# Patient Record
Sex: FEMALE | Race: WHITE | NOT HISPANIC OR LATINO | Employment: FULL TIME | URBAN - METROPOLITAN AREA
[De-identification: names, ages, dates, MRNs, and addresses within clinical notes are randomized per-mention and may not be internally consistent; named-entity substitution may affect disease eponyms.]

---

## 2017-01-03 PROBLEM — M79.601 ARM PAIN, RIGHT: Status: ACTIVE | Noted: 2017-01-03

## 2018-04-28 ENCOUNTER — HOSPITAL ENCOUNTER (EMERGENCY)
Facility: HOSPITAL | Age: 63
Discharge: HOME | End: 2018-04-28
Attending: EMERGENCY MEDICINE | Admitting: EMERGENCY MEDICINE

## 2018-04-28 ENCOUNTER — APPOINTMENT (OUTPATIENT)
Dept: CT IMAGING | Facility: HOSPITAL | Age: 63
End: 2018-04-28

## 2018-04-28 VITALS
RESPIRATION RATE: 16 BRPM | TEMPERATURE: 98.5 F | SYSTOLIC BLOOD PRESSURE: 146 MMHG | OXYGEN SATURATION: 94 % | HEART RATE: 97 BPM | HEIGHT: 72 IN | BODY MASS INDEX: 27.77 KG/M2 | DIASTOLIC BLOOD PRESSURE: 93 MMHG | WEIGHT: 205 LBS

## 2018-04-28 DIAGNOSIS — S00.03XA HEMATOMA OF SCALP, INITIAL ENCOUNTER: Primary | ICD-10-CM

## 2018-04-28 DIAGNOSIS — S09.90XA MINOR CLOSED HEAD INJURY: ICD-10-CM

## 2018-04-28 PROCEDURE — 99283 EMERGENCY DEPT VISIT LOW MDM: CPT

## 2018-04-28 PROCEDURE — 70450 CT HEAD/BRAIN W/O DYE: CPT

## 2018-05-08 ENCOUNTER — TRANSCRIBE ORDERS (OUTPATIENT)
Dept: OCCUPATIONAL THERAPY | Facility: CLINIC | Age: 63
End: 2018-05-08

## 2018-05-08 DIAGNOSIS — S76.211A GROIN STRAIN, RIGHT, INITIAL ENCOUNTER: Primary | ICD-10-CM

## 2018-05-09 ENCOUNTER — TREATMENT (OUTPATIENT)
Dept: PHYSICAL THERAPY | Facility: CLINIC | Age: 63
End: 2018-05-09

## 2018-05-09 DIAGNOSIS — S76.211D GROIN STRAIN, RIGHT, SUBSEQUENT ENCOUNTER: Primary | ICD-10-CM

## 2018-05-09 PROCEDURE — 97110 THERAPEUTIC EXERCISES: CPT | Performed by: PHYSICAL THERAPIST

## 2018-05-09 PROCEDURE — 97140 MANUAL THERAPY 1/> REGIONS: CPT | Performed by: PHYSICAL THERAPIST

## 2018-05-09 PROCEDURE — 97001 PR PHYS THERAPY EVALUATION: CPT | Performed by: PHYSICAL THERAPIST

## 2018-05-09 NOTE — PROGRESS NOTES
Physical Therapy Initial Evaluation and Plan of Care    Patient: Penelope Onofre   : 1955  Diagnosis/ICD-10 Code:  Groin strain, right, subsequent encounter [S76.211D]  Referring practitioner: CARYN Wiggins    Subjective Evaluation    History of Present Illness  Date of onset: 2018  Mechanism of injury: Walking down hallway and slipped falling on R hip/head.  Didn't notice hip initially due to head pain. Pt denied dizziness before or after her fall    PMH of sciatica which wasn't symptomatic before her fall.  Pt also has a history of anxiety.        Patient Occupation: [employer] for 27 years   Precautions and Work Restrictions: no pushing/pullling >20#, sit down prn, no crawling/kneeling/squatting/standing/walkingPain  Current pain ratin  At worst pain ratin  Location: R inguinal region  Quality: pulling  Relieving factors: heat  Aggravating factors: lifting (sit to stand)    Diagnostic Tests  CT scan: normal    Treatments  Previous treatment: medication (dose pack)  Patient Goals  Patient goals for therapy: decreased pain             Objective       Palpation   Left   No palpable tenderness to the adductor brevis, adductor longus and iliopsoas.     Tenderness     Lumbar Spine  No tenderness in the spinous process.     Right Hip   Tenderness in the inguinal ligament. No tenderness in the ASIS and greater trochanter.     Active Range of Motion   Left Hip   External rotation (90/90): 30 degrees   Internal rotation (90/90): 30 degrees     Right Hip   Flexion: 95 degrees with pain  External rotation (90/90): 30 degrees   Internal rotation (90/90): 32 degrees     Passive Range of Motion     Right Hip   Normal passive range of motion    Strength/Myotome Testing     Left Hip   Planes of Motion   Flexion: 5  Extension: 2+    Right Hip   Planes of Motion   Flexion: 5  Extension: 2+  Abduction: 2+  Adduction: 5  External rotation: 5  Internal rotation: 5    Additional Strength  Details  Pt noted R groin pain with L hip flexion    Tests     Lumbar     Right   Negative passive SLR.     Left Hip   SLR: Negative.     Right Hip   Positive SERGIO.   Negative FADIR.     Additional Tests Details  Pt unable to perform R ASLR due to groin pain    Level ASIS    Slow, guarded transitional movements    Ambulation     Observational Gait   Gait: antalgic   Decreased right stance time.          Assessment & Plan     Assessment  Impairments: abnormal or restricted ROM, activity intolerance, impaired physical strength and pain with function  Assessment details:  Penelope Onofre is a pleasant 63 y.o. female that presents with signs and symptoms consistent with the above diagnosis. Pt would benefit from skilled PT services in order to address listed impairments and increase tolerance to normal daily activities including ADLs, work and recreational activities.       Prognosis: good  Functional Limitations: lifting, sleeping, walking, uncomfortable because of pain, sitting and standing  Goals  Plan Goals: STG In 2 weeks  1. Pt to be independent with HEP  2. Pt to ambulate in a good reciprocal gait pattern   3. Pt to report decreased pain with transitional movements such as sit to stand.   4. Pt to exhibit R hip overall strength to >/= 3+/5 with minimal pain to allow for prolonged ADL's and walking    LTG In 4 weeks  1. Pt to exhibit at least 100 degrees of R hip flex AROM to allow for traversing objects and transitional movements as necessary for ADL's  2. Pt to report min to no pain with ADLs/work  3. Pt to return to work with min/no restrictions  4. Pt to exhibit 5/5 strength throughout hip and knee musculature to allow for normalized gait and prolonged standing.  5. - SERGIO testing to allow for pivoting movements during ADLs with minimal discomfort.      Plan  Therapy options: will be seen for skilled physical therapy services  Planned modality interventions: ultrasound, electrical stimulation/[place]izen [place]  stimulation, cryotherapy and thermotherapy (hydrocollator packs)  Planned therapy interventions: abdominal trunk stabilization, flexibility, strengthening, stretching, soft tissue mobilization, manual therapy, joint mobilization, home exercise program and gait training  Frequency: 3x week  Duration in weeks: 4  Treatment plan discussed with: patient        Manual Therapy:    10     mins  72280;  Therapeutic Exercise:    10     mins  10766;     Neuromuscular Rogelio:    -    mins  86385;    Therapeutic Activity:     -     mins  36269;     Gait Training:      -     mins  90145;     Ultrasound:     -     mins  18347;    Electrical Stimulation:    -     mins  13641 ( );  Iontophoresis                 -     mins 41440      Timed Treatment:   20   mins   Total Treatment:     60   mins    PT SIGNATURE: FARZANEH Gonzalez License # [id number]  DATE TREATMENT INITIATED: 5/9/2018    Initial Certification  Certification Period: 8/7/2018  I certify that the therapy services are furnished while this patient is under my care.  The services outlined above are required by this patient, and will be reviewed every 90 days.     PHYSICIAN: Kelly Saravia, CARYN      DATE:     Please sign and return via fax to [phone].. Thank you, [facility].

## 2018-05-09 NOTE — PATIENT INSTRUCTIONS
Access Code: [email or web address]   URL: [email or web address]   Date: 05/09/2018   Prepared by: Angie Hinojosa     Exercises   Supine Hip Adduction Isometric with Ball - 10 reps - 1 sets - 10 hold - 1x daily   Hooklying Isometric Hip Abduction with Belt - 10 reps - 1 sets - 10 hold - 1x daily   Hooklying Isometric Hip Flexion - 10 reps - 1 sets - 5 hold - 1x daily   Supine Gluteal Sets - 10 reps - 1 sets - 5 hold - 1x daily   Beginner Bridge - 10 reps - 1 sets - 5 hold - 1x daily   Seated Hip Adduction Isometrics with Ball - 10 reps - 1 sets - 10 hold - 1x daily   Seated Isometric Hip Abduction with Belt - 10 reps - 1 sets - 10 hold - 2x daily

## 2018-05-11 ENCOUNTER — TREATMENT (OUTPATIENT)
Dept: PHYSICAL THERAPY | Facility: CLINIC | Age: 63
End: 2018-05-11

## 2018-05-11 DIAGNOSIS — S76.211D GROIN STRAIN, RIGHT, SUBSEQUENT ENCOUNTER: Primary | ICD-10-CM

## 2018-05-11 PROCEDURE — 97110 THERAPEUTIC EXERCISES: CPT | Performed by: PHYSICAL THERAPIST

## 2018-05-11 PROCEDURE — 97140 MANUAL THERAPY 1/> REGIONS: CPT | Performed by: PHYSICAL THERAPIST

## 2018-05-11 PROCEDURE — 97035 APP MDLTY 1+ULTRASOUND EA 15: CPT | Performed by: PHYSICAL THERAPIST

## 2018-05-11 NOTE — PROGRESS NOTES
Physical Therapy Daily Progress Note    Visit # : 2  Penelope Onofre reports: feeling about the same; is unsure about exercise technique and would like to have videos of exercises.      Subjective     Objective   See Exercise, Manual, and Modality Logs for complete treatment.       Assessment/Plan  Pt was sent a link for HEP videos and demonstrated good technique and understanding in clinic.  Good tolerance to Sci-Fit exercise progression.  Progress strengthening /stabilization /functional activity           Manual Therapy:    10     mins  78460;  Therapeutic Exercise:    15     mins  89124;     Neuromuscular Rogelio:    -    mins  87898;    Therapeutic Activity:     -     mins  96666;     Gait Training:      -     mins  18163;     Ultrasound:     8     mins  38654;    Electrical Stimulation:    -     mins  98007 ( );  Iontophoresis                 -     mins 20786      Timed Treatment:   33   mins   Total Treatment:     45   mins        Donna Hinojosa PT  Physical Therapist  [place] License # [id number]

## 2018-05-14 ENCOUNTER — TREATMENT (OUTPATIENT)
Dept: PHYSICAL THERAPY | Facility: CLINIC | Age: 63
End: 2018-05-14

## 2018-05-14 DIAGNOSIS — S76.211D GROIN STRAIN, RIGHT, SUBSEQUENT ENCOUNTER: Primary | ICD-10-CM

## 2018-05-14 PROCEDURE — 97140 MANUAL THERAPY 1/> REGIONS: CPT | Performed by: PHYSICAL THERAPIST

## 2018-05-14 PROCEDURE — 97110 THERAPEUTIC EXERCISES: CPT | Performed by: PHYSICAL THERAPIST

## 2018-05-14 PROCEDURE — 97035 APP MDLTY 1+ULTRASOUND EA 15: CPT | Performed by: PHYSICAL THERAPIST

## 2018-05-14 NOTE — PATIENT INSTRUCTIONS
Access Code: [email or web address]   URL: [email or web address]   Date: 05/14/2018   Prepared by: Angie Hinojosa     Exercises   Supine March - 10 reps - 1 sets - 5 hold - 1x daily   Hooklying Clamshell with Resistance - 10 reps - 2 sets - 3 hold - 1x daily   Seated Hip Abduction with Resistance - 10 reps - 2 sets - 3 hold - 1x daily

## 2018-05-14 NOTE — PROGRESS NOTES
Physical Therapy Daily Progress Note    Visit # : 3  Penelope Onofre reports: felt a little better over the weekend but groin is sore today after returning to work.    Subjective     Objective   See Exercise, Manual, and Modality Logs for complete treatment.       Assessment/Plan  Pt had difficulty performing sidestepping in good form on Treadmill and required SBA for safety.  Excellent tolerance to exercise progression without exacerbation of symptoms  Progress per Plan of Care           Manual Therapy:    10     mins  57337;  Therapeutic Exercise:    25     mins  97038;     Neuromuscular Rogelio:    -    mins  88600;    Therapeutic Activity:     -     mins  33285;     Gait Training:      -     mins  04665;     Ultrasound:     8     mins  62394;    Electrical Stimulation:    -     mins  34152 ( );  Iontophoresis                 -     mins 72212      Timed Treatment:   43   mins   Total Treatment:     55   mins        Donna Hinojosa PT  Physical Therapist  [place] License # [id number]

## 2018-05-16 ENCOUNTER — TREATMENT (OUTPATIENT)
Dept: PHYSICAL THERAPY | Facility: CLINIC | Age: 63
End: 2018-05-16

## 2018-05-16 DIAGNOSIS — S76.211D GROIN STRAIN, RIGHT, SUBSEQUENT ENCOUNTER: Primary | ICD-10-CM

## 2018-05-16 PROCEDURE — 97035 APP MDLTY 1+ULTRASOUND EA 15: CPT | Performed by: PHYSICAL THERAPIST

## 2018-05-16 PROCEDURE — 97140 MANUAL THERAPY 1/> REGIONS: CPT | Performed by: PHYSICAL THERAPIST

## 2018-05-16 PROCEDURE — 97110 THERAPEUTIC EXERCISES: CPT | Performed by: PHYSICAL THERAPIST

## 2018-05-16 NOTE — PATIENT INSTRUCTIONS
Access Code: [email or web address]   URL: [email or web address]   Date: 05/16/2018   Prepared by: Angie Hinojosa     Exercises   Hooklying Single Knee to Chest - 2 reps - 1 sets - 20 hold - 1x daily

## 2018-05-16 NOTE — PROGRESS NOTES
Physical Therapy Daily Progress Note    Visit # : 4  Penelope Onofre reports: feeling 85% better    Subjective     Objective   See Exercise, Manual, and Modality Logs for complete treatment.       Assessment/Plan  Pt is responding favorably to treatment and has been consistent with HEP.  Add push/pull sled next visit if tolerated.    Progress per Plan of Care           Manual Therapy:    10     mins  87721;  Therapeutic Exercise:    25     mins  47607;     Neuromuscular Rogelio:    -    mins  76747;    Therapeutic Activity:     -     mins  44875;     Gait Training:      -     mins  01528;     Ultrasound:     8     mins  21005;    Electrical Stimulation:    -     mins  64354 ( );  Iontophoresis                 -     mins 94173      Timed Treatment:   43   mins   Total Treatment:     55   mins        Donna Hinojosa, PT  Physical Therapist  [place] License # [id number]

## 2018-05-18 ENCOUNTER — TREATMENT (OUTPATIENT)
Dept: PHYSICAL THERAPY | Facility: CLINIC | Age: 63
End: 2018-05-18

## 2018-05-18 DIAGNOSIS — S76.211D GROIN STRAIN, RIGHT, SUBSEQUENT ENCOUNTER: Primary | ICD-10-CM

## 2018-05-18 PROCEDURE — 97110 THERAPEUTIC EXERCISES: CPT | Performed by: PHYSICAL THERAPIST

## 2018-05-18 PROCEDURE — 97140 MANUAL THERAPY 1/> REGIONS: CPT | Performed by: PHYSICAL THERAPIST

## 2018-05-18 NOTE — PROGRESS NOTES
Physical Therapy Daily Progress Note    Visit # : 5  Penelope Onofre reports: pt reports that she is feeling much better; no pain at work today.  Has a thermastat issue at home and requests to abbreviate treatment today and will perform HEP/MH when she gets home.      Subjective     Objective   See Exercise, Manual, and Modality Logs for complete treatment.       Assessment/Plan  Held on ultrasound today as well as symptoms have significant decreased.  Reassess for MD next visit.    Progress per Plan of Care           Manual Therapy:    10     mins  55379;  Therapeutic Exercise:    15     mins  71225;     Neuromuscular Rogelio:    -    mins  25603;    Therapeutic Activity:     -     mins  74028;     Gait Training:      -     mins  06333;     Ultrasound:     -     mins  65723;    Electrical Stimulation:    -     mins  29610 ( );  Iontophoresis                 -     mins 95228      Timed Treatment:  25    mins   Total Treatment:     25   mins        Donna Hinojosa, FARZANEH  Physical Therapist  [place] License # [id number]

## 2018-05-21 ENCOUNTER — TREATMENT (OUTPATIENT)
Dept: PHYSICAL THERAPY | Facility: CLINIC | Age: 63
End: 2018-05-21

## 2018-05-21 DIAGNOSIS — S76.211D GROIN STRAIN, RIGHT, SUBSEQUENT ENCOUNTER: Primary | ICD-10-CM

## 2018-05-21 PROCEDURE — 97110 THERAPEUTIC EXERCISES: CPT | Performed by: PHYSICAL THERAPIST

## 2018-05-21 NOTE — PROGRESS NOTES
"MD note    Patient: Penelope Onofre   : 1955  Diagnosis/ICD-10 Code:  Groin strain, right, subsequent encounter [S76.211D]  Referring practitioner: CARYN Wiggins  Date of Initial Visit: Type: THERAPY  Noted: 2018  Today's Date: 2018  Patient seen for 6 sessions      Subjective:   Clinical Progress: improved  Home Program Compliance: Yes  Treatment has included: therapeutic exercise, manual therapy, ultrasound and moist heat    Subjective Evaluation    History of Present Illness  Mechanism of injury: \"I'm feeling 150% better\"    Pain  Current pain ratin         Objective   MMT: R hip flex/Add/IR/ER  and non painful      Gait: pt ambulates in a good reciprocal gait pattern  AROM: R hip flex 120 degrees  Special testing: - SERGIO  Function: pt was able to push a 50# sled with pivot turn 2x25' without symptoms    Assessment & Plan     Assessment  Assessment details: Pt has made excellent progress in physical therapy and has met all STG/LTG and demonstrates good understanding of HEP.  Pt demonstrates ability to return to full duty work.  Recommend discharge from formal PT.           Recommendations: Discharge    PT Signature: Donna Hinojosa, PT  [place] License # [id number]        Manual Therapy:    -     mins  75537;  Therapeutic Exercise:    30     mins  86473;     Neuromuscular Rogelio:   -     mins  84729;    Therapeutic Activity:     5     mins  58146;     Gait Training:      -     mins  95188;     Ultrasound:     -     mins  24454;    Electrical Stimulation:    -     mins  85313 ( );  Iontophoresis                 -     mins 52882    Timed Treatment:   35   mins   Total Treatment:     35   mins                      "

## 2018-06-27 ENCOUNTER — DOCUMENTATION (OUTPATIENT)
Dept: PHYSICAL THERAPY | Facility: CLINIC | Age: 63
End: 2018-06-27

## 2018-06-27 NOTE — PROGRESS NOTES
Discharge Summary  Discharge Summary from Physical Therapy Report      Dates  PT visit: 5/9-5/21/18  Number of Visits: 6     Discharge Status of Patient: See MD Note dated 5/21/18    Goals: All Met    Discharge Plan: Continue with current home exercise program as instructed    Date of Discharge 6/27/18        Donna Hinojosa, PT  Physical Therapist

## 2018-11-17 ENCOUNTER — APPOINTMENT (OUTPATIENT)
Dept: GENERAL RADIOLOGY | Facility: HOSPITAL | Age: 63
End: 2018-11-17

## 2018-11-17 ENCOUNTER — HOSPITAL ENCOUNTER (EMERGENCY)
Facility: HOSPITAL | Age: 63
Discharge: HOME | End: 2018-11-17
Attending: FAMILY MEDICINE | Admitting: FAMILY MEDICINE

## 2018-11-17 VITALS
DIASTOLIC BLOOD PRESSURE: 97 MMHG | SYSTOLIC BLOOD PRESSURE: 150 MMHG | WEIGHT: 182 LBS | HEART RATE: 6 BPM | RESPIRATION RATE: 16 BRPM | BODY MASS INDEX: 24.65 KG/M2 | OXYGEN SATURATION: 99 % | HEIGHT: 72 IN | TEMPERATURE: 98.3 F

## 2018-11-17 DIAGNOSIS — S59.901A ELBOW INJURY, RIGHT, INITIAL ENCOUNTER: Primary | ICD-10-CM
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
## 2018-11-17 PROCEDURE — 99283 EMERGENCY DEPT VISIT LOW MDM: CPT

## 2018-11-17 PROCEDURE — 73070 X-RAY EXAM OF ELBOW: CPT

## 2018-11-17 RX ORDER — HYDROCODONE BITARTRATE AND ACETAMINOPHEN 5; 325 MG/1; MG/1
1 TABLET ORAL EVERY 4 HOURS PRN
Qty: 8 TABLET | Refills: 0 | Status: SHIPPED | OUTPATIENT
Start: 2018-11-17

## 2018-11-17 NOTE — DISCHARGE INSTRUCTIONS
If unable to work Monday due to pain, call Dr Armenta for a recheck in the office Monday or Tuesday.  Return if any problems

## 2018-11-17 NOTE — ED PROVIDER NOTES
" EMERGENCY DEPARTMENT ENCOUNTER    CHIEF COMPLAINT  Chief Complaint: Elbow Pain  History given by: Patient  History limited by:   Room Number: 27/27  PMD: Provider, No Known      HPI:  Pt is a 63 y.o. female who presents complaining of R elbow pain that occurred today PTA while at work. Pt states that she was carrying bags of garbage when she slipped on eggs which caused her to fall. Pt did not strike her head or have LOC. She has been able to move elbow with pain. Pt has also been able to ambulate.    Duration: PTA  Onset: sudden  Timing: constant  Location: R elbow  Radiation: none  Quality: \"pain\"  Intensity/Severity: moderate  Progression: unchanged  Associated Symptoms: none  Aggravating Factors: movement  Alleviating Factors: none  Previous Episodes: none  Treatment before arrival: none    PAST MEDICAL HISTORY  Active Ambulatory Problems     Diagnosis Date Noted   • Arm pain, left 01/03/2017     Resolved Ambulatory Problems     Diagnosis Date Noted   • No Resolved Ambulatory Problems     Past Medical History:   Diagnosis Date   • Claudication (CMS/HCC)    • GERD (gastroesophageal reflux disease)        PAST SURGICAL HISTORY  Past Surgical History:   Procedure Laterality Date   • BREAST CYST EXCISION Right    • TONSILLECTOMY         FAMILY HISTORY  Family History   Problem Relation Age of Onset   • Alzheimer's disease Mother    • Cancer Father        SOCIAL HISTORY  Social History     Socioeconomic History   • Marital status:      Spouse name: Not on file   • Number of children: Not on file   • Years of education: Not on file   • Highest education level: Not on file   Social Needs   • Financial resource strain: Not on file   • Food insecurity - worry: Not on file   • Food insecurity - inability: Not on file   • Transportation needs - medical: Not on file   • Transportation needs - non-medical: Not on file   Occupational History   • Not on file   Tobacco Use   • Smoking status: Former Smoker     Years: " 40.00   • Smokeless tobacco: Never Used   Substance and Sexual Activity   • Alcohol use: No   • Drug use: Defer   • Sexual activity: Defer   Other Topics Concern   • Not on file   Social History Narrative   • Not on file       ALLERGIES  Codeine and Sulfa antibiotics    REVIEW OF SYSTEMS  Review of Systems   Constitutional: Negative for fever.   Respiratory: Negative for shortness of breath.    Cardiovascular: Negative for chest pain.   Musculoskeletal: Positive for arthralgias (R elbow).       PHYSICAL EXAM  ED Triage Vitals [11/17/18 0848]   Temp Heart Rate Resp BP SpO2   -- 86 16 150/97 --      Temp src Heart Rate Source Patient Position BP Location FiO2 (%)   -- -- -- -- --       Physical Exam   Constitutional: She is oriented to person, place, and time. No distress.   Eyes: EOM are normal.   Neck: Normal range of motion.   Cardiovascular: Normal rate and regular rhythm.   Pulmonary/Chest: Effort normal and breath sounds normal. No respiratory distress.   Musculoskeletal:        Right elbow: Tenderness found.   Neurological: She is alert and oriented to person, place, and time. She has normal sensation and normal strength.   Skin: Skin is warm and dry.   Psychiatric: Affect normal.   Nursing note and vitals reviewed.    RADIOLOGY  XR Elbow 2 View Right - negative fx        I ordered the above noted radiological studies. Interpreted by radiologist.  Reviewed by me in PACS.       PROCEDURES  Procedures      PROGRESS AND CONSULTS     9:00 AM  Ordered XR R elbow.  9:27 AM  Rechecked with pt. Informed of her imaging showing no obvious fx, but possibility of radial head fx. Rechecked her ROM and it is actually almost painless.  Suspect this is a contusion and less likelyu an occult radial head fracture. Will place in sling and if sxs persist will have follow-up with orthopedist.       MEDICAL DECISION MAKING  Results were reviewed/discussed with the patient and they were also made aware of online access. Pt also made  aware that some labs, such as cultures, will not be resulted during ER visit and follow up with PMD is necessary.     MDM  Number of Diagnoses or Management Options  Elbow injury, right, initial encounter:   Fall, initial encounter:      Amount and/or Complexity of Data Reviewed  Tests in the radiology section of CPT®: ordered and reviewed (negative)           DIAGNOSIS  Final diagnoses:   Elbow injury, right, initial encounter   Fall, initial encounter       DISPOSITION  DISCHARGE    Patient discharged in stable condition.    Reviewed implications of results, diagnosis, meds, responsibility to follow up, warning signs and symptoms of possible worsening, potential complications and reasons to return to ER.    Patient/Family voiced understanding of above instructions.    Discussed plan for discharge, as there is no emergent indication for admission. Patient referred to primary care provider for BP management due to today's BP. Pt/family is agreeable and understands need for follow up and repeat testing.  Pt is aware that discharge does not mean that nothing is wrong but it indicates no emergency is present that requires admission and they must continue care with follow-up as given below or physician of their choice.     FOLLOW-UP  Carlos Armenta Jr., MD  [address] [address]  [phone]    Call in 2 days  For Orthopedist follow-up, If symptoms worsen         Medication List      New Prescriptions    HYDROcodone-acetaminophen 5-325 MG per tablet  Commonly known as:  NORCO  Take 1 tablet by mouth Every 4 (Four) Hours As Needed (pain).              Latest Documented Vital Signs:  As of 9:33 AM  BP- 150/97 HR- 86 Temp-   O2 sat- 99%    --  Documentation assistance provided by lori Gan for Dr. Lawson.  Information recorded by the scribe was done at my direction and has been verified and validated by me.     Wu Gan  11/17/18 1836       Augustus Lawson MD  11/17/18 4315

## 2019-01-25 ENCOUNTER — APPOINTMENT (OUTPATIENT)
Dept: GENERAL RADIOLOGY | Facility: HOSPITAL | Age: 64
End: 2019-01-25

## 2019-01-25 ENCOUNTER — HOSPITAL ENCOUNTER (EMERGENCY)
Facility: HOSPITAL | Age: 64
Discharge: HOME | End: 2019-01-25
Attending: EMERGENCY MEDICINE | Admitting: EMERGENCY MEDICINE

## 2019-01-25 ENCOUNTER — APPOINTMENT (OUTPATIENT)
Dept: CT IMAGING | Facility: HOSPITAL | Age: 64
End: 2019-01-25

## 2019-01-25 VITALS
TEMPERATURE: 97.6 F | WEIGHT: 168.1 LBS | OXYGEN SATURATION: 95 % | DIASTOLIC BLOOD PRESSURE: 100 MMHG | BODY MASS INDEX: 22.77 KG/M2 | HEIGHT: 72 IN | SYSTOLIC BLOOD PRESSURE: 182 MMHG | HEART RATE: 77 BPM | RESPIRATION RATE: 16 BRPM

## 2019-01-25 DIAGNOSIS — R51.9 NONINTRACTABLE EPISODIC HEADACHE, UNSPECIFIED HEADACHE TYPE: ICD-10-CM

## 2019-01-25 DIAGNOSIS — R79.89 ELEVATED SERUM CREATININE: ICD-10-CM

## 2019-01-25 DIAGNOSIS — I10 HYPERTENSION, UNSPECIFIED TYPE: Primary | ICD-10-CM

## 2019-01-25 LAB
ALBUMIN SERPL-MCNC: 4.2 G/DL (ref 3.5–5.2)
ALBUMIN/GLOB SERPL: 1.3 G/DL
ALP SERPL-CCNC: 79 U/L (ref 39–117)
ALT SERPL W P-5'-P-CCNC: 9 U/L (ref 1–33)
ANION GAP SERPL CALCULATED.3IONS-SCNC: 12.2 MMOL/L
AST SERPL-CCNC: 13 U/L (ref 1–32)
BASOPHILS # BLD AUTO: 0.02 10*3/MM3 (ref 0–0.2)
BASOPHILS NFR BLD AUTO: 0.3 % (ref 0–1.5)
BILIRUB SERPL-MCNC: 0.4 MG/DL (ref 0.1–1.2)
BUN BLD-MCNC: 22 MG/DL (ref 8–23)
BUN/CREAT SERPL: 15.9 (ref 7–25)
CALCIUM SPEC-SCNC: 9.3 MG/DL (ref 8.6–10.5)
CHLORIDE SERPL-SCNC: 102 MMOL/L (ref 98–107)
CO2 SERPL-SCNC: 21.8 MMOL/L (ref 22–29)
CREAT BLD-MCNC: 1.38 MG/DL (ref 0.57–1)
DEPRECATED RDW RBC AUTO: 49.2 FL (ref 37–54)
EOSINOPHIL # BLD AUTO: 0.06 10*3/MM3 (ref 0–0.7)
EOSINOPHIL NFR BLD AUTO: 0.9 % (ref 0.3–6.2)
ERYTHROCYTE [DISTWIDTH] IN BLOOD BY AUTOMATED COUNT: 13.1 % (ref 11.7–13)
GFR SERPL CREATININE-BSD FRML MDRD: 38 ML/MIN/1.73
GLOBULIN UR ELPH-MCNC: 3.2 GM/DL
GLUCOSE BLD-MCNC: 92 MG/DL (ref 65–99)
HCT VFR BLD AUTO: 42.8 % (ref 35.6–45.5)
HGB BLD-MCNC: 14.2 G/DL (ref 11.9–15.5)
HOLD SPECIMEN: NORMAL
HOLD SPECIMEN: NORMAL
IMM GRANULOCYTES # BLD AUTO: 0 10*3/MM3 (ref 0–0.03)
IMM GRANULOCYTES NFR BLD AUTO: 0 % (ref 0–0.5)
LYMPHOCYTES # BLD AUTO: 1.39 10*3/MM3 (ref 0.9–4.8)
LYMPHOCYTES NFR BLD AUTO: 21.8 % (ref 19.6–45.3)
MCH RBC QN AUTO: 34 PG (ref 26.9–32)
MCHC RBC AUTO-ENTMCNC: 33.2 G/DL (ref 32.4–36.3)
MCV RBC AUTO: 102.4 FL (ref 80.5–98.2)
MONOCYTES # BLD AUTO: 0.61 10*3/MM3 (ref 0.2–1.2)
MONOCYTES NFR BLD AUTO: 9.6 % (ref 5–12)
NEUTROPHILS # BLD AUTO: 4.3 10*3/MM3 (ref 1.9–8.1)
NEUTROPHILS NFR BLD AUTO: 67.4 % (ref 42.7–76)
PLATELET # BLD AUTO: 129 10*3/MM3 (ref 140–500)
PMV BLD AUTO: 10.3 FL (ref 6–12)
POTASSIUM BLD-SCNC: 4.1 MMOL/L (ref 3.5–5.2)
PROT SERPL-MCNC: 7.4 G/DL (ref 6–8.5)
RBC # BLD AUTO: 4.18 10*6/MM3 (ref 3.9–5.2)
SODIUM BLD-SCNC: 136 MMOL/L (ref 136–145)
TROPONIN T SERPL-MCNC: <0.01 NG/ML (ref 0–0.03)
WBC NRBC COR # BLD: 6.38 10*3/MM3 (ref 4.5–10.7)
WHOLE BLOOD HOLD SPECIMEN: NORMAL
WHOLE BLOOD HOLD SPECIMEN: NORMAL

## 2019-01-25 PROCEDURE — 93005 ELECTROCARDIOGRAM TRACING: CPT | Performed by: PHYSICIAN ASSISTANT

## 2019-01-25 PROCEDURE — 93010 ELECTROCARDIOGRAM REPORT: CPT | Performed by: INTERNAL MEDICINE

## 2019-01-25 PROCEDURE — 85025 COMPLETE CBC W/AUTO DIFF WBC: CPT | Performed by: PHYSICIAN ASSISTANT

## 2019-01-25 PROCEDURE — 71046 X-RAY EXAM CHEST 2 VIEWS: CPT

## 2019-01-25 PROCEDURE — 70450 CT HEAD/BRAIN W/O DYE: CPT

## 2019-01-25 PROCEDURE — 80053 COMPREHEN METABOLIC PANEL: CPT | Performed by: PHYSICIAN ASSISTANT

## 2019-01-25 PROCEDURE — 99284 EMERGENCY DEPT VISIT MOD MDM: CPT

## 2019-01-25 PROCEDURE — 84484 ASSAY OF TROPONIN QUANT: CPT | Performed by: PHYSICIAN ASSISTANT

## 2019-01-25 RX ORDER — AMLODIPINE BESYLATE 10 MG/1
10 TABLET ORAL DAILY
Qty: 30 TABLET | Refills: 0 | Status: SHIPPED | OUTPATIENT
Start: 2019-01-25

## 2019-01-25 RX ORDER — CLONIDINE HYDROCHLORIDE 0.1 MG/1
0.1 TABLET ORAL ONCE
Status: COMPLETED | OUTPATIENT
Start: 2019-01-25 | End: 2019-01-25

## 2019-01-25 RX ADMIN — CLONIDINE HYDROCHLORIDE 0.1 MG: 0.1 TABLET ORAL at 08:18

## 2019-01-25 NOTE — ED PROVIDER NOTES
"EMERGENCY DEPARTMENT ENCOUNTER    Room Number:  30/30  Date seen:  1/25/2019  Time seen: 7:49 AM  PCP: Provider, No Known   History provided by- patient    HPI:  Chief complaint: elevated blood pressure  Context:Penelope Onofre is a 63 y.o. female who presents to the ED with c/o elevated BP. She reports that about 4 days ago, she slipped on ice and fell. She notes that the following day, when she went to [facility] to be evaluated for the fall, she had elevated BP of 202/104 and was instructed to follow up with a PMD for it. She states that today, while she was at work, she checked her BP and it was elevated (SBP of 199) and so she came to ED for further evaluation. She reports that she has had intermittent headaches (gradual onset, moderate in severity) \"for a while\" and intermittent dizziness for the past week but currently has neither of these sx. She denies chest pain, dyspnea, vision changes, focal weakness, numbness, syncope, abd pain, N/V/D, acute change in chronic bilateral hand swelling, BLE swelling, fever, chills, and past hx of HTN. She notes that she is currently not established with a PMD and has not undergone blood work in several years. She reports that she has hx of Raynaud's disease. Pt has no other complaints at this time.     Onset: gradual  Location: generalized  Radiation: N/A  Duration: first noticed about 3 days ago  Timing: intermittent  Character: reported elevated BP of 202/104 about 3 days ago, reported elevated SBP of 199 today  Aggravating Factors: none  Alleviating Factors: none  Severity: moderate    MEDICAL RECORD REVIEW    No pertinent old labs in EPIC      ALLERGIES  Codeine and Sulfa antibiotics    PAST MEDICAL HISTORY  Active Ambulatory Problems     Diagnosis Date Noted   • Arm pain, left 01/03/2017     Resolved Ambulatory Problems     Diagnosis Date Noted   • No Resolved Ambulatory Problems     Past Medical History:   Diagnosis Date   • Chronic back pain    • Claudication (CMS/HCC)    • " GERD (gastroesophageal reflux disease)        PAST SURGICAL HISTORY  Past Surgical History:   Procedure Laterality Date   • BREAST CYST EXCISION Right    • TONSILLECTOMY         FAMILY HISTORY  Family History   Problem Relation Age of Onset   • Alzheimer's disease Mother    • Cancer Father        SOCIAL HISTORY  Social History     Socioeconomic History   • Marital status:      Spouse name: Not on file   • Number of children: Not on file   • Years of education: Not on file   • Highest education level: Not on file   Social Needs   • Financial resource strain: Not on file   • Food insecurity - worry: Not on file   • Food insecurity - inability: Not on file   • Transportation needs - medical: Not on file   • Transportation needs - non-medical: Not on file   Occupational History   • Not on file   Tobacco Use   • Smoking status: Former Smoker     Years: 40.00   • Smokeless tobacco: Never Used   • Tobacco comment: quit 2 weeks ago   Substance and Sexual Activity   • Alcohol use: No     Frequency: Never   • Drug use: No   • Sexual activity: Defer   Other Topics Concern   • Not on file   Social History Narrative   • Not on file       REVIEW OF SYSTEMS  Review of Systems   Constitutional: Negative for chills and fever.        Reported elevated BP of 202/104 about 3 days ago, reported elevated SBP of 199 today   Eyes: Negative.  Negative for visual disturbance.   Respiratory: Negative for shortness of breath.    Cardiovascular: Negative for chest pain.   Gastrointestinal: Negative for abdominal pain, diarrhea, nausea and vomiting.   Genitourinary: Negative.  Negative for difficulty urinating and dysuria.   Musculoskeletal:        Chronic bilateral hand swelling (no acute change)   Skin: Negative.    Neurological: Positive for dizziness (intermittent) and headaches (intermittent). Negative for syncope, weakness and numbness.   Psychiatric/Behavioral: Negative.        PHYSICAL EXAM  ED Triage Vitals   Temp Heart Rate  Resp BP SpO2   01/25/19 0729 01/25/19 0748 01/25/19 0729 01/25/19 0734 01/25/19 0804   98.2 °F (36.8 °C) 101 18 (!) 208/126 96 % WNL      Temp src Heart Rate Source Patient Position BP Location FiO2 (%)   01/25/19 0729 01/25/19 0858 01/25/19 0858 01/25/19 0858 --   Tympanic Monitor Lying Left arm      Physical Exam   Constitutional: She is oriented to person, place, and time and well-developed, well-nourished, and in no distress.   HENT:   Head: Normocephalic and atraumatic.   Right Ear: External ear normal.   Left Ear: External ear normal.   Nose: Nose normal.   Eyes: Conjunctivae and EOM are normal.   Neck: Normal range of motion.   Cardiovascular: Normal rate, regular rhythm and intact distal pulses.   Pulmonary/Chest: Effort normal and breath sounds normal. No respiratory distress. She has no wheezes. She has no rhonchi. She has no rales.   Abdominal: Soft. She exhibits no distension. There is no tenderness.   Musculoskeletal: Normal range of motion. She exhibits edema (trace edema to bilateral hands, trace BLE edema).   Neurological: She is alert and oriented to person, place, and time. She has normal motor skills and normal sensation. GCS score is 15.   No focal neuro deficits   Skin: Skin is warm and dry.   Psychiatric: Affect normal.   Nursing note and vitals reviewed.      LAB RESULTS  Recent Results (from the past 24 hour(s))   Comprehensive Metabolic Panel    Collection Time: 01/25/19  8:12 AM   Result Value Ref Range    Glucose 92 65 - 99 mg/dL    BUN 22 8 - 23 mg/dL    Creatinine 1.38 (H) 0.57 - 1.00 mg/dL    Sodium 136 136 - 145 mmol/L    Potassium 4.1 3.5 - 5.2 mmol/L    Chloride 102 98 - 107 mmol/L    CO2 21.8 (L) 22.0 - 29.0 mmol/L    Calcium 9.3 8.6 - 10.5 mg/dL    Total Protein 7.4 6.0 - 8.5 g/dL    Albumin 4.20 3.50 - 5.20 g/dL    ALT (SGPT) 9 1 - 33 U/L    AST (SGOT) 13 1 - 32 U/L    Alkaline Phosphatase 79 39 - 117 U/L    Total Bilirubin 0.4 0.1 - 1.2 mg/dL    eGFR Non  Amer 38 (L) >60  mL/min/1.73    Globulin 3.2 gm/dL    A/G Ratio 1.3 g/dL    BUN/Creatinine Ratio 15.9 7.0 - 25.0    Anion Gap 12.2 mmol/L   Troponin    Collection Time: 01/25/19  8:12 AM   Result Value Ref Range    Troponin T <0.010 0.000 - 0.030 ng/mL   CBC Auto Differential    Collection Time: 01/25/19  8:12 AM   Result Value Ref Range    WBC 6.38 4.50 - 10.70 10*3/mm3    RBC 4.18 3.90 - 5.20 10*6/mm3    Hemoglobin 14.2 11.9 - 15.5 g/dL    Hematocrit 42.8 35.6 - 45.5 %    .4 (H) 80.5 - 98.2 fL    MCH 34.0 (H) 26.9 - 32.0 pg    MCHC 33.2 32.4 - 36.3 g/dL    RDW 13.1 (H) 11.7 - 13.0 %    RDW-SD 49.2 37.0 - 54.0 fl    MPV 10.3 6.0 - 12.0 fL    Platelets 129 (L) 140 - 500 10*3/mm3    Neutrophil % 67.4 42.7 - 76.0 %    Lymphocyte % 21.8 19.6 - 45.3 %    Monocyte % 9.6 5.0 - 12.0 %    Eosinophil % 0.9 0.3 - 6.2 %    Basophil % 0.3 0.0 - 1.5 %    Immature Grans % 0.0 0.0 - 0.5 %    Neutrophils, Absolute 4.30 1.90 - 8.10 10*3/mm3    Lymphocytes, Absolute 1.39 0.90 - 4.80 10*3/mm3    Monocytes, Absolute 0.61 0.20 - 1.20 10*3/mm3    Eosinophils, Absolute 0.06 0.00 - 0.70 10*3/mm3    Basophils, Absolute 0.02 0.00 - 0.20 10*3/mm3    Immature Grans, Absolute 0.00 0.00 - 0.03 10*3/mm3   Light Blue Top    Collection Time: 01/25/19  8:12 AM   Result Value Ref Range    Extra Tube hold for add-on    Green Top (Gel)    Collection Time: 01/25/19  8:12 AM   Result Value Ref Range    Extra Tube Hold for add-ons.    Lavender Top    Collection Time: 01/25/19  8:12 AM   Result Value Ref Range    Extra Tube hold for add-on    Gold Top - SST    Collection Time: 01/25/19  8:12 AM   Result Value Ref Range    Extra Tube Hold for add-ons.        I ordered the above labs and reviewed the results    RADIOLOGY  CT Head Without Contrast (Preliminary result)   Result time 01/25/19 10:16:43   Preliminary result by Carlos James MD (01/25/19 10:16:43)                Impression:    Negative.     Radiation dose reduction techniques were utilized, including  automated  exposure control and exposure modulation based on body size.                  Narrative:    HEAD CT WITHOUT CONTRAST 01/25/2019     HISTORY: Headache. Hypertension.     TECHNIQUE: Noncontrast head CT is provided and is correlated with  previous head CT 04/28/2018 and 04/17/2013.     FINDINGS: The ventricles are normal in caliber and unchanged in  comparison to the April 2018 CT. The brain parenchyma, extra-axial  spaces, and the bones of the skull appear normal. Paranasal sinuses,  mastoid air cells, and middle ears are clear.                       XR Chest 2 View (Preliminary result)   Result time 01/25/19 09:20:34   Preliminary result by Twan Candelaria MD (01/25/19 09:20:34)                Impression:    Borderline cardiomegaly.               Narrative:    PA AND LATERAL CHEST 01/25/2019      HISTORY: Hypertension.     FINDINGS: Heart size is at the upper limits of normal. Lungs appear free  of acute infiltrates. Bony structures appear unremarkable.                       I ordered the above noted radiological studies and reviewed the images on the PACS system.        MEDICATIONS GIVEN IN ER  Medications   CloNIDine (CATAPRES) tablet 0.1 mg (0.1 mg Oral Given 1/25/19 0818)       EKG  Interpreted by ED Physician (Dr Hall; see Dr Hall's note for EKG interpretation)      PROCEDURES  Procedures      PROGRESS AND CONSULTS    Progress Notes:       0753- BP is 189/130.     0800- Ordered clonidine for elevated BP. Ordered CT head, CXR, troponin, blood work, and EKG for further evaluation.     0827- Pt reports having anxiety with CTs. Offered medication for anxiety but pt declines, stating that she is willing to undergo CT head without it.      1020- Obtained CT head results-> no acute process.     1026- Rechecked pt. She is resting comfortably and is in no acute distress. BP is 180/114. Discussed with pt about all pertinent results including CT head findings (no acute process), CXR findings (no acute  "process), elevated creatinine of 1.38, negative troponin, otherwise stable labs, and elevated BP in ED that has somewhat improved after administration of clonidine. Informed pt of plan to prescribe 10mg norvasc QD for HTN. Counseled pt on the importance of following up closely with referred [facility] physician for recheck of condition/creatinine/BP and for further BP management and to call the office TODAY to schedule close follow up appointment. Provided discharge instructions for HTN and serum creatinine test. RTER warnings given. Pt understands and agrees with plan. Addressed all questions.    1115- Reviewed pt's history and workup with Dr. Hall.  After a bedside evaluation; Dr Hall agrees with the plan of care.       Disposition vitals:  BP (!) 180/104 (BP Location: Left arm, Patient Position: Lying)   Pulse 91   Temp 98.2 °F (36.8 °C) (Tympanic)   Resp 16   Ht 182.9 cm (72\")   Wt 76.2 kg (168 lb 1.6 oz)   SpO2 95%   BMI 22.80 kg/m²       DIAGNOSIS  Final diagnoses:   Hypertension, unspecified type   Nonintractable episodic headache, unspecified headache type   Elevated serum creatinine       DISCHARGE    Patient discharged in stable condition.    Reviewed implications of results, diagnosis, meds, responsibility to follow up, warning signs and symptoms of possible worsening, potential complications and reasons to return to ER.    Patient/Family voiced understanding of above instructions.    Discussed plan for discharge, as there is no emergent indication for admission. Patient referred to primary care provider for BP management due to today's BP. Pt/family is agreeable and understands need for follow up and repeat testing.  Pt/family is aware that discharge does not mean that nothing is wrong but it indicates no emergency is present that requires admission and that pt must continue care with follow-up as given below or physician of their choice.     FOLLOW-UP  PATIENT LIAISON [facility] " [id number]  [phone]  Schedule an appointment as soon as possible for a visit in 2 days        DISCHARGE MEDICATIONS     Medication List      New Prescriptions    amLODIPine 10 MG tablet  Commonly known as:  NORVASC  Take 1 tablet by mouth Daily.            Documentation assistance provided by lori Workman for Yohana Perea PA-C.  Information recorded by the scribe was done at my direction and has been verified and validated by me.       Angelica Workman  01/25/19 1141       Yohana Perea PA  01/26/19 9116

## 2019-01-25 NOTE — ED PROVIDER NOTES
MD ATTESTATION NOTE    The SHANICE and I have discussed this patient's history, physical exam, and treatment plan.  I have reviewed the documentation and personally had a face to face interaction with the patient. I affirm the documentation and agree with the treatment and plan.  The attached note describes my personal findings.    Pt presents with elevated BP that she first noticed about 4 days ago after she visited urgent care s/p mechanical fall. Pt checked her BP again today and her systolic was 199 so she came to the ER for further evaluation. Pt also complains of intermittent dizziness and headaches for the last several months. Pt has no other complaints at this time.     GENERAL: not distressed  HENT: nares patent  EYES: no scleral icterus  CV: regular rhythm, regular rate  RESPIRATORY: normal effort  MUSCULOSKELETAL: no deformity  NEURO: alert, GALDAMEZ, FC  SKIN: warm, dry    Vital signs and nursing notes reviewed.    Informed Pt that we are going to treat Pt with BP medicines. Emphasized the need for her to follow up with her PCP regarding long term BP management. Pt understands and agrees to all plans. All questions answered.          Esperanza Rodriguez  01/25/19 1121       Ty Hall II, MD  01/25/19 2747

## 2019-01-25 NOTE — DISCHARGE INSTRUCTIONS
Follow up with a primary care doctor of your choice or call the number above for recommendations of clinics that are accepting new patients.  Call TODAY to schedule this appointment.  Take the norvasc as prescribed.  Return to the ER for chest pain, shortness of breath, stroke symptoms, severe headache, as needed.

## 2019-01-25 NOTE — ED NOTES
Pt c/o high blood pressure today. 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 It was also high a few days ago at an [facility] when Pt was there due to a fall and back pain. Pt denies headache or any other symptoms     Gris Sow RN  01/25/19 0872

## 2025-01-01 ENCOUNTER — APPOINTMENT (OUTPATIENT)
Dept: GENERAL RADIOLOGY | Facility: HOSPITAL | Age: 70
End: 2025-01-01
Payer: MEDICARE

## 2025-01-01 ENCOUNTER — HOSPITAL ENCOUNTER (INPATIENT)
Facility: HOSPITAL | Age: 70
LOS: 1 days | End: 2025-06-04
Attending: EMERGENCY MEDICINE | Admitting: INTERNAL MEDICINE
Payer: MEDICARE

## 2025-01-01 VITALS
OXYGEN SATURATION: 88 % | SYSTOLIC BLOOD PRESSURE: 80 MMHG | BODY MASS INDEX: 17.69 KG/M2 | WEIGHT: 130.4 LBS | DIASTOLIC BLOOD PRESSURE: 43 MMHG | TEMPERATURE: 97.4 F

## 2025-01-01 DIAGNOSIS — I95.9 HYPOTENSION, UNSPECIFIED HYPOTENSION TYPE: ICD-10-CM

## 2025-01-01 DIAGNOSIS — E87.20 LACTIC ACIDOSIS: ICD-10-CM

## 2025-01-01 DIAGNOSIS — I48.91 ATRIAL FIBRILLATION WITH RVR: ICD-10-CM

## 2025-01-01 DIAGNOSIS — D69.6 THROMBOCYTOPENIA: ICD-10-CM

## 2025-01-01 DIAGNOSIS — N17.9 AKI (ACUTE KIDNEY INJURY): ICD-10-CM

## 2025-01-01 DIAGNOSIS — J81.0 ACUTE PULMONARY EDEMA: ICD-10-CM

## 2025-01-01 DIAGNOSIS — A41.9 SEPTIC SHOCK: Primary | ICD-10-CM

## 2025-01-01 DIAGNOSIS — R65.21 SEPTIC SHOCK: Primary | ICD-10-CM

## 2025-01-01 DIAGNOSIS — N39.0 ACUTE UTI: ICD-10-CM

## 2025-01-01 DIAGNOSIS — D64.9 ANEMIA, UNSPECIFIED TYPE: ICD-10-CM

## 2025-01-01 DIAGNOSIS — D72.819 LEUKOPENIA, UNSPECIFIED TYPE: ICD-10-CM

## 2025-01-01 DIAGNOSIS — E83.42 HYPOMAGNESEMIA: ICD-10-CM

## 2025-01-01 LAB
ABO GROUP BLD: NORMAL
ALBUMIN SERPL-MCNC: 2.5 G/DL (ref 3.5–5.2)
ALBUMIN SERPL-MCNC: NORMAL G/DL
ALBUMIN/GLOB SERPL: 1 G/DL
ALBUMIN/GLOB SERPL: NORMAL {RATIO}
ALP SERPL-CCNC: 34 U/L (ref 39–117)
ALP SERPL-CCNC: NORMAL U/L
ALT SERPL W P-5'-P-CCNC: 13 U/L (ref 1–33)
ALT SERPL W P-5'-P-CCNC: NORMAL U/L
ANION GAP SERPL CALCULATED.3IONS-SCNC: 20.3 MMOL/L (ref 5–15)
ANION GAP SERPL CALCULATED.3IONS-SCNC: NORMAL MMOL/L
APTT PPP: 38 SECONDS (ref 22.7–35.4)
APTT PPP: NORMAL S
ARTERIAL PATENCY WRIST A: ABNORMAL
AST SERPL-CCNC: 20 U/L (ref 1–32)
AST SERPL-CCNC: NORMAL U/L
ATMOSPHERIC PRESS: 753 MMHG
B PARAPERT DNA SPEC QL NAA+PROBE: NOT DETECTED
B PERT DNA SPEC QL NAA+PROBE: NOT DETECTED
BACTERIA UR QL AUTO: ABNORMAL /HPF
BASE EXCESS BLDA CALC-SCNC: -8.1 MMOL/L (ref 0–2)
BDY SITE: ABNORMAL
BILIRUB SERPL-MCNC: 0.5 MG/DL (ref 0–1.2)
BILIRUB SERPL-MCNC: NORMAL MG/DL
BILIRUB UR QL STRIP: NEGATIVE
BLD GP AB SCN SERPL QL: NEGATIVE
BUN SERPL-MCNC: 32 MG/DL (ref 8–23)
BUN SERPL-MCNC: NORMAL MG/DL
BUN/CREAT SERPL: 14.1 (ref 7–25)
BUN/CREAT SERPL: NORMAL
C PNEUM DNA NPH QL NAA+NON-PROBE: NOT DETECTED
CALCIUM SPEC-SCNC: 8 MG/DL (ref 8.6–10.5)
CALCIUM SPEC-SCNC: NORMAL MMOL/L
CHLORIDE SERPL-SCNC: 94 MMOL/L (ref 98–107)
CHLORIDE SERPL-SCNC: NORMAL MMOL/L
CLARITY UR: ABNORMAL
CO2 SERPL-SCNC: 12.7 MMOL/L (ref 22–29)
CO2 SERPL-SCNC: NORMAL MMOL/L
COLOR UR: ABNORMAL
CREAT SERPL-MCNC: 2.27 MG/DL (ref 0.57–1)
CREAT SERPL-MCNC: NORMAL MG/DL
D-LACTATE SERPL-SCNC: 3.1 MMOL/L (ref 0.5–2)
D-LACTATE SERPL-SCNC: 4.8 MMOL/L (ref 0.5–2)
D-LACTATE SERPL-SCNC: NORMAL MMOL/L
DEPRECATED RDW RBC AUTO: 46.7 FL (ref 37–54)
DEPRECATED RDW RBC AUTO: NORMAL FL
DEVICE COMMENT: ABNORMAL
EGFRCR SERPLBLD CKD-EPI 2021: 22.6 ML/MIN/1.73
ERYTHROCYTE [DISTWIDTH] IN BLOOD BY AUTOMATED COUNT: 13.1 % (ref 12.3–15.4)
ERYTHROCYTE [DISTWIDTH] IN BLOOD BY AUTOMATED COUNT: NORMAL %
FLUAV SUBTYP SPEC NAA+PROBE: NOT DETECTED
FLUBV RNA ISLT QL NAA+PROBE: NOT DETECTED
GAS FLOW AIRWAY: 15 LPM
GEN 5 1HR TROPONIN T REFLEX: 65 NG/L
GLOBULIN UR ELPH-MCNC: 2.6 GM/DL
GLOBULIN UR ELPH-MCNC: NORMAL MG/DL
GLUCOSE SERPL-MCNC: 123 MG/DL (ref 65–99)
GLUCOSE SERPL-MCNC: NORMAL MG/DL
GLUCOSE UR STRIP-MCNC: NEGATIVE MG/DL
HADV DNA SPEC NAA+PROBE: NOT DETECTED
HCO3 BLDA-SCNC: 17.8 MMOL/L (ref 22–28)
HCOV 229E RNA SPEC QL NAA+PROBE: NOT DETECTED
HCOV HKU1 RNA SPEC QL NAA+PROBE: NOT DETECTED
HCOV NL63 RNA SPEC QL NAA+PROBE: NOT DETECTED
HCOV OC43 RNA SPEC QL NAA+PROBE: NOT DETECTED
HCT VFR BLD AUTO: 23.4 % (ref 34–46.6)
HCT VFR BLD AUTO: NORMAL %
HEMODILUTION: NO
HGB BLD-MCNC: 8.2 G/DL (ref 12–15.9)
HGB BLD-MCNC: NORMAL G/DL
HGB UR QL STRIP.AUTO: ABNORMAL
HMPV RNA NPH QL NAA+NON-PROBE: NOT DETECTED
HPIV1 RNA ISLT QL NAA+PROBE: NOT DETECTED
HPIV2 RNA SPEC QL NAA+PROBE: NOT DETECTED
HPIV3 RNA NPH QL NAA+PROBE: NOT DETECTED
HPIV4 P GENE NPH QL NAA+PROBE: NOT DETECTED
HYALINE CASTS UR QL AUTO: ABNORMAL /LPF
HYPOCHROMIA BLD QL: NORMAL
INHALED O2 CONCENTRATION: 100 %
INR PPP: 1.47 (ref 0.9–1.1)
INR PPP: NORMAL
KETONES UR QL STRIP: NEGATIVE
LEUKOCYTE ESTERASE UR QL STRIP.AUTO: ABNORMAL
LYMPHOCYTES # BLD MANUAL: NORMAL 10*3/UL
Lab: ABNORMAL
M PNEUMO IGG SER IA-ACNC: NOT DETECTED
MAGNESIUM SERPL-MCNC: 1.4 MG/DL (ref 1.6–2.4)
MAGNESIUM SERPL-MCNC: NORMAL MG/DL
MCH RBC QN AUTO: 34.9 PG (ref 26.6–33)
MCH RBC QN AUTO: NORMAL PG
MCHC RBC AUTO-ENTMCNC: 35 G/DL (ref 31.5–35.7)
MCHC RBC AUTO-ENTMCNC: NORMAL G/DL
MCV RBC AUTO: 99.6 FL (ref 79–97)
MCV RBC AUTO: NORMAL FL
MODALITY: ABNORMAL
MRSA DNA SPEC QL NAA+PROBE: NORMAL
MYELOCYTES NFR BLD MANUAL: NORMAL %
NEUTROPHILS # BLD AUTO: NORMAL 10*3/UL
NEUTROPHILS NFR BLD MANUAL: NORMAL %
NITRITE UR QL STRIP: NEGATIVE
NOTIFIED WHO: ABNORMAL
NT-PROBNP SERPL-MCNC: ABNORMAL PG/ML (ref 0–900)
NT-PROBNP SERPL-MCNC: NORMAL PG/ML
O2 A-A PPRESDIFF RESPIRATORY: 0.1 MMHG
PCO2 BLDA: 36.9 MM HG (ref 35–45)
PH BLDA: 7.29 PH UNITS (ref 7.35–7.45)
PH UR STRIP.AUTO: 7 [PH] (ref 5–8)
PHOSPHATE SERPL-MCNC: 3.3 MG/DL (ref 2.5–4.5)
PHOSPHATE SERPL-MCNC: NORMAL MG/DL
PLAT MORPH BLD: NORMAL
PLATELET # BLD AUTO: 9 10*3/MM3 (ref 140–450)
PLATELET # BLD AUTO: NORMAL 10*3/UL
PMV BLD AUTO: 12.4 FL (ref 6–12)
PMV BLD AUTO: NORMAL FL
PO2 BLD: 56 MM[HG] (ref 0–500)
PO2 BLDA: 55.6 MM HG (ref 80–100)
POTASSIUM SERPL-SCNC: 2.8 MMOL/L (ref 3.5–5.2)
POTASSIUM SERPL-SCNC: NORMAL MMOL/L
PROCALCITONIN SERPL-MCNC: 93.2 NG/ML (ref 0–0.25)
PROT SERPL-MCNC: 5.1 G/DL (ref 6–8.5)
PROT SERPL-MCNC: NORMAL G/DL
PROT UR QL STRIP: ABNORMAL
PROTHROMBIN TIME: 17.8 SECONDS (ref 11.7–14.2)
PROTHROMBIN TIME: NORMAL S
QT INTERVAL: 293 MS
QTC INTERVAL: 477 MS
RBC # BLD AUTO: 2.35 10*6/MM3 (ref 3.77–5.28)
RBC # BLD AUTO: NORMAL 10*6/UL
RBC # UR STRIP: ABNORMAL /HPF
READ BACK: YES
REF LAB TEST METHOD: ABNORMAL
RH BLD: POSITIVE
RHINOVIRUS RNA SPEC NAA+PROBE: NOT DETECTED
RSV RNA NPH QL NAA+NON-PROBE: NOT DETECTED
SAO2 % BLDCOA: 85.2 % (ref 92–98.5)
SARS-COV-2 RNA NPH QL NAA+NON-PROBE: NOT DETECTED
SODIUM SERPL-SCNC: 127 MMOL/L (ref 136–145)
SODIUM SERPL-SCNC: NORMAL MMOL/L
SP GR UR STRIP: 1.01 (ref 1–1.03)
SQUAMOUS #/AREA URNS HPF: ABNORMAL /HPF
T&S EXPIRATION DATE: NORMAL
TOTAL RATE: 30 BREATHS/MINUTE
TROPONIN T % DELTA: 10
TROPONIN T NUMERIC DELTA: 6 NG/L
TROPONIN T SERPL HS-MCNC: NORMAL NG/L
UROBILINOGEN UR QL STRIP: ABNORMAL
VARIANT LYMPHS NFR BLD MANUAL: NORMAL %
WBC # UR STRIP: ABNORMAL /HPF
WBC MORPH BLD: NORMAL
WBC NRBC COR # BLD AUTO: 1.12 10*3/MM3 (ref 3.4–10.8)
WBC NRBC COR # BLD AUTO: NORMAL 10*3/UL

## 2025-01-01 PROCEDURE — P9035 PLATELET PHERES LEUKOREDUCED: HCPCS

## 2025-01-01 PROCEDURE — 85610 PROTHROMBIN TIME: CPT | Performed by: INTERNAL MEDICINE

## 2025-01-01 PROCEDURE — P9100 PATHOGEN TEST FOR PLATELETS: HCPCS

## 2025-01-01 PROCEDURE — P9612 CATHETERIZE FOR URINE SPEC: HCPCS

## 2025-01-01 PROCEDURE — 0202U NFCT DS 22 TRGT SARS-COV-2: CPT | Performed by: EMERGENCY MEDICINE

## 2025-01-01 PROCEDURE — 85025 COMPLETE CBC W/AUTO DIFF WBC: CPT | Performed by: EMERGENCY MEDICINE

## 2025-01-01 PROCEDURE — 87077 CULTURE AEROBIC IDENTIFY: CPT | Performed by: EMERGENCY MEDICINE

## 2025-01-01 PROCEDURE — 71045 X-RAY EXAM CHEST 1 VIEW: CPT

## 2025-01-01 PROCEDURE — 99291 CRITICAL CARE FIRST HOUR: CPT

## 2025-01-01 PROCEDURE — 25010000002 POTASSIUM CHLORIDE 10 MEQ/100ML SOLUTION: Performed by: INTERNAL MEDICINE

## 2025-01-01 PROCEDURE — 81001 URINALYSIS AUTO W/SCOPE: CPT | Performed by: EMERGENCY MEDICINE

## 2025-01-01 PROCEDURE — 94799 UNLISTED PULMONARY SVC/PX: CPT

## 2025-01-01 PROCEDURE — 99222 1ST HOSP IP/OBS MODERATE 55: CPT | Performed by: INTERNAL MEDICINE

## 2025-01-01 PROCEDURE — 87641 MR-STAPH DNA AMP PROBE: CPT

## 2025-01-01 PROCEDURE — 85007 BL SMEAR W/DIFF WBC COUNT: CPT | Performed by: EMERGENCY MEDICINE

## 2025-01-01 PROCEDURE — 36600 WITHDRAWAL OF ARTERIAL BLOOD: CPT

## 2025-01-01 PROCEDURE — 86901 BLOOD TYPING SEROLOGIC RH(D): CPT | Performed by: EMERGENCY MEDICINE

## 2025-01-01 PROCEDURE — 85610 PROTHROMBIN TIME: CPT | Performed by: EMERGENCY MEDICINE

## 2025-01-01 PROCEDURE — 85730 THROMBOPLASTIN TIME PARTIAL: CPT | Performed by: EMERGENCY MEDICINE

## 2025-01-01 PROCEDURE — 84100 ASSAY OF PHOSPHORUS: CPT | Performed by: INTERNAL MEDICINE

## 2025-01-01 PROCEDURE — 87186 SC STD MICRODIL/AGAR DIL: CPT | Performed by: EMERGENCY MEDICINE

## 2025-01-01 PROCEDURE — 83735 ASSAY OF MAGNESIUM: CPT | Performed by: EMERGENCY MEDICINE

## 2025-01-01 PROCEDURE — 84484 ASSAY OF TROPONIN QUANT: CPT | Performed by: EMERGENCY MEDICINE

## 2025-01-01 PROCEDURE — 25010000002 VASOPRESSIN 20 UNIT/ML SOLUTION

## 2025-01-01 PROCEDURE — 25810000003 SODIUM CHLORIDE 0.9 % SOLUTION 250 ML FLEX CONT: Performed by: EMERGENCY MEDICINE

## 2025-01-01 PROCEDURE — 83735 ASSAY OF MAGNESIUM: CPT | Performed by: INTERNAL MEDICINE

## 2025-01-01 PROCEDURE — 84145 PROCALCITONIN (PCT): CPT

## 2025-01-01 PROCEDURE — 82803 BLOOD GASES ANY COMBINATION: CPT

## 2025-01-01 PROCEDURE — 83880 ASSAY OF NATRIURETIC PEPTIDE: CPT | Performed by: INTERNAL MEDICINE

## 2025-01-01 PROCEDURE — 93010 ELECTROCARDIOGRAM REPORT: CPT | Performed by: INTERNAL MEDICINE

## 2025-01-01 PROCEDURE — 83880 ASSAY OF NATRIURETIC PEPTIDE: CPT | Performed by: EMERGENCY MEDICINE

## 2025-01-01 PROCEDURE — 85027 COMPLETE CBC AUTOMATED: CPT | Performed by: INTERNAL MEDICINE

## 2025-01-01 PROCEDURE — 93005 ELECTROCARDIOGRAM TRACING: CPT | Performed by: EMERGENCY MEDICINE

## 2025-01-01 PROCEDURE — 94761 N-INVAS EAR/PLS OXIMETRY MLT: CPT

## 2025-01-01 PROCEDURE — 25010000002 HYDROCORTISONE SOD SUC (PF) 100 MG RECONSTITUTED SOLUTION: Performed by: INTERNAL MEDICINE

## 2025-01-01 PROCEDURE — 86850 RBC ANTIBODY SCREEN: CPT | Performed by: EMERGENCY MEDICINE

## 2025-01-01 PROCEDURE — 80053 COMPREHEN METABOLIC PANEL: CPT | Performed by: INTERNAL MEDICINE

## 2025-01-01 PROCEDURE — 86923 COMPATIBILITY TEST ELECTRIC: CPT

## 2025-01-01 PROCEDURE — 87040 BLOOD CULTURE FOR BACTERIA: CPT | Performed by: EMERGENCY MEDICINE

## 2025-01-01 PROCEDURE — 86901 BLOOD TYPING SEROLOGIC RH(D): CPT

## 2025-01-01 PROCEDURE — 84100 ASSAY OF PHOSPHORUS: CPT | Performed by: EMERGENCY MEDICINE

## 2025-01-01 PROCEDURE — 25010000002 VANCOMYCIN HCL 1.25 G RECONSTITUTED SOLUTION 1 EACH VIAL: Performed by: EMERGENCY MEDICINE

## 2025-01-01 PROCEDURE — 36430 TRANSFUSION BLD/BLD COMPNT: CPT

## 2025-01-01 PROCEDURE — 86900 BLOOD TYPING SEROLOGIC ABO: CPT

## 2025-01-01 PROCEDURE — 36415 COLL VENOUS BLD VENIPUNCTURE: CPT

## 2025-01-01 PROCEDURE — 25810000003 LACTATED RINGERS SOLUTION: Performed by: EMERGENCY MEDICINE

## 2025-01-01 PROCEDURE — 85730 THROMBOPLASTIN TIME PARTIAL: CPT | Performed by: INTERNAL MEDICINE

## 2025-01-01 PROCEDURE — 87086 URINE CULTURE/COLONY COUNT: CPT | Performed by: EMERGENCY MEDICINE

## 2025-01-01 PROCEDURE — 25010000002 AMIODARONE IN DEXTROSE 5% 360-4.14 MG/200ML-% SOLUTION: Performed by: EMERGENCY MEDICINE

## 2025-01-01 PROCEDURE — 25010000002 AMIODARONE IN DEXTROSE 5% 150-4.21 MG/100ML-% SOLUTION: Performed by: EMERGENCY MEDICINE

## 2025-01-01 PROCEDURE — 80053 COMPREHEN METABOLIC PANEL: CPT | Performed by: EMERGENCY MEDICINE

## 2025-01-01 PROCEDURE — 94640 AIRWAY INHALATION TREATMENT: CPT

## 2025-01-01 PROCEDURE — 25810000003 SODIUM CHLORIDE 0.9 % SOLUTION: Performed by: INTERNAL MEDICINE

## 2025-01-01 PROCEDURE — 86900 BLOOD TYPING SEROLOGIC ABO: CPT | Performed by: EMERGENCY MEDICINE

## 2025-01-01 PROCEDURE — 25010000002 CEFEPIME PER 500 MG: Performed by: EMERGENCY MEDICINE

## 2025-01-01 PROCEDURE — 83605 ASSAY OF LACTIC ACID: CPT | Performed by: EMERGENCY MEDICINE

## 2025-01-01 RX ORDER — ACETAMINOPHEN 160 MG/5ML
650 SOLUTION ORAL EVERY 4 HOURS PRN
Status: DISCONTINUED | OUTPATIENT
Start: 2025-01-01 | End: 2025-01-01 | Stop reason: HOSPADM

## 2025-01-01 RX ORDER — ARFORMOTEROL TARTRATE 15 UG/2ML
15 SOLUTION RESPIRATORY (INHALATION)
Status: DISCONTINUED | OUTPATIENT
Start: 2025-01-01 | End: 2025-01-01

## 2025-01-01 RX ORDER — BISACODYL 5 MG/1
5 TABLET, DELAYED RELEASE ORAL DAILY PRN
Status: DISCONTINUED | OUTPATIENT
Start: 2025-01-01 | End: 2025-01-01

## 2025-01-01 RX ORDER — MORPHINE SULFATE 2 MG/ML
2 INJECTION, SOLUTION INTRAMUSCULAR; INTRAVENOUS
Status: DISCONTINUED | OUTPATIENT
Start: 2025-01-01 | End: 2025-01-01 | Stop reason: HOSPADM

## 2025-01-01 RX ORDER — GLYCOPYRROLATE 0.2 MG/ML
0.2 INJECTION INTRAMUSCULAR; INTRAVENOUS
Status: DISCONTINUED | OUTPATIENT
Start: 2025-01-01 | End: 2025-01-01 | Stop reason: HOSPADM

## 2025-01-01 RX ORDER — PANTOPRAZOLE SODIUM 40 MG/1
40 TABLET, DELAYED RELEASE ORAL
Status: DISCONTINUED | OUTPATIENT
Start: 2025-06-05 | End: 2025-01-01 | Stop reason: HOSPADM

## 2025-01-01 RX ORDER — ACETAMINOPHEN 650 MG/1
650 SUPPOSITORY RECTAL ONCE
Status: COMPLETED | OUTPATIENT
Start: 2025-01-01 | End: 2025-01-01

## 2025-01-01 RX ORDER — LORAZEPAM 2 MG/ML
0.5 CONCENTRATE ORAL
Status: DISCONTINUED | OUTPATIENT
Start: 2025-01-01 | End: 2025-01-01 | Stop reason: HOSPADM

## 2025-01-01 RX ORDER — POLYETHYLENE GLYCOL 3350 17 G/17G
17 POWDER, FOR SOLUTION ORAL DAILY PRN
Status: DISCONTINUED | OUTPATIENT
Start: 2025-01-01 | End: 2025-01-01

## 2025-01-01 RX ORDER — MORPHINE SULFATE 20 MG/ML
5 SOLUTION ORAL
Refills: 0 | Status: DISCONTINUED | OUTPATIENT
Start: 2025-01-01 | End: 2025-01-01 | Stop reason: HOSPADM

## 2025-01-01 RX ORDER — SODIUM CHLORIDE 0.9 % (FLUSH) 0.9 %
10 SYRINGE (ML) INJECTION AS NEEDED
Status: DISCONTINUED | OUTPATIENT
Start: 2025-01-01 | End: 2025-01-01 | Stop reason: HOSPADM

## 2025-01-01 RX ORDER — ONDANSETRON 4 MG/1
4 TABLET, ORALLY DISINTEGRATING ORAL EVERY 6 HOURS PRN
Status: DISCONTINUED | OUTPATIENT
Start: 2025-01-01 | End: 2025-01-01 | Stop reason: HOSPADM

## 2025-01-01 RX ORDER — MORPHINE SULFATE 2 MG/ML
4 INJECTION, SOLUTION INTRAMUSCULAR; INTRAVENOUS
Status: DISCONTINUED | OUTPATIENT
Start: 2025-01-01 | End: 2025-01-01 | Stop reason: HOSPADM

## 2025-01-01 RX ORDER — PROCHLORPERAZINE MALEATE 10 MG
5 TABLET ORAL EVERY 6 HOURS PRN
Status: DISCONTINUED | OUTPATIENT
Start: 2025-01-01 | End: 2025-01-01 | Stop reason: HOSPADM

## 2025-01-01 RX ORDER — HYDROCORTISONE SODIUM SUCCINATE 100 MG/2ML
100 INJECTION INTRAMUSCULAR; INTRAVENOUS EVERY 8 HOURS
Status: DISCONTINUED | OUTPATIENT
Start: 2025-01-01 | End: 2025-01-01

## 2025-01-01 RX ORDER — HALOPERIDOL 2 MG/ML
1 SOLUTION ORAL EVERY 4 HOURS PRN
Status: DISCONTINUED | OUTPATIENT
Start: 2025-01-01 | End: 2025-01-01 | Stop reason: HOSPADM

## 2025-01-01 RX ORDER — LORAZEPAM 2 MG/ML
1 CONCENTRATE ORAL
Status: DISCONTINUED | OUTPATIENT
Start: 2025-01-01 | End: 2025-01-01 | Stop reason: HOSPADM

## 2025-01-01 RX ORDER — SODIUM CHLORIDE 9 MG/ML
40 INJECTION, SOLUTION INTRAVENOUS AS NEEDED
Status: DISCONTINUED | OUTPATIENT
Start: 2025-01-01 | End: 2025-01-01 | Stop reason: HOSPADM

## 2025-01-01 RX ORDER — SODIUM CHLORIDE 0.9 % (FLUSH) 0.9 %
10 SYRINGE (ML) INJECTION AS NEEDED
Status: DISCONTINUED | OUTPATIENT
Start: 2025-01-01 | End: 2025-01-01

## 2025-01-01 RX ORDER — PANTOPRAZOLE SODIUM 40 MG/1
40 TABLET, DELAYED RELEASE ORAL
Status: DISCONTINUED | OUTPATIENT
Start: 2025-01-01 | End: 2025-01-01

## 2025-01-01 RX ORDER — LORAZEPAM 2 MG/ML
1 INJECTION INTRAMUSCULAR
Status: DISCONTINUED | OUTPATIENT
Start: 2025-01-01 | End: 2025-01-01 | Stop reason: HOSPADM

## 2025-01-01 RX ORDER — HYDROMORPHONE HYDROCHLORIDE 1 MG/ML
0.5 INJECTION, SOLUTION INTRAMUSCULAR; INTRAVENOUS; SUBCUTANEOUS
Refills: 0 | Status: DISCONTINUED | OUTPATIENT
Start: 2025-01-01 | End: 2025-01-01 | Stop reason: HOSPADM

## 2025-01-01 RX ORDER — MORPHINE SULFATE 20 MG/ML
10 SOLUTION ORAL
Refills: 0 | Status: DISCONTINUED | OUTPATIENT
Start: 2025-01-01 | End: 2025-01-01 | Stop reason: HOSPADM

## 2025-01-01 RX ORDER — DIPHENHYDRAMINE HCL 25 MG
25 CAPSULE ORAL EVERY 6 HOURS PRN
Status: DISCONTINUED | OUTPATIENT
Start: 2025-01-01 | End: 2025-01-01 | Stop reason: HOSPADM

## 2025-01-01 RX ORDER — MORPHINE SULFATE 20 MG/ML
20 SOLUTION ORAL
Refills: 0 | Status: DISCONTINUED | OUTPATIENT
Start: 2025-01-01 | End: 2025-01-01 | Stop reason: HOSPADM

## 2025-01-01 RX ORDER — HALOPERIDOL 1 MG/1
1 TABLET ORAL EVERY 4 HOURS PRN
Status: DISCONTINUED | OUTPATIENT
Start: 2025-01-01 | End: 2025-01-01 | Stop reason: HOSPADM

## 2025-01-01 RX ORDER — LORAZEPAM 2 MG/ML
2 INJECTION INTRAMUSCULAR
Status: DISCONTINUED | OUTPATIENT
Start: 2025-01-01 | End: 2025-01-01 | Stop reason: HOSPADM

## 2025-01-01 RX ORDER — HALOPERIDOL 1 MG/1
2 TABLET ORAL EVERY 4 HOURS PRN
Status: DISCONTINUED | OUTPATIENT
Start: 2025-01-01 | End: 2025-01-01 | Stop reason: HOSPADM

## 2025-01-01 RX ORDER — AMOXICILLIN 250 MG
2 CAPSULE ORAL 2 TIMES DAILY
Status: DISCONTINUED | OUTPATIENT
Start: 2025-01-01 | End: 2025-01-01

## 2025-01-01 RX ORDER — LORAZEPAM 2 MG/ML
2 CONCENTRATE ORAL
Status: DISCONTINUED | OUTPATIENT
Start: 2025-01-01 | End: 2025-01-01 | Stop reason: HOSPADM

## 2025-01-01 RX ORDER — SODIUM CHLORIDE 9 MG/ML
40 INJECTION, SOLUTION INTRAVENOUS AS NEEDED
Status: DISCONTINUED | OUTPATIENT
Start: 2025-01-01 | End: 2025-01-01

## 2025-01-01 RX ORDER — HEPARIN SODIUM (PORCINE) LOCK FLUSH IV SOLN 100 UNIT/ML 100 UNIT/ML
5 SOLUTION INTRAVENOUS AS NEEDED
Status: DISCONTINUED | OUTPATIENT
Start: 2025-01-01 | End: 2025-01-01

## 2025-01-01 RX ORDER — DIPHENHYDRAMINE HYDROCHLORIDE 50 MG/ML
25 INJECTION, SOLUTION INTRAMUSCULAR; INTRAVENOUS EVERY 6 HOURS PRN
Status: DISCONTINUED | OUTPATIENT
Start: 2025-01-01 | End: 2025-01-01 | Stop reason: HOSPADM

## 2025-01-01 RX ORDER — ECHINACEA PURPUREA EXTRACT 125 MG
2 TABLET ORAL AS NEEDED
Status: DISCONTINUED | OUTPATIENT
Start: 2025-01-01 | End: 2025-01-01 | Stop reason: HOSPADM

## 2025-01-01 RX ORDER — ALLOPURINOL 300 MG/1
300 TABLET ORAL DAILY
Status: DISCONTINUED | OUTPATIENT
Start: 2025-01-01 | End: 2025-01-01

## 2025-01-01 RX ORDER — SODIUM CHLORIDE 0.9 % (FLUSH) 0.9 %
20 SYRINGE (ML) INJECTION AS NEEDED
Status: DISCONTINUED | OUTPATIENT
Start: 2025-01-01 | End: 2025-01-01 | Stop reason: HOSPADM

## 2025-01-01 RX ORDER — ALPRAZOLAM 0.25 MG
0.5 TABLET ORAL 3 TIMES DAILY PRN
Status: DISCONTINUED | OUTPATIENT
Start: 2025-01-01 | End: 2025-01-01

## 2025-01-01 RX ORDER — LORAZEPAM 2 MG/ML
0.5 INJECTION INTRAMUSCULAR
Status: DISCONTINUED | OUTPATIENT
Start: 2025-01-01 | End: 2025-01-01 | Stop reason: HOSPADM

## 2025-01-01 RX ORDER — GLYCOPYRROLATE 0.2 MG/ML
0.4 INJECTION INTRAMUSCULAR; INTRAVENOUS
Status: DISCONTINUED | OUTPATIENT
Start: 2025-01-01 | End: 2025-01-01 | Stop reason: HOSPADM

## 2025-01-01 RX ORDER — SODIUM CHLORIDE 9 MG/ML
100 INJECTION, SOLUTION INTRAVENOUS CONTINUOUS
Status: DISCONTINUED | OUTPATIENT
Start: 2025-01-01 | End: 2025-01-01

## 2025-01-01 RX ORDER — NOREPINEPHRINE BITARTRATE 0.03 MG/ML
.02-.3 INJECTION, SOLUTION INTRAVENOUS
Status: DISCONTINUED | OUTPATIENT
Start: 2025-01-01 | End: 2025-01-01

## 2025-01-01 RX ORDER — ACETAMINOPHEN 325 MG/1
650 TABLET ORAL EVERY 4 HOURS PRN
Status: DISCONTINUED | OUTPATIENT
Start: 2025-01-01 | End: 2025-01-01 | Stop reason: HOSPADM

## 2025-01-01 RX ORDER — DIPHENOXYLATE HYDROCHLORIDE AND ATROPINE SULFATE 2.5; .025 MG/1; MG/1
1 TABLET ORAL
Status: DISCONTINUED | OUTPATIENT
Start: 2025-01-01 | End: 2025-01-01 | Stop reason: HOSPADM

## 2025-01-01 RX ORDER — BUDESONIDE 1 MG/2ML
1 INHALANT ORAL
Status: DISCONTINUED | OUTPATIENT
Start: 2025-01-01 | End: 2025-01-01

## 2025-01-01 RX ORDER — NITROGLYCERIN 0.4 MG/1
0.4 TABLET SUBLINGUAL
Status: DISCONTINUED | OUTPATIENT
Start: 2025-01-01 | End: 2025-01-01

## 2025-01-01 RX ORDER — POTASSIUM CHLORIDE 7.45 MG/ML
10 INJECTION INTRAVENOUS
Status: DISCONTINUED | OUTPATIENT
Start: 2025-01-01 | End: 2025-01-01

## 2025-01-01 RX ORDER — NICOTINE POLACRILEX 4 MG
15 LOZENGE BUCCAL
Status: DISCONTINUED | OUTPATIENT
Start: 2025-01-01 | End: 2025-01-01

## 2025-01-01 RX ORDER — PROMETHAZINE HYDROCHLORIDE 12.5 MG/1
12.5 SUPPOSITORY RECTAL EVERY 4 HOURS PRN
Status: DISCONTINUED | OUTPATIENT
Start: 2025-01-01 | End: 2025-01-01 | Stop reason: HOSPADM

## 2025-01-01 RX ORDER — IBUPROFEN 600 MG/1
1 TABLET ORAL
Status: DISCONTINUED | OUTPATIENT
Start: 2025-01-01 | End: 2025-01-01

## 2025-01-01 RX ORDER — PROCHLORPERAZINE 25 MG
25 SUPPOSITORY, RECTAL RECTAL EVERY 12 HOURS PRN
Status: DISCONTINUED | OUTPATIENT
Start: 2025-01-01 | End: 2025-01-01 | Stop reason: HOSPADM

## 2025-01-01 RX ORDER — PROCHLORPERAZINE EDISYLATE 5 MG/ML
5 INJECTION INTRAMUSCULAR; INTRAVENOUS EVERY 6 HOURS PRN
Status: DISCONTINUED | OUTPATIENT
Start: 2025-01-01 | End: 2025-01-01 | Stop reason: HOSPADM

## 2025-01-01 RX ORDER — SODIUM CHLORIDE 0.9 % (FLUSH) 0.9 %
10 SYRINGE (ML) INJECTION EVERY 12 HOURS SCHEDULED
Status: DISCONTINUED | OUTPATIENT
Start: 2025-01-01 | End: 2025-01-01

## 2025-01-01 RX ORDER — BISACODYL 10 MG
10 SUPPOSITORY, RECTAL RECTAL DAILY PRN
Status: DISCONTINUED | OUTPATIENT
Start: 2025-01-01 | End: 2025-01-01

## 2025-01-01 RX ORDER — ONDANSETRON 2 MG/ML
4 INJECTION INTRAMUSCULAR; INTRAVENOUS EVERY 6 HOURS PRN
Status: DISCONTINUED | OUTPATIENT
Start: 2025-01-01 | End: 2025-01-01

## 2025-01-01 RX ORDER — PANTOPRAZOLE SODIUM 40 MG/10ML
40 INJECTION, POWDER, LYOPHILIZED, FOR SOLUTION INTRAVENOUS
Status: DISCONTINUED | OUTPATIENT
Start: 2025-06-05 | End: 2025-01-01 | Stop reason: HOSPADM

## 2025-01-01 RX ORDER — HALOPERIDOL 2 MG/ML
2 SOLUTION ORAL EVERY 4 HOURS PRN
Status: DISCONTINUED | OUTPATIENT
Start: 2025-01-01 | End: 2025-01-01 | Stop reason: HOSPADM

## 2025-01-01 RX ORDER — ACETAMINOPHEN 650 MG/1
650 SUPPOSITORY RECTAL EVERY 4 HOURS PRN
Status: DISCONTINUED | OUTPATIENT
Start: 2025-01-01 | End: 2025-01-01 | Stop reason: HOSPADM

## 2025-01-01 RX ORDER — MORPHINE SULFATE 2 MG/ML
6 INJECTION, SOLUTION INTRAMUSCULAR; INTRAVENOUS
Status: DISCONTINUED | OUTPATIENT
Start: 2025-01-01 | End: 2025-01-01 | Stop reason: HOSPADM

## 2025-01-01 RX ORDER — PROMETHAZINE HYDROCHLORIDE 25 MG/1
12.5 TABLET ORAL EVERY 4 HOURS PRN
Status: DISCONTINUED | OUTPATIENT
Start: 2025-01-01 | End: 2025-01-01 | Stop reason: HOSPADM

## 2025-01-01 RX ORDER — HALOPERIDOL 5 MG/ML
2 INJECTION INTRAMUSCULAR EVERY 4 HOURS PRN
Status: DISCONTINUED | OUTPATIENT
Start: 2025-01-01 | End: 2025-01-01 | Stop reason: HOSPADM

## 2025-01-01 RX ORDER — IPRATROPIUM BROMIDE AND ALBUTEROL SULFATE 2.5; .5 MG/3ML; MG/3ML
3 SOLUTION RESPIRATORY (INHALATION)
Status: DISCONTINUED | OUTPATIENT
Start: 2025-01-01 | End: 2025-01-01

## 2025-01-01 RX ORDER — ONDANSETRON 2 MG/ML
4 INJECTION INTRAMUSCULAR; INTRAVENOUS EVERY 6 HOURS PRN
Status: DISCONTINUED | OUTPATIENT
Start: 2025-01-01 | End: 2025-01-01 | Stop reason: HOSPADM

## 2025-01-01 RX ORDER — SCOPOLAMINE 1 MG/3D
1 PATCH, EXTENDED RELEASE TRANSDERMAL
Status: DISCONTINUED | OUTPATIENT
Start: 2025-01-01 | End: 2025-01-01 | Stop reason: HOSPADM

## 2025-01-01 RX ORDER — HALOPERIDOL 5 MG/ML
1 INJECTION INTRAMUSCULAR EVERY 4 HOURS PRN
Status: DISCONTINUED | OUTPATIENT
Start: 2025-01-01 | End: 2025-01-01 | Stop reason: HOSPADM

## 2025-01-01 RX ORDER — DEXTROSE MONOHYDRATE 25 G/50ML
25 INJECTION, SOLUTION INTRAVENOUS
Status: DISCONTINUED | OUTPATIENT
Start: 2025-01-01 | End: 2025-01-01

## 2025-01-01 RX ADMIN — IPRATROPIUM BROMIDE AND ALBUTEROL SULFATE 3 ML: .5; 3 SOLUTION RESPIRATORY (INHALATION) at 08:30

## 2025-01-01 RX ADMIN — NOREPINEPHRINE BITARTRATE 0.3 MCG/KG/MIN: 32 SOLUTION INTRAVENOUS at 02:32

## 2025-01-01 RX ADMIN — CEFEPIME 2000 MG: 2 INJECTION, POWDER, FOR SOLUTION INTRAVENOUS at 04:11

## 2025-01-01 RX ADMIN — POTASSIUM CHLORIDE 10 MEQ: 7.46 INJECTION, SOLUTION INTRAVENOUS at 10:00

## 2025-01-01 RX ADMIN — VASOPRESSIN 0.03 UNITS/MIN: 20 INJECTION INTRAVENOUS at 10:11

## 2025-01-01 RX ADMIN — HYDROCORTISONE SODIUM SUCCINATE 100 MG: 100 INJECTION, POWDER, FOR SOLUTION INTRAMUSCULAR; INTRAVENOUS at 10:48

## 2025-01-01 RX ADMIN — SODIUM CHLORIDE, POTASSIUM CHLORIDE, SODIUM LACTATE AND CALCIUM CHLORIDE 1000 ML: 600; 310; 30; 20 INJECTION, SOLUTION INTRAVENOUS at 02:24

## 2025-01-01 RX ADMIN — IPRATROPIUM BROMIDE AND ALBUTEROL SULFATE 3 ML: .5; 3 SOLUTION RESPIRATORY (INHALATION) at 11:01

## 2025-01-01 RX ADMIN — AMIODARONE HYDROCHLORIDE 1 MG/MIN: 1.8 INJECTION, SOLUTION INTRAVENOUS at 03:15

## 2025-01-01 RX ADMIN — SODIUM CHLORIDE 100 ML/HR: 9 INJECTION, SOLUTION INTRAVENOUS at 08:54

## 2025-01-01 RX ADMIN — SODIUM CHLORIDE 1250 MG: 9 INJECTION, SOLUTION INTRAVENOUS at 06:04

## 2025-01-01 RX ADMIN — ACETAMINOPHEN 650 MG: 650 SUPPOSITORY RECTAL at 02:53

## 2025-01-01 RX ADMIN — AMIODARONE HYDROCHLORIDE 150 MG: 1.5 INJECTION, SOLUTION INTRAVENOUS at 02:56

## 2025-01-01 RX ADMIN — POTASSIUM CHLORIDE 10 MEQ: 7.46 INJECTION, SOLUTION INTRAVENOUS at 08:57

## 2025-05-12 ENCOUNTER — HOSPITAL ENCOUNTER (INPATIENT)
Facility: HOSPITAL | Age: 70
LOS: 17 days | Discharge: NURSING, REHAB OR LONG-TERM CARE | End: 2025-05-30
Attending: EMERGENCY MEDICINE | Admitting: INTERNAL MEDICINE
Payer: MEDICARE

## 2025-05-12 ENCOUNTER — APPOINTMENT (OUTPATIENT)
Dept: GENERAL RADIOLOGY | Facility: HOSPITAL | Age: 70
End: 2025-05-12
Payer: MEDICARE

## 2025-05-12 DIAGNOSIS — J18.9 PNEUMONIA OF RIGHT LOWER LOBE DUE TO INFECTIOUS ORGANISM: ICD-10-CM

## 2025-05-12 DIAGNOSIS — C34.2 SMALL CELL CARCINOMA OF MIDDLE LOBE OF RIGHT LUNG: ICD-10-CM

## 2025-05-12 DIAGNOSIS — R53.1 GENERALIZED WEAKNESS: Primary | ICD-10-CM

## 2025-05-12 PROCEDURE — 83880 ASSAY OF NATRIURETIC PEPTIDE: CPT | Performed by: EMERGENCY MEDICINE

## 2025-05-12 PROCEDURE — 85025 COMPLETE CBC W/AUTO DIFF WBC: CPT | Performed by: EMERGENCY MEDICINE

## 2025-05-12 PROCEDURE — 36415 COLL VENOUS BLD VENIPUNCTURE: CPT

## 2025-05-12 PROCEDURE — 84484 ASSAY OF TROPONIN QUANT: CPT | Performed by: EMERGENCY MEDICINE

## 2025-05-12 PROCEDURE — 80053 COMPREHEN METABOLIC PANEL: CPT | Performed by: EMERGENCY MEDICINE

## 2025-05-12 PROCEDURE — 93005 ELECTROCARDIOGRAM TRACING: CPT | Performed by: EMERGENCY MEDICINE

## 2025-05-12 PROCEDURE — 99285 EMERGENCY DEPT VISIT HI MDM: CPT

## 2025-05-13 ENCOUNTER — APPOINTMENT (OUTPATIENT)
Dept: CT IMAGING | Facility: HOSPITAL | Age: 70
End: 2025-05-13
Payer: MEDICARE

## 2025-05-13 ENCOUNTER — APPOINTMENT (OUTPATIENT)
Dept: GENERAL RADIOLOGY | Facility: HOSPITAL | Age: 70
End: 2025-05-13
Payer: MEDICARE

## 2025-05-13 ENCOUNTER — APPOINTMENT (OUTPATIENT)
Dept: CARDIOLOGY | Facility: HOSPITAL | Age: 70
End: 2025-05-13
Payer: MEDICARE

## 2025-05-13 PROBLEM — E86.0 DEHYDRATION: Status: ACTIVE | Noted: 2025-05-13

## 2025-05-13 PROBLEM — J18.9 PNEUMONIA: Status: ACTIVE | Noted: 2025-05-13

## 2025-05-13 PROBLEM — R73.9 HYPERGLYCEMIA: Status: ACTIVE | Noted: 2025-05-13

## 2025-05-13 PROBLEM — J90 PLEURAL EFFUSION: Status: ACTIVE | Noted: 2025-05-13

## 2025-05-13 PROBLEM — R53.1 GENERALIZED WEAKNESS: Status: ACTIVE | Noted: 2025-05-13

## 2025-05-13 PROBLEM — R79.89 ELEVATED TROPONIN: Status: ACTIVE | Noted: 2025-05-13

## 2025-05-13 PROBLEM — L89.151 DECUBITUS ULCER OF SACRAL REGION, STAGE 1: Status: ACTIVE | Noted: 2025-05-13

## 2025-05-13 PROBLEM — R79.89 ELEVATED BRAIN NATRIURETIC PEPTIDE (BNP) LEVEL: Status: ACTIVE | Noted: 2025-05-13

## 2025-05-13 PROBLEM — E87.6 HYPOKALEMIA: Status: ACTIVE | Noted: 2025-05-13

## 2025-05-13 PROBLEM — R42 DIZZINESS: Status: ACTIVE | Noted: 2025-05-13

## 2025-05-13 PROBLEM — N17.9 ACUTE KIDNEY FAILURE: Status: ACTIVE | Noted: 2025-05-13

## 2025-05-13 PROBLEM — Z91.89 AT HIGH RISK FOR PRESSURE INJURY OF SKIN: Status: ACTIVE | Noted: 2025-05-13

## 2025-05-13 LAB
ALBUMIN SERPL-MCNC: 3.6 G/DL (ref 3.5–5.2)
ALBUMIN/GLOB SERPL: 1.1 G/DL
ALP SERPL-CCNC: 91 U/L (ref 39–117)
ALT SERPL W P-5'-P-CCNC: 7 U/L (ref 1–33)
ANION GAP SERPL CALCULATED.3IONS-SCNC: 10.6 MMOL/L (ref 5–15)
ANION GAP SERPL CALCULATED.3IONS-SCNC: 13.5 MMOL/L (ref 5–15)
AORTIC DIMENSIONLESS INDEX: 0.71 (DI)
ASCENDING AORTA: 2.8 CM
AST SERPL-CCNC: 24 U/L (ref 1–32)
AV MEAN PRESS GRAD SYS DOP V1V2: 4.3 MMHG
AV VMAX SYS DOP: 135.7 CM/SEC
B PARAPERT DNA SPEC QL NAA+PROBE: NOT DETECTED
B PERT DNA SPEC QL NAA+PROBE: NOT DETECTED
BASOPHILS # BLD AUTO: 0.02 10*3/MM3 (ref 0–0.2)
BASOPHILS NFR BLD AUTO: 0.3 % (ref 0–1.5)
BH CV ECHO MEAS - ACS: 1.64 CM
BH CV ECHO MEAS - AO MAX PG: 7.4 MMHG
BH CV ECHO MEAS - AO ROOT DIAM: 3.2 CM
BH CV ECHO MEAS - AO V2 VTI: 21.3 CM
BH CV ECHO MEAS - AVA(I,D): 2.2 CM2
BH CV ECHO MEAS - EDV(CUBED): 33.5 ML
BH CV ECHO MEAS - EDV(MOD-SP2): 36 ML
BH CV ECHO MEAS - EDV(MOD-SP4): 56 ML
BH CV ECHO MEAS - EF(MOD-SP2): 63.9 %
BH CV ECHO MEAS - EF(MOD-SP4): 62.5 %
BH CV ECHO MEAS - ESV(CUBED): 11.4 ML
BH CV ECHO MEAS - ESV(MOD-SP2): 13 ML
BH CV ECHO MEAS - ESV(MOD-SP4): 21 ML
BH CV ECHO MEAS - FS: 30.2 %
BH CV ECHO MEAS - IVS/LVPW: 1.02 CM
BH CV ECHO MEAS - IVSD: 0.92 CM
BH CV ECHO MEAS - LAT PEAK E' VEL: 7.8 CM/SEC
BH CV ECHO MEAS - LV DIASTOLIC VOL/BSA (35-75): 30.6 CM2
BH CV ECHO MEAS - LV MASS(C)D: 79 GRAMS
BH CV ECHO MEAS - LV MAX PG: 2.6 MMHG
BH CV ECHO MEAS - LV MEAN PG: 1.54 MMHG
BH CV ECHO MEAS - LV SYSTOLIC VOL/BSA (12-30): 11.5 CM2
BH CV ECHO MEAS - LV V1 MAX: 80.7 CM/SEC
BH CV ECHO MEAS - LV V1 VTI: 15.1 CM
BH CV ECHO MEAS - LVIDD: 3.2 CM
BH CV ECHO MEAS - LVIDS: 2.25 CM
BH CV ECHO MEAS - LVOT AREA: 3.1 CM2
BH CV ECHO MEAS - LVOT DIAM: 1.99 CM
BH CV ECHO MEAS - LVPWD: 0.9 CM
BH CV ECHO MEAS - MED PEAK E' VEL: 5.7 CM/SEC
BH CV ECHO MEAS - MV A DUR: 0.09 SEC
BH CV ECHO MEAS - MV A MAX VEL: 105.3 CM/SEC
BH CV ECHO MEAS - MV DEC SLOPE: 413.7 CM/SEC2
BH CV ECHO MEAS - MV DEC TIME: 0.19 SEC
BH CV ECHO MEAS - MV E MAX VEL: 72.3 CM/SEC
BH CV ECHO MEAS - MV E/A: 0.69
BH CV ECHO MEAS - MV MAX PG: 4.6 MMHG
BH CV ECHO MEAS - MV MEAN PG: 1.88 MMHG
BH CV ECHO MEAS - MV P1/2T: 55.2 MSEC
BH CV ECHO MEAS - MV V2 VTI: 15.5 CM
BH CV ECHO MEAS - MVA(P1/2T): 4 CM2
BH CV ECHO MEAS - MVA(VTI): 3 CM2
BH CV ECHO MEAS - PA ACC TIME: 0.08 SEC
BH CV ECHO MEAS - PA V2 MAX: 94.4 CM/SEC
BH CV ECHO MEAS - PULM A REVS DUR: 0.12 SEC
BH CV ECHO MEAS - PULM A REVS VEL: 36.7 CM/SEC
BH CV ECHO MEAS - PULM DIAS VEL: 43.9 CM/SEC
BH CV ECHO MEAS - PULM S/D: 1.36
BH CV ECHO MEAS - PULM SYS VEL: 59.6 CM/SEC
BH CV ECHO MEAS - RV MAX PG: 2.41 MMHG
BH CV ECHO MEAS - RV V1 MAX: 77.6 CM/SEC
BH CV ECHO MEAS - RV V1 VTI: 12 CM
BH CV ECHO MEAS - SV(LVOT): 46.9 ML
BH CV ECHO MEAS - SV(MOD-SP2): 23 ML
BH CV ECHO MEAS - SV(MOD-SP4): 35 ML
BH CV ECHO MEAS - SVI(LVOT): 25.6 ML/M2
BH CV ECHO MEAS - SVI(MOD-SP2): 12.6 ML/M2
BH CV ECHO MEAS - SVI(MOD-SP4): 19.1 ML/M2
BH CV ECHO MEASUREMENTS AVERAGE E/E' RATIO: 10.71
BH CV XLRA - TDI S': 16.6 CM/SEC
BILIRUB SERPL-MCNC: 0.5 MG/DL (ref 0–1.2)
BUN SERPL-MCNC: 13 MG/DL (ref 8–23)
BUN SERPL-MCNC: 13 MG/DL (ref 8–23)
BUN/CREAT SERPL: 10.7 (ref 7–25)
BUN/CREAT SERPL: 13 (ref 7–25)
C PNEUM DNA NPH QL NAA+NON-PROBE: NOT DETECTED
CALCIUM SPEC-SCNC: 9.2 MG/DL (ref 8.6–10.5)
CALCIUM SPEC-SCNC: 9.2 MG/DL (ref 8.6–10.5)
CHLORIDE SERPL-SCNC: 105 MMOL/L (ref 98–107)
CHLORIDE SERPL-SCNC: 106 MMOL/L (ref 98–107)
CO2 SERPL-SCNC: 18.4 MMOL/L (ref 22–29)
CO2 SERPL-SCNC: 20.5 MMOL/L (ref 22–29)
CREAT SERPL-MCNC: 1 MG/DL (ref 0.57–1)
CREAT SERPL-MCNC: 1.21 MG/DL (ref 0.57–1)
D-LACTATE SERPL-SCNC: 1.3 MMOL/L (ref 0.5–2)
DEPRECATED RDW RBC AUTO: 46.9 FL (ref 37–54)
DEPRECATED RDW RBC AUTO: 47.6 FL (ref 37–54)
EGFRCR SERPLBLD CKD-EPI 2021: 48 ML/MIN/1.73
EGFRCR SERPLBLD CKD-EPI 2021: 60.4 ML/MIN/1.73
EOSINOPHIL # BLD AUTO: 0.12 10*3/MM3 (ref 0–0.4)
EOSINOPHIL NFR BLD AUTO: 1.9 % (ref 0.3–6.2)
ERYTHROCYTE [DISTWIDTH] IN BLOOD BY AUTOMATED COUNT: 12.6 % (ref 12.3–15.4)
ERYTHROCYTE [DISTWIDTH] IN BLOOD BY AUTOMATED COUNT: 12.9 % (ref 12.3–15.4)
FLUAV SUBTYP SPEC NAA+PROBE: NOT DETECTED
FLUBV RNA ISLT QL NAA+PROBE: NOT DETECTED
FOLATE SERPL-MCNC: 12.4 NG/ML (ref 4.78–24.2)
GEN 5 1HR TROPONIN T REFLEX: 30 NG/L
GLOBULIN UR ELPH-MCNC: 3.3 GM/DL
GLUCOSE SERPL-MCNC: 115 MG/DL (ref 65–99)
GLUCOSE SERPL-MCNC: 135 MG/DL (ref 65–99)
HADV DNA SPEC NAA+PROBE: NOT DETECTED
HBA1C MFR BLD: 5.5 % (ref 4.8–5.6)
HCOV 229E RNA SPEC QL NAA+PROBE: NOT DETECTED
HCOV HKU1 RNA SPEC QL NAA+PROBE: NOT DETECTED
HCOV NL63 RNA SPEC QL NAA+PROBE: NOT DETECTED
HCOV OC43 RNA SPEC QL NAA+PROBE: NOT DETECTED
HCT VFR BLD AUTO: 46 % (ref 34–46.6)
HCT VFR BLD AUTO: 46.8 % (ref 34–46.6)
HGB BLD-MCNC: 15.8 G/DL (ref 12–15.9)
HGB BLD-MCNC: 16.4 G/DL (ref 12–15.9)
HMPV RNA NPH QL NAA+NON-PROBE: NOT DETECTED
HPIV1 RNA ISLT QL NAA+PROBE: NOT DETECTED
HPIV2 RNA SPEC QL NAA+PROBE: NOT DETECTED
HPIV3 RNA NPH QL NAA+PROBE: NOT DETECTED
HPIV4 P GENE NPH QL NAA+PROBE: NOT DETECTED
IMM GRANULOCYTES # BLD AUTO: 0.02 10*3/MM3 (ref 0–0.05)
IMM GRANULOCYTES NFR BLD AUTO: 0.3 % (ref 0–0.5)
LYMPHOCYTES # BLD AUTO: 1.5 10*3/MM3 (ref 0.7–3.1)
LYMPHOCYTES NFR BLD AUTO: 23.3 % (ref 19.6–45.3)
M PNEUMO IGG SER IA-ACNC: NOT DETECTED
MAGNESIUM SERPL-MCNC: 1.7 MG/DL (ref 1.6–2.4)
MCH RBC QN AUTO: 34.7 PG (ref 26.6–33)
MCH RBC QN AUTO: 35 PG (ref 26.6–33)
MCHC RBC AUTO-ENTMCNC: 34.3 G/DL (ref 31.5–35.7)
MCHC RBC AUTO-ENTMCNC: 35 G/DL (ref 31.5–35.7)
MCV RBC AUTO: 100 FL (ref 79–97)
MCV RBC AUTO: 101.1 FL (ref 79–97)
MONOCYTES # BLD AUTO: 0.85 10*3/MM3 (ref 0.1–0.9)
MONOCYTES NFR BLD AUTO: 13.2 % (ref 5–12)
NEUTROPHILS NFR BLD AUTO: 3.94 10*3/MM3 (ref 1.7–7)
NEUTROPHILS NFR BLD AUTO: 61 % (ref 42.7–76)
NRBC BLD AUTO-RTO: 0 /100 WBC (ref 0–0.2)
NT-PROBNP SERPL-MCNC: 1374 PG/ML (ref 0–900)
PLATELET # BLD AUTO: 182 10*3/MM3 (ref 140–450)
PLATELET # BLD AUTO: 203 10*3/MM3 (ref 140–450)
PMV BLD AUTO: 10 FL (ref 6–12)
PMV BLD AUTO: 10.1 FL (ref 6–12)
POTASSIUM SERPL-SCNC: 3.4 MMOL/L (ref 3.5–5.2)
POTASSIUM SERPL-SCNC: 3.9 MMOL/L (ref 3.5–5.2)
PROCALCITONIN SERPL-MCNC: 0.05 NG/ML (ref 0–0.25)
PROT SERPL-MCNC: 6.9 G/DL (ref 6–8.5)
QT INTERVAL: 378 MS
QTC INTERVAL: 482 MS
RBC # BLD AUTO: 4.55 10*6/MM3 (ref 3.77–5.28)
RBC # BLD AUTO: 4.68 10*6/MM3 (ref 3.77–5.28)
RHINOVIRUS RNA SPEC NAA+PROBE: NOT DETECTED
RSV RNA NPH QL NAA+NON-PROBE: NOT DETECTED
SARS-COV-2 RNA NPH QL NAA+NON-PROBE: NOT DETECTED
SINUS: 3 CM
SODIUM SERPL-SCNC: 135 MMOL/L (ref 136–145)
SODIUM SERPL-SCNC: 139 MMOL/L (ref 136–145)
TROPONIN T % DELTA: -6
TROPONIN T NUMERIC DELTA: -2 NG/L
TROPONIN T SERPL HS-MCNC: 32 NG/L
TROPONIN T SERPL HS-MCNC: 32 NG/L
TSH SERPL DL<=0.05 MIU/L-ACNC: 4.8 UIU/ML (ref 0.27–4.2)
VIT B12 BLD-MCNC: 394 PG/ML (ref 211–946)
WBC NRBC COR # BLD AUTO: 5.99 10*3/MM3 (ref 3.4–10.8)
WBC NRBC COR # BLD AUTO: 6.45 10*3/MM3 (ref 3.4–10.8)

## 2025-05-13 PROCEDURE — 93306 TTE W/DOPPLER COMPLETE: CPT

## 2025-05-13 PROCEDURE — 82607 VITAMIN B-12: CPT | Performed by: INTERNAL MEDICINE

## 2025-05-13 PROCEDURE — 85027 COMPLETE CBC AUTOMATED: CPT | Performed by: NURSE PRACTITIONER

## 2025-05-13 PROCEDURE — 71045 X-RAY EXAM CHEST 1 VIEW: CPT

## 2025-05-13 PROCEDURE — 87040 BLOOD CULTURE FOR BACTERIA: CPT | Performed by: EMERGENCY MEDICINE

## 2025-05-13 PROCEDURE — 70450 CT HEAD/BRAIN W/O DYE: CPT

## 2025-05-13 PROCEDURE — 80048 BASIC METABOLIC PNL TOTAL CA: CPT | Performed by: NURSE PRACTITIONER

## 2025-05-13 PROCEDURE — 94799 UNLISTED PULMONARY SVC/PX: CPT

## 2025-05-13 PROCEDURE — 83605 ASSAY OF LACTIC ACID: CPT | Performed by: EMERGENCY MEDICINE

## 2025-05-13 PROCEDURE — 84145 PROCALCITONIN (PCT): CPT | Performed by: INTERNAL MEDICINE

## 2025-05-13 PROCEDURE — 25010000002 AZITHROMYCIN PER 500 MG: Performed by: INTERNAL MEDICINE

## 2025-05-13 PROCEDURE — 93010 ELECTROCARDIOGRAM REPORT: CPT | Performed by: STUDENT IN AN ORGANIZED HEALTH CARE EDUCATION/TRAINING PROGRAM

## 2025-05-13 PROCEDURE — 84484 ASSAY OF TROPONIN QUANT: CPT | Performed by: EMERGENCY MEDICINE

## 2025-05-13 PROCEDURE — 92610 EVALUATE SWALLOWING FUNCTION: CPT | Performed by: SPEECH-LANGUAGE PATHOLOGIST

## 2025-05-13 PROCEDURE — 25810000003 SODIUM CHLORIDE 0.9 % SOLUTION 250 ML FLEX CONT: Performed by: INTERNAL MEDICINE

## 2025-05-13 PROCEDURE — 25510000001 PERFLUTREN 6.52 MG/ML SUSPENSION 2 ML VIAL: Performed by: INTERNAL MEDICINE

## 2025-05-13 PROCEDURE — 84443 ASSAY THYROID STIM HORMONE: CPT | Performed by: INTERNAL MEDICINE

## 2025-05-13 PROCEDURE — 83735 ASSAY OF MAGNESIUM: CPT | Performed by: INTERNAL MEDICINE

## 2025-05-13 PROCEDURE — 94761 N-INVAS EAR/PLS OXIMETRY MLT: CPT

## 2025-05-13 PROCEDURE — 25810000003 SODIUM CHLORIDE 0.9 % SOLUTION: Performed by: INTERNAL MEDICINE

## 2025-05-13 PROCEDURE — 93306 TTE W/DOPPLER COMPLETE: CPT | Performed by: INTERNAL MEDICINE

## 2025-05-13 PROCEDURE — 82746 ASSAY OF FOLIC ACID SERUM: CPT | Performed by: INTERNAL MEDICINE

## 2025-05-13 PROCEDURE — 94664 DEMO&/EVAL PT USE INHALER: CPT

## 2025-05-13 PROCEDURE — 94640 AIRWAY INHALATION TREATMENT: CPT

## 2025-05-13 PROCEDURE — 0202U NFCT DS 22 TRGT SARS-COV-2: CPT | Performed by: INTERNAL MEDICINE

## 2025-05-13 PROCEDURE — 71250 CT THORAX DX C-: CPT

## 2025-05-13 PROCEDURE — 25010000002 CEFTRIAXONE PER 250 MG: Performed by: EMERGENCY MEDICINE

## 2025-05-13 PROCEDURE — 84484 ASSAY OF TROPONIN QUANT: CPT | Performed by: NURSE PRACTITIONER

## 2025-05-13 PROCEDURE — 83036 HEMOGLOBIN GLYCOSYLATED A1C: CPT | Performed by: INTERNAL MEDICINE

## 2025-05-13 RX ORDER — SODIUM CHLORIDE 9 MG/ML
40 INJECTION, SOLUTION INTRAVENOUS AS NEEDED
Status: DISCONTINUED | OUTPATIENT
Start: 2025-05-13 | End: 2025-05-30 | Stop reason: HOSPADM

## 2025-05-13 RX ORDER — PANTOPRAZOLE SODIUM 40 MG/1
40 TABLET, DELAYED RELEASE ORAL
Status: DISCONTINUED | OUTPATIENT
Start: 2025-05-14 | End: 2025-05-18

## 2025-05-13 RX ORDER — ATORVASTATIN CALCIUM 20 MG/1
20 TABLET, FILM COATED ORAL NIGHTLY
COMMUNITY

## 2025-05-13 RX ORDER — ACETAMINOPHEN 650 MG/1
650 SUPPOSITORY RECTAL EVERY 4 HOURS PRN
Status: DISCONTINUED | OUTPATIENT
Start: 2025-05-13 | End: 2025-05-30 | Stop reason: HOSPADM

## 2025-05-13 RX ORDER — ACETAMINOPHEN 325 MG/1
650 TABLET ORAL EVERY 4 HOURS PRN
Status: DISCONTINUED | OUTPATIENT
Start: 2025-05-13 | End: 2025-05-30 | Stop reason: HOSPADM

## 2025-05-13 RX ORDER — NITROGLYCERIN 0.4 MG/1
0.4 TABLET SUBLINGUAL
Status: DISCONTINUED | OUTPATIENT
Start: 2025-05-13 | End: 2025-05-27

## 2025-05-13 RX ORDER — HYDROCODONE BITARTRATE AND ACETAMINOPHEN 5; 325 MG/1; MG/1
1 TABLET ORAL EVERY 4 HOURS PRN
Refills: 0 | Status: DISCONTINUED | OUTPATIENT
Start: 2025-05-13 | End: 2025-05-30 | Stop reason: HOSPADM

## 2025-05-13 RX ORDER — SODIUM CHLORIDE 0.9 % (FLUSH) 0.9 %
10 SYRINGE (ML) INJECTION AS NEEDED
Status: DISCONTINUED | OUTPATIENT
Start: 2025-05-13 | End: 2025-05-30 | Stop reason: HOSPADM

## 2025-05-13 RX ORDER — ACETAMINOPHEN 500 MG
500 TABLET ORAL EVERY 6 HOURS PRN
Status: DISCONTINUED | OUTPATIENT
Start: 2025-05-13 | End: 2025-05-30 | Stop reason: HOSPADM

## 2025-05-13 RX ORDER — SODIUM CHLORIDE 0.9 % (FLUSH) 0.9 %
10 SYRINGE (ML) INJECTION EVERY 12 HOURS SCHEDULED
Status: DISCONTINUED | OUTPATIENT
Start: 2025-05-13 | End: 2025-05-30 | Stop reason: HOSPADM

## 2025-05-13 RX ORDER — SODIUM CHLORIDE 9 MG/ML
75 INJECTION, SOLUTION INTRAVENOUS CONTINUOUS
Status: DISCONTINUED | OUTPATIENT
Start: 2025-05-13 | End: 2025-05-14

## 2025-05-13 RX ORDER — CALCIUM CARBONATE 500 MG/1
2 TABLET, CHEWABLE ORAL 2 TIMES DAILY PRN
Status: DISCONTINUED | OUTPATIENT
Start: 2025-05-13 | End: 2025-05-30 | Stop reason: HOSPADM

## 2025-05-13 RX ORDER — ACETAMINOPHEN 160 MG/5ML
650 SOLUTION ORAL EVERY 4 HOURS PRN
Status: DISCONTINUED | OUTPATIENT
Start: 2025-05-13 | End: 2025-05-30 | Stop reason: HOSPADM

## 2025-05-13 RX ORDER — LORAZEPAM 2 MG/ML
1 INJECTION INTRAMUSCULAR ONCE AS NEEDED
Status: COMPLETED | OUTPATIENT
Start: 2025-05-13 | End: 2025-05-14

## 2025-05-13 RX ORDER — ENOXAPARIN SODIUM 100 MG/ML
40 INJECTION SUBCUTANEOUS EVERY 24 HOURS
Status: DISCONTINUED | OUTPATIENT
Start: 2025-05-13 | End: 2025-05-30 | Stop reason: HOSPADM

## 2025-05-13 RX ORDER — GUAIFENESIN 600 MG/1
1200 TABLET, EXTENDED RELEASE ORAL EVERY 12 HOURS SCHEDULED
Status: DISCONTINUED | OUTPATIENT
Start: 2025-05-13 | End: 2025-05-30 | Stop reason: HOSPADM

## 2025-05-13 RX ORDER — ATORVASTATIN CALCIUM 20 MG/1
20 TABLET, FILM COATED ORAL NIGHTLY
Status: DISCONTINUED | OUTPATIENT
Start: 2025-05-13 | End: 2025-05-30 | Stop reason: HOSPADM

## 2025-05-13 RX ORDER — IPRATROPIUM BROMIDE AND ALBUTEROL SULFATE 2.5; .5 MG/3ML; MG/3ML
3 SOLUTION RESPIRATORY (INHALATION)
Status: DISCONTINUED | OUTPATIENT
Start: 2025-05-13 | End: 2025-05-30 | Stop reason: HOSPADM

## 2025-05-13 RX ORDER — ONDANSETRON 2 MG/ML
4 INJECTION INTRAMUSCULAR; INTRAVENOUS EVERY 6 HOURS PRN
Status: DISCONTINUED | OUTPATIENT
Start: 2025-05-13 | End: 2025-05-30 | Stop reason: HOSPADM

## 2025-05-13 RX ORDER — ONDANSETRON 4 MG/1
4 TABLET, ORALLY DISINTEGRATING ORAL EVERY 6 HOURS PRN
Status: DISCONTINUED | OUTPATIENT
Start: 2025-05-13 | End: 2025-05-30 | Stop reason: HOSPADM

## 2025-05-13 RX ADMIN — ATORVASTATIN CALCIUM 20 MG: 20 TABLET, FILM COATED ORAL at 20:51

## 2025-05-13 RX ADMIN — SODIUM CHLORIDE 75 ML/HR: 9 INJECTION, SOLUTION INTRAVENOUS at 15:03

## 2025-05-13 RX ADMIN — IPRATROPIUM BROMIDE AND ALBUTEROL SULFATE 3 ML: .5; 3 SOLUTION RESPIRATORY (INHALATION) at 16:35

## 2025-05-13 RX ADMIN — GUAIFENESIN 1200 MG: 600 TABLET, EXTENDED RELEASE ORAL at 20:51

## 2025-05-13 RX ADMIN — Medication 10 ML: at 08:49

## 2025-05-13 RX ADMIN — Medication 10 ML: at 20:54

## 2025-05-13 RX ADMIN — AZITHROMYCIN MONOHYDRATE 500 MG: 500 INJECTION, POWDER, LYOPHILIZED, FOR SOLUTION INTRAVENOUS at 15:03

## 2025-05-13 RX ADMIN — CEFTRIAXONE SODIUM 2000 MG: 2 INJECTION, POWDER, FOR SOLUTION INTRAMUSCULAR; INTRAVENOUS at 02:46

## 2025-05-13 RX ADMIN — MENTHOL, ZINC OXIDE 1 APPLICATION: .44; 20.6 OINTMENT TOPICAL at 20:51

## 2025-05-13 RX ADMIN — Medication 10 ML: at 02:47

## 2025-05-13 RX ADMIN — IPRATROPIUM BROMIDE AND ALBUTEROL SULFATE 3 ML: .5; 3 SOLUTION RESPIRATORY (INHALATION) at 22:06

## 2025-05-13 RX ADMIN — PERFLUTREN 2 ML: 6.52 INJECTION, SUSPENSION INTRAVENOUS at 15:21

## 2025-05-13 RX ADMIN — MENTHOL, ZINC OXIDE 1 APPLICATION: .44; 20.6 OINTMENT TOPICAL at 11:57

## 2025-05-13 NOTE — CONSULTS
"[facility]  [phone]  Dr. Agustin Orellana      Subjective   LOS: 0 days     Is a 70-year-old female who presents with a roughly 6-month history of shortness of breath and fatigue.  She states that she started feeling bad about 6 months ago and that this is just gotten progressively worse.  She reports fatigue, weakness and shortness of breath.  Its gotten to the point now that she rarely gets out of her bed.  She states that her apartment is essentially in squalor and as she puts it \"looks like a tornado came through\".  She also reports a significant history of anxiety and has been fairly anxious about this hospital stay.  She was not really able to give me much else in the way of clinical history.  She does state that her primary concern right now is her dog which she is eager to get back home to take care of.  There is reportedly someone who will be trying to help her so that she can stay.  Her workup thus far has shown a large right pleural effusion and she also has very bulky mediastinal and right hilar lymphadenopathy with what looks like occlusion of part of the bronchus intermedius.  There may also be some endobronchial lesion within the bronchus intermedius.  She denies any history of cancer.  She has a large breast lesion that she states has been there for \"years\" and that a previous doctor told her that this was nothing to worry about.  She is a former smoker with a roughly 35-pack-year history and quit about 1 year ago she states.  Otherwise she denies any other acute concerns or complaints.    Penelope Onofre  reports no history of alcohol use.,  reports that she has quit smoking. Her smoking use included cigarettes. She has a 40 pack-year smoking history. She has never used smokeless tobacco.     Past Hx:  has a past medical history of Chronic back pain, Claudication, and GERD (gastroesophageal reflux disease).  Surg Hx:  has a past surgical history that includes Tonsillectomy and " Breast cyst excision (Right).  FH: family history includes Alzheimer's disease in her mother; Cancer in her father.  SH:  reports that she has quit smoking. Her smoking use included cigarettes. She has a 40 pack-year smoking history. She has never used smokeless tobacco. She reports that she does not drink alcohol and does not use drugs.    Medications Prior to Admission   Medication Sig Dispense Refill Last Dose/Taking    acetaminophen (TYLENOL) 500 MG tablet Take 1 tablet by mouth Every 6 (Six) Hours As Needed for Mild Pain.   Past Week    amLODIPine (NORVASC) 10 MG tablet Take 1 tablet by mouth Daily. 30 tablet 0 Past Week    atorvastatin (LIPITOR) 20 MG tablet Take 1 tablet by mouth Every Night.   Past Week    omeprazole (PriLOSEC) 40 MG capsule Take 1 capsule by mouth Daily.   Past Week    cyclobenzaprine (FLEXERIL) 10 MG tablet Take 1 tablet by mouth 3 (Three) Times a Day As Needed for Muscle Spasms. 20 tablet 0     HYDROcodone-acetaminophen (NORCO) 5-325 MG per tablet Take 1 tablet by mouth Every 4 (Four) Hours As Needed (pain). 8 tablet 0     naproxen (NAPROSYN) 500 MG tablet Take 1 tablet by mouth 2 (Two) Times a Day With Meals. 30 tablet 0     Terbinafine-Hydryprop Chitosan 250 MG & 1% kit by Combination route.        Allergies   Allergen Reactions    Codeine     Sulfa Antibiotics        Review of Systems   All other systems reviewed and are negative.    Vital Signs past 24hrs  BP range: BP: (111-151)/(74-92) 120/78  Pulse range: Heart Rate:  [] 98  Resp rate range: Resp:  [16-22] 16  Temp range: Temp (24hrs), Av.9 °F (36.6 °C), Min:97.7 °F (36.5 °C), Max:98.1 °F (36.7 °C)    Oxygen range: SpO2:  [92 %-99 %] 92 %; Flow (L/min) (Oxygen Therapy):  [2] 2;   Device (Oxygen Therapy): nasal cannula  63.5 kg (140 lb); Body mass index is 18.99 kg/m².  Net IO Since Admission: 580 mL [25 1617]        Mechanical Ventilator:     Physical Exam  Vitals reviewed.   Constitutional:       General: She is  not in acute distress.     Appearance: Normal appearance.   HENT:      Head: Normocephalic and atraumatic.   Eyes:      Extraocular Movements: Extraocular movements intact.      Conjunctiva/sclera: Conjunctivae normal.      Pupils: Pupils are equal, round, and reactive to light.   Cardiovascular:      Rate and Rhythm: Normal rate and regular rhythm.      Heart sounds: No murmur heard.     No friction rub. No gallop.   Pulmonary:      Effort: Pulmonary effort is normal. No respiratory distress.      Breath sounds: Examination of the left-middle field reveals decreased breath sounds. Examination of the left-lower field reveals decreased breath sounds. Decreased breath sounds present. No wheezing, rhonchi or rales.   Chest:      Chest wall: Mass present.      Comments: There is a rather large soft tissue chest wall mass on the right side.  Abdominal:      General: Abdomen is flat.      Palpations: Abdomen is soft.      Tenderness: There is no abdominal tenderness.   Musculoskeletal:         General: No swelling or tenderness. Normal range of motion.   Skin:     General: Skin is warm and dry.      Findings: No rash.   Neurological:      General: No focal deficit present.      Mental Status: She is alert and oriented to person, place, and time.   Psychiatric:         Mood and Affect: Mood normal.         Behavior: Behavior normal.         Results Review:    I have reviewed the laboratory and imaging data from current admission. My annotations are as noted in assessment and plan.  Result Review:  I have personally reviewed the results from the time of this admission to 5/13/2025 16:17 EDT and agree with these findings:  [x]  Laboratory list / accordion  [x]  Microbiology  [x]  Radiology  [x]  EKG/Telemetry   [x]  Cardiology/Vascular   [x]  Pathology  [x]  Old records  []  Other:        Medication Review:  I have reviewed the current MAR. My annotations are as noted in assessment and plan.    sodium chloride, 75 mL/hr,  Last Rate: 75 mL/hr (05/13/25 1503)      Lines, Drains & Airways       Active LDAs       Name Placement date Placement time Site Days    Peripheral IV 05/12/25 18 G Left 05/12/25  --  --  1                  Diet Orders (active) (From admission, onward)       Start     Ordered    05/13/25 0946  Diet: Renal; Low Phosphorus, Low Potassium, Low Sodium (2-3g); Texture: Regular (IDDSI 7); Fluid Consistency: Thin (IDDSI 0)  Diet Effective Now         05/13/25 0946                  No active isolations    Assessment  Acute hypoxic respiratory failure  Right pleural effusion  Bulky mediastinal and right hilar adenopathy  Possible pneumonia  Right chest wall mass  Former smoker    Plan  -Patient presented on 5/13/2025 with progressively worsening shortness of breath, fatigue and weakness for 6 months.  Found to have right pleural effusion and mediastinal and right hilar adenopathy.  - Continue weaning oxygen for goal O2 saturation greater than 88%  - Can continue antibiotics for now targeted at community-acquired pneumonia  - Will get a thoracentesis of the right effusion  - At this point I am fairly concerned that there is a malignant process going on.  Whether this represents a primary lung malignancy versus metastatic disease is unclear.  I think it is reasonable to continue antibiotics targeted at infection for now.  We will need to await the cytology of the pleural fluid if this is revealing for the cause of malignancy then this will give us both diagnosis and stage.  If this is negative then we will need to do bronchoscopy with EBUS lymph node biopsy and possible biopsy of endobronchial lesion as well.  After she has the thoracentesis we will need to get a CT chest to evaluate the parenchyma on the right side.  Patient is fairly anxious and I had a long conversation with her at the bedside about the need to complete this evaluation and that if she leaves this could delay diagnosis significantly.  She is understanding of  these risks and is currently thinking about her options.    Electronically signed by Agustin Orellana DO, 05/13/25, 4:17 PM EDT.      Part of this note may be an electronic transcription/translation of spoken language to printed text using the Dragon Dictation System.

## 2025-05-13 NOTE — SIGNIFICANT NOTE
Patient currently off the floor , will see today as able or 5/14 05/13/25 5833   OTHER   Discipline speech language pathologist

## 2025-05-13 NOTE — PLAN OF CARE
Goal Outcome Evaluation:      Pt admitted to unit this shift, VSS throughout shift. Echo and CT completed. Urine and sputum sample still needed.

## 2025-05-13 NOTE — PLAN OF CARE
Goal Outcome Evaluation:  Plan of Care Reviewed With: patient           Outcome Evaluation: Clinical swallow evaluation completed however limited assessment due to refusing solids. Please see note for detail. Recommend continue ordered diet of regular solids and thin liquids and will attempt re-assessment pending pt cooperation. Meds whole as tolerated.    Anticipated Discharge Disposition (SLP): unknown          SLP Swallowing Diagnosis: R/O pharyngeal dysphagia (05/13/25 1600)

## 2025-05-13 NOTE — H&P
Patient Name:  Penelope Onofre  YOB: 1955  MRN:  [id number]  Admit Date:  5/12/2025  Patient Care Team:  Provider, No Known as PCP - General        Chief Complaint   Patient presents with   • Weakness - Generalized   • Shortness of Breath   Duration several weeks.    History Present Illness     A pleasant 70 years old female who is an ex-smoker stopped approximately 10 months ago and who does not consume alcohol.  She is a rather poor historian.  She has a past history of hypertension/chronic low back pain syndrome/lower extremity peripheral vascular disease/dyslipidemia/GERD who presented to the emergency department with progressive weakness and shortness of breath for several weeks probably months.  She does have some occasional dry cough.  No chest pain.  No palpitation.  No lower extremity edema.  No hemoptysis.  No PND orthopnea.  No fever or chills.  She also described progressive weakness but no myalgia.  Described postural dizziness but no loss of consciousness.  No seizures.  No focal neurological symptoms.  Decreased appetite.  No abdominal pain.  No nausea or vomiting.  Normal bowel habits without constipation/diarrhea/bleeding per rectum/melena.  No fever or chills.  In the emergency department she was noted to be hypoxemic.  Chest x-ray revealed dense right basilar consolidation with a large pleural effusion and infiltrate.  CBC was normal except a hemoglobin of 16.4 with MCV of 100.  Troponin elevated at 32 with subsequent troponin of 38 and -3 delta.  proBNP elevated at 1374.  CMP normal except glucose of 135, creatinine 1.21, potassium 3.4, CO2 20.5, GFR 48.  Lactic acid was normal.  Blood cultures obtained.  EKG with normal sinus rhythm and left atrial infarction and age undetermined anterior infarction patient was started on Rocephin and given IV fluid and subsequently admitted        Review of Systems   Cardiovascular/respiratory.  As above  GI.  As above.    As.  As above.  .   No dysuria or hematuria.  No frequency or urgency.    Personal History     Past Medical History:   Diagnosis Date   • Chronic back pain    • Claudication    • GERD (gastroesophageal reflux disease)      Past Surgical History:   Procedure Laterality Date   • BREAST CYST EXCISION Right    • TONSILLECTOMY       Family History   Problem Relation Age of Onset   • Alzheimer's disease Mother    • Cancer Father      Social History     Tobacco Use   • Smoking status: Former     Current packs/day: 1.00     Average packs/day: 1 pack/day for 40.0 years (40.0 ttl pk-yrs)     Types: Cigarettes   • Smokeless tobacco: Never   • Tobacco comments:     quit 2 weeks ago   Vaping Use   • Vaping status: Never Used   Substance Use Topics   • Alcohol use: No   • Drug use: No     No current facility-administered medications on file prior to encounter.     Current Outpatient Medications on File Prior to Encounter   Medication Sig Dispense Refill   • acetaminophen (TYLENOL) 500 MG tablet Take 1 tablet by mouth Every 6 (Six) Hours As Needed for Mild Pain.     • amLODIPine (NORVASC) 10 MG tablet Take 1 tablet by mouth Daily. 30 tablet 0   • atorvastatin (LIPITOR) 20 MG tablet Take 1 tablet by mouth Every Night.     • omeprazole (PriLOSEC) 40 MG capsule Take 1 capsule by mouth Daily.     • cyclobenzaprine (FLEXERIL) 10 MG tablet Take 1 tablet by mouth 3 (Three) Times a Day As Needed for Muscle Spasms. 20 tablet 0   • HYDROcodone-acetaminophen (NORCO) 5-325 MG per tablet Take 1 tablet by mouth Every 4 (Four) Hours As Needed (pain). 8 tablet 0   • naproxen (NAPROSYN) 500 MG tablet Take 1 tablet by mouth 2 (Two) Times a Day With Meals. 30 tablet 0   • Terbinafine-Hydryprop Chitosan 250 MG & 1% kit by Combination route.       Allergies   Allergen Reactions   • Codeine    • Sulfa Antibiotics        Objective    Objective     Vital Signs  Temp:  [97.7 °F (36.5 °C)-98.1 °F (36.7 °C)] 98.1 °F (36.7 °C)  Heart Rate:  [] 81  Resp:  [16-22] 16  BP:  (111-151)/(74-92) 111/85  SpO2:  [92 %-99 %] 92 %  on  Flow (L/min) (Oxygen Therapy):  [2] 2;   Device (Oxygen Therapy): nasal cannula  Body mass index is 19.02 kg/m².    Physical Exam  General.  Elderly female.  She is alert and oriented x 4.  Appears chronically ill and older than stated age.  Underweight.  In no apparent pain/distress/diaphoresis.  Normal mood and affect  Eyes.  Pupils equal round and reactive.  Intact extraocular musculature.  No pallor or jaundice  Oral cavity.  Moist mucous membrane  Neck.  Supple.  No JVD.  No lymphadenopathy or thyromegaly  Cardiovascular.  Regular rate and rhythm with no gallops or murmurs  Chest.  Decreased entry in the right lung.  Scattered bilateral rhonchi.  Abdomen.  Soft lax.  No tenderness.  No organomegaly.  No guarding or rebound  Extremities.  No clubbing/cyanosis/edema  CNS..  No acute focal neurological deficits.        Results Review:  I reviewed the patient's new clinical results.  I reviewed the patient's new imaging results and agree with the interpretation.  I reviewed the patient's other test results and agree with the interpretation  I personally viewed and interpreted the patient's EKG/Telemetry data  Discussed with ED provider.    Lab Results (last 24 hours)       Procedure Component Value Units Date/Time    CBC & Differential [[id number]]  (Abnormal) Collected: 05/12/25 2359    Specimen: Blood Updated: 05/13/25 0019    Narrative:      The following orders were created for panel order CBC & Differential.  Procedure                               Abnormality         Status                     ---------                               -----------         ------                     CBC Auto Differential[[id number]]        Abnormal            Final result                 Please view results for these tests on the individual orders.    Comprehensive Metabolic Panel [[id number]]  (Abnormal) Collected: 05/12/25 2359    Specimen: Blood Updated: 05/13/25 0056      Glucose 135 mg/dL      BUN 13 mg/dL      Creatinine 1.21 mg/dL      Sodium 139 mmol/L      Potassium 3.4 mmol/L      Chloride 105 mmol/L      CO2 20.5 mmol/L      Calcium 9.2 mg/dL      Total Protein 6.9 g/dL      Albumin 3.6 g/dL      ALT (SGPT) 7 U/L      AST (SGOT) 24 U/L      Alkaline Phosphatase 91 U/L      Total Bilirubin 0.5 mg/dL      Globulin 3.3 gm/dL      A/G Ratio 1.1 g/dL      BUN/Creatinine Ratio 10.7     Anion Gap 13.5 mmol/L      eGFR 48.0 mL/min/1.73     Narrative:      GFR Categories in Chronic Kidney Disease (CKD)              GFR Category          GFR (mL/min/1.73)    Interpretation  G1                    90 or greater        Normal or high (1)  G2                    60-89                Mild decrease (1)  G3a                   45-59                Mild to moderate decrease  G3b                   30-44                Moderate to severe decrease  G4                    15-29                Severe decrease  G5                    14 or less           Kidney failure    (1)In the absence of evidence of kidney disease, neither GFR category G1 or G2 fulfill the criteria for CKD.    eGFR calculation 2021 CKD-EPI creatinine equation, which does not include race as a factor    BNP [[id number]]  (Abnormal) Collected: 05/12/25 2359    Specimen: Blood Updated: 05/13/25 0056     proBNP 1,374.0 pg/mL     Narrative:      This assay is used as an aid in the diagnosis of individuals suspected of having heart failure. It can be used as an aid in the diagnosis of acute decompensated heart failure (ADHF) in patients presenting with signs and symptoms of ADHF to the emergency department (ED). In addition, NT-proBNP of <300 pg/mL indicates ADHF is not likely.    Age Range Result Interpretation  NT-proBNP Concentration (pg/mL:      <50             Positive            >450                   Gray                 300-450                    Negative             <300    50-75           Positive            >900                   [place]                300-900                  Negative            <300      >75             Positive            >1800                  Gray                300-1800                  Negative            <300    High Sensitivity Troponin T [[id number]]  (Abnormal) Collected: 05/12/25 2359    Specimen: Blood Updated: 05/13/25 0056     HS Troponin T 32 ng/L     Narrative:      High Sensitive Troponin T Reference Range:  <14.0 ng/L- Negative Female for AMI  <22.0 ng/L- Negative Male for AMI  >=14 - Abnormal Female indicating possible myocardial injury.  >=22 - Abnormal Male indicating possible myocardial injury.   Clinicians would have to utilize clinical acumen, EKG, Troponin, and serial changes to determine if it is an Acute Myocardial Infarction or myocardial injury due to an underlying chronic condition.         CBC Auto Differential [[id number]]  (Abnormal) Collected: 05/12/25 2359    Specimen: Blood Updated: 05/13/25 0019     WBC 6.45 10*3/mm3      RBC 4.68 10*6/mm3      Hemoglobin 16.4 g/dL      Hematocrit 46.8 %      .0 fL      MCH 35.0 pg      MCHC 35.0 g/dL      RDW 12.9 %      RDW-SD 47.6 fl      MPV 10.0 fL      Platelets 203 10*3/mm3      Neutrophil % 61.0 %      Lymphocyte % 23.3 %      Monocyte % 13.2 %      Eosinophil % 1.9 %      Basophil % 0.3 %      Immature Grans % 0.3 %      Neutrophils, Absolute 3.94 10*3/mm3      Lymphocytes, Absolute 1.50 10*3/mm3      Monocytes, Absolute 0.85 10*3/mm3      Eosinophils, Absolute 0.12 10*3/mm3      Basophils, Absolute 0.02 10*3/mm3      Immature Grans, Absolute 0.02 10*3/mm3      nRBC 0.0 /100 WBC     High Sensitivity Troponin T 1Hr [[id number]]  (Abnormal) Collected: 05/13/25 0120    Specimen: Blood Updated: 05/13/25 0152     HS Troponin T 30 ng/L      Troponin T Numeric Delta -2 ng/L      Troponin T % Delta -6    Narrative:      High Sensitive Troponin T Reference Range:  <14.0 ng/L- Negative Female for AMI  <22.0 ng/L- Negative Male for AMI  >=14 - Abnormal  Female indicating possible myocardial injury.  >=22 - Abnormal Male indicating possible myocardial injury.   Clinicians would have to utilize clinical acumen, EKG, Troponin, and serial changes to determine if it is an Acute Myocardial Infarction or myocardial injury due to an underlying chronic condition.         Blood Culture - Blood, Arm, Right [[id number]] Collected: 05/13/25 0239    Specimen: Blood from Arm, Right Updated: 05/13/25 0251    Lactic Acid, Plasma [[id number]]  (Normal) Collected: 05/13/25 0239    Specimen: Blood from Arm, Right Updated: 05/13/25 0315     Lactate 1.3 mmol/L     Blood Culture - Blood, Arm, Left [[id number]] Collected: 05/13/25 0242    Specimen: Blood from Arm, Left Updated: 05/13/25 0251    Basic Metabolic Panel [[id number]]  (Abnormal) Collected: 05/13/25 0611    Specimen: Blood from Hand, Left Updated: 05/13/25 0709     Glucose 115 mg/dL      BUN 13 mg/dL      Creatinine 1.00 mg/dL      Sodium 135 mmol/L      Potassium 3.9 mmol/L      Chloride 106 mmol/L      CO2 18.4 mmol/L      Calcium 9.2 mg/dL      BUN/Creatinine Ratio 13.0     Anion Gap 10.6 mmol/L      eGFR 60.4 mL/min/1.73     Narrative:      GFR Categories in Chronic Kidney Disease (CKD)              GFR Category          GFR (mL/min/1.73)    Interpretation  G1                    90 or greater        Normal or high (1)  G2                    60-89                Mild decrease (1)  G3a                   45-59                Mild to moderate decrease  G3b                   30-44                Moderate to severe decrease  G4                    15-29                Severe decrease  G5                    14 or less           Kidney failure    (1)In the absence of evidence of kidney disease, neither GFR category G1 or G2 fulfill the criteria for CKD.    eGFR calculation 2021 CKD-EPI creatinine equation, which does not include race as a factor    CBC (No Diff) [[id number]]  (Abnormal) Collected: 05/13/25 0611    Specimen: Blood from  Hand, Left Updated: 05/13/25 0622     WBC 5.99 10*3/mm3      RBC 4.55 10*6/mm3      Hemoglobin 15.8 g/dL      Hematocrit 46.0 %      .1 fL      MCH 34.7 pg      MCHC 34.3 g/dL      RDW 12.6 %      RDW-SD 46.9 fl      MPV 10.1 fL      Platelets 182 10*3/mm3     High Sensitivity Troponin T [[id number]]  (Abnormal) Collected: 05/13/25 0611    Specimen: Blood from Hand, Left Updated: 05/13/25 0709     HS Troponin T 32 ng/L     Narrative:      High Sensitive Troponin T Reference Range:  <14.0 ng/L- Negative Female for AMI  <22.0 ng/L- Negative Male for AMI  >=14 - Abnormal Female indicating possible myocardial injury.  >=22 - Abnormal Male indicating possible myocardial injury.   Clinicians would have to utilize clinical acumen, EKG, Troponin, and serial changes to determine if it is an Acute Myocardial Infarction or myocardial injury due to an underlying chronic condition.                 Imaging Results (Last 24 Hours)       Procedure Component Value Units Date/Time    XR Chest 1 View [[id number]] Collected: 05/13/25 0037     Updated: 05/13/25 0041    Narrative:      SINGLE VIEW OF THE CHEST     HISTORY: Generalized weakness     COMPARISON: January 25, 2019     FINDINGS:  There is cardiomegaly. There is no vascular congestion. Left lung is  clear. The patient appears to have a large right pleural effusion. There  is dense right basilar consolidation. There may be some further  infiltrate within the right upper lobe.       Impression:      Dense right basilar consolidation and large right pleural effusion, with  possible infiltrate within the right upper lobe as well.     This report was finalized on 5/13/2025 12:38 AM by Dr. Elsie Pedraza M.D on Workstation: [id number]                   ECG 12 Lead Dyspnea   Preliminary Result   HEART RATE=97  bpm   RR Znqrhgmw=930  ms   WY Hcttokag=664  ms   P Horizontal Axis=-40  deg   P Front Axis=72  deg   QRSD Interval=63  ms   QT Zjbuhgdv=569  ms   XElQ=524  ms   QRS  Axis=91  deg   T Wave Axis=260  deg   - ABNORMAL ECG -   Sinus rhythm   Probable left atrial enlargement   Anterior infarct, age indeterminate   Date and Time of Study:2025-05-13 00:06:20      Telemetry Scan   Final Result               Assessment/Plan     Active Hospital Problems    Diagnosis  POA   • **Generalized weakness [R53.1]  Yes   • Dizziness [R42]  Yes   • Pneumonia [J18.9]  Yes   • Pleural effusion [J90]  Yes   • At high risk for pressure injury of skin [Z91.89]  Not Applicable   • Decubitus ulcer of sacral region, stage 1 [L89.151]  Yes   • Acute kidney failure [N17.9]  Yes   • Dehydration [E86.0]  Yes   • Elevated troponin [R79.89]  Yes   • Elevated brain natriuretic peptide (BNP) level [R79.89]  Yes   • Hypokalemia [E87.6]  Yes   • Hyperglycemia [R73.9]  Yes      Resolved Hospital Problems   No resolved problems to display.           Weakness/fatigue/dizziness.  This is multifactorial secondary to hypokalemia/dehydration/questionable congestive heart failure/pneumonia and hypoxemia.  No other evidence of infection beside the pneumonia (normal lactic acid/normal white blood cell/normal fever.  Will check procalcitonin and UA.  Will check orthostasis/brain CT/2D echo/initiate IV fluid ration/substitute potassium.  Will check magnesium/TSH/CK/a.m. cortisol level.  PT and OT to evaluate and treat  For social status.  Dog fecal material in the house with poor hygiene of the house per report.  CCP to evaluate  Right-sided pneumonia/large pleural effusion/hypoxemic respiratory failure.  This could be pneumonia versus lung cancer.  Will check blood cultures/sputum cultures/respiratory PCR/urine Legionella and Streptococcus antigen.  Will initiate DuoNebs/Mucinex/IV Rocephin and Zithromax.  Will ask speech to rule out aspiration.  Ask pulmonary to evaluate as I believe she will need thoracocentesis for  Grade 1 sacral and heel decubiti present on admission wound nurse.  Hemoconcentration/macrocytosis.   Hemoconcentration is secondary to hydration and has improved with IV fluid.  Check TSH/B12/cortisol/folate.  Elevated troponin/elevated proBNP/abnormal EKG in a patient with a history of hypertension will hold Norvasc for now and monitor blood pressure to avoid hypotension.  Check 2D echo.  I believe elevation of the troponin and proBNP could be secondary to the renal failure but will rule out congestive heart failure especially in view of the weakness and dyspnea.  Acute kidney failure/hypokalemia acute kidney failure is most likely secondary to dehydration leading to prerenal azotemia.  Improved and resolved with IV fluid.  Resolved hypokalemia with substitution.  Will check magnesium.  Check bladder scan.  Slow IV fluid and monitor renal function.  Hyperglycemia.  Check A1c  Hyperlipidemia.  Continue statin and check aspirin  GERD.  Benign GI examination.  Continue proton pump inhibitor.  VTE prophylaxis.  Sequential compression device.      Discussed my findings and plan of treatment with the patient/nurse  Code Status -full       Oren Copeland MD  [facility]  12:17 EDT

## 2025-05-13 NOTE — ED NOTES
Nursing report ED to floor  Penelope Onofre  70 y.o.  female    HPI :  HPI  Stated Reason for Visit: To ER via EMS from home.  C/o SOA O2 Sat mid 80's on RA.  2l/nc 96%    Pt has had weakness and inability to get out of bed to even go to the bathroom. States she got dizzy when she got up.  This has been going on for weeks that she has been having difficulty getting up.  Pt states she has not eaten anything in possibly one week.      Pt states increased in weakness x 6-8 mos.  History Obtained From: patient, EMS    Chief Complaint  Chief Complaint   Patient presents with    Weakness - Generalized    Shortness of Breath       Admitting doctor:   Manjeet Christiansen MD    Admitting diagnosis:   The primary encounter diagnosis was Generalized weakness. A diagnosis of Pneumonia of right lower lobe due to infectious organism was also pertinent to this visit.    Code status:   Current Code Status       Date Active Code Status Order ID Comments User Context       5/13/2025 0130 CPR (Attempt to Resuscitate) [id number]  Eduarda Lagos APRN ED        Question Answer    Code Status (Patient has no pulse and is not breathing) CPR (Attempt to Resuscitate)    Medical Interventions (Patient has pulse or is breathing) Full Support                    Allergies:   Codeine and Sulfa antibiotics    Isolation:   No active isolations    Intake and Output    Intake/Output Summary (Last 24 hours) at 5/13/2025 0334  Last data filed at 5/13/2025 0330  Gross per 24 hour   Intake 100 ml   Output --   Net 100 ml       Weight:   There were no vitals filed for this visit.    Most recent vitals:   Vitals:    05/13/25 0145 05/13/25 0216 05/13/25 0246 05/13/25 0316   BP: 120/83 122/76 136/84 126/77   Pulse: 91 88 93 86   Resp:  18     Temp:       SpO2: 94% 94% 95% 99%       Active LDAs/IV Access:   Lines, Drains & Airways       Active LDAs       Name Placement date Placement time Site Days    Peripheral IV 05/12/25 18 G Left 05/12/25  --  --  1                     Labs (abnormal labs have a star):   Labs Reviewed   COMPREHENSIVE METABOLIC PANEL - Abnormal; Notable for the following components:       Result Value    Glucose 135 (*)     Creatinine 1.21 (*)     Potassium 3.4 (*)     CO2 20.5 (*)     eGFR 48.0 (*)     All other components within normal limits    Narrative:     GFR Categories in Chronic Kidney Disease (CKD)              GFR Category          GFR (mL/min/1.73)    Interpretation  G1                    90 or greater        Normal or high (1)  G2                    60-89                Mild decrease (1)  G3a                   45-59                Mild to moderate decrease  G3b                   30-44                Moderate to severe decrease  G4                    15-29                Severe decrease  G5                    14 or less           Kidney failure    (1)In the absence of evidence of kidney disease, neither GFR category G1 or G2 fulfill the criteria for CKD.    eGFR calculation 2021 CKD-EPI creatinine equation, which does not include race as a factor   BNP (IN-HOUSE) - Abnormal; Notable for the following components:    proBNP 1,374.0 (*)     All other components within normal limits    Narrative:     This assay is used as an aid in the diagnosis of individuals suspected of having heart failure. It can be used as an aid in the diagnosis of acute decompensated heart failure (ADHF) in patients presenting with signs and symptoms of ADHF to the emergency department (ED). In addition, NT-proBNP of <300 pg/mL indicates ADHF is not likely.    Age Range Result Interpretation  NT-proBNP Concentration (pg/mL:      <50             Positive            >450                   Gray                 300-450                    Negative             <300    50-75           Positive            >900                  Gray                300-900                  Negative            <300      >75             Positive            >1800                  [place]                 300-1800                  Negative            <300   TROPONIN - Abnormal; Notable for the following components:    HS Troponin T 32 (*)     All other components within normal limits    Narrative:     High Sensitive Troponin T Reference Range:  <14.0 ng/L- Negative Female for AMI  <22.0 ng/L- Negative Male for AMI  >=14 - Abnormal Female indicating possible myocardial injury.  >=22 - Abnormal Male indicating possible myocardial injury.   Clinicians would have to utilize clinical acumen, EKG, Troponin, and serial changes to determine if it is an Acute Myocardial Infarction or myocardial injury due to an underlying chronic condition.        CBC WITH AUTO DIFFERENTIAL - Abnormal; Notable for the following components:    Hemoglobin 16.4 (*)     Hematocrit 46.8 (*)     .0 (*)     MCH 35.0 (*)     Monocyte % 13.2 (*)     All other components within normal limits   HIGH SENSITIVITIY TROPONIN T 1HR - Abnormal; Notable for the following components:    HS Troponin T 30 (*)     All other components within normal limits    Narrative:     High Sensitive Troponin T Reference Range:  <14.0 ng/L- Negative Female for AMI  <22.0 ng/L- Negative Male for AMI  >=14 - Abnormal Female indicating possible myocardial injury.  >=22 - Abnormal Male indicating possible myocardial injury.   Clinicians would have to utilize clinical acumen, EKG, Troponin, and serial changes to determine if it is an Acute Myocardial Infarction or myocardial injury due to an underlying chronic condition.        LACTIC ACID, PLASMA - Normal   BLOOD CULTURE   BLOOD CULTURE   URINALYSIS W/ MICROSCOPIC IF INDICATED (NO CULTURE)   BASIC METABOLIC PANEL   CBC (NO DIFF)   TROPONIN   CBC AND DIFFERENTIAL    Narrative:     The following orders were created for panel order CBC & Differential.  Procedure                               Abnormality         Status                     ---------                               -----------         ------                      CBC Auto Differential[[id number]]        Abnormal            Final result                 Please view results for these tests on the individual orders.       EKG:   ECG 12 Lead Dyspnea   Preliminary Result   HEART RATE=97  bpm   RR Ovzcgyzy=947  ms   MI Rigvjepn=709  ms   P Horizontal Axis=-40  deg   P Front Axis=72  deg   QRSD Interval=63  ms   QT Fboxpsiq=619  ms   YTkL=858  ms   QRS Axis=91  deg   T Wave Axis=260  deg   - ABNORMAL ECG -   Sinus rhythm   Probable left atrial enlargement   Anterior infarct, age indeterminate   Date and Time of Study:2025-05-13 00:06:20          Meds given in ED:   Medications   sodium chloride 0.9 % flush 10 mL (10 mL Intravenous Given 5/13/25 0247)   sodium chloride 0.9 % flush 10 mL (has no administration in time range)   sodium chloride 0.9 % infusion 40 mL (has no administration in time range)   nitroglycerin (NITROSTAT) SL tablet 0.4 mg (has no administration in time range)   acetaminophen (TYLENOL) tablet 650 mg (has no administration in time range)     Or   acetaminophen (TYLENOL) 160 MG/5ML oral solution 650 mg (has no administration in time range)     Or   acetaminophen (TYLENOL) suppository 650 mg (has no administration in time range)   ondansetron ODT (ZOFRAN-ODT) disintegrating tablet 4 mg (has no administration in time range)     Or   ondansetron (ZOFRAN) injection 4 mg (has no administration in time range)   calcium carbonate (TUMS) chewable tablet 500 mg (200 mg elemental) (has no administration in time range)   cefTRIAXone (ROCEPHIN) 1,000 mg in sodium chloride 0.9 % 100 mL MBP (has no administration in time range)   cefTRIAXone (ROCEPHIN) 2,000 mg in sodium chloride 0.9 % 100 mL MBP (0 mg Intravenous Stopped 5/13/25 0330)       Imaging results:  XR Chest 1 View  Result Date: 5/13/2025  Dense right basilar consolidation and large right pleural effusion, with possible infiltrate within the right upper lobe as well.  This report was finalized on 5/13/2025 12:38 AM by  Dr. Elsie Pedraza M.D on Workstation: [id number]        Ambulatory status:   - Assistx1    Social issues:   Social History     Socioeconomic History    Marital status:    Tobacco Use    Smoking status: Former     Types: Cigarettes    Smokeless tobacco: Never    Tobacco comments:     quit 2 weeks ago   Substance and Sexual Activity    Alcohol use: No    Drug use: No    Sexual activity: Defer       Peripheral Neurovascular  Peripheral Neurovascular (Adult)  Peripheral Neurovascular WDL: WDL    Neuro Cognitive  Neuro Cognitive (Adult)  Cognitive/Neuro/Behavioral WDL: WDL, orientation  Orientation: oriented x 4    Learning  Learning Assessment  Learning Readiness and Ability: no barriers identified  Education Provided  Person Taught: patient    Respiratory  Respiratory WDL  Respiratory WDL: .WDL except, all  Rhythm/Pattern, Respiratory: shortness of breath, depth regular, pattern regular, unlabored    Abdominal Pain       Pain Assessments  Pain (Adult)  (0-10) Pain Rating: Rest: 0    NIH Stroke Scale       Chelsie Warren RN  05/13/25 03:34 EDT

## 2025-05-13 NOTE — ED TRIAGE NOTES
To ER via EMS from home.  C/o SOA O2 Sat mid 80's on RA.  2l/nc 96%    Pt has had weakness and inability to get out of bed to even go to the bathroom. States she got dizzy when she got up.  This has been going on for weeks that she has been having difficulty getting up.  Pt states she has not eaten anything in possibly one week.      Pt states increased in weakness x 6-8 mos.

## 2025-05-13 NOTE — NURSING NOTE
Cwon consult received. Patient with chronic discoloration over sacrococcygeal region. Area is dry and blanchable. She does have a bony sacrum that should be protected for prevention with barrier ointment. There is also a linear shallow break in the skin to right inner gluteal fold, likely r/t moisture.  I will recommend Calmoseptine ointment for moisture and barrier protection.   Bilateral heels red but intact and blanchable. Continue to offload. Of note, patient can turn side to side in bed without assistance.   All orders placed in Epic. Please call with any questions.

## 2025-05-13 NOTE — ED PROVIDER NOTES
EMERGENCY DEPARTMENT ENCOUNTER    Room Number:  22/22  PCP: Provider, No Known  Historian: Patient      HPI:  Chief Complaint: Weakness shortness of breath  A complete HPI/ROS/PMH/PSH/SH/FH are unobtainable due to: None  Context: Penelope Onofre is a 70 y.o. female who presents to the ED c/o weakness and shortness of breath.  Patient states she has had progressive weakness for several weeks now.  Patient states she is unable to get out of bed to feed herself.  Patient has not seen the doctor.  States she has shortness of breath.  Longtime smoker but states she stopped smoking.  States she does get short of breath with minimal exertion.  Has had no fevers or chills.  Patient apparently home is close to unliveable as there is dog feces throughout the house.            PAST MEDICAL HISTORY  Active Ambulatory Problems     Diagnosis Date Noted    Arm pain, left 01/03/2017     Resolved Ambulatory Problems     Diagnosis Date Noted    No Resolved Ambulatory Problems     Past Medical History:   Diagnosis Date    Chronic back pain     Claudication     GERD (gastroesophageal reflux disease)          PAST SURGICAL HISTORY  Past Surgical History:   Procedure Laterality Date    BREAST CYST EXCISION Right     TONSILLECTOMY           FAMILY HISTORY  Family History   Problem Relation Age of Onset    Alzheimer's disease Mother     Cancer Father          SOCIAL HISTORY  Social History     Socioeconomic History    Marital status:    Tobacco Use    Smoking status: Former     Types: Cigarettes    Smokeless tobacco: Never    Tobacco comments:     quit 2 weeks ago   Substance and Sexual Activity    Alcohol use: No    Drug use: No    Sexual activity: Defer         ALLERGIES  Codeine and Sulfa antibiotics        REVIEW OF SYSTEMS  Review of Systems   Weakness      PHYSICAL EXAM  ED Triage Vitals [05/12/25 2325]   Temp Heart Rate Resp BP SpO2   97.7 °F (36.5 °C) 104 22 151/92 96 %      Temp src Heart Rate Source Patient Position BP  Location FiO2 (%)   -- -- -- -- --       Physical Exam      GENERAL: no acute distress.  Cachectic and ill-appearing  HENT: nares patent  EYES: no scleral icterus  CV: regular rhythm, normal rate  RESPIRATORY: normal effort.  Diminished breath sounds bilaterally  ABDOMEN: soft  MUSCULOSKELETAL: no deformity  NEURO: alert, moves all extremities, follows commands  PSYCH:  calm, cooperative  SKIN: warm, dry    Vital signs and nursing notes reviewed.          LAB RESULTS  Recent Results (from the past 24 hours)   Comprehensive Metabolic Panel    Collection Time: 05/12/25 11:59 PM    Specimen: Blood   Result Value Ref Range    Glucose 135 (H) 65 - 99 mg/dL    BUN 13 8 - 23 mg/dL    Creatinine 1.21 (H) 0.57 - 1.00 mg/dL    Sodium 139 136 - 145 mmol/L    Potassium 3.4 (L) 3.5 - 5.2 mmol/L    Chloride 105 98 - 107 mmol/L    CO2 20.5 (L) 22.0 - 29.0 mmol/L    Calcium 9.2 8.6 - 10.5 mg/dL    Total Protein 6.9 6.0 - 8.5 g/dL    Albumin 3.6 3.5 - 5.2 g/dL    ALT (SGPT) 7 1 - 33 U/L    AST (SGOT) 24 1 - 32 U/L    Alkaline Phosphatase 91 39 - 117 U/L    Total Bilirubin 0.5 0.0 - 1.2 mg/dL    Globulin 3.3 gm/dL    A/G Ratio 1.1 g/dL    BUN/Creatinine Ratio 10.7 7.0 - 25.0    Anion Gap 13.5 5.0 - 15.0 mmol/L    eGFR 48.0 (L) >60.0 mL/min/1.73   BNP    Collection Time: 05/12/25 11:59 PM    Specimen: Blood   Result Value Ref Range    proBNP 1,374.0 (H) 0.0 - 900.0 pg/mL   High Sensitivity Troponin T    Collection Time: 05/12/25 11:59 PM    Specimen: Blood   Result Value Ref Range    HS Troponin T 32 (H) <14 ng/L   CBC Auto Differential    Collection Time: 05/12/25 11:59 PM    Specimen: Blood   Result Value Ref Range    WBC 6.45 3.40 - 10.80 10*3/mm3    RBC 4.68 3.77 - 5.28 10*6/mm3    Hemoglobin 16.4 (H) 12.0 - 15.9 g/dL    Hematocrit 46.8 (H) 34.0 - 46.6 %    .0 (H) 79.0 - 97.0 fL    MCH 35.0 (H) 26.6 - 33.0 pg    MCHC 35.0 31.5 - 35.7 g/dL    RDW 12.9 12.3 - 15.4 %    RDW-SD 47.6 37.0 - 54.0 fl    MPV 10.0 6.0 - 12.0 fL     Platelets 203 140 - 450 10*3/mm3    Neutrophil % 61.0 42.7 - 76.0 %    Lymphocyte % 23.3 19.6 - 45.3 %    Monocyte % 13.2 (H) 5.0 - 12.0 %    Eosinophil % 1.9 0.3 - 6.2 %    Basophil % 0.3 0.0 - 1.5 %    Immature Grans % 0.3 0.0 - 0.5 %    Neutrophils, Absolute 3.94 1.70 - 7.00 10*3/mm3    Lymphocytes, Absolute 1.50 0.70 - 3.10 10*3/mm3    Monocytes, Absolute 0.85 0.10 - 0.90 10*3/mm3    Eosinophils, Absolute 0.12 0.00 - 0.40 10*3/mm3    Basophils, Absolute 0.02 0.00 - 0.20 10*3/mm3    Immature Grans, Absolute 0.02 0.00 - 0.05 10*3/mm3    nRBC 0.0 0.0 - 0.2 /100 WBC   ECG 12 Lead Dyspnea    Collection Time: 05/13/25 12:06 AM   Result Value Ref Range    QT Interval 378 ms    QTC Interval 482 ms   High Sensitivity Troponin T 1Hr    Collection Time: 05/13/25  1:20 AM    Specimen: Blood   Result Value Ref Range    HS Troponin T 30 (H) <14 ng/L    Troponin T Numeric Delta -2 ng/L    Troponin T % Delta -6 Abnormal if >/= 20%       Ordered the above labs and reviewed the results.        RADIOLOGY  XR Chest 1 View  Result Date: 5/13/2025  SINGLE VIEW OF THE CHEST  HISTORY: Generalized weakness  COMPARISON: January 25, 2019  FINDINGS: There is cardiomegaly. There is no vascular congestion. Left lung is clear. The patient appears to have a large right pleural effusion. There is dense right basilar consolidation. There may be some further infiltrate within the right upper lobe.      Dense right basilar consolidation and large right pleural effusion, with possible infiltrate within the right upper lobe as well.  This report was finalized on 5/13/2025 12:38 AM by Dr. Elsie Pedraza M.D on Workstation: [id number]        Ordered the above noted radiological studies.  Chest x-ray independently interpreted by me and does show large right-sided effusion          PROCEDURES  Procedures        EKG          EKG time: 006  Rhythm/Rate: Normal sinus rhythm 97  P waves and TN: Normal P waves  QRS, axis: Normal QRS  ST and T waves:  Nonspecific ST-T wave    Interpreted Contemporaneously by me, independently viewed  Unchanged compared to prior 1/25/2019      MEDICATIONS GIVEN IN ER  Medications   cefTRIAXone (ROCEPHIN) 2,000 mg in sodium chloride 0.9 % 100 mL MBP (has no administration in time range)   sodium chloride 0.9 % flush 10 mL (has no administration in time range)   sodium chloride 0.9 % flush 10 mL (has no administration in time range)   sodium chloride 0.9 % infusion 40 mL (has no administration in time range)   nitroglycerin (NITROSTAT) SL tablet 0.4 mg (has no administration in time range)   acetaminophen (TYLENOL) tablet 650 mg (has no administration in time range)     Or   acetaminophen (TYLENOL) 160 MG/5ML oral solution 650 mg (has no administration in time range)     Or   acetaminophen (TYLENOL) suppository 650 mg (has no administration in time range)   ondansetron ODT (ZOFRAN-ODT) disintegrating tablet 4 mg (has no administration in time range)     Or   ondansetron (ZOFRAN) injection 4 mg (has no administration in time range)   calcium carbonate (TUMS) chewable tablet 500 mg (200 mg elemental) (has no administration in time range)   cefTRIAXone (ROCEPHIN) 1,000 mg in sodium chloride 0.9 % 100 mL MBP (has no administration in time range)                   MEDICAL DECISION MAKING, PROGRESS, and CONSULTS    All labs have been independently reviewed by me.  All radiology studies have been reviewed by me and I have also reviewed the radiology report.   EKGs independently viewed and interpreted by me.  Discussion below represents my analysis of pertinent findings related to patient's condition, differential diagnosis, treatment plan and final disposition.      Additional sources:  - Discussed/ obtained information from independent historians: None    - External (non-ED) record review: Epic reviewed patient seen by primary provider 7/30/2024 for hypertension    - Chronic or social conditions impacting care: None    - Shared decision  making: None      Orders placed during this visit:  Orders Placed This Encounter   Procedures    Blood Culture - Blood,    Blood Culture - Blood,    XR Chest 1 View    Comprehensive Metabolic Panel    BNP    High Sensitivity Troponin T    Urinalysis With Microscopic If Indicated (No Culture) - Urine, Clean Catch    CBC Auto Differential    High Sensitivity Troponin T 1Hr    Lactic Acid, Plasma    Basic Metabolic Panel    CBC (No Diff)    High Sensitivity Troponin T    Diet: Regular/House; Fluid Consistency: Thin (IDDSI 0)    Vital Signs    Intake & Output    Weigh Patient    Oral Care    Place Sequential Compression Device    Maintain Sequential Compression Device    Maintain IV Access    Telemetry - Place Orders & Notify Provider of Results When Patient Experiences Acute Chest Pain, Dysrhythmia or Respiratory Distress    May Be Off Telemetry for Tests    Code Status and Medical Interventions: CPR (Attempt to Resuscitate); Full Support    [facility] (on-call MD unless specified) Details    ECG 12 Lead Dyspnea    Insert Peripheral IV    Initiate Observation Status    CBC & Differential         Additional orders considered but not ordered:  None        Differential diagnosis includes but is not limited to:    Pneumonia versus malignancy versus CHF      Independent interpretation of labs, radiology studies, and discussions with consultants:  ED Course as of 05/13/25 0156   Tue May 13, 2025   0124 01:25 EDT  Patient presents for weakness as well as hypoxia.  Patient does appear to have right-sided pneumonia with effusion.  EMS states patient's house was covered in dog feces.  Patient has been too weak to get out of bed to clean up after the dog.  States she has not eaten in several days.  APS report has been sent by EMS.  Chest x-ray may be pneumonia but it could also be lung cancer.  Patient discussed with Eduarda PRASAD with [facility] and will be admitted. [SL]      ED Course User Index  [SL] Nirav Kruger MD                  DIAGNOSIS  Final diagnoses:   Generalized weakness   Pneumonia of right lower lobe due to infectious organism         DISPOSITION  admit            Latest Documented Vital Signs:  As of 01:56 EDT  BP- 145/92 HR- 75 Temp- 97.7 °F (36.5 °C) O2 sat- 98%              --    Please note that portions of this were completed with a voice recognition program.       Note Disclaimer: At [facility], we believe that sharing information builds trust and better relationships. You are receiving this note because you are receiving care at [facility] or recently visited. It is possible you will see health information before a provider has talked with you about it. This kind of information can be easy to misunderstand. To help you fully understand what it means for your health, we urge you to discuss this note with your provider.            Nirav Kruger MD  05/13/25 0157

## 2025-05-13 NOTE — THERAPY EVALUATION
Acute Care - Speech Language Pathology   Swallow Initial Evaluation [facility]     Patient Name: Penelope Onofre  : 1955  MRN: [id number]  Today's Date: 2025               Admit Date: 2025    Visit Dx:     ICD-10-CM ICD-9-CM   1. Generalized weakness  R53.1 780.79   2. Pneumonia of right lower lobe due to infectious organism  J18.9 486     Patient Active Problem List   Diagnosis    Arm pain, left    Generalized weakness    Dizziness    Pneumonia    Pleural effusion    At high risk for pressure injury of skin    Decubitus ulcer of sacral region, stage 1    Acute kidney failure    Dehydration    Elevated troponin    Elevated brain natriuretic peptide (BNP) level    Hypokalemia    Hyperglycemia     Past Medical History:   Diagnosis Date    Chronic back pain     Claudication     GERD (gastroesophageal reflux disease)      Past Surgical History:   Procedure Laterality Date    BREAST CYST EXCISION Right     TONSILLECTOMY         SLP Recommendation and Plan  SLP Swallowing Diagnosis: R/O pharyngeal dysphagia (25)  SLP Diet Recommendation: regular textures, thin liquids, other (see comments) (continue current diet ordered by MD) (25)  Recommended Precautions and Strategies: upright posture during/after eating, small bites of food and sips of liquid, general aspiration precautions (25)  SLP Rec. for Method of Medication Administration: meds whole, as tolerated (25)     Monitor for Signs of Aspiration: yes, notify SLP if any concerns (25)  Recommended Diagnostics: reassess via clinical swallow evaluation (25)  Swallow Criteria for Skilled Therapeutic Interventions Met: demonstrates skilled criteria (25)  Anticipated Discharge Disposition (SLP): unknown (25)  Rehab Potential/Prognosis, Swallowing: adequate, monitor progress closely (25)  Therapy Frequency (Swallow): PRN (25)  Predicted Duration  Therapy Intervention (Days): until discharge (05/13/25 1600)  Oral Care Recommendations: Oral Care BID/PRN (05/13/25 1600)                                        Outcome Evaluation: Clinical swallow evaluation completed however limited assessment due to refusing solids. Please see note for detail. Recommend continue ordered diet of regular solids and thin liquids and will attempt re-assessment pending pt cooperation. Meds whole as tolerated.      SWALLOW EVALUATION (Last 72 Hours)       SLP Adult Swallow Evaluation       Row Name 05/13/25 1600                   Rehab Evaluation    Document Type evaluation  -KA        Subjective Information no complaints  -KA        Patient Observations alert  -KA        Patient Effort adequate  -KA        Symptoms Noted During/After Treatment none  -KA           General Information    Patient Profile Reviewed yes  -KA        Pertinent History Of Current Problem PNA, weakness, fatigue  -KA        Current Method of Nutrition regular textures;thin liquids  -KA        Precautions/Limitations, Vision WFL;for purposes of eval  -KA        Precautions/Limitations, Hearing WFL;for purposes of eval  -KA        Prior Level of Function-Communication unknown  -KA        Prior Level of Function-Swallowing regular textures;thin liquids  -KA        Plans/Goals Discussed with patient  -KA        Barriers to Rehab ineffective coping  -KA        Patient's Goals for Discharge return home  -KA           Pain    Pretreatment Pain Rating 0/10 - no pain  -KA        Posttreatment Pain Rating 0/10 - no pain  -KA           Oral Motor Structure and Function    Dentition Assessment missing teeth  -KA        Secretion Management WNL/WFL  -KA        Mucosal Quality moist, healthy  -KA           Oral Musculature and Cranial Nerve Assessment    Oral Motor General Assessment generalized oral motor weakness  -KA           Clinical Swallow Eval    Clinical Swallow Evaluation Summary Clinical swallow evaluation  "attempted however limited assessment due to pt refusing to consume solids. Patient denied dysphagia symptoms, however stated \"I am too upset to eat.\" Pt reporting she wants to go home to see her dog. Patient accepted multiple trials of thins via straw without overt s/s of pen/asp. Laryngeal elevation felt adequate upon neck palpation.  -           SLP Evaluation Clinical Impression    SLP Swallowing Diagnosis R/O pharyngeal dysphagia  -        Functional Impact risk of aspiration/pneumonia  -        Rehab Potential/Prognosis, Swallowing adequate, monitor progress closely  -        Swallow Criteria for Skilled Therapeutic Interventions Met demonstrates skilled criteria  -           Recommendations    Therapy Frequency (Swallow) PRN  -        Predicted Duration Therapy Intervention (Days) until discharge  -        SLP Diet Recommendation regular textures;thin liquids;other (see comments)  continue current diet ordered by MD  -        Recommended Diagnostics reassess via clinical swallow evaluation  -        Recommended Precautions and Strategies upright posture during/after eating;small bites of food and sips of liquid;general aspiration precautions  -        Oral Care Recommendations Oral Care BID/PRN  -        SLP Rec. for Method of Medication Administration meds whole;as tolerated  -        Monitor for Signs of Aspiration yes;notify SLP if any concerns  -        Anticipated Discharge Disposition (SLP) unknown  -           Swallow Goals (SLP)    Swallow STGs diet tolerance goal selection (SLP)  -        Diet Tolerance Goal Selection (SLP) Swallow Short Term Goal 1  -           (STG) Swallow 1    (STG) Swallow 1 Patient will tolerate least restrictive diet without overt s/s of pen/asp.  -                  User Key  (r) = Recorded By, (t) = Taken By, (c) = Cosigned By      Initials Name Effective Dates    Greg Rivers, SLP 01/05/24 -                     EDUCATION  The patient has " been educated in the following areas:   Dysphagia (Swallowing Impairment).        SLP GOALS       Row Name 05/13/25 1600             (STG) Swallow 1    (STG) Swallow 1 Patient will tolerate least restrictive diet without overt s/s of pen/asp.  -KA                User Key  (r) = Recorded By, (t) = Taken By, (c) = Cosigned By      Initials Name Provider Type    Greg Rivers SLP Speech and Language Pathologist                         Time Calculation:    Time Calculation- SLP       Row Name 05/13/25 1623             Time Calculation- SLP    SLP Start Time 1530  -KA      SLP Received On 05/13/25  -KA         Untimed Charges    [id number] Eval Oral Pharyng Swallow Minutes 45  -KA         Total Minutes    Untimed Charges Total Minutes 45  -KA       Total Minutes 45  -KA                User Key  (r) = Recorded By, (t) = Taken By, (c) = Cosigned By      Initials Name Provider Type    Greg Rivers SLP Speech and Language Pathologist                    Therapy Charges for Today       Code Description Service Date Service Provider Modifiers Qty    [id number] HC ST EVAL ORAL PHARYNG SWALLOW 3 5/13/2025 Greg Laurent SLP GN 1                 ROLA Pena  5/13/2025

## 2025-05-14 ENCOUNTER — APPOINTMENT (OUTPATIENT)
Dept: CT IMAGING | Facility: HOSPITAL | Age: 70
End: 2025-05-14
Payer: MEDICARE

## 2025-05-14 ENCOUNTER — APPOINTMENT (OUTPATIENT)
Dept: ULTRASOUND IMAGING | Facility: HOSPITAL | Age: 70
End: 2025-05-14
Payer: MEDICARE

## 2025-05-14 LAB
ALBUMIN FLD-MCNC: 2.9 G/DL
ANION GAP SERPL CALCULATED.3IONS-SCNC: 14 MMOL/L (ref 5–15)
APPEARANCE FLD: CLEAR
BACTERIA UR QL AUTO: ABNORMAL /HPF
BASOPHILS # BLD AUTO: 0.02 10*3/MM3 (ref 0–0.2)
BASOPHILS NFR BLD AUTO: 0.3 % (ref 0–1.5)
BILIRUB UR QL STRIP: NEGATIVE
BUN SERPL-MCNC: 12 MG/DL (ref 8–23)
BUN/CREAT SERPL: 12 (ref 7–25)
CALCIUM SPEC-SCNC: 8.9 MG/DL (ref 8.6–10.5)
CHLORIDE SERPL-SCNC: 103 MMOL/L (ref 98–107)
CK SERPL-CCNC: 59 U/L (ref 20–180)
CLARITY UR: ABNORMAL
CO2 SERPL-SCNC: 20 MMOL/L (ref 22–29)
COLOR FLD: YELLOW
COLOR UR: ABNORMAL
CORTIS SERPL-MCNC: 15.9 MCG/DL
CREAT SERPL-MCNC: 1 MG/DL (ref 0.57–1)
DEPRECATED RDW RBC AUTO: 45.7 FL (ref 37–54)
EGFRCR SERPLBLD CKD-EPI 2021: 60.4 ML/MIN/1.73
EOSINOPHIL # BLD AUTO: 0.05 10*3/MM3 (ref 0–0.4)
EOSINOPHIL NFR BLD AUTO: 0.8 % (ref 0.3–6.2)
ERYTHROCYTE [DISTWIDTH] IN BLOOD BY AUTOMATED COUNT: 12.6 % (ref 12.3–15.4)
GLUCOSE FLD-MCNC: 124 MG/DL
GLUCOSE SERPL-MCNC: 118 MG/DL (ref 65–99)
GLUCOSE UR STRIP-MCNC: NEGATIVE MG/DL
HCT VFR BLD AUTO: 42.3 % (ref 34–46.6)
HGB BLD-MCNC: 14.4 G/DL (ref 12–15.9)
HGB UR QL STRIP.AUTO: NEGATIVE
HYALINE CASTS UR QL AUTO: ABNORMAL /LPF
IMM GRANULOCYTES # BLD AUTO: 0.02 10*3/MM3 (ref 0–0.05)
IMM GRANULOCYTES NFR BLD AUTO: 0.3 % (ref 0–0.5)
KETONES UR QL STRIP: ABNORMAL
LDH FLD-CCNC: 193 U/L
LDH SERPL-CCNC: 306 U/L (ref 135–214)
LEUKOCYTE ESTERASE UR QL STRIP.AUTO: ABNORMAL
LYMPHOCYTES # BLD AUTO: 0.95 10*3/MM3 (ref 0.7–3.1)
LYMPHOCYTES NFR BLD AUTO: 15.3 % (ref 19.6–45.3)
LYMPHOCYTES NFR FLD MANUAL: 80 %
MCH RBC QN AUTO: 33.9 PG (ref 26.6–33)
MCHC RBC AUTO-ENTMCNC: 34 G/DL (ref 31.5–35.7)
MCV RBC AUTO: 99.5 FL (ref 79–97)
METHOD: NORMAL
MONOCYTES # BLD AUTO: 0.78 10*3/MM3 (ref 0.1–0.9)
MONOCYTES NFR BLD AUTO: 12.6 % (ref 5–12)
MONOS+MACROS NFR FLD: 20 %
NEUTROPHILS NFR BLD AUTO: 4.37 10*3/MM3 (ref 1.7–7)
NEUTROPHILS NFR BLD AUTO: 70.7 % (ref 42.7–76)
NITRITE UR QL STRIP: NEGATIVE
NRBC BLD AUTO-RTO: 0 /100 WBC (ref 0–0.2)
NUC CELL # FLD: 64 /MM3
PH FLD: 7.45 [PH]
PH UR STRIP.AUTO: 5.5 [PH] (ref 5–8)
PLATELET # BLD AUTO: 167 10*3/MM3 (ref 140–450)
PMV BLD AUTO: 10 FL (ref 6–12)
POTASSIUM SERPL-SCNC: 3.8 MMOL/L (ref 3.5–5.2)
PROT FLD-MCNC: 4.1 G/DL
PROT SERPL-MCNC: 6.4 G/DL (ref 6–8.5)
PROT UR QL STRIP: ABNORMAL
RBC # BLD AUTO: 4.25 10*6/MM3 (ref 3.77–5.28)
RBC # FLD AUTO: 43 /MM3
RBC # UR STRIP: ABNORMAL /HPF
REF LAB TEST METHOD: ABNORMAL
SODIUM SERPL-SCNC: 137 MMOL/L (ref 136–145)
SP GR UR STRIP: 1.03 (ref 1–1.03)
SQUAMOUS #/AREA URNS HPF: ABNORMAL /HPF
TRIGL FLD-MCNC: 25 MG/DL
UROBILINOGEN UR QL STRIP: ABNORMAL
WBC # UR STRIP: ABNORMAL /HPF
WBC NRBC COR # BLD AUTO: 6.19 10*3/MM3 (ref 3.4–10.8)

## 2025-05-14 PROCEDURE — 87070 CULTURE OTHR SPECIMN AEROBIC: CPT | Performed by: STUDENT IN AN ORGANIZED HEALTH CARE EDUCATION/TRAINING PROGRAM

## 2025-05-14 PROCEDURE — 25010000002 ENOXAPARIN PER 10 MG: Performed by: INTERNAL MEDICINE

## 2025-05-14 PROCEDURE — 25010000002 PIPERACILLIN SOD-TAZOBACTAM PER 1 G: Performed by: INTERNAL MEDICINE

## 2025-05-14 PROCEDURE — 97530 THERAPEUTIC ACTIVITIES: CPT

## 2025-05-14 PROCEDURE — 84155 ASSAY OF PROTEIN SERUM: CPT | Performed by: STUDENT IN AN ORGANIZED HEALTH CARE EDUCATION/TRAINING PROGRAM

## 2025-05-14 PROCEDURE — 97162 PT EVAL MOD COMPLEX 30 MIN: CPT

## 2025-05-14 PROCEDURE — 76942 ECHO GUIDE FOR BIOPSY: CPT

## 2025-05-14 PROCEDURE — 71260 CT THORAX DX C+: CPT

## 2025-05-14 PROCEDURE — 82533 TOTAL CORTISOL: CPT | Performed by: INTERNAL MEDICINE

## 2025-05-14 PROCEDURE — 87116 MYCOBACTERIA CULTURE: CPT | Performed by: STUDENT IN AN ORGANIZED HEALTH CARE EDUCATION/TRAINING PROGRAM

## 2025-05-14 PROCEDURE — 88112 CYTOPATH CELL ENHANCE TECH: CPT | Performed by: STUDENT IN AN ORGANIZED HEALTH CARE EDUCATION/TRAINING PROGRAM

## 2025-05-14 PROCEDURE — 25010000002 CEFTRIAXONE PER 250 MG: Performed by: INTERNAL MEDICINE

## 2025-05-14 PROCEDURE — 94799 UNLISTED PULMONARY SVC/PX: CPT

## 2025-05-14 PROCEDURE — 87075 CULTR BACTERIA EXCEPT BLOOD: CPT | Performed by: STUDENT IN AN ORGANIZED HEALTH CARE EDUCATION/TRAINING PROGRAM

## 2025-05-14 PROCEDURE — 82550 ASSAY OF CK (CPK): CPT | Performed by: INTERNAL MEDICINE

## 2025-05-14 PROCEDURE — 89051 BODY FLUID CELL COUNT: CPT | Performed by: STUDENT IN AN ORGANIZED HEALTH CARE EDUCATION/TRAINING PROGRAM

## 2025-05-14 PROCEDURE — 97166 OT EVAL MOD COMPLEX 45 MIN: CPT

## 2025-05-14 PROCEDURE — 88305 TISSUE EXAM BY PATHOLOGIST: CPT | Performed by: STUDENT IN AN ORGANIZED HEALTH CARE EDUCATION/TRAINING PROGRAM

## 2025-05-14 PROCEDURE — 82042 OTHER SOURCE ALBUMIN QUAN EA: CPT | Performed by: STUDENT IN AN ORGANIZED HEALTH CARE EDUCATION/TRAINING PROGRAM

## 2025-05-14 PROCEDURE — 81001 URINALYSIS AUTO W/SCOPE: CPT | Performed by: INTERNAL MEDICINE

## 2025-05-14 PROCEDURE — 87205 SMEAR GRAM STAIN: CPT | Performed by: STUDENT IN AN ORGANIZED HEALTH CARE EDUCATION/TRAINING PROGRAM

## 2025-05-14 PROCEDURE — 83615 LACTATE (LD) (LDH) ENZYME: CPT | Performed by: STUDENT IN AN ORGANIZED HEALTH CARE EDUCATION/TRAINING PROGRAM

## 2025-05-14 PROCEDURE — 25010000002 LORAZEPAM PER 2 MG: Performed by: STUDENT IN AN ORGANIZED HEALTH CARE EDUCATION/TRAINING PROGRAM

## 2025-05-14 PROCEDURE — 87206 SMEAR FLUORESCENT/ACID STAI: CPT | Performed by: STUDENT IN AN ORGANIZED HEALTH CARE EDUCATION/TRAINING PROGRAM

## 2025-05-14 PROCEDURE — 0W993ZX DRAINAGE OF RIGHT PLEURAL CAVITY, PERCUTANEOUS APPROACH, DIAGNOSTIC: ICD-10-PCS | Performed by: STUDENT IN AN ORGANIZED HEALTH CARE EDUCATION/TRAINING PROGRAM

## 2025-05-14 PROCEDURE — 25010000002 LIDOCAINE 1 % SOLUTION: Performed by: RADIOLOGY

## 2025-05-14 PROCEDURE — 25510000001 IOPAMIDOL 61 % SOLUTION: Performed by: INTERNAL MEDICINE

## 2025-05-14 PROCEDURE — 83986 ASSAY PH BODY FLUID NOS: CPT | Performed by: STUDENT IN AN ORGANIZED HEALTH CARE EDUCATION/TRAINING PROGRAM

## 2025-05-14 PROCEDURE — 84478 ASSAY OF TRIGLYCERIDES: CPT | Performed by: STUDENT IN AN ORGANIZED HEALTH CARE EDUCATION/TRAINING PROGRAM

## 2025-05-14 PROCEDURE — 84157 ASSAY OF PROTEIN OTHER: CPT | Performed by: STUDENT IN AN ORGANIZED HEALTH CARE EDUCATION/TRAINING PROGRAM

## 2025-05-14 PROCEDURE — 85025 COMPLETE CBC W/AUTO DIFF WBC: CPT | Performed by: INTERNAL MEDICINE

## 2025-05-14 PROCEDURE — 25010000002 AZITHROMYCIN PER 500 MG: Performed by: INTERNAL MEDICINE

## 2025-05-14 PROCEDURE — 80048 BASIC METABOLIC PNL TOTAL CA: CPT | Performed by: INTERNAL MEDICINE

## 2025-05-14 PROCEDURE — 25810000003 SODIUM CHLORIDE 0.9 % SOLUTION 250 ML FLEX CONT: Performed by: INTERNAL MEDICINE

## 2025-05-14 PROCEDURE — 94664 DEMO&/EVAL PT USE INHALER: CPT

## 2025-05-14 PROCEDURE — 82945 GLUCOSE OTHER FLUID: CPT | Performed by: STUDENT IN AN ORGANIZED HEALTH CARE EDUCATION/TRAINING PROGRAM

## 2025-05-14 RX ORDER — IOPAMIDOL 612 MG/ML
100 INJECTION, SOLUTION INTRAVASCULAR
Status: COMPLETED | OUTPATIENT
Start: 2025-05-14 | End: 2025-05-14

## 2025-05-14 RX ORDER — LIDOCAINE HYDROCHLORIDE 10 MG/ML
10 INJECTION, SOLUTION INFILTRATION; PERINEURAL ONCE
Status: COMPLETED | OUTPATIENT
Start: 2025-05-14 | End: 2025-05-14

## 2025-05-14 RX ADMIN — PANTOPRAZOLE SODIUM 40 MG: 40 TABLET, DELAYED RELEASE ORAL at 06:40

## 2025-05-14 RX ADMIN — MENTHOL, ZINC OXIDE 1 APPLICATION: .44; 20.6 OINTMENT TOPICAL at 08:45

## 2025-05-14 RX ADMIN — Medication 10 ML: at 20:42

## 2025-05-14 RX ADMIN — ENOXAPARIN SODIUM 40 MG: 100 INJECTION SUBCUTANEOUS at 15:17

## 2025-05-14 RX ADMIN — LORAZEPAM 1 MG: 2 INJECTION INTRAMUSCULAR; INTRAVENOUS at 08:43

## 2025-05-14 RX ADMIN — LIDOCAINE HYDROCHLORIDE 5 ML: 10 INJECTION, SOLUTION INFILTRATION; PERINEURAL at 09:35

## 2025-05-14 RX ADMIN — IPRATROPIUM BROMIDE AND ALBUTEROL SULFATE 3 ML: .5; 3 SOLUTION RESPIRATORY (INHALATION) at 07:11

## 2025-05-14 RX ADMIN — CEFTRIAXONE SODIUM 1000 MG: 1 INJECTION, POWDER, FOR SOLUTION INTRAMUSCULAR; INTRAVENOUS at 00:09

## 2025-05-14 RX ADMIN — Medication 10 ML: at 08:45

## 2025-05-14 RX ADMIN — PIPERACILLIN AND TAZOBACTAM 3.38 G: 3; .375 INJECTION, POWDER, FOR SOLUTION INTRAVENOUS at 19:43

## 2025-05-14 RX ADMIN — GUAIFENESIN 1200 MG: 600 TABLET, EXTENDED RELEASE ORAL at 08:45

## 2025-05-14 RX ADMIN — AZITHROMYCIN MONOHYDRATE 500 MG: 500 INJECTION, POWDER, LYOPHILIZED, FOR SOLUTION INTRAVENOUS at 15:17

## 2025-05-14 RX ADMIN — IOPAMIDOL 95 ML: 612 INJECTION, SOLUTION INTRAVENOUS at 15:49

## 2025-05-14 NOTE — NURSING NOTE
"Pt currently confused and refusing her tele monitor and pulse ox. MD made aware. CTM    1645: Pt continues to refuse telemetry equipment to properly monitor her vitals. Pt continues to get OOB despite education on falls by this RN. The patient is at a high risk of falling and injuring herself. Pt has verbalized wanting to leave to go to her dog that has been left in her apartment. Pt is not fully oriented on multiple assessments by this RN. This RN has spoken to animal control agent in the morning and officer [place]. This RN has reached out to old phone number in patient chart from prevous encounters (no longer associated with patient contact), pt apartment complex to find NOK or anyone to help as well as pt friend in house keeping here at [facility] and reached out to nika who the patient said she knew. At this time the patient has no NOK or anyone to help. Pt accusing staff of not being any help to her and being \"mean, rude, disrepectful, and not doing our jobs,\"Pt told this RN while this RN educating on importance of staying in bed to prevent injury \"Why are you so mean to me? You are so disrespectful. Ill make sure you have no wife and no daughter after this. The staff here are treating me terrible\".  Pt has been reminded we are going above and beyond to help her and no staff member has any intention of harming her. This RN has spent a large portion of the day trying to assist Patient. MD and NIDHI aware. CTM   "

## 2025-05-14 NOTE — PROGRESS NOTES
[facility]  [phone]  Dr. Agustin Orellana     Subjective:  LOS: 1    Chief Complaint: Shortness of breath and fatigue    Patient initially sleeping on my evaluation.  She did awaken easily and denied any acute concerns or complaints.  She had recently come back from her thoracentesis.     Objective   Vital Signs past 24hrs  Temp range: Temp (24hrs), Av.5 °F (36.4 °C), Min:97 °F (36.1 °C), Max:98.1 °F (36.7 °C)    BP range: BP: (107-128)/(73-99) 107/79  Pulse range: Heart Rate:  [] 109  Resp rate range: Resp:  [16-18] 16  Device (Oxygen Therapy): nasal cannulaFlow (L/min) (Oxygen Therapy):  [1-2] 2  Oxygen range:SpO2:  [88 %-98 %] 88 %   Mechanical Ventilator:       Results Review:    I have reviewed the laboratory and imaging data since the last note by [facility] physician.  My annotations are noted in assessment and plan.      Result Review:  I have personally reviewed the results from last note by [facility] physician to 2025 18:04 EDT and agree with these findings:  [x]  Laboratory list / accordion  [x]  Microbiology  [x]  Radiology  [x]  EKG/Telemetry   [x]  Cardiology/Vascular   [x]  Pathology  [x]  Old records  []  Other:    Medication Review:  I have reviewed the current MAR.  My annotations are noted in assessment and plan.    atorvastatin, 20 mg, Oral, Nightly  azithromycin, 500 mg, Intravenous, Q24H  enoxaparin sodium, 40 mg, Subcutaneous, Q24H  guaiFENesin, 1,200 mg, Oral, Q12H  ipratropium-albuterol, 3 mL, Nebulization, 4x Daily - RT  Menthol-Zinc Oxide, 1 Application, Topical, BID  pantoprazole, 40 mg, Oral, Q AM  piperacillin-tazobactam, 3.375 g, Intravenous, Q8H  sodium chloride, 10 mL, Intravenous, Q12H           Lines, Drains & Airways       Active LDAs       Name Placement date Placement time Site Days    Peripheral IV 25 18 G Left 25  --  --  2    Peripheral IV 25 1427 20 G Anterior;Left;Proximal Forearm 25  1427  Forearm  less than 1                   No active isolations  Diet Orders (active) (From admission, onward)       Start     Ordered    05/13/25 0946  Diet: Renal; Low Phosphorus, Low Potassium, Low Sodium (2-3g); Texture: Regular (IDDSI 7); Fluid Consistency: Thin (IDDSI 0)  Diet Effective Now         05/13/25 0946                      Assessment  Acute hypoxic respiratory failure  Right pleural effusion s/p thoracentesis 5/14/25 with exudate  Bulky mediastinal and right hilar adenopathy  Suspected right hilar mass  Possible pneumonia  Right chest wall mass  Former smoker     Plan  -Patient presented on 5/13/2025 with progressively worsening shortness of breath, fatigue and weakness for 6 months.  Found to have right pleural effusion and mediastinal and right hilar adenopathy.  - Continue weaning oxygen for goal O2 saturation greater than 88%  - Can continue antibiotics for now targeted at community-acquired pneumonia  - Thoracentesis on 5/14/2025 with 1 L of fluid removed.  Exudative by lights criteria.  Cytology pending  -CT chest with contrast after thoracentesis shows further evidence of a right hilar mass with bulky mediastinal and hilar lymphadenopathy that is enclosing the right mainstem bronchus.  - At this point I am fairly concerned that there is a malignant process going on.  Whether this represents a primary lung malignancy versus metastatic disease is unclear.  I think it is reasonable to continue antibiotics targeted at infection for now.  We will need to await the cytology of the pleural fluid if this is revealing for the cause of malignancy then this will give us both diagnosis and stage.  If this is negative then we can either do bronchoscopy with EBUS lymph node biopsy and possible biopsy of endobronchial lesion.  Or alternatively we could consult IR for biopsy of her subcarinal lymph node which would give both diagnosis and stage.    Agustin Orellana,    05/14/25  18:04 EDT      Part of this note may be an electronic  transcription/translation of spoken language to printed text using the Dragon Dictation System.

## 2025-05-14 NOTE — PLAN OF CARE
Goal Outcome Evaluation:  Plan of Care Reviewed With: patient           Outcome Evaluation: Pt is a 70 y.o female admitted to [facility] on 5/12 with progressive weakness, dizziness, SOB ongoing for weeks to months. Pt is treated for  hypokalemia/dehydration/questionable congestive heart failure/pneumonia and hypoxemia. Chart indicates pt was unable to get herself out of bed to walk to the bathroom prior to admission. Pt reports she lives alone with her dog. She reports minimally participating in some ADLs such as bathing and poor PO intake. Pt is very unsteady today and requires min AX2. Pt on 2L today SPO2 reading 84-85% but poor wave lenght. Pt reports adament about dc home due to needing to be back with her dog. She is generally pretty weak today. I would recommend SNF at [facility] with further OT to address ADL independence, endurance deficits, balance/risk for falls.    Anticipated Discharge Disposition (OT): skilled nursing facility

## 2025-05-14 NOTE — PROGRESS NOTES
Name: Penelope Onofre ADMIT: 2025   : 1955  PCP: Provider, No Known    MRN: [id number] LOS: 1 days   AGE/SEX: 70 y.o. female  ROOM: [facility]/     Subjective   Subjective   Patient is asking to be released home and she is very concerned about her dog.  She underwent thoracocentesis of the right today with removal of 1 L of pleural fluid and she feels better in terms of her shortness of breath.  She does have occasional dry cough without sputum production or hemoptysis.  No fever or chills.  No chest pain.  No PND orthopnea.  No palpitation.  No ankle edema.  No wheezing.    Review of Systems  GI.  No abdominal pain.  No nausea or vomiting  .  No dysuria or hematuria.  CNS.  No headache.  No focal neurological symptoms.     Objective   Objective   Vital Signs  Temp:  [97 °F (36.1 °C)-98.1 °F (36.7 °C)] 98.1 °F (36.7 °C)  Heart Rate:  [] 109  Resp:  [16-18] 16  BP: (107-128)/(73-99) 107/79  SpO2:  [88 %-98 %] 88 %  on  Flow (L/min) (Oxygen Therapy):  [1-2] 2;   Device (Oxygen Therapy): nasal cannula    Intake/Output Summary (Last 24 hours) at 2025 1743  Last data filed at 2025 0948  Gross per 24 hour   Intake --   Output 1000 ml   Net -1000 ml     Body mass index is 18.99 kg/m².      25  0910 25  1518   Weight: 63.6 kg (140 lb 3.4 oz) 63.5 kg (140 lb)     Physical Exam  General.  Elderly female.  She is alert and oriented x 4.  Appears chronically ill and older than stated age.  Underweight.  In no apparent pain/distress/diaphoresis.  Normal mood and affect  Eyes.  Pupils equal round and reactive.  Intact extraocular musculature.  No pallor or jaundice  Oral cavity.  Moist mucous membrane  Neck.  Supple.  No JVD.  No lymphadenopathy or thyromegaly  Cardiovascular.  Regular rate and rhythm with no gallops or murmurs  Chest.  Decreased entry in the right lung with h clear left lung air entry.  Hardy rhonchi (improved from yesterday)..  Scattered bilateral rhonchi.  Abdomen.  Soft  "lax.  No tenderness.  No organomegaly.  No guarding or rebound  Extremities.  No clubbing/cyanosis/edema  CNS..  No acute focal neurological deficits.      Results Review:      Results from last 7 days   Lab Units 05/14/25 0637 05/13/25 0611 05/12/25  2359   SODIUM mmol/L 137 135* 139   POTASSIUM mmol/L 3.8 3.9 3.4*   CHLORIDE mmol/L 103 106 105   CO2 mmol/L 20.0* 18.4* 20.5*   BUN mg/dL 12 13 13   CREATININE mg/dL 1.00 1.00 1.21*   GLUCOSE mg/dL 118* 115* 135*   CALCIUM mg/dL 8.9 9.2 9.2   AST (SGOT) U/L  --   --  24   ALT (SGPT) U/L  --   --  7     Estimated Creatinine Clearance: 51.7 mL/min (by C-G formula based on SCr of 1 mg/dL).  Results from last 7 days   Lab Units 05/13/25  0611   HEMOGLOBIN A1C % 5.50         Results from last 7 days   Lab Units 05/14/25 0637 05/13/25  0611 05/13/25  0120 05/12/25  2359   CK TOTAL U/L 59  --   --   --    HSTROP T ng/L  --  32* 30* 32*     Results from last 7 days   Lab Units 05/12/25  2359   PROBNP pg/mL 1,374.0*     Results from last 7 days   Lab Units 05/13/25  0611   TSH uIU/mL 4.800*     Results from last 7 days   Lab Units 05/13/25  1428   MAGNESIUM mg/dL 1.7           Invalid input(s): \"LDLCALC\"  Results from last 7 days   Lab Units 05/14/25 0637 05/13/25  0611 05/12/25  2359   WBC 10*3/mm3 6.19 5.99 6.45   HEMOGLOBIN g/dL 14.4 15.8 16.4*   HEMATOCRIT % 42.3 46.0 46.8*   PLATELETS 10*3/mm3 167 182 203   MCV fL 99.5* 101.1* 100.0*   MCH pg 33.9* 34.7* 35.0*   MCHC g/dL 34.0 34.3 35.0   RDW % 12.6 12.6 12.9   RDW-SD fl 45.7 46.9 47.6   MPV fL 10.0 10.1 10.0   NEUTROPHIL % % 70.7  --  61.0   LYMPHOCYTE % % 15.3*  --  23.3   MONOCYTES % % 12.6*  --  13.2*   EOSINOPHIL % % 0.8  --  1.9   BASOPHIL % % 0.3  --  0.3   IMM GRAN % % 0.3  --  0.3   NEUTROS ABS 10*3/mm3 4.37  --  3.94   LYMPHS ABS 10*3/mm3 0.95  --  1.50   MONOS ABS 10*3/mm3 0.78  --  0.85   EOS ABS 10*3/mm3 0.05  --  0.12   BASOS ABS 10*3/mm3 0.02  --  0.02   IMMATURE GRANS (ABS) 10*3/mm3 0.02  --  0.02 "   NRBC /100 WBC 0.0  --  0.0             Results from last 7 days   Lab Units 05/13/25  1428 05/13/25  0239   PROCALCITONIN ng/mL 0.05  --    LACTATE mmol/L  --  1.3             Results from last 7 days   Lab Units 05/13/25  0242 05/13/25  0239   BLOODCX  No growth at 24 hours No growth at 24 hours     Results from last 7 days   Lab Units 05/13/25  1229   ADENOVIRUS DETECTION BY PCR  Not Detected   CORONAVIRUS 229E  Not Detected   CORONAVIRUS HKU1  Not Detected   CORONAVIRUS NL63  Not Detected   CORONAVIRUS OC43  Not Detected   HUMAN METAPNEUMOVIRUS  Not Detected   HUMAN RHINOVIRUS/ENTEROVIRUS  Not Detected   INFLUENZA B PCR  Not Detected   PARAINFLUENZA 1  Not Detected   PARAINFLUENZA VIRUS 2  Not Detected   PARAINFLUENZA VIRUS 3  Not Detected   PARAINFLUENZA VIRUS 4  Not Detected   BORDETELLA PERTUSSIS PCR  Not Detected   CHLAMYDOPHILA PNEUMONIAE PCR  Not Detected   MYCOPLAMA PNEUMO PCR  Not Detected   INFLUENZA A PCR  Not Detected   RSV, PCR  Not Detected     Results from last 7 days   Lab Units 05/14/25  0656   NITRITE UA  Negative   WBC UA /HPF 3-5*   BACTERIA UA /HPF None Seen   SQUAM EPITHEL UA /HPF 0-2           Imaging:  Imaging Results (Last 24 Hours)       Procedure Component Value Units Date/Time    CT Chest With Contrast Diagnostic [[id number]] Collected: 05/14/25 1615     Updated: 05/14/25 1615    Narrative:      CT CHEST WITH IV CONTRAST     HISTORY: 70-year-old female with lymphadenopathy and right effusion.  Status post right ultrasound-guided thoracentesis earlier today removing  1 L of fluid.     TECHNIQUE: Radiation dose reduction techniques were utilized, including  automated exposure control and exposure modulation based on body size.   3 mm images were obtained through the chest after the administration of  IV contrast. Compared with yesterday's noncontrast chest CT.     FINDINGS:  1. Residual moderate size residual right pleural effusion which layers  to the apex. Significant decrease in the  right lower lobe atelectasis.  There are dependent airspace opacities at the right upper lobe  suspicious for pneumonia, more conspicuous due to the decrease in the  right effusion, but unchanged. Unchanged pulmonary nodules. Moderately  advanced emphysema     2. Bulky confluent mediastinal and right hilar lymphadenopathy measures  in total approximately 8.6 x 8.0 cm. There is also separate bulky  anterior mediastinal lymphadenopathy and bulky right supraclavicular  lymphadenopathy which is likely metastatic.  The bulky lymphadenopathy is contiguous with a bulky right middle lobe  mass measuring approximately 4.8 x 3.2 cm, image 172. Tissue diagnosis  is recommended with bronchoscopy.     3. There is occlusion of the right mainstem bronchus and right  interlobar bronchus. Right middle lobe bronchus is narrowed and some of  the right lower lobe bronchi are opacified. The proximal right upper  lobe bronchi are narrowed, but are otherwise patent.     There is a small pericardial effusion measuring up to 1 cm in diameter  along the anterior cardiac margin. No left pleural effusion          US Thoracentesis [[id number]] Collected: 05/14/25 1109    Specimen: Body Fluid Updated: 05/14/25 1112    Narrative:      IMAGE GUIDED THORACENTESIS     HISTORY:  right pleural effusion, need to rule out malignant cause     TECHNIQUE: Informed consent was obtained and documented in the patient's  chart. The patient was placed in upright position. After planning  ultrasound, the patient's right posterolateral thorax was prepped and  draped in the usual aseptic manner. 1% lidocaine was infiltrated at the  designated puncture site. A 5-Hungarian thoracentesis catheter was then  inserted into the pleural effusion using ultrasound guidance and a total  of 1 L fluid was drained with specimen sent to laboratory.  Upon  completion of the procedure, the catheter was withdrawn and hemostasis  achieved by manual pressure. Sterile dressing was  applied. There were no  immediate complications. All elements of maximal sterile technique were  followed.          Impression:      Successful ultrasound guided right thoracentesis.     This report was finalized on 5/14/2025 11:09 AM by Dr. Phillip Starkey M.D on Workstation: [id number]                  I reviewed the patient's new clinical results / labs / tests / procedures      Assessment/Plan     Active Hospital Problems    Diagnosis  POA   • **Generalized weakness [R53.1]  Yes   • Dizziness [R42]  Yes   • Pneumonia [J18.9]  Yes   • Pleural effusion [J90]  Yes   • At high risk for pressure injury of skin [Z91.89]  Not Applicable   • Decubitus ulcer of sacral region, stage 1 [L89.151]  Yes   • Acute kidney failure [N17.9]  Yes   • Dehydration [E86.0]  Yes   • Elevated troponin [R79.89]  Yes   • Elevated brain natriuretic peptide (BNP) level [R79.89]  Yes   • Hypokalemia [E87.6]  Yes   • Hyperglycemia [R73.9]  Yes      Resolved Hospital Problems   No resolved problems to display.           Weakness/fatigue/dizziness.  This is multifactorial secondary to hypokalemia/dehydration/questionable congestive heart failure/pneumonia and hypoxemia.  No other evidence of infection beside the pneumonia (normal lactic acid/normal white blood cell/no fever/normal procalcitonin/negative blood cultures/negative UTI by UA).  CT scan of the brain without contrast without acute disease.  Orthostasis are negative.  Echo with normal ejection fraction and no significant valvular disease.  Resolved dehydration with IV fluid.  Normal potassium with substitution.  Normal magnesium/TSH/CK/a.m. cortisol level.  Physical therapy recommends SNF   Poor social status.  Dog fecal material in the house with poor hygiene of the house per report.  CCP to evaluate  Right-sided pneumonia/large pleural effusion/hypoxemic respiratory failure.  Negative blood cultures till now.  Unable to produce sputum.  Negative respiratory PCR panel.  Awaiting urine  Legionella and Streptococcus antigen.  S/p right thoracocentesis.  Pleural fluid Gram stain negative.  Pleural fluid culture and cytology is pending but biochemical markers indicate exudate.  Repeat CAT scan of the chest after thoracocentesis revealed residual moderate size right pleural effusion, decreased right lower lobe atelectasis, right upper lobe infiltrate, pulmonary nodules, advanced COPD, bulky mediastinal and right hilar lymphadenopathy and right supraclavicular lymphadenopathy likely metastasis, right middle lobe mass, occlusion of the right mainstem bronchus with narrowing.  Picture is suggestive with lung cancer with at least lymph node metastasis and postobstructive pneumonia.  Will continue IV Zithromax and change from Rocephin to Zosyn.  Continue DuoNebs/Mucinex .speech evaluation incomplete because the patient did not cooperate but no clinical evidence of aspiration.  Might require a bronchoscopy.  Pulmonary on board and their help is appreciated a  Grade 1 sacral and heel decubiti present on admission wound nurse.  Hemoconcentration/macrocytosis.  Hemoconcentration is secondary to hydration and has resolved with IV fluid.  No TSH/B12/cortisol/folate.  Elevated troponin/elevated proBNP/abnormal EKG in a patient with a history of hypertension good blood pressure control off Norvasc.  Echo with normal ejection fraction no significant valvular disease..  I believe elevation of the troponin and proBNP could be secondary to the renal failure   Acute kidney failure/hypokalemia acute kidney failure is most likely secondary to dehydration leading to prerenal azotemia.  Resolved acute kidney failure with IV fluid..  Resolved hypokalemia with substitution.  Normal  magnesium.  Patient appears euvolemic at this time.    Hyperglycemia.  Normally  Hyperlipidemia.  Continue statin   GERD.  Benign GI examination.  Continue proton pump inhibitor.  VTE prophylaxis.  Disposition.  Patient has been insistent to leave  because she wants to take care of her dog.  I explained to the patient that workup and treatment is not complete and that she could be jeopardizing her health if she leaves.  She is alert and oriented x 4 however and can sign AMA if she needs to but I managed to convince her to stay today.        Oren Copeland MD  [facility]  05/14/25  17:43 EDT

## 2025-05-14 NOTE — THERAPY EVALUATION
Patient Name: Penelope Onofre  : 1955    MRN: [id number]                              Today's Date: 2025       Admit Date: 2025    Visit Dx:     ICD-10-CM ICD-9-CM   1. Generalized weakness  R53.1 780.79   2. Pneumonia of right lower lobe due to infectious organism  J18.9 486     Patient Active Problem List   Diagnosis    Arm pain, left    Generalized weakness    Dizziness    Pneumonia    Pleural effusion    At high risk for pressure injury of skin    Decubitus ulcer of sacral region, stage 1    Acute kidney failure    Dehydration    Elevated troponin    Elevated brain natriuretic peptide (BNP) level    Hypokalemia    Hyperglycemia     Past Medical History:   Diagnosis Date    Chronic back pain     Claudication     GERD (gastroesophageal reflux disease)      Past Surgical History:   Procedure Laterality Date    BREAST CYST EXCISION Right     TONSILLECTOMY        General Information       Row Name 25 1340 25 0206       Physical Therapy Time and Intention    Document Type -- evaluation  -DJ    Mode of Treatment --  cotx appropriate due to level of assist for safe mobility as well as unknown activity tolerance  -DJ co-treatment;physical therapy;occupational therapy  cotx  -DJ      Row Name 25 1340          General Information    Patient Profile Reviewed yes  -DJ     Prior Level of Function --  lives alone and was unable to bathe herself, unable to walk for a few weeks, house in squalor, has dog, wasnt able to feed herself  -DJ     Existing Precautions/Restrictions fall;oxygen therapy device and L/min  1L  -DJ     Barriers to Rehab previous functional deficit;cognitive status;environmental barriers  APS report due to condition of house, dog feces  -DJ       Row Name 25 1340          Living Environment    Current Living Arrangements home  -DJ     People in Home alone  -DJ       Row Name 25 1340          Home Main Entrance    Number of Stairs, Main Entrance none  -DJ        Row Name 05/14/25 1340          Stairs Within Home, Primary    Number of Stairs, Within Home, Primary none  -DJ       Row Name 05/14/25 1340          Cognition    Orientation Status (Cognition) oriented x 3;unable/difficult to assess  speech difficult to understand, no teeth  -DJ       Row Name 05/14/25 1340          Safety Issues/Impairments Affecting Functional Mobility    Safety Issues Affecting Function (Mobility) judgment;safety precaution awareness  -DJ     Impairments Affecting Function (Mobility) balance;endurance/activity tolerance;strength;shortness of breath;postural/trunk control;cognition  -DJ     Cognitive Impairments, Mobility Safety/Performance safety precaution awareness;judgment  -DJ     Comment, Safety Issues/Impairments (Mobility) gt belt, nonskid socks, O2  -DJ               User Key  (r) = Recorded By, (t) = Taken By, (c) = Cosigned By      Initials Name Provider Type    Renetta Ceron, PT Physical Therapist                   Mobility       Row Name 05/14/25 1347          Bed Mobility    Bed Mobility supine-sit;sit-supine  -DJ     Supine-Sit [place] (Bed Mobility) contact guard;minimum assist (75% patient effort)  -DJ     Sit-Supine [place] (Bed Mobility) not tested  -DJ     Comment, (Bed Mobility) Pt placed in recliner chair after therapy session  -DJ       Row Name 05/14/25 1347          Transfers    Comment, (Transfers) sit/stand from transport stretcher  -DJ       Row Name 05/14/25 1347          Bed-Chair Transfer    Bed-Chair [place] (Transfers) minimum assist (75% patient effort);2 person assist;verbal cues  transport stretcher to recliner  -DJ       Row Name 05/14/25 1347          Sit-Stand Transfer    Sit-Stand [place] (Transfers) minimum assist (75% patient effort);2 person assist;verbal cues  -DJ     Assistive Device (Sit-Stand Transfers) walker, front-wheeled  -DJ       Row Name 05/14/25 1347          Gait/Stairs (Locomotion)    [place] Level (Gait) minimum  assist (75% patient effort);2 person assist;verbal cues  -DJ     Assistive Device (Gait) walker, front-wheeled  -DJ     Distance in Feet (Gait) 12  -DJ     Deviations/Abnormal Patterns (Gait) festinating/shuffling;stride length decreased;gait speed decreased  -DJ     Bilateral Gait Deviations forward flexed posture  -DJ     [place] Level (Stairs) not tested  -DJ     Comment, (Gait/Stairs) Pt amb 12' from transport stretcher to recliner chair with r wx and min A Of 2; balance is very unsteady, posture is flexed at cervical and thoracic spine, endurance is poor.  -DJ               User Key  (r) = Recorded By, (t) = Taken By, (c) = Cosigned By      Initials Name Provider Type    Renetta Ceron, FARZANEH Physical Therapist                   Obj/Interventions       Row Name 05/14/25 1350          Range of Motion Comprehensive    Comment, General Range of Motion grossly WFL, lacks TKE  -DJ       Row Name 05/14/25 1350          Strength Comprehensive (MMT)    Comment, General Manual Muscle Testing (MMT) Assessment generally weak - moves all 4s  -DJ       Row Name 05/14/25 1350          Motor Skills    Motor Skills functional endurance  -DJ     Functional Endurance poor  -DJ       Row Name 05/14/25 1350          Balance    Balance Assessment standing static balance;standing dynamic balance  -DJ     Static Standing Balance minimal assist  -DJ     Dynamic Standing Balance minimal assist;2-person assist;verbal cues  -DJ     Position/Device Used, Standing Balance walker, front-wheeled;supported  -DJ     Balance Interventions sitting;standing;sit to stand;supported;weight shifting activity  -DJ     Comment, Balance very unsteady  -DJ       Row Name 05/14/25 1350          Sensory Assessment (Somatosensory)    Sensory Assessment (Somatosensory) not tested  -DJ               User Key  (r) = Recorded By, (t) = Taken By, (c) = Cosigned By      Initials Name Provider Type    Renetta Ceron, PT Physical Therapist                    Goals/Plan       Row Name 05/14/25 [facility]7          Bed Mobility Goal 1 (PT)    Activity/Assistive Device (Bed Mobility Goal 1, PT) sit to supine;supine to sit  -DJ     [place] Level/Cues Needed (Bed Mobility Goal 1, PT) standby assist;verbal cues required  -DJ     Time Frame (Bed Mobility Goal 1, PT) 10 days  -DJ       Row Name 05/14/25 1357          Transfer Goal 1 (PT)    Activity/Assistive Device (Transfer Goal 1, PT) sit-to-stand/stand-to-sit  -DJ     [place] Level/Cues Needed (Transfer Goal 1, PT) standby assist  -DJ     Time Frame (Transfer Goal 1, PT) 10 days  -DJ       Row Name 05/14/25 1357          Gait Training Goal 1 (PT)    Activity/Assistive Device (Gait Training Goal 1, PT) gait (walking locomotion);assistive device use;improve balance and speed;increase endurance/gait distance;increase energy conservation;walker, rolling  -DJ     [place] Level (Gait Training Goal 1, PT) contact guard required;verbal cues required  -DJ     Distance (Gait Training Goal 1, PT) >150'  -DJ     Time Frame (Gait Training Goal 1, PT) 10 days  -DJ       Row Name 05/14/25 5927          Patient Education Goal (PT)    Activity (Patient Education Goal, PT) HEP  -DJ     [place]/Cues/Accuracy (Memory Goal 2, PT) demonstrates adequately;verbalizes understanding  -DJ     Time Frame (Patient Education Goal, PT) 5 days;short term goal (STG)  -DJ       Row Name 05/14/25 6881          Therapy Assessment/Plan (PT)    Planned Therapy Interventions (PT) balance training;bed mobility training;gait training;home exercise program;strengthening;postural re-education;patient/family education;transfer training  -DJ               User Key  (r) = Recorded By, (t) = Taken By, (c) = Cosigned By      Initials Name Provider Type    Renetta Ceron, PT Physical Therapist                   Clinical Impression       Row Name 05/14/25 5945          Pain    Pretreatment Pain Rating 0/10 - no pain  -DJ     Pre/Posttreatment Pain Comment Pt  c/o neck discomfort when asked to raise her head up  -DJ       Row Name 05/14/25 1829          Plan of Care Review    Plan of Care Reviewed With patient  -DJ     Outcome Evaluation 69yo frail appearing white female admitted 5/12/25 due to progressive generalized weakness and dizziness, inability to walk or feed self at home. PMH Includes pna, sacral decub, LBP. PLOF: Pt lives at home with dog and gradually lost abiility to amb, bathe herself, or feed herself. APA reports filed for squalor of house. She is limited now by generalized weakness and decreased activity tolerance as well as poor dynamic balance. Today, she was found in her room on transport stretcher returning from thoracentesis. Her speech is difficult to understand. She req min A to sit EOB with vc for sequencing and increased time. She req CGA to sit EOB. SHe stood from transport stretcher with min A Of 2. Pt amb 12' from transport stretcher to recliner chair with r wx and min A Of 2 on 1L O2; balance is very unsteady, posture is flexed at cervical and thoracic spine, endurance is poor. She was placed in recliner chair. O2 sats 84% if reading is accurate. She may benefit from skilled PT services to address functional deficits and prepare for d/c. Pt is not safe to return home and is a high falls risk. Recommend SNF  -DJ       Row Name 05/14/25 7779          Therapy Assessment/Plan (PT)    Patient/Family Therapy Goals Statement (PT) pt wants to return home  -DJ     Rehab Potential (PT) fair  -DJ     Criteria for Skilled Interventions Met (PT) yes;meets criteria;skilled treatment is necessary  -DJ     Therapy Frequency (PT) 5 times/wk  -DJ       Row Name 05/14/25 7763          Vital Signs    O2 Delivery Pre Treatment nasal cannula  1L  -DJ     O2 Delivery Intra Treatment nasal cannula  1L  -DJ     Post SpO2 (%) 84  if portable O2 sensor correct  -DJ     O2 Delivery Post Treatment nasal cannula  1L  -DJ     Pre Patient Position Supine  -DJ     Intra  Patient Position Standing  -DJ     Post Patient Position Supine  -DJ       Row Name 05/14/25 1353          Positioning and Restraints    Pre-Treatment Position other (comment)  transport stretcher  -DJ     Post Treatment Position chair  -DJ     In Chair notified nsg;reclined;call light within reach;encouraged to call for assist;exit alarm on  -DJ               User Key  (r) = Recorded By, (t) = Taken By, (c) = Cosigned By      Initials Name Provider Type    Renetta Ceron, PT Physical Therapist                   Outcome Measures       Row Name 05/14/25 1359          How much help from another person do you currently need...    Turning from your back to your side while in flat bed without using bedrails? 2  -DJ     Moving from lying on back to sitting on the side of a flat bed without bedrails? 2  -DJ     Moving to and from a bed to a chair (including a wheelchair)? 2  -DJ     Standing up from a chair using your arms (e.g., wheelchair, bedside chair)? 2  -DJ     Climbing 3-5 steps with a railing? 1  -DJ     To walk in hospital room? 2  -DJ     AM-PAC 6 Clicks Score (PT) 11  -DJ     Highest Level of Mobility Goal Move to Chair/Commode-4  -DJ       Row Name 05/14/25 1359 05/14/25 1331       Functional Assessment    Outcome Measure Options AM-PAC 6 Clicks Basic Mobility (PT)  -DJ AM-PAC 6 Clicks Daily Activity (OT)  -SM              User Key  (r) = Recorded By, (t) = Taken By, (c) = Cosigned By      Initials Name Provider Type    Renetta Ceron, PT Physical Therapist    Nataliia Lerma OTR/L, CSRS Occupational Therapist                                 Physical Therapy Education       Title: PT OT SLP Therapies (In Progress)       Topic: Physical Therapy (In Progress)       Point: Mobility training (In Progress)       Learning Progress Summary            Patient Acceptance, E, NR by DAVIE at 5/14/2025 1400                      Point: Home exercise program (Not Started)       Learner Progress:  Not documented in this  visit.              Point: Body mechanics (In Progress)       Learning Progress Summary            Patient Acceptance, E, NR by DAVIE at 5/14/2025 1400                      Point: Precautions (In Progress)       Learning Progress Summary            Patient Acceptance, E, NR by DAVIE at 5/14/2025 1400                                      User Key       Initials Effective Dates Name Provider Type Discipline    DAVIE 10/25/19 -  Renetta Ely, PT Physical Therapist PT                  PT Recommendation and Plan  Planned Therapy Interventions (PT): balance training, bed mobility training, gait training, home exercise program, strengthening, postural re-education, patient/family education, transfer training  Outcome Evaluation: 71yo frail appearing white female admitted 5/12/25 due to progressive generalized weakness and dizziness, inability to walk or feed self at home. PMH Includes pna, sacral decub, LBP. PLOF: Pt lives at home with dog and gradually lost abiility to amb, bathe herself, or feed herself. APA reports filed for squalor of house. She is limited now by generalized weakness and decreased activity tolerance as well as poor dynamic balance. Today, she was found in her room on transport stretcher returning from thoracentesis. Her speech is difficult to understand. She req min A to sit EOB with vc for sequencing and increased time. She req CGA to sit EOB. SHe stood from transport stretcher with min A Of 2. Pt amb 12' from transport stretcher to recliner chair with r wx and min A Of 2 on 1L O2; balance is very unsteady, posture is flexed at cervical and thoracic spine, endurance is poor. She was placed in recliner chair. O2 sats 84% if reading is accurate. She may benefit from skilled PT services to address functional deficits and prepare for d/c. Pt is not safe to return home and is a high falls risk. Recommend SNF     Time Calculation:   PT Evaluation Complexity  History, PT Evaluation Complexity: 3 or more personal  factors and/or comorbidities  Examination of Body Systems (PT Eval Complexity): total of 4 or more elements  Clinical Presentation (PT Evaluation Complexity): evolving  Clinical Decision Making (PT Evaluation Complexity): moderate complexity  Overall Complexity (PT Evaluation Complexity): moderate complexity     PT Charges       Row Name 05/14/25 1405             Time Calculation    Start Time 1000  -DJ      Stop Time 1025  -DJ      Time Calculation (min) 25 min  -DJ      PT Non-Billable Time (min) 10 min  -DJ      PT Received On 05/14/25  -DJ      PT - Next Appointment 05/15/25  -DJ      PT Goal Re-Cert Due Date 05/24/25  -DJ                User Key  (r) = Recorded By, (t) = Taken By, (c) = Cosigned By      Initials Name Provider Type    Renetta Ceron, FARZANEH Physical Therapist                  Therapy Charges for Today       Code Description Service Date Service Provider Modifiers Qty    65250637224 HC PT EVAL MOD COMPLEXITY 3 5/14/2025 Renetta Ely, PT GP 1    20572399977 HC PT THERAPEUTIC ACT EA 15 MIN 5/14/2025 Renetta Ely, PT GP 2            PT G-Codes  Outcome Measure Options: AM-PAC 6 Clicks Basic Mobility (PT)  AM-PAC 6 Clicks Score (PT): 11  AM-PAC 6 Clicks Score (OT): 17  PT Discharge Summary  Anticipated Discharge Disposition (PT): skilled nursing facility    Renetta Ely PT  5/14/2025

## 2025-05-14 NOTE — PLAN OF CARE
Goal Outcome Evaluation:  Plan of Care Reviewed With: patient  Plan for Thoracentesis this am with CT Scan to follow.  Awaiting test results. Case management following for discharge needs.

## 2025-05-14 NOTE — PLAN OF CARE
Goal Outcome Evaluation:  Plan of Care Reviewed With: patient           Outcome Evaluation: 71yo frail appearing white female admitted 5/12/25 due to progressive generalized weakness and dizziness, inability to walk or feed self at home. PMH Includes pna, sacral decub, LBP. PLOF: Pt lives at home with dog and gradually lost abiility to amb, bathe herself, or feed herself. APA reports filed for squalor of house. She is limited now by generalized weakness and decreased activity tolerance as well as poor dynamic balance. Today, she was found in her room on transport stretcher returning from thoracentesis. Her speech is difficult to understand. She req min A to sit EOB with vc for sequencing and increased time. She req CGA to sit EOB. SHe stood from transport stretcher with min A Of 2. Pt amb 12' from transport stretcher to recliner chair with r wx and min A Of 2 on 1L O2; balance is very unsteady, posture is flexed at cervical and thoracic spine, endurance is poor. She was placed in recliner chair. O2 sats 84% if reading is accurate. She may benefit from skilled PT services to address functional deficits and prepare for d/c. Pt is not safe to return home and is a high falls risk. Recommend SNF    Anticipated Discharge Disposition (PT): skilled nursing facility

## 2025-05-14 NOTE — THERAPY EVALUATION
Patient Name: Penelope Onofre  : 1955    MRN: [id number]                              Today's Date: 2025       Admit Date: 2025    Visit Dx:     ICD-10-CM ICD-9-CM   1. Generalized weakness  R53.1 780.79   2. Pneumonia of right lower lobe due to infectious organism  J18.9 486     Patient Active Problem List   Diagnosis    Arm pain, left    Generalized weakness    Dizziness    Pneumonia    Pleural effusion    At high risk for pressure injury of skin    Decubitus ulcer of sacral region, stage 1    Acute kidney failure    Dehydration    Elevated troponin    Elevated brain natriuretic peptide (BNP) level    Hypokalemia    Hyperglycemia     Past Medical History:   Diagnosis Date    Chronic back pain     Claudication     GERD (gastroesophageal reflux disease)      Past Surgical History:   Procedure Laterality Date    BREAST CYST EXCISION Right     TONSILLECTOMY        General Information       Row Name 25 1319          OT Time and Intention    Document Type evaluation  -SM     Mode of Treatment occupational therapy;co-treatment  -SM     Patient Effort adequate  -SM       Row Name 25 131          General Information    Patient Profile Reviewed yes  -SM     Prior Level of Function independent:;ADL's;all household mobility  reports difficulty with bathing and not bathing much  -SM     Existing Precautions/Restrictions fall;oxygen therapy device and L/min  -SM       Row Name 25 1319          Occupational Profile    Environmental Supports and Barriers (Occupational Profile) Pt reports no home DME/AD  -SM       Row Name 25          Living Environment    Current Living Arrangements home  -SM     People in Home alone  -SM       Row Name 25 1319          Home Main Entrance    Number of Stairs, Main Entrance none  -SM       Row Name 25 1319          Stairs Within Home, Primary    Number of Stairs, Within Home, Primary none  -SM       Row Name 25 1319           Cognition    Orientation Status (Cognition) oriented x 3  -       Row Name 05/14/25 1319          Safety Issues/Impairments Affecting Functional Mobility    Safety Issues Affecting Function (Mobility) awareness of need for assistance  -     Impairments Affecting Function (Mobility) balance;endurance/activity tolerance;strength;shortness of breath;postural/trunk control  -     Comment, Safety Issues/Impairments (Mobility) PT/OT cotreatment medically appropriate and necessary due to patient acuity level, to maximize therapeutic benefit due to impaired act tolerance, and for safety of patient and staff. Treatment focused on progression of care and goals established in POC.  -               User Key  (r) = Recorded By, (t) = Taken By, (c) = Cosigned By      Initials Name Provider Type     Nataliia Mckeon, OTR/L, CSRS Occupational Therapist                     Mobility/ADL's       [facility] Name 05/14/25 1320          Bed Mobility    Comment, (Bed Mobility) sitting EOB with PT prior to OT arrival  -       Row Name 05/14/25 1320          Transfers    Transfers sit-stand transfer  -[facility] Name 05/14/25 1320          Sit-Stand Transfer    Sit-Stand [place] (Transfers) minimum assist (75% patient effort);2 person assist  -[facility] Name 05/14/25 1320          Functional Mobility    Functional Mobility- Ind. Level minimum assist (75% patient effort);2 person assist required  -     Functional Mobility- Device walker, front-wheeled  -     Functional Mobility-Distance (Feet) --  12  -     Functional Mobility- Comment needs assist to navigate with the walker, poor forward rounded posture, overall unsteady  -       Row Name 05/14/25 1320          Activities of Daily Living    BADL Assessment/Intervention lower body dressing;grooming  -[facility] Name 05/14/25 1320          Lower Body Dressing Assessment/Training    [place] Level (Lower Body Dressing) don;socks;maximum assist (25% patient  effort)  -     Position (Lower Body Dressing) edge of bed sitting  -       Row Name 05/14/25 1320          Grooming Assessment/Training    [place] Level (Grooming) set up;hair care, combing/brushing  -     Position (Grooming) supported sitting  -               User Key  (r) = Recorded By, (t) = Taken By, (c) = Cosigned By      Initials Name Provider Type     Nataliia Mckeon OTR/L, WILLY Occupational Therapist                   Obj/Interventions       [facility] Name 05/14/25 1324          Range of Motion Comprehensive    General Range of Motion bilateral upper extremity ROM WNL  -[facility] Name 05/14/25 1324          Strength Comprehensive (MMT)    Comment, General Manual Muscle Testing (MMT) Assessment BUE 4/5 MMT  -[facility] Name 05/14/25 1324          Motor Skills    Motor Skills functional endurance  -     Functional Endurance poor  -SM       Row Name 05/14/25 1324          Balance    Balance Assessment sitting static balance;standing static balance;standing dynamic balance  -     Static Sitting Balance contact guard;minimal assist  -     Position, Sitting Balance sitting edge of bed  -     Static Standing Balance contact guard  -     Dynamic Standing Balance minimal assist;2-person assist  -     Position/Device Used, Standing Balance supported;walker, rolling  -               User Key  (r) = Recorded By, (t) = Taken By, (c) = Cosigned By      Initials Name Provider Type     Nataliia Mckeon OTR/L, WILLY Occupational Therapist                   Goals/Plan    No documentation.                  Clinical Impression       [facility] Name 05/14/25 1324          Pain Assessment    Pre/Posttreatment Pain Comment pt reports chronic neck pain with activity- unrated  -SM       Row Name 05/14/25 1324          Plan of Care Review    Plan of Care Reviewed With patient  -     Outcome Evaluation Pt is a 70 y.o female admitted to [facility] on 5/12 with progressive weakness, dizziness, SOB ongoing for weeks  to months. Pt is treated for  hypokalemia/dehydration/questionable congestive heart failure/pneumonia and hypoxemia. Chart indicates pt was unable to get herself out of bed to walk to the bathroom prior to admission. Pt reports she lives alone with her dog. She reports minimally participating in some ADLs such as bathing and poor PO intake. Pt is very unsteady today and requires min AX2. Pt on 2L today SPO2 reading 84-85% but poor wave lenght. Pt reports adament about dc home due to needing to be back with her dog. She is generally pretty weak today. I would recommend SNF at [place] with further OT to address ADL independence, endurance deficits, balance/risk for falls.  -       Row Name 05/14/25 1324          Therapy Assessment/Plan (OT)    Rehab Potential (OT) good  -     Criteria for Skilled Therapeutic Interventions Met (OT) yes;skilled treatment is necessary  -     Therapy Frequency (OT) 5 times/wk  -       Row Name 05/14/25 1324          Therapy Plan Review/Discharge Plan (OT)    Anticipated Discharge Disposition (OT) skilled nursing facility  -       Row Name 05/14/25 1324          Vital Signs    Intra SpO2 (%) 85  poor wave length, multiple attempts to get better reading. RN notified.  -     O2 Delivery Intra Treatment supplemental O2  -       Row Name 05/14/25 1324          Positioning and Restraints    Pre-Treatment Position in bed  -     Post Treatment Position chair  -SM     In Chair call light within reach;encouraged to call for assist;exit alarm on;notified ns  -               User Key  (r) = Recorded By, (t) = Taken By, (c) = Cosigned By      Initials Name Provider Type    Nataliia Lerma OTR/L, CSRS Occupational Therapist                   Outcome Measures       Row Name 05/14/25 8047          How much help from another is currently needed...    Putting on and taking off regular lower body clothing? 2  -SM     Bathing (including washing, rinsing, and drying) 2  -SM     Toileting  (which includes using toilet bed pan or urinal) 3  -SM     Putting on and taking off regular upper body clothing 3  -SM     Taking care of personal grooming (such as brushing teeth) 3  -SM     Eating meals 4  -SM     AM-PAC 6 Clicks Score (OT) 17  -SM       Row Name 05/14/25 1331          Functional Assessment    Outcome Measure Options AM-PAC 6 Clicks Daily Activity (OT)  -               User Key  (r) = Recorded By, (t) = Taken By, (c) = Cosigned By      Initials Name Provider Type     Nataliia Mckeon, MITALI/L, WILLY Occupational Therapist                    Occupational Therapy Education       Title: PT OT SLP Therapies (In Progress)       Topic: Occupational Therapy (In Progress)       Point: ADL training (Done)       Learning Progress Summary            Patient Acceptance, E, VU by  at 5/14/2025 1332    Comment: OT goals, POC, dc recommendations                                      User Key       Initials Effective Dates Name Provider Type Discipline     04/24/25 -  Nataliia Mckeon OTR/L, WILLY Occupational Therapist OT                  OT Recommendation and Plan  Therapy Frequency (OT): 5 times/wk  Plan of Care Review  Plan of Care Reviewed With: patient  Outcome Evaluation: Pt is a 70 y.o female admitted to [facility] on 5/12 with progressive weakness, dizziness, SOB ongoing for weeks to months. Pt is treated for  hypokalemia/dehydration/questionable congestive heart failure/pneumonia and hypoxemia. Chart indicates pt was unable to get herself out of bed to walk to the bathroom prior to admission. Pt reports she lives alone with her dog. She reports minimally participating in some ADLs such as bathing and poor PO intake. Pt is very unsteady today and requires min AX2. Pt on 2L today SPO2 reading 84-85% but poor wave lenght. Pt reports adament about dc home due to needing to be back with her dog. She is generally pretty weak today. I would recommend SNF at [place] with further OT to address ADL independence,  endurance deficits, balance/risk for falls.     Time Calculation:   Evaluation Complexity (OT)  Review Occupational Profile/Medical/Therapy History Complexity: expanded/moderate complexity  Assessment, Occupational Performance/Identification of Deficit Complexity: 3-5 performance deficits  Clinical Decision Making Complexity (OT): detailed assessment/moderate complexity  Overall Complexity of Evaluation (OT): moderate complexity     Time Calculation- OT       Row Name 05/14/25 1332             Time Calculation- OT    OT Start Time 1002  -SM      OT Stop Time 1025  -SM      OT Time Calculation (min) 23 min  -SM      Total Timed Code Minutes- OT 12 minute(s)  -SM      OT Received On 05/14/25  -      OT - Next Appointment 05/15/25  -SM      OT Goal Re-Cert Due Date 05/28/25  -SM         Timed Charges    45416 - OT Therapeutic Activity Minutes 12  -SM         Total Minutes    Timed Charges Total Minutes 12  -SM       Total Minutes 12  -SM                User Key  (r) = Recorded By, (t) = Taken By, (c) = Cosigned By      Initials Name Provider Type     Nataliia Mckeon, OTR/L, CSRS Occupational Therapist                  Therapy Charges for Today       Code Description Service Date Service Provider Modifiers Qty    [id number]  OT THERAPEUTIC ACT EA 15 MIN 5/14/2025 Nataliia Mckeon OTR/L, CSRS GO 1    [id number] HC OT EVAL MOD COMPLEXITY 3 5/14/2025 Nataliia Mckeon OTR/L, CSRS GO 1                 Nataliia Mckeon OTR/L, CSRS  5/14/2025

## 2025-05-15 ENCOUNTER — APPOINTMENT (OUTPATIENT)
Dept: ULTRASOUND IMAGING | Facility: HOSPITAL | Age: 70
End: 2025-05-15
Payer: MEDICARE

## 2025-05-15 LAB
ANION GAP SERPL CALCULATED.3IONS-SCNC: 13.1 MMOL/L (ref 5–15)
BASOPHILS # BLD AUTO: 0.03 10*3/MM3 (ref 0–0.2)
BASOPHILS NFR BLD AUTO: 0.5 % (ref 0–1.5)
BUN SERPL-MCNC: 12 MG/DL (ref 8–23)
BUN/CREAT SERPL: 10 (ref 7–25)
CALCIUM SPEC-SCNC: 8.8 MG/DL (ref 8.6–10.5)
CHLORIDE SERPL-SCNC: 106 MMOL/L (ref 98–107)
CK SERPL-CCNC: 68 U/L (ref 20–180)
CO2 SERPL-SCNC: 21.9 MMOL/L (ref 22–29)
CREAT SERPL-MCNC: 1.2 MG/DL (ref 0.57–1)
DEPRECATED RDW RBC AUTO: 47.5 FL (ref 37–54)
DX PRELIMINARY: NORMAL
EGFRCR SERPLBLD CKD-EPI 2021: 48.5 ML/MIN/1.73
EOSINOPHIL # BLD AUTO: 0.19 10*3/MM3 (ref 0–0.4)
EOSINOPHIL NFR BLD AUTO: 3 % (ref 0.3–6.2)
ERYTHROCYTE [DISTWIDTH] IN BLOOD BY AUTOMATED COUNT: 12.8 % (ref 12.3–15.4)
GLUCOSE BLDC GLUCOMTR-MCNC: 97 MG/DL (ref 70–130)
GLUCOSE SERPL-MCNC: 84 MG/DL (ref 65–99)
HCT VFR BLD AUTO: 40.8 % (ref 34–46.6)
HGB BLD-MCNC: 14 G/DL (ref 12–15.9)
IMM GRANULOCYTES # BLD AUTO: 0.02 10*3/MM3 (ref 0–0.05)
IMM GRANULOCYTES NFR BLD AUTO: 0.3 % (ref 0–0.5)
LAB AP CASE REPORT: NORMAL
LYMPHOCYTES # BLD AUTO: 1.15 10*3/MM3 (ref 0.7–3.1)
LYMPHOCYTES NFR BLD AUTO: 18.3 % (ref 19.6–45.3)
MCH RBC QN AUTO: 35.1 PG (ref 26.6–33)
MCHC RBC AUTO-ENTMCNC: 34.3 G/DL (ref 31.5–35.7)
MCV RBC AUTO: 102.3 FL (ref 79–97)
MONOCYTES # BLD AUTO: 0.79 10*3/MM3 (ref 0.1–0.9)
MONOCYTES NFR BLD AUTO: 12.6 % (ref 5–12)
NEUTROPHILS NFR BLD AUTO: 4.09 10*3/MM3 (ref 1.7–7)
NEUTROPHILS NFR BLD AUTO: 65.3 % (ref 42.7–76)
NRBC BLD AUTO-RTO: 0 /100 WBC (ref 0–0.2)
PATH REPORT.FINAL DX SPEC: NORMAL
PATH REPORT.GROSS SPEC: NORMAL
PLATELET # BLD AUTO: 155 10*3/MM3 (ref 140–450)
PMV BLD AUTO: 10.3 FL (ref 6–12)
POTASSIUM SERPL-SCNC: 4.2 MMOL/L (ref 3.5–5.2)
RBC # BLD AUTO: 3.99 10*6/MM3 (ref 3.77–5.28)
S PNEUM AG SPEC QL LA: NEGATIVE
SODIUM SERPL-SCNC: 141 MMOL/L (ref 136–145)
WBC NRBC COR # BLD AUTO: 6.27 10*3/MM3 (ref 3.4–10.8)

## 2025-05-15 PROCEDURE — 25010000002 AZITHROMYCIN PER 500 MG: Performed by: INTERNAL MEDICINE

## 2025-05-15 PROCEDURE — 88341 IMHCHEM/IMCYTCHM EA ADD ANTB: CPT | Performed by: INTERNAL MEDICINE

## 2025-05-15 PROCEDURE — 25810000003 SODIUM CHLORIDE 0.9 % SOLUTION 250 ML FLEX CONT: Performed by: INTERNAL MEDICINE

## 2025-05-15 PROCEDURE — 85025 COMPLETE CBC W/AUTO DIFF WBC: CPT | Performed by: INTERNAL MEDICINE

## 2025-05-15 PROCEDURE — 25010000002 LIDOCAINE 1 % SOLUTION: Performed by: RADIOLOGY

## 2025-05-15 PROCEDURE — 94761 N-INVAS EAR/PLS OXIMETRY MLT: CPT

## 2025-05-15 PROCEDURE — 82550 ASSAY OF CK (CPK): CPT | Performed by: INTERNAL MEDICINE

## 2025-05-15 PROCEDURE — 94799 UNLISTED PULMONARY SVC/PX: CPT

## 2025-05-15 PROCEDURE — 80048 BASIC METABOLIC PNL TOTAL CA: CPT | Performed by: INTERNAL MEDICINE

## 2025-05-15 PROCEDURE — 94664 DEMO&/EVAL PT USE INHALER: CPT

## 2025-05-15 PROCEDURE — 76942 ECHO GUIDE FOR BIOPSY: CPT

## 2025-05-15 PROCEDURE — 82948 REAGENT STRIP/BLOOD GLUCOSE: CPT

## 2025-05-15 PROCEDURE — 88342 IMHCHEM/IMCYTCHM 1ST ANTB: CPT | Performed by: INTERNAL MEDICINE

## 2025-05-15 PROCEDURE — 25010000002 ENOXAPARIN PER 10 MG: Performed by: INTERNAL MEDICINE

## 2025-05-15 PROCEDURE — 88360 TUMOR IMMUNOHISTOCHEM/MANUAL: CPT | Performed by: INTERNAL MEDICINE

## 2025-05-15 PROCEDURE — 88305 TISSUE EXAM BY PATHOLOGIST: CPT | Performed by: INTERNAL MEDICINE

## 2025-05-15 PROCEDURE — 25010000002 PIPERACILLIN SOD-TAZOBACTAM PER 1 G: Performed by: INTERNAL MEDICINE

## 2025-05-15 PROCEDURE — 07B13ZX EXCISION OF RIGHT NECK LYMPHATIC, PERCUTANEOUS APPROACH, DIAGNOSTIC: ICD-10-PCS | Performed by: STUDENT IN AN ORGANIZED HEALTH CARE EDUCATION/TRAINING PROGRAM

## 2025-05-15 PROCEDURE — 87899 AGENT NOS ASSAY W/OPTIC: CPT | Performed by: INTERNAL MEDICINE

## 2025-05-15 RX ORDER — LIDOCAINE HYDROCHLORIDE AND EPINEPHRINE 10; 10 MG/ML; UG/ML
10 INJECTION, SOLUTION INFILTRATION; PERINEURAL ONCE
Status: DISCONTINUED | OUTPATIENT
Start: 2025-05-15 | End: 2025-05-18

## 2025-05-15 RX ORDER — LIDOCAINE HYDROCHLORIDE 10 MG/ML
10 INJECTION, SOLUTION INFILTRATION; PERINEURAL ONCE
Status: COMPLETED | OUTPATIENT
Start: 2025-05-15 | End: 2025-05-15

## 2025-05-15 RX ORDER — LORAZEPAM 2 MG/ML
1 INJECTION INTRAMUSCULAR ONCE AS NEEDED
Status: DISCONTINUED | OUTPATIENT
Start: 2025-05-15 | End: 2025-05-25

## 2025-05-15 RX ORDER — LIDOCAINE HYDROCHLORIDE 10 MG/ML
3 INJECTION, SOLUTION INFILTRATION; PERINEURAL ONCE
Status: DISCONTINUED | OUTPATIENT
Start: 2025-05-15 | End: 2025-05-18

## 2025-05-15 RX ADMIN — LIDOCAINE HYDROCHLORIDE 3 ML: 10 INJECTION, SOLUTION INFILTRATION; PERINEURAL at 15:59

## 2025-05-15 RX ADMIN — Medication 10 ML: at 21:37

## 2025-05-15 RX ADMIN — ENOXAPARIN SODIUM 40 MG: 100 INJECTION SUBCUTANEOUS at 13:20

## 2025-05-15 RX ADMIN — Medication 10 ML: at 09:33

## 2025-05-15 RX ADMIN — ATORVASTATIN CALCIUM 20 MG: 20 TABLET, FILM COATED ORAL at 21:36

## 2025-05-15 RX ADMIN — MENTHOL, ZINC OXIDE 1 APPLICATION: .44; 20.6 OINTMENT TOPICAL at 09:38

## 2025-05-15 RX ADMIN — AZITHROMYCIN MONOHYDRATE 500 MG: 500 INJECTION, POWDER, LYOPHILIZED, FOR SOLUTION INTRAVENOUS at 13:20

## 2025-05-15 RX ADMIN — PIPERACILLIN AND TAZOBACTAM 3.38 G: 3; .375 INJECTION, POWDER, FOR SOLUTION INTRAVENOUS at 18:38

## 2025-05-15 RX ADMIN — IPRATROPIUM BROMIDE AND ALBUTEROL SULFATE 3 ML: .5; 3 SOLUTION RESPIRATORY (INHALATION) at 20:39

## 2025-05-15 RX ADMIN — IPRATROPIUM BROMIDE AND ALBUTEROL SULFATE 3 ML: .5; 3 SOLUTION RESPIRATORY (INHALATION) at 11:05

## 2025-05-15 RX ADMIN — MENTHOL, ZINC OXIDE 1 APPLICATION: .44; 20.6 OINTMENT TOPICAL at 21:37

## 2025-05-15 RX ADMIN — PANTOPRAZOLE SODIUM 40 MG: 40 TABLET, DELAYED RELEASE ORAL at 05:47

## 2025-05-15 RX ADMIN — GUAIFENESIN 1200 MG: 600 TABLET, EXTENDED RELEASE ORAL at 21:36

## 2025-05-15 RX ADMIN — PIPERACILLIN AND TAZOBACTAM 3.38 G: 3; .375 INJECTION, POWDER, FOR SOLUTION INTRAVENOUS at 11:28

## 2025-05-15 RX ADMIN — PIPERACILLIN AND TAZOBACTAM 3.38 G: 3; .375 INJECTION, POWDER, FOR SOLUTION INTRAVENOUS at 02:47

## 2025-05-15 RX ADMIN — IPRATROPIUM BROMIDE AND ALBUTEROL SULFATE 3 ML: .5; 3 SOLUTION RESPIRATORY (INHALATION) at 07:37

## 2025-05-15 NOTE — PROGRESS NOTES
Name: Penelope Onofre ADMIT: 2025   : 1955  PCP: Provider, No Known    MRN: [id number] LOS: 2 days   AGE/SEX: 70 y.o. female  ROOM: [facility]/     Subjective   Subjective   No shortness of breath.  She does have occasional dry cough without sputum production or hemoptysis.  No fever or chills.  No chest pain.  No PND orthopnea.  No palpitation.  No ankle edema.  No wheezing.    Review of Systems  GI.  No abdominal pain.  No nausea or vomiting  .  No dysuria or hematuria.  CNS.  No headache.  No focal neurological symptoms.     Objective   Objective   Vital Signs  Temp:  [97.2 °F (36.2 °C)-97.7 °F (36.5 °C)] 97.7 °F (36.5 °C)  Heart Rate:  [] 94  Resp:  [16-18] 16  BP: ()/(60-78) 124/75  SpO2:  [92 %-100 %] 94 %  on  Flow (L/min) (Oxygen Therapy):  [2-22] 2;   Device (Oxygen Therapy): nasal cannula    Intake/Output Summary (Last 24 hours) at 5/15/2025 1703  Last data filed at 5/15/2025 0300  Gross per 24 hour   Intake --   Output 550 ml   Net -550 ml     Body mass index is 18.99 kg/m².      25  0910 25  1518   Weight: 63.6 kg (140 lb 3.4 oz) 63.5 kg (140 lb)     Physical Exam  General.  Elderly female.  She is alert and oriented x 4.  Appears chronically ill and older than stated age.  Underweight.  In no apparent pain/distress/diaphoresis.  Normal mood and affect  Eyes.  Pupils equal round and reactive.  Intact extraocular musculature.  No pallor or jaundice  Oral cavity.  Moist mucous membrane  Neck.  Supple.  No JVD.  No lymphadenopathy or thyromegaly  Cardiovascular.  Regular rate and rhythm with no gallops or murmurs  Chest.  Decreased entry in the right lung with h clear left lung air entry.  Right-sided rhonchi (improved from yesterday)..    Abdomen.  Soft lax.  No tenderness.  No organomegaly.  No guarding or rebound  Extremities.  No clubbing/cyanosis/edema  CNS..  No acute focal neurological deficits.      Results Review:      Results from last 7 days   Lab Units  "05/15/25  0641 05/14/25  0637 05/13/25  0611 05/12/25  2359   SODIUM mmol/L 141 137 135* 139   POTASSIUM mmol/L 4.2 3.8 3.9 3.4*   CHLORIDE mmol/L 106 103 106 105   CO2 mmol/L 21.9* 20.0* 18.4* 20.5*   BUN mg/dL 12 12 13 13   CREATININE mg/dL 1.20* 1.00 1.00 1.21*   GLUCOSE mg/dL 84 118* 115* 135*   CALCIUM mg/dL 8.8 8.9 9.2 9.2   AST (SGOT) U/L  --   --   --  24   ALT (SGPT) U/L  --   --   --  7     Estimated Creatinine Clearance: 43.1 mL/min (A) (by C-G formula based on SCr of 1.2 mg/dL (H)).  Results from last 7 days   Lab Units 05/13/25  0611   HEMOGLOBIN A1C % 5.50         Results from last 7 days   Lab Units 05/15/25  0641 05/14/25  0637 05/13/25  0611 05/13/25  0120 05/12/25  2359   CK TOTAL U/L 68 59  --   --   --    HSTROP T ng/L  --   --  32* 30* 32*     Results from last 7 days   Lab Units 05/12/25  2359   PROBNP pg/mL 1,374.0*     Results from last 7 days   Lab Units 05/13/25  0611   TSH uIU/mL 4.800*     Results from last 7 days   Lab Units 05/13/25  1428   MAGNESIUM mg/dL 1.7           Invalid input(s): \"LDLCALC\"  Results from last 7 days   Lab Units 05/15/25  0641 05/14/25  0637 05/13/25  0611 05/12/25  2359   WBC 10*3/mm3 6.27 6.19 5.99 6.45   HEMOGLOBIN g/dL 14.0 14.4 15.8 16.4*   HEMATOCRIT % 40.8 42.3 46.0 46.8*   PLATELETS 10*3/mm3 155 167 182 203   MCV fL 102.3* 99.5* 101.1* 100.0*   MCH pg 35.1* 33.9* 34.7* 35.0*   MCHC g/dL 34.3 34.0 34.3 35.0   RDW % 12.8 12.6 12.6 12.9   RDW-SD fl 47.5 45.7 46.9 47.6   MPV fL 10.3 10.0 10.1 10.0   NEUTROPHIL % % 65.3 70.7  --  61.0   LYMPHOCYTE % % 18.3* 15.3*  --  23.3   MONOCYTES % % 12.6* 12.6*  --  13.2*   EOSINOPHIL % % 3.0 0.8  --  1.9   BASOPHIL % % 0.5 0.3  --  0.3   IMM GRAN % % 0.3 0.3  --  0.3   NEUTROS ABS 10*3/mm3 4.09 4.37  --  3.94   LYMPHS ABS 10*3/mm3 1.15 0.95  --  1.50   MONOS ABS 10*3/mm3 0.79 0.78  --  0.85   EOS ABS 10*3/mm3 0.19 0.05  --  0.12   BASOS ABS 10*3/mm3 0.03 0.02  --  0.02   IMMATURE GRANS (ABS) 10*3/mm3 0.02 0.02  --  0.02 "   NRBC /100 WBC 0.0 0.0  --  0.0             Results from last 7 days   Lab Units 05/13/25  1428 05/13/25  0239   PROCALCITONIN ng/mL 0.05  --    LACTATE mmol/L  --  1.3             Results from last 7 days   Lab Units 05/14/25  0941 05/13/25  0242 05/13/25  0239   BLOODCX   --  No growth at 2 days No growth at 2 days   BODYFLDCX  No growth  --   --      Results from last 7 days   Lab Units 05/13/25  1229   ADENOVIRUS DETECTION BY PCR  Not Detected   CORONAVIRUS 229E  Not Detected   CORONAVIRUS HKU1  Not Detected   CORONAVIRUS NL63  Not Detected   CORONAVIRUS OC43  Not Detected   HUMAN METAPNEUMOVIRUS  Not Detected   HUMAN RHINOVIRUS/ENTEROVIRUS  Not Detected   INFLUENZA B PCR  Not Detected   PARAINFLUENZA 1  Not Detected   PARAINFLUENZA VIRUS 2  Not Detected   PARAINFLUENZA VIRUS 3  Not Detected   PARAINFLUENZA VIRUS 4  Not Detected   BORDETELLA PERTUSSIS PCR  Not Detected   CHLAMYDOPHILA PNEUMONIAE PCR  Not Detected   MYCOPLAMA PNEUMO PCR  Not Detected   INFLUENZA A PCR  Not Detected   RSV, PCR  Not Detected     Results from last 7 days   Lab Units 05/14/25  0656   NITRITE UA  Negative   WBC UA /HPF 3-5*   BACTERIA UA /HPF None Seen   SQUAM EPITHEL UA /HPF 0-2           Imaging:  Imaging Results (Last 24 Hours)       Procedure Component Value Units Date/Time    US Guided Lymph Node Biopsy [[id number]] Collected: 05/15/25 1627    Specimen: Lymph Node Updated: 05/15/25 1632    Narrative:      ULTRASOUND-GUIDED RIGHT SUPRACLAVICULAR LYMPH NODE BIOPSY     INDICATION: Right supraclavicular lymphadenopathy concerning for  malignancy, biopsy requested     COMPARISON: CT of the chest 5/14/2025     The risks, benefits and alternatives of the procedure were discussed  with the patient, and informed consent was obtained. In the procedure  room a timeout was performed confirming correct patient and procedure.     TECHNIQUE:  Ultrasound of the right supraclavicular region was performed. The  largest lymph node was identified  measuring 2.2 x 1.7 x 1.3 cm and  corresponding with the enlarged lymph node on the comparison study. The  overlying skin was prepped and draped in usual sterile fashion with 2%  chlorhexidine. 1% lidocaine was administered for local anesthesia. Next  under ultrasound guidance 2 18-gauge core biopsies of the lymph node  were obtained and samples sent for tissue pathology and flow cytometry.  Patient tolerated procedure well without immediate complications       Impression:      Technically successful ultrasound-guided right supraclavicular lymph  node biopsy     This report was finalized on 5/15/2025 4:28 PM by Dr. Wu Ortega M.D on Workstation: [id number]       CT Chest With Contrast Diagnostic [[id number]] Collected: 05/14/25 1615     Updated: 05/15/25 1227    Narrative:      CT CHEST WITH IV CONTRAST     HISTORY: 70-year-old female with lymphadenopathy and right effusion.  Status post right ultrasound-guided thoracentesis earlier today removing  1 L of fluid.     TECHNIQUE: Radiation dose reduction techniques were utilized, including  automated exposure control and exposure modulation based on body size.   3 mm images were obtained through the chest after the administration of  IV contrast. Compared with yesterday's noncontrast chest CT.     FINDINGS:  1. Residual moderate size residual right pleural effusion which layers  to the apex. Significant decrease in the right lower lobe atelectasis.  There are dependent airspace opacities at the right upper lobe  suspicious for pneumonia, more conspicuous due to the decrease in the  right effusion, but unchanged. Unchanged pulmonary nodules. Moderately  advanced emphysema     2. Bulky confluent mediastinal and right hilar lymphadenopathy measures  in total approximately 8.6 x 8.0 cm. There is also separate bulky  anterior mediastinal lymphadenopathy and bulky right supraclavicular  lymphadenopathy which is likely metastatic.  The bulky lymphadenopathy is contiguous  with a bulky right middle lobe  mass measuring approximately 4.8 x 3.2 cm, image 172. Tissue diagnosis  is recommended with bronchoscopy.     3. There is occlusion of the right mainstem bronchus and right  interlobar bronchus. Right middle lobe bronchus is narrowed and some of  the right lower lobe bronchi are opacified. The proximal right upper  lobe bronchi are narrowed, but are otherwise patent.     4. There is a very small pericardial effusion which measures up to 1 cm  in diameter along the anterior cardiac margin. No left pleural effusion.        This report was finalized on 5/15/2025 12:23 PM by Dr. Ledy Johnson M.D  on Workstation: [id number]                  I reviewed the patient's new clinical results / labs / tests / procedures      Assessment/Plan     Active Hospital Problems    Diagnosis  POA   • **Generalized weakness [R53.1]  Yes   • Dizziness [R42]  Yes   • Pneumonia [J18.9]  Yes   • Pleural effusion [J90]  Yes   • At high risk for pressure injury of skin [Z91.89]  Not Applicable   • Decubitus ulcer of sacral region, stage 1 [L89.151]  Yes   • Acute kidney failure [N17.9]  Yes   • Dehydration [E86.0]  Yes   • Elevated troponin [R79.89]  Yes   • Elevated brain natriuretic peptide (BNP) level [R79.89]  Yes   • Hypokalemia [E87.6]  Yes   • Hyperglycemia [R73.9]  Yes      Resolved Hospital Problems   No resolved problems to display.           Weakness/fatigue/dizziness.  This is multifactorial secondary to hypokalemia/dehydration/questionable congestive heart failure/pneumonia and hypoxemia.  No other evidence of infection beside the pneumonia (normal lactic acid/normal white blood cell/no fever/normal procalcitonin/negative blood cultures/negative UTI by UA).  CT scan of the brain without contrast without acute disease.  Orthostasis are negative.  Echo with normal ejection fraction and no significant valvular disease.  Resolved dehydration with IV fluid.  Normal potassium with substitution.  Normal  magnesium/TSH/CK/a.m. cortisol level.  Physical therapy recommends SNF   Poor social status.  Dog fecal material in the house with poor hygiene of the house per report.  CCP to evaluate  Right-sided pneumonia/large pleural effusion/hypoxemic respiratory failure.  Negative blood cultures till now.  Unable to produce sputum.  Negative respiratory PCR panel.  Negative Streptococcus antigen in the urine. .  S/p right thoracocentesis.  Pleural fluid exudate by lights criteria.  Pleural fluid culture is negative till now.  Pleural fluid cytology is negative..  Repeat CAT scan of the chest after thoracocentesis revealed residual moderate size right pleural effusion, decreased right lower lobe atelectasis, right upper lobe infiltrate, pulmonary nodules, advanced COPD, bulky mediastinal and right hilar lymphadenopathy and right supraclavicular lymphadenopathy likely metastasis, right middle lobe mass, occlusion of the right mainstem bronchus with narrowing.  Picture is suggestive with lung cancer with at least lymph node metastasis and postobstructive pneumonia.  Status post IV Zithromax.  Currently on  DuoNebs/Mucinex/Zosyn..Patient underwent supraclavicular lymph node biopsy by IR today.  If this is nonconclusive then a bronchoscopy will be done Per pulmonary.    Grade 1 sacral and heel decubiti present on admission wound nurse.  Hemoconcentration/macrocytosis.  Hemoconcentration is secondary to hydration and has resolved with IV fluid.  Normal TSH/B12/cortisol/folate.  Elevated troponin/elevated proBNP/abnormal EKG in a patient with a history of hypertension good blood pressure control off Norvasc.  Echo with normal ejection fraction no significant valvular disease..  I believe elevation of the troponin and proBNP could be secondary to the renal failure   Acute kidney failure/hypokalemia acute kidney failure is most likely secondary to dehydration leading to prerenal azotemia.  Resolved acute kidney failure with IV  fluid..  Resolved hypokalemia with substitution.  Normal  magnesium.  Patient appears euvolemic at this time.    Hyperglycemia.  Normal A1c.  Hyperlipidemia.  Continue statin   GERD.  Benign GI examination.  Continue proton pump inhibitor.  VTE prophylaxis.      Discussed.  My findings and plan of treatment with the patient/nurse  Disposition.  To be determined based on clinical course.        Oren Copeland MD  [facility]  05/15/25  17:03 EDT

## 2025-05-15 NOTE — CONSULTS
IDENTIFYING INFORMATION: The patient is a 70-year-old white female admitted with weakness as her chief complaint.    CHIEF COMPLAINT: Are you a [place] fan?    INFORMANT: Patient staff and chart    RELIABILITY: Fair    HISTORY OF PRESENT ILLNESS: The patient is a 70-year-old white female admitted with complaints of severe weakness.  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  When seen today, the patient has a sitter secondary to frequent attempts to get out of bed but otherwise has been no management issues after initially declining care.  There is concern about her current living situation with sacral and he will decubiti present on admission.  The patient also recently had required thoracentesis during this hospitalization.  She is pleasant and cooperative during interview today and is oriented to person and place and can identify the date and [place] president.  She denies any symptoms of depression or recent changes in sleep or appetite.    PAST PSYCHIATRIC HISTORY: None reported    PAST MEDICAL HISTORY: Significant for chronic back pain, claudication and GERD    MEDICATIONS:   Current Facility-Administered Medications   Medication Dose Route Frequency Provider Last Rate Last Admin    acetaminophen (TYLENOL) tablet 650 mg  650 mg Oral Q4H PRN Oren Copeland MD        Or    acetaminophen (TYLENOL) 160 MG/5ML oral solution 650 mg  650 mg Oral Q4H PRN Oren Copeland MD        Or    acetaminophen (TYLENOL) suppository 650 mg  650 mg Rectal Q4H PRN Oren Copeland MD        acetaminophen (TYLENOL) tablet 500 mg  500 mg Oral Q6H PRN Oren Copeland MD        atorvastatin (LIPITOR) tablet 20 mg  20 mg Oral Nightly Oren Copeland MD   20 mg at 05/13/25 2051    azithromycin (ZITHROMAX) 500 mg in sodium chloride 0.9 % 250 mL IVPB-VTB  500 mg Intravenous Q24H Oren Copeland MD   500 mg at 05/14/25 1517    calcium carbonate (TUMS) chewable tablet 500 mg (200 mg elemental)  2  tablet Oral BID PRN Oren Copeland MD        enoxaparin sodium (LOVENOX) syringe 40 mg  40 mg Subcutaneous Q24H Oren Copeland MD   40 mg at 05/14/25 1517    guaiFENesin (MUCINEX) 12 hr tablet 1,200 mg  1,200 mg Oral Q12H Oren Copeland MD   1,200 mg at 05/14/25 0845    HYDROcodone-acetaminophen (NORCO) 5-325 MG per tablet 1 tablet  1 tablet Oral Q4H PRN Oren Copeland MD        ipratropium-albuterol (DUO-NEB) nebulizer solution 3 mL  3 mL Nebulization 4x Daily - RT Oren Copeland MD   3 mL at 05/15/25 1105    Menthol-Zinc Oxide 1 Application  1 Application Topical BID rOen Copeland MD   1 Application at 05/15/25 0938    nitroglycerin (NITROSTAT) SL tablet 0.4 mg  0.4 mg Sublingual Q5 Min PRN Oren Copeland MD        ondansetron ODT (ZOFRAN-ODT) disintegrating tablet 4 mg  4 mg Oral Q6H PRN Oren Copeland MD        Or    ondansetron (ZOFRAN) injection 4 mg  4 mg Intravenous Q6H PRN Oren Copeland MD        pantoprazole (PROTONIX) EC tablet 40 mg  40 mg Oral Q AM Oren Copeland MD   40 mg at 05/15/25 0547    piperacillin-tazobactam (ZOSYN) 3.375 g IVPB in 100 mL NS MBP (CD)  3.375 g Intravenous Q8H Oren Copeland MD   3.375 g at 05/15/25 1128    sodium chloride 0.9 % flush 10 mL  10 mL Intravenous Q12H Oren Copeland MD   10 mL at 05/15/25 0933    sodium chloride 0.9 % flush 10 mL  10 mL Intravenous PRN Oren Copeland MD        sodium chloride 0.9 % infusion 40 mL  40 mL Intravenous PRN Oren Copeland MD             ALLERGIES: Codeine and sulfa    FAMILY HISTORY: Noncontributory    SOCIAL HISTORY: The patient lives alone with her dog.  She denies abuse of any psychoactive substances.    MENTAL STATUS EXAM: The patient is a thin white female appearing her stated age.  She has no apparent physical distress at the time examination.  She is awake alert and oriented x 3.  Her mood is euthymic her affect congruent.  Speech is generally relevant and coherent.  No gross deficit  memory or cognition are noted.  The patient is cooperative throughout interview.  She denies current suicidal or homicidal ideation or psychotic features.  Judgement and insight are intact.    ASSETS/LIABILITIES: To be assessed/lack of support    DIAGNOSTIC IMPRESSION: Adjustment disorder with mixed emotional features, medical problems as noted previously    PLAN: At this point, the patient is fully oriented and does not appear to be exhibiting any signs of psychosis.  I believe this is largely a  issue at this point given the patient's degree of physical deterioration on admission.  At this point, I will not initiate any pharmacotherapy.  I would recommend that an APS report be considered.

## 2025-05-15 NOTE — PLAN OF CARE
Problem: Fatigue  Goal: Improved Activity Tolerance  Outcome: Progressing  Intervention: Promote Improved Energy  Recent Flowsheet Documentation  Taken 5/14/2025 2100 by Genoveva Servin RN  Activity Management:   activity minimized   activity encouraged  Taken 5/14/2025 1949 by Genoveva Servin RN  Activity Management: activity encouraged     Problem: Adult Inpatient Plan of Care  Goal: Plan of Care Review  Outcome: Progressing  Flowsheets (Taken 5/14/2025 2358)  Progress: no change  Plan of Care Reviewed With: patient  Goal: Patient-Specific Goal (Individualized)  Outcome: Progressing  Goal: Absence of Hospital-Acquired Illness or Injury  Outcome: Progressing  Intervention: Identify and Manage Fall Risk  Recent Flowsheet Documentation  Taken 5/14/2025 2312 by Genoveva Servin RN  Safety Promotion/Fall Prevention:   room organization consistent   safety round/check completed  Taken 5/14/2025 2200 by Genoveva Servin RN  Safety Promotion/Fall Prevention:   room organization consistent   safety round/check completed  Taken 5/14/2025 2030 by Genoveva Servin RN  Safety Promotion/Fall Prevention:   safety round/check completed   room organization consistent  Taken 5/14/2025 1949 by Genoveva Servin RN  Safety Promotion/Fall Prevention:   room organization consistent   safety round/check completed  Taken 5/14/2025 1910 by Genoveva Servin RN  Safety Promotion/Fall Prevention:   activity supervised   safety round/check completed   room organization consistent  Intervention: Prevent Skin Injury  Recent Flowsheet Documentation  Taken 5/14/2025 1949 by Genoveva Servin, RN  Body Position: right  Intervention: Prevent Infection  Recent Flowsheet Documentation  Taken 5/14/2025 1949 by Genoveva Servin RN  Infection Prevention: rest/sleep promoted  Goal: Optimal Comfort and Wellbeing  Outcome: Progressing  Intervention: Provide Person-Centered Care  Recent Flowsheet Documentation  Taken 5/14/2025 1949 by Genoveva Servin, RN  Trust  Relationship/Rapport: care explained  Goal: Readiness for Transition of Care  Outcome: Progressing     Problem: Skin Injury Risk Increased  Goal: Skin Health and Integrity  Outcome: Progressing  Intervention: Optimize Skin Protection  Recent Flowsheet Documentation  Taken 5/14/2025 2100 by Genoveva Servin RN  Activity Management:   activity minimized   activity encouraged  Taken 5/14/2025 1949 by Genoveva Servin, RN  Activity Management: activity encouraged  Pressure Reduction Techniques: weight shift assistance provided     Problem: Fall Injury Risk  Goal: Absence of Fall and Fall-Related Injury  Outcome: Progressing  Intervention: Identify and Manage Contributors  Recent Flowsheet Documentation  Taken 5/14/2025 1949 by Genoveva Servin RN  Medication Review/Management: medications reviewed  Intervention: Promote Injury-Free Environment  Recent Flowsheet Documentation  Taken 5/14/2025 2312 by Genoveva Servin RN  Safety Promotion/Fall Prevention:   room organization consistent   safety round/check completed  Taken 5/14/2025 2200 by Genoveva Servin RN  Safety Promotion/Fall Prevention:   room organization consistent   safety round/check completed  Taken 5/14/2025 2030 by Genoveva Servin RN  Safety Promotion/Fall Prevention:   safety round/check completed   room organization consistent  Taken 5/14/2025 1949 by Genoveva Servin RN  Safety Promotion/Fall Prevention:   room organization consistent   safety round/check completed  Taken 5/14/2025 1910 by Genoveva Servin RN  Safety Promotion/Fall Prevention:   activity supervised   safety round/check completed   room organization consistent     Problem: Comorbidity Management  Goal: Maintenance of COPD Symptom Control  Outcome: Progressing  Intervention: Maintain COPD (Chronic Obstructive Pulmonary Disease) Symptom Control  Recent Flowsheet Documentation  Taken 5/14/2025 1949 by Genoveva Servin, RN  Medication Review/Management: medications reviewed  Goal: Blood Pressure in Desired  Range  Outcome: Progressing  Intervention: Maintain Blood Pressure Management  Recent Flowsheet Documentation  Taken 5/14/2025 1949 by Genoveva Servin, RN  Medication Review/Management: medications reviewed   Goal Outcome Evaluation:  Plan of Care Reviewed With: patient        Progress: no change

## 2025-05-15 NOTE — PLAN OF CARE
Problem: Fatigue  Goal: Improved Activity Tolerance  Outcome: Progressing  Intervention: Promote Improved Energy  Recent Flowsheet Documentation  Taken 5/15/2025 0800 by Twan Leigh RN  Activity Management: activity encouraged     Problem: Adult Inpatient Plan of Care  Goal: Plan of Care Review  Outcome: Progressing  Flowsheets (Taken 5/15/2025 1747)  Plan of Care Reviewed With: patient  Goal: Patient-Specific Goal (Individualized)  Outcome: Progressing  Goal: Absence of Hospital-Acquired Illness or Injury  Outcome: Progressing  Intervention: Identify and Manage Fall Risk  Recent Flowsheet Documentation  Taken 5/15/2025 1600 by Twan Leigh RN  Safety Promotion/Fall Prevention: patient off unit  Taken 5/15/2025 1400 by Twan Leigh, RN  Safety Promotion/Fall Prevention:   activity supervised   assistive device/personal items within reach   clutter free environment maintained   fall prevention program maintained   gait belt   elopement precautions   lighting adjusted   mobility aid in reach   muscle strengthening facilitated   nonskid shoes/slippers when out of bed   safety round/check completed   room organization consistent  Taken 5/15/2025 1200 by Twan Leigh RN  Safety Promotion/Fall Prevention:   activity supervised   assistive device/personal items within reach   clutter free environment maintained   fall prevention program maintained   gait belt   lighting adjusted   mobility aid in reach   muscle strengthening facilitated   nonskid shoes/slippers when out of bed   room organization consistent   safety round/check completed  Taken 5/15/2025 1000 by Twan Leigh RN  Safety Promotion/Fall Prevention:   activity supervised   assistive device/personal items within reach   clutter free environment maintained   fall prevention program maintained   elopement precautions   gait belt   lighting adjusted   mobility aid in reach   muscle strengthening facilitated   nonskid shoes/slippers when  out of bed   room organization consistent   safety round/check completed  Taken 5/15/2025 0800 by Twan Leigh RN  Safety Promotion/Fall Prevention:   activity supervised   assistive device/personal items within reach   clutter free environment maintained   fall prevention program maintained   gait belt   lighting adjusted   mobility aid in reach   muscle strengthening facilitated   nonskid shoes/slippers when out of bed   room organization consistent   safety round/check completed  Intervention: Prevent Skin Injury  Recent Flowsheet Documentation  Taken 5/15/2025 1000 by Twan Leigh RN  Body Position:   weight shifting   supine  Taken 5/15/2025 0800 by Twan Leigh RN  Body Position:   weight shifting   supine  Skin Protection: incontinence pads utilized  Goal: Optimal Comfort and Wellbeing  Outcome: Progressing  Intervention: Provide Person-Centered Care  Recent Flowsheet Documentation  Taken 5/15/2025 0800 by Twan Leigh RN  Trust Relationship/Rapport:   care explained   choices provided   empathic listening provided   questions answered   questions encouraged   reassurance provided   thoughts/feelings acknowledged  Goal: Readiness for Transition of Care  Outcome: Progressing     Problem: Skin Injury Risk Increased  Goal: Skin Health and Integrity  Outcome: Progressing  Intervention: Optimize Skin Protection  Recent Flowsheet Documentation  Taken 5/15/2025 1000 by Twan Leigh RN  Head of Bed (HOB) Positioning: HOB elevated  Taken 5/15/2025 0800 by Twan Leigh RN  Activity Management: activity encouraged  Pressure Reduction Techniques:   frequent weight shift encouraged   heels elevated off bed  Head of Bed (HOB) Positioning: HOB elevated  Pressure Reduction Devices: pressure-redistributing mattress utilized  Skin Protection: incontinence pads utilized     Problem: Fall Injury Risk  Goal: Absence of Fall and Fall-Related Injury  Outcome: Progressing  Intervention: Identify and  Manage Contributors  Recent Flowsheet Documentation  Taken 5/15/2025 1400 by Twan Leigh RN  Medication Review/Management: medications reviewed  Taken 5/15/2025 1000 by Twan Leigh RN  Medication Review/Management: medications reviewed  Taken 5/15/2025 0800 by Twan Leigh RN  Medication Review/Management: medications reviewed  Intervention: Promote Injury-Free Environment  Recent Flowsheet Documentation  Taken 5/15/2025 1600 by Twan Leigh RN  Safety Promotion/Fall Prevention: patient off unit  Taken 5/15/2025 1400 by Twan Leigh RN  Safety Promotion/Fall Prevention:   activity supervised   assistive device/personal items within reach   clutter free environment maintained   fall prevention program maintained   gait belt   elopement precautions   lighting adjusted   mobility aid in reach   muscle strengthening facilitated   nonskid shoes/slippers when out of bed   safety round/check completed   room organization consistent  Taken 5/15/2025 1200 by Twan Leigh RN  Safety Promotion/Fall Prevention:   activity supervised   assistive device/personal items within reach   clutter free environment maintained   fall prevention program maintained   gait belt   lighting adjusted   mobility aid in reach   muscle strengthening facilitated   nonskid shoes/slippers when out of bed   room organization consistent   safety round/check completed  Taken 5/15/2025 1000 by Twan Leigh RN  Safety Promotion/Fall Prevention:   activity supervised   assistive device/personal items within reach   clutter free environment maintained   fall prevention program maintained   elopement precautions   gait belt   lighting adjusted   mobility aid in reach   muscle strengthening facilitated   nonskid shoes/slippers when out of bed   room organization consistent   safety round/check completed  Taken 5/15/2025 0800 by Twan Leigh RN  Safety Promotion/Fall Prevention:   activity supervised   assistive  device/personal items within reach   clutter free environment maintained   fall prevention program maintained   gait belt   lighting adjusted   mobility aid in reach   muscle strengthening facilitated   nonskid shoes/slippers when out of bed   room organization consistent   safety round/check completed     Problem: Comorbidity Management  Goal: Maintenance of COPD Symptom Control  Outcome: Progressing  Intervention: Maintain COPD (Chronic Obstructive Pulmonary Disease) Symptom Control  Recent Flowsheet Documentation  Taken 5/15/2025 1400 by Twan Leigh RN  Medication Review/Management: medications reviewed  Taken 5/15/2025 1000 by Twan Leigh RN  Medication Review/Management: medications reviewed  Taken 5/15/2025 0800 by Twan Leigh RN  Medication Review/Management: medications reviewed  Goal: Blood Pressure in Desired Range  Outcome: Progressing  Intervention: Maintain Blood Pressure Management  Recent Flowsheet Documentation  Taken 5/15/2025 1400 by Twan Leigh RN  Medication Review/Management: medications reviewed  Taken 5/15/2025 1000 by Twan Leigh RN  Medication Review/Management: medications reviewed  Taken 5/15/2025 0800 by Twan Leigh RN  Medication Review/Management: medications reviewed   Goal Outcome Evaluation:  Plan of Care Reviewed With: patient

## 2025-05-15 NOTE — PROGRESS NOTES
[facility]  [phone]  Dr. Agustin Orellana     Subjective:  LOS: 2    Chief Complaint: Shortness of breath and fatigue    Patient appeared to be much better today.  Was more alert and more coherent.  Able to have full conversation.  She did apologize for her previous behavior but is worried about her dog.    Objective   Vital Signs past 24hrs  Temp range: Temp (24hrs), Av.5 °F (36.4 °C), Min:97.2 °F (36.2 °C), Max:97.7 °F (36.5 °C)    BP range: BP: ()/(60-78) 96/63  Pulse range: Heart Rate:  [] 111  Resp rate range: Resp:  [16-18] 16  Device (Oxygen Therapy): nasal cannulaFlow (L/min) (Oxygen Therapy):  [2-22] 2  Oxygen range:SpO2:  [92 %-100 %] 92 %   Mechanical Ventilator:     Physical Exam  Vitals and nursing note reviewed.   Constitutional:       General: She is not in acute distress.     Appearance: She is cachectic.   HENT:      Head: Normocephalic and atraumatic.   Cardiovascular:      Rate and Rhythm: Normal rate and regular rhythm.      Heart sounds: No murmur heard.  Pulmonary:      Effort: Pulmonary effort is normal. No respiratory distress.      Breath sounds: Normal breath sounds. No wheezing, rhonchi or rales.   Abdominal:      General: Abdomen is flat.      Tenderness: There is no abdominal tenderness.   Skin:     General: Skin is warm and dry.      Findings: No rash.   Neurological:      Mental Status: She is alert.       Results Review:    I have reviewed the laboratory and imaging data since the last note by [facility] physician.  My annotations are noted in assessment and plan.      Result Review:  I have personally reviewed the results from last note by [facility] physician to 5/15/2025 15:51 EDT and agree with these findings:  [x]  Laboratory list / accordion  [x]  Microbiology  [x]  Radiology  [x]  EKG/Telemetry   [x]  Cardiology/Vascular   [x]  Pathology  [x]  Old records  []  Other:    Medication Review:  I have reviewed the current MAR.  My annotations are noted in  assessment and plan.    atorvastatin, 20 mg, Oral, Nightly  enoxaparin sodium, 40 mg, Subcutaneous, Q24H  guaiFENesin, 1,200 mg, Oral, Q12H  ipratropium-albuterol, 3 mL, Nebulization, 4x Daily - RT  lidocaine, 3 mL, Injection, Once  lidocaine 1% - EPINEPHrine [id number], 10 mL, Injection, Once  Menthol-Zinc Oxide, 1 Application, Topical, BID  pantoprazole, 40 mg, Oral, Q AM  piperacillin-tazobactam, 3.375 g, Intravenous, Q8H  sodium chloride, 10 mL, Intravenous, Q12H           Lines, Drains & Airways       Active LDAs       Name Placement date Placement time Site Days    Peripheral IV 05/12/25 18 G Left 05/12/25  --  --  3    Peripheral IV 05/14/25 1427 20 G Anterior;Left;Proximal Forearm 05/14/25  1427  Forearm  1    External Urinary Catheter --  --  --  --                  No active isolations  Diet Orders (active) (From admission, onward)       Start     Ordered    05/15/25 0001  NPO Diet NPO Type: Strict NPO  Diet Effective Midnight         05/14/25 1804                      Assessment  Acute hypoxic respiratory failure  Right pleural effusion s/p thoracentesis 5/14/25 with exudate  Bulky mediastinal and right hilar adenopathy  Suspected right hilar mass  Possible pneumonia  Right chest wall mass  Former smoker     Plan  -Patient presented on 5/13/2025 with progressively worsening shortness of breath, fatigue and weakness for 6 months.  Found to have right pleural effusion and mediastinal and right hilar adenopathy.  - Continue weaning oxygen for goal O2 saturation greater than 88%  - Can continue antibiotics for now targeted at community-acquired pneumonia  - Thoracentesis on 5/14/2025 with 1 L of fluid removed.  Exudative by lights criteria.  Cytology negative for malignant cells.  -CT chest with contrast after thoracentesis shows further evidence of a right hilar mass with bulky mediastinal and hilar lymphadenopathy that is enclosing the right mainstem bronchus.  - At this point I am fairly concerned that there  is a malignant process going on.  Whether this represents a primary lung malignancy versus metastatic disease is unclear.  I think it is reasonable to continue antibiotics targeted at infection for now.  Cytology of the pleural fluid is negative for malignancy therefore I think the next best step would be to do a supraclavicular lymph node biopsy if this is easily accessible by IR therefore we will get diagnosis and stage.  Also would likely get enough tissue to go ahead and do NexGen sequencing.  If this biopsy comes up negative or they are unable to perform it for any reason then we certainly can do bronchoscopy with EBUS biopsy which will definitely give us the diagnosis.    Agustin Orellana DO   05/15/25  15:51 EDT      Part of this note may be an electronic transcription/translation of spoken language to printed text using the Dragon Dictation System.

## 2025-05-15 NOTE — PROGRESS NOTES
"Nutrition Services    Patient Name: Penelope Onofre  YOB: 1955  MRN: [id number]  Admission date: 5/12/2025    Assessment Date:  05/15/25    Recommend advance diet as soon as able. Will offer supplements to help with nutrient intake if desired. Will perform NFPE at follow up.     NUTRITION EVALUATION      Reason for Encounter BMI < 19.0   Diagnosis/Problem Admission Diagnosis:  Generalized weakness [R53.1]  Pneumonia of right lower lobe due to infectious organism [J18.9]    Problem List:    Generalized weakness    Dizziness    Pneumonia    Pleural effusion    At high risk for pressure injury of skin    Decubitus ulcer of sacral region, stage 1    Acute kidney failure    Dehydration    Elevated troponin    Elevated brain natriuretic peptide (BNP) level    Hypokalemia    Hyperglycemia     Narrative Pt came to hospital with shortness of breath. She had 1L of fluid removed from her lung on 5/14 and CT chest with contrast after thoracentesis shows further evidence of a right hilar mass.        PO Diet NPO Diet NPO Type: Strict NPO   Allergies NKFA   Supplements n/a   PO Intake % Pt NPO starting 5/14, 0% intake prior on 5/13       Chewing/Swallowing Difficulty no issues identified at this time, evaluated by SLP and safe for normal textures and thin liquids.        Medications reviewed, protonix, antibiotics   Labs  reviewed       Physical Findings alert, oriented, on oxygen therapy     Edema no edema    GI Function WDL   Skin Status pressure injury: Stage 1 per nursing, medication recommended for treatment by wound care   Lines/Drains none   I/O net fluid loss and amount/timeframe:3 days       Height  Weight  BMI  Weight Trend     Height: 182.9 cm (72\")  Weight: 63.5 kg (140 lb) (05/13/25 1518)  Body mass index is 18.99 kg/m².  Stable per chart review (2 pound loss since office visit 1 year ago)    Weight change: No significant changes       NFPE Unable to perform due to: pt out of room       Nutrition " Problem (PES) Problem: Inadequate Oral Intake  Etiology: Other: Clinical course    Signs/Symptoms: NPO and PO intake 0% since admission       Intervention/Plan Recommend advance diet as soon as able. Will offer supplements to help with nutrient intake if desired. Will perform NFPE at follow up.     RD to follow up per protocol.     Results from last 7 days   Lab Units 05/15/25  0641 05/14/25  0637 05/13/25  0611 05/12/25  2359   SODIUM mmol/L 141 137 135* 139   POTASSIUM mmol/L 4.2 3.8 3.9 3.4*   CHLORIDE mmol/L 106 103 106 105   CO2 mmol/L 21.9* 20.0* 18.4* 20.5*   BUN mg/dL 12 12 13 13   CREATININE mg/dL 1.20* 1.00 1.00 1.21*   CALCIUM mg/dL 8.8 8.9 9.2 9.2   BILIRUBIN mg/dL  --   --   --  0.5   ALK PHOS U/L  --   --   --  91   ALT (SGPT) U/L  --   --   --  7   AST (SGOT) U/L  --   --   --  24   GLUCOSE mg/dL 84 118* 115* 135*     Results from last 7 days   Lab Units 05/15/25  0641 05/14/25  0637 05/13/25  1428   MAGNESIUM mg/dL  --   --  1.7   HEMOGLOBIN g/dL 14.0   < >  --    HEMATOCRIT % 40.8   < >  --     < > = values in this interval not displayed.     Lab Results   Component Value Date    HGBA1C 5.50 05/13/2025     Wt Readings from Last 10 Encounters:   05/13/25 63.5 kg (140 lb)   01/25/19 76.2 kg (168 lb 1.6 oz)   01/22/19 83.9 kg (185 lb)   11/17/18 82.6 kg (182 lb)   06/29/18 90.7 kg (200 lb)   04/28/18 93 kg (205 lb)   04/20/18 90.7 kg (200 lb)   05/16/17 95.3 kg (210 lb)   01/03/17 99.3 kg (219 lb)   07/27/16 98 kg (216 lb)       Electronically signed by:  Ya Hernandez RD  05/15/25 13:58 EDT

## 2025-05-16 LAB
ANION GAP SERPL CALCULATED.3IONS-SCNC: 14 MMOL/L (ref 5–15)
BASOPHILS # BLD AUTO: 0.02 10*3/MM3 (ref 0–0.2)
BASOPHILS NFR BLD AUTO: 0.4 % (ref 0–1.5)
BUN SERPL-MCNC: 9 MG/DL (ref 8–23)
BUN/CREAT SERPL: 8.6 (ref 7–25)
CALCIUM SPEC-SCNC: 8.3 MG/DL (ref 8.6–10.5)
CHLORIDE SERPL-SCNC: 105 MMOL/L (ref 98–107)
CK SERPL-CCNC: 61 U/L (ref 20–180)
CO2 SERPL-SCNC: 20 MMOL/L (ref 22–29)
CREAT SERPL-MCNC: 1.05 MG/DL (ref 0.57–1)
DEPRECATED RDW RBC AUTO: 49 FL (ref 37–54)
EGFRCR SERPLBLD CKD-EPI 2021: 56.9 ML/MIN/1.73
EOSINOPHIL # BLD AUTO: 0.14 10*3/MM3 (ref 0–0.4)
EOSINOPHIL NFR BLD AUTO: 2.8 % (ref 0.3–6.2)
ERYTHROCYTE [DISTWIDTH] IN BLOOD BY AUTOMATED COUNT: 13 % (ref 12.3–15.4)
GLUCOSE SERPL-MCNC: 96 MG/DL (ref 65–99)
HCT VFR BLD AUTO: 38.6 % (ref 34–46.6)
HGB BLD-MCNC: 12.8 G/DL (ref 12–15.9)
IMM GRANULOCYTES # BLD AUTO: 0.02 10*3/MM3 (ref 0–0.05)
IMM GRANULOCYTES NFR BLD AUTO: 0.4 % (ref 0–0.5)
LYMPHOCYTES # BLD AUTO: 1.23 10*3/MM3 (ref 0.7–3.1)
LYMPHOCYTES NFR BLD AUTO: 24.2 % (ref 19.6–45.3)
MCH RBC QN AUTO: 34 PG (ref 26.6–33)
MCHC RBC AUTO-ENTMCNC: 33.2 G/DL (ref 31.5–35.7)
MCV RBC AUTO: 102.7 FL (ref 79–97)
MONOCYTES # BLD AUTO: 0.6 10*3/MM3 (ref 0.1–0.9)
MONOCYTES NFR BLD AUTO: 11.8 % (ref 5–12)
NEUTROPHILS NFR BLD AUTO: 3.07 10*3/MM3 (ref 1.7–7)
NEUTROPHILS NFR BLD AUTO: 60.4 % (ref 42.7–76)
NRBC BLD AUTO-RTO: 0 /100 WBC (ref 0–0.2)
PLATELET # BLD AUTO: 147 10*3/MM3 (ref 140–450)
PMV BLD AUTO: 10.3 FL (ref 6–12)
POTASSIUM SERPL-SCNC: 3.1 MMOL/L (ref 3.5–5.2)
RBC # BLD AUTO: 3.76 10*6/MM3 (ref 3.77–5.28)
SODIUM SERPL-SCNC: 139 MMOL/L (ref 136–145)
WBC NRBC COR # BLD AUTO: 5.08 10*3/MM3 (ref 3.4–10.8)

## 2025-05-16 PROCEDURE — 94664 DEMO&/EVAL PT USE INHALER: CPT

## 2025-05-16 PROCEDURE — 94799 UNLISTED PULMONARY SVC/PX: CPT

## 2025-05-16 PROCEDURE — 25010000002 PIPERACILLIN SOD-TAZOBACTAM PER 1 G: Performed by: INTERNAL MEDICINE

## 2025-05-16 PROCEDURE — 80048 BASIC METABOLIC PNL TOTAL CA: CPT | Performed by: INTERNAL MEDICINE

## 2025-05-16 PROCEDURE — 25010000002 ENOXAPARIN PER 10 MG: Performed by: INTERNAL MEDICINE

## 2025-05-16 PROCEDURE — 97535 SELF CARE MNGMENT TRAINING: CPT

## 2025-05-16 PROCEDURE — 94761 N-INVAS EAR/PLS OXIMETRY MLT: CPT

## 2025-05-16 PROCEDURE — 85025 COMPLETE CBC W/AUTO DIFF WBC: CPT | Performed by: INTERNAL MEDICINE

## 2025-05-16 PROCEDURE — 97530 THERAPEUTIC ACTIVITIES: CPT

## 2025-05-16 PROCEDURE — 97110 THERAPEUTIC EXERCISES: CPT

## 2025-05-16 PROCEDURE — 82550 ASSAY OF CK (CPK): CPT | Performed by: INTERNAL MEDICINE

## 2025-05-16 PROCEDURE — 94760 N-INVAS EAR/PLS OXIMETRY 1: CPT

## 2025-05-16 RX ORDER — POTASSIUM CHLORIDE 1500 MG/1
40 TABLET, EXTENDED RELEASE ORAL EVERY 4 HOURS
Status: COMPLETED | OUTPATIENT
Start: 2025-05-16 | End: 2025-05-17

## 2025-05-16 RX ADMIN — PIPERACILLIN AND TAZOBACTAM 3.38 G: 3; .375 INJECTION, POWDER, FOR SOLUTION INTRAVENOUS at 20:08

## 2025-05-16 RX ADMIN — POTASSIUM CHLORIDE 40 MEQ: 1500 TABLET, EXTENDED RELEASE ORAL at 16:07

## 2025-05-16 RX ADMIN — MENTHOL, ZINC OXIDE 1 APPLICATION: .44; 20.6 OINTMENT TOPICAL at 20:14

## 2025-05-16 RX ADMIN — MENTHOL, ZINC OXIDE 1 APPLICATION: .44; 20.6 OINTMENT TOPICAL at 08:14

## 2025-05-16 RX ADMIN — PIPERACILLIN AND TAZOBACTAM 3.38 G: 3; .375 INJECTION, POWDER, FOR SOLUTION INTRAVENOUS at 10:17

## 2025-05-16 RX ADMIN — IPRATROPIUM BROMIDE AND ALBUTEROL SULFATE 3 ML: .5; 3 SOLUTION RESPIRATORY (INHALATION) at 21:47

## 2025-05-16 RX ADMIN — PANTOPRAZOLE SODIUM 40 MG: 40 TABLET, DELAYED RELEASE ORAL at 06:29

## 2025-05-16 RX ADMIN — IPRATROPIUM BROMIDE AND ALBUTEROL SULFATE 3 ML: .5; 3 SOLUTION RESPIRATORY (INHALATION) at 14:41

## 2025-05-16 RX ADMIN — GUAIFENESIN 1200 MG: 600 TABLET, EXTENDED RELEASE ORAL at 08:14

## 2025-05-16 RX ADMIN — POTASSIUM CHLORIDE 40 MEQ: 1500 TABLET, EXTENDED RELEASE ORAL at 20:13

## 2025-05-16 RX ADMIN — IPRATROPIUM BROMIDE AND ALBUTEROL SULFATE 3 ML: .5; 3 SOLUTION RESPIRATORY (INHALATION) at 06:53

## 2025-05-16 RX ADMIN — Medication 10 ML: at 10:17

## 2025-05-16 RX ADMIN — Medication 10 ML: at 20:14

## 2025-05-16 RX ADMIN — IPRATROPIUM BROMIDE AND ALBUTEROL SULFATE 3 ML: .5; 3 SOLUTION RESPIRATORY (INHALATION) at 10:56

## 2025-05-16 RX ADMIN — GUAIFENESIN 1200 MG: 600 TABLET, EXTENDED RELEASE ORAL at 20:13

## 2025-05-16 RX ADMIN — ENOXAPARIN SODIUM 40 MG: 100 INJECTION SUBCUTANEOUS at 13:27

## 2025-05-16 RX ADMIN — ATORVASTATIN CALCIUM 20 MG: 20 TABLET, FILM COATED ORAL at 20:13

## 2025-05-16 RX ADMIN — PIPERACILLIN AND TAZOBACTAM 3.38 G: 3; .375 INJECTION, POWDER, FOR SOLUTION INTRAVENOUS at 02:49

## 2025-05-16 NOTE — PLAN OF CARE
Problem: Adult Inpatient Plan of Care  Goal: Plan of Care Review  Outcome: Progressing  Flowsheets (Taken 5/16/2025 6509)  Progress: no change  Plan of Care Reviewed With: patient   Goal Outcome Evaluation:  Plan of Care Reviewed With: patient        Progress: no change             Pt was able to work with therapy today. She sat up in the chair for a few hours. Pt is on 2L nasal cannula. Pt is still getting IV antibiotics. Plan for pt to go to rehab at discharge. VSS

## 2025-05-16 NOTE — PROGRESS NOTES
"    Name: Penelope Onofre ADMIT: 2025   : 1955  PCP: Provider, No Known    MRN: [id number] LOS: 3 days   AGE/SEX: 70 y.o. female  ROOM: [facility]/     Subjective   Subjective   I am very worried about my dogI am afraid that animal control will take him from home\".  No shortness of breath.  Resolved dry cough without sputum production or hemoptysis.  No fever or chills.  No chest pain.  No PND orthopnea.  No palpitation.  No ankle edema.  No wheezing.    Review of Systems  GI.  No abdominal pain.  No nausea or vomiting  .  No dysuria or hematuria.  CNS.  No headache.  No focal neurological symptoms.     Objective   Objective   Vital Signs  Temp:  [97.2 °F (36.2 °C)-98.2 °F (36.8 °C)] 97.5 °F (36.4 °C)  Heart Rate:  [] 112  Resp:  [16-22] 18  BP: (110-121)/(62-91) 121/62  SpO2:  [86 %-100 %] 94 %  on  Flow (L/min) (Oxygen Therapy):  [1-4] 1;   Device (Oxygen Therapy): nasal cannula    Intake/Output Summary (Last 24 hours) at 2025 1622  Last data filed at 2025 1315  Gross per 24 hour   Intake 600 ml   Output --   Net 600 ml     Body mass index is 18.99 kg/m².      25  0910 25  1518   Weight: 63.6 kg (140 lb 3.4 oz) 63.5 kg (140 lb)     Physical Exam  General.  Elderly female.  She is alert and oriented x 4.  Appears chronically ill and older than stated age.  Underweight.  In no apparent pain/distress/diaphoresis.  Normal mood and affect  Eyes.  Pupils equal round and reactive.  Intact extraocular musculature.  No pallor or jaundice  Oral cavity.  Moist mucous membrane  Neck.  Supple.  No JVD.  No lymphadenopathy or thyromegaly  Cardiovascular.  Regular rate and rhythm with no gallops or murmurs  Chest.  Decreased entry in the right lung with right-sided rhonchi and crackles.  Abdomen.  Soft lax.  No tenderness.  No organomegaly.  No guarding or rebound  Extremities.  No clubbing/cyanosis/edema  CNS..  No acute focal neurological deficits.      Results Review:      Results from " "last 7 days   Lab Units 05/16/25  0732 05/15/25  0641 05/14/25  0637 05/13/25  0611 05/12/25  2359   SODIUM mmol/L 139 141 137 135* 139   POTASSIUM mmol/L 3.1* 4.2 3.8 3.9 3.4*   CHLORIDE mmol/L 105 106 103 106 105   CO2 mmol/L 20.0* 21.9* 20.0* 18.4* 20.5*   BUN mg/dL 9 12 12 13 13   CREATININE mg/dL 1.05* 1.20* 1.00 1.00 1.21*   GLUCOSE mg/dL 96 84 118* 115* 135*   CALCIUM mg/dL 8.3* 8.8 8.9 9.2 9.2   AST (SGOT) U/L  --   --   --   --  24   ALT (SGPT) U/L  --   --   --   --  7     Estimated Creatinine Clearance: 49.3 mL/min (A) (by C-G formula based on SCr of 1.05 mg/dL (H)).  Results from last 7 days   Lab Units 05/13/25  0611   HEMOGLOBIN A1C % 5.50     Results from last 7 days   Lab Units 05/15/25  2108   GLUCOSE mg/dL 97     Results from last 7 days   Lab Units 05/16/25  0732 05/15/25  0641 05/14/25 0637 05/13/25  0611 05/13/25  0120 05/12/25  2359   CK TOTAL U/L 61 68 59  --   --   --    HSTROP T ng/L  --   --   --  32* 30* 32*     Results from last 7 days   Lab Units 05/12/25  2359   PROBNP pg/mL 1,374.0*     Results from last 7 days   Lab Units 05/13/25  0611   TSH uIU/mL 4.800*     Results from last 7 days   Lab Units 05/13/25  1428   MAGNESIUM mg/dL 1.7           Invalid input(s): \"LDLCALC\"  Results from last 7 days   Lab Units 05/16/25  0732 05/15/25  0641 05/14/25  0637 05/13/25  0611 05/12/25  2359   WBC 10*3/mm3 5.08 6.27 6.19 5.99 6.45   HEMOGLOBIN g/dL 12.8 14.0 14.4 15.8 16.4*   HEMATOCRIT % 38.6 40.8 42.3 46.0 46.8*   PLATELETS 10*3/mm3 147 155 167 182 203   MCV fL 102.7* 102.3* 99.5* 101.1* 100.0*   MCH pg 34.0* 35.1* 33.9* 34.7* 35.0*   MCHC g/dL 33.2 34.3 34.0 34.3 35.0   RDW % 13.0 12.8 12.6 12.6 12.9   RDW-SD fl 49.0 47.5 45.7 46.9 47.6   MPV fL 10.3 10.3 10.0 10.1 10.0   NEUTROPHIL % % 60.4 65.3 70.7  --  61.0   LYMPHOCYTE % % 24.2 18.3* 15.3*  --  23.3   MONOCYTES % % 11.8 12.6* 12.6*  --  13.2*   EOSINOPHIL % % 2.8 3.0 0.8  --  1.9   BASOPHIL % % 0.4 0.5 0.3  --  0.3   IMM GRAN % % 0.4 " 0.3 0.3  --  0.3   NEUTROS ABS 10*3/mm3 3.07 4.09 4.37  --  3.94   LYMPHS ABS 10*3/mm3 1.23 1.15 0.95  --  1.50   MONOS ABS 10*3/mm3 0.60 0.79 0.78  --  0.85   EOS ABS 10*3/mm3 0.14 0.19 0.05  --  0.12   BASOS ABS 10*3/mm3 0.02 0.03 0.02  --  0.02   IMMATURE GRANS (ABS) 10*3/mm3 0.02 0.02 0.02  --  0.02   NRBC /100 WBC 0.0 0.0 0.0  --  0.0             Results from last 7 days   Lab Units 05/13/25  1428 05/13/25  0239   PROCALCITONIN ng/mL 0.05  --    LACTATE mmol/L  --  1.3             Results from last 7 days   Lab Units 05/14/25  0941 05/13/25  0242 05/13/25  0239   BLOODCX   --  No growth at 3 days No growth at 3 days   BODYFLDCX  No growth at 2 days  --   --      Results from last 7 days   Lab Units 05/13/25  1229   ADENOVIRUS DETECTION BY PCR  Not Detected   CORONAVIRUS 229E  Not Detected   CORONAVIRUS HKU1  Not Detected   CORONAVIRUS NL63  Not Detected   CORONAVIRUS OC43  Not Detected   HUMAN METAPNEUMOVIRUS  Not Detected   HUMAN RHINOVIRUS/ENTEROVIRUS  Not Detected   INFLUENZA B PCR  Not Detected   PARAINFLUENZA 1  Not Detected   PARAINFLUENZA VIRUS 2  Not Detected   PARAINFLUENZA VIRUS 3  Not Detected   PARAINFLUENZA VIRUS 4  Not Detected   BORDETELLA PERTUSSIS PCR  Not Detected   CHLAMYDOPHILA PNEUMONIAE PCR  Not Detected   MYCOPLAMA PNEUMO PCR  Not Detected   INFLUENZA A PCR  Not Detected   RSV, PCR  Not Detected     Results from last 7 days   Lab Units 05/14/25  0656   NITRITE UA  Negative   WBC UA /HPF 3-5*   BACTERIA UA /HPF None Seen   SQUAM EPITHEL UA /HPF 0-2           Imaging:  Imaging Results (Last 24 Hours)       Procedure Component Value Units Date/Time    US Guided Lymph Node Biopsy [[id number]] Collected: 05/15/25 1627    Specimen: Lymph Node Updated: 05/15/25 1632    Narrative:      ULTRASOUND-GUIDED RIGHT SUPRACLAVICULAR LYMPH NODE BIOPSY     INDICATION: Right supraclavicular lymphadenopathy concerning for  malignancy, biopsy requested     COMPARISON: CT of the chest 5/14/2025     The risks,  benefits and alternatives of the procedure were discussed  with the patient, and informed consent was obtained. In the procedure  room a timeout was performed confirming correct patient and procedure.     TECHNIQUE:  Ultrasound of the right supraclavicular region was performed. The  largest lymph node was identified measuring 2.2 x 1.7 x 1.3 cm and  corresponding with the enlarged lymph node on the comparison study. The  overlying skin was prepped and draped in usual sterile fashion with 2%  chlorhexidine. 1% lidocaine was administered for local anesthesia. Next  under ultrasound guidance 2 18-gauge core biopsies of the lymph node  were obtained and samples sent for tissue pathology and flow cytometry.  Patient tolerated procedure well without immediate complications       Impression:      Technically successful ultrasound-guided right supraclavicular lymph  node biopsy     This report was finalized on 5/15/2025 4:28 PM by Dr. Wu Ortega M.D on Workstation: [id number]                  I reviewed the patient's new clinical results / labs / tests / procedures      Assessment/Plan     Active Hospital Problems    Diagnosis  POA    **Generalized weakness [R53.1]  Yes    Dizziness [R42]  Yes    Pneumonia [J18.9]  Yes    Pleural effusion [J90]  Yes    At high risk for pressure injury of skin [Z91.89]  Not Applicable    Decubitus ulcer of sacral region, stage 1 [L89.151]  Yes    Acute kidney failure [N17.9]  Yes    Dehydration [E86.0]  Yes    Elevated troponin [R79.89]  Yes    Elevated brain natriuretic peptide (BNP) level [R79.89]  Yes    Hypokalemia [E87.6]  Yes    Hyperglycemia [R73.9]  Yes      Resolved Hospital Problems   No resolved problems to display.           Weakness/fatigue/dizziness.  This is multifactorial secondary to hypokalemia/dehydration/questionable congestive heart failure/pneumonia and hypoxemia.  No other evidence of infection beside the pneumonia (normal lactic acid/normal white blood cell/no  fever/normal procalcitonin/negative blood cultures/negative UTI by UA).  CT scan of the brain without contrast without acute disease.  Orthostasis are negative.  Echo with normal ejection fraction and no significant valvular disease.  Resolved dehydration with IV fluid.  Normal potassium with substitution.  Normal magnesium/TSH/CK/a.m. cortisol level.  Physical therapy recommends SNF   Poor social status.  Dog fecal material in the house with poor hygiene of the house per report.  CCP to evaluate  Right-sided pneumonia/large pleural effusion/hypoxemic respiratory failure.  Negative blood cultures till now.  Unable to produce sputum.  Negative respiratory PCR panel.  Negative Streptococcus antigen in the urine. .  S/p right thoracocentesis.  Pleural fluid exudate by lights criteria.  Pleural fluid culture is negative till now.  Pleural fluid cytology is negative..  Repeat CAT scan of the chest after thoracocentesis revealed residual moderate size right pleural effusion, decreased right lower lobe atelectasis, right upper lobe infiltrate, pulmonary nodules, advanced COPD, bulky mediastinal and right hilar lymphadenopathy and right supraclavicular lymphadenopathy likely metastasis, right middle lobe mass, occlusion of the right mainstem bronchus with narrowing.  Picture is suggestive with lung cancer with at least lymph node metastasis and postobstructive pneumonia.  Status post IV Zithromax.  Currently on  DuoNebs/Mucinex/Zosyn..Patient underwent supraclavicular lymph node biopsy by IR yesterday.  (Pathology is pending)..  If this is nonconclusive then a bronchoscopy will be done Per pulmonary.    Grade 1 sacral and heel decubiti present on admission wound nurse.  Hemoconcentration/macrocytosis.  Hemoconcentration is secondary to hydration and has resolved with IV fluid.  Normal TSH/B12/cortisol/folate.  Elevated troponin/elevated proBNP/abnormal EKG in a patient with a history of hypertension good blood pressure  control off Norvasc.  Echo with normal ejection fraction no significant valvular disease..  I believe elevation of the troponin and proBNP could be secondary to the renal failure   Acute kidney failure/hypokalemia acute kidney failure is most likely secondary to dehydration leading to prerenal azotemia.  Resolved acute kidney failure with IV fluid..  Resolved hypokalemia with substitution.  Normal  magnesium.  Patient appears euvolemic at this time.    Hyperglycemia.  Normal A1c.  Hyperlipidemia.  Continue statin   GERD.  Benign GI examination.  Continue proton pump inhibitor.  VTE prophylaxis.      Discussed.  My findings and plan of treatment with the patient/nurse  Disposition.  To be determined based on clinical course.        Oren Copeland MD  [facility]  05/16/25  16:22 EDT

## 2025-05-16 NOTE — PLAN OF CARE
Goal Outcome Evaluation:  Plan of Care Reviewed With: patient           Outcome Evaluation: Pt seen this morning for OT treatment. Donned socks from long sitting w/ setup, transitioned to EOB w/ SBA. Performed impulsive STS w/ SBA prior to cueing by OT. Performed functional mobility to bathroom and began sink side ADLs w/ Min A + RW for standing balance, pt very unsteady/impulsive throughout. Pt had just begun hygiene task when she reports feeling as if she were going to pass out, further activity was deferred and pt was returned to recliner. Vitals obtained w/ BP and HR WFL, SpO2 reading 85%. Returned to 90s w/ rest and PLB. Pt continues to present w/ decreased strength, balance, activity tolerance, and safety awareness to perform near baseline. OT will f/u to address deficits. Pt remains a high fall risk and is most appropriate for SNF at this time.    Anticipated Discharge Disposition (OT): skilled nursing facility

## 2025-05-16 NOTE — PROGRESS NOTES
The patient is pleasant cooperative and fully oriented when seen today.  Her mood is euthymic and she is eating hungrily.  From a psychiatric standpoint she appears stable, and I will sign off.

## 2025-05-16 NOTE — PLAN OF CARE
Goal Outcome Evaluation:              Outcome Evaluation: Continued weakness with limited activity toleranceduring PT today.  Able to stand up few times w/ min A using RW.  Ambulated 3' w/ min A using RW, slow shuffling steps, limited by fatigue and SOB, Sp02 maintained >90% on 1L NC, HR to 139, nursing aware.  Declined further activty due to fatigue from OT and being in chair for few hours today.  Of note, pt reports animal services told her they are taking her dog at 10am 5/17 if it is still in her apt.  Recommend using BSC instead of purewick, sitting in chair for meals and DC to SNU, plan to f/u for PT 5/19, pt and RN notified.    Anticipated Discharge Disposition (PT): skilled nursing facility

## 2025-05-16 NOTE — PLAN OF CARE
Problem: Fatigue  Goal: Improved Activity Tolerance  Outcome: Progressing  Intervention: Promote Improved Energy  Recent Flowsheet Documentation  Taken 5/15/2025 1944 by Genoveva Servin RN  Activity Management: activity encouraged  Fatigue Management: activity schedule adjusted     Problem: Adult Inpatient Plan of Care  Goal: Plan of Care Review  Outcome: Progressing  Flowsheets (Taken 5/16/2025 0050)  Progress: no change  Plan of Care Reviewed With: patient  Goal: Patient-Specific Goal (Individualized)  Outcome: Progressing  Goal: Absence of Hospital-Acquired Illness or Injury  Outcome: Progressing  Intervention: Identify and Manage Fall Risk  Recent Flowsheet Documentation  Taken 5/16/2025 0002 by Genoveva Servin, RN  Safety Promotion/Fall Prevention:   safety round/check completed   room organization consistent  Taken 5/15/2025 2305 by Genoveva Servin RN  Safety Promotion/Fall Prevention:   safety round/check completed   room organization consistent  Taken 5/15/2025 2211 by Genoveva Servin RN  Safety Promotion/Fall Prevention:   safety round/check completed   room organization consistent  Taken 5/15/2025 2105 by Genoveva Servin RN  Safety Promotion/Fall Prevention:   safety round/check completed   room organization consistent  Taken 5/15/2025 2042 by Genoveva Servin, RN  Safety Promotion/Fall Prevention:   room organization consistent   safety round/check completed  Taken 5/15/2025 1944 by Genoveva Servin RN  Safety Promotion/Fall Prevention: activity supervised  Goal: Optimal Comfort and Wellbeing  Outcome: Progressing  Intervention: Provide Person-Centered Care  Recent Flowsheet Documentation  Taken 5/15/2025 1944 by Genoveva Servin, RN  Trust Relationship/Rapport: care explained  Goal: Readiness for Transition of Care  Outcome: Progressing     Problem: Skin Injury Risk Increased  Goal: Skin Health and Integrity  Outcome: Progressing  Intervention: Optimize Skin Protection  Recent Flowsheet Documentation  Taken  5/15/2025 1944 by Genoveva Servin RN  Activity Management: activity encouraged  Pressure Reduction Techniques: frequent weight shift encouraged  Head of Bed (HOB) Positioning: HOB elevated  Pressure Reduction Devices: specialty bed utilized     Problem: Fall Injury Risk  Goal: Absence of Fall and Fall-Related Injury  Outcome: Progressing  Intervention: Promote Injury-Free Environment  Recent Flowsheet Documentation  Taken 5/16/2025 0002 by Genoveva Servin RN  Safety Promotion/Fall Prevention:   safety round/check completed   room organization consistent  Taken 5/15/2025 2305 by Genoveva Servin RN  Safety Promotion/Fall Prevention:   safety round/check completed   room organization consistent  Taken 5/15/2025 2211 by Genoveva Servin RN  Safety Promotion/Fall Prevention:   safety round/check completed   room organization consistent  Taken 5/15/2025 2105 by Genoveva Servin RN  Safety Promotion/Fall Prevention:   safety round/check completed   room organization consistent  Taken 5/15/2025 2042 by Genoveva Servin RN  Safety Promotion/Fall Prevention:   room organization consistent   safety round/check completed  Taken 5/15/2025 1944 by Genoveva Servin RN  Safety Promotion/Fall Prevention: activity supervised     Problem: Comorbidity Management  Goal: Maintenance of COPD Symptom Control  Outcome: Progressing  Goal: Blood Pressure in Desired Range  Outcome: Progressing   Goal Outcome Evaluation:  Plan of Care Reviewed With: patient        Progress: no change

## 2025-05-16 NOTE — THERAPY TREATMENT NOTE
Patient Name: Penelope Onofre  : 1955    MRN: [id number]                              Today's Date: 2025       Admit Date: 2025    Visit Dx:     ICD-10-CM ICD-9-CM   1. Generalized weakness  R53.1 780.79   2. Pneumonia of right lower lobe due to infectious organism  J18.9 486     Patient Active Problem List   Diagnosis    Arm pain, left    Generalized weakness    Dizziness    Pneumonia    Pleural effusion    At high risk for pressure injury of skin    Decubitus ulcer of sacral region, stage 1    Acute kidney failure    Dehydration    Elevated troponin    Elevated brain natriuretic peptide (BNP) level    Hypokalemia    Hyperglycemia     Past Medical History:   Diagnosis Date    Chronic back pain     Claudication     GERD (gastroesophageal reflux disease)      Past Surgical History:   Procedure Laterality Date    BREAST CYST EXCISION Right     TONSILLECTOMY      US GUIDED LYMPH NODE BIOPSY  5/15/2025      General Information       Row Name 25 1537          Physical Therapy Time and Intention    Document Type therapy note (daily note)  -AR     Mode of Treatment physical therapy  -AR       Row Name 25 1537          General Information    Patient Profile Reviewed yes  -AR     Existing Precautions/Restrictions fall;oxygen therapy device and L/min  -AR       Row Name 25 1537          Cognition    Orientation Status (Cognition) oriented to;person;place  -AR       Row Name 25 1537          Safety Issues/Impairments Affecting Functional Mobility    Safety Issues Affecting Function (Mobility) judgment;problem-solving;insight into deficits/self-awareness  -AR               User Key  (r) = Recorded By, (t) = Taken By, (c) = Cosigned By      Initials Name Provider Type    AR Eli Henry PT Physical Therapist                   Mobility       Row Name 25 1537          Bed Mobility    Supine-Sit [place] (Bed Mobility) standby assist  -AR     Sit-Supine [place] (Bed  Mobility) minimum assist (75% patient effort)  -AR     Assistive Device (Bed Mobility) bed rails;head of bed elevated  -AR       [facility] Name 05/16/25 1537          Sit-Stand Transfer    Sit-Stand [place] (Transfers) minimum assist (75% patient effort)  -AR     Assistive Device (Sit-Stand Transfers) walker, front-wheeled  -AR     Comment, (Sit-Stand Transfer) sit<>stand 3x with siting rest break  -AR       [facility] Name 05/16/25 1537          Gait/Stairs (Locomotion)    [place] Level (Gait) minimum assist (75% patient effort)  -AR     Assistive Device (Gait) walker, front-wheeled  -AR     Patient was able to Ambulate yes  -AR     Distance in Feet (Gait) 3  -AR     Deviations/Abnormal Patterns (Gait) festinating/shuffling;stride length decreased;gait speed decreased  -AR     Bilateral Gait Deviations forward flexed posture  -AR               User Key  (r) = Recorded By, (t) = Taken By, (c) = Cosigned By      Initials Name Provider Type    Eli Howard, PT Physical Therapist                   Obj/Interventions       Row Name 05/16/25 1538          Motor Skills    Therapeutic Exercise --  standing marches 5x  -AR       Row Name 05/16/25 1538          Balance    Static Standing Balance minimal assist  -AR     Position/Device Used, Standing Balance walker, rolling  -AR               User Key  (r) = Recorded By, (t) = Taken By, (c) = Cosigned By      Initials Name Provider Type    Eli Howard, PT Physical Therapist                   Goals/Plan    No documentation.                  Clinical Impression       Row Name 05/16/25 1538          Pain    Pretreatment Pain Rating 0/10 - no pain  -AR     Posttreatment Pain Rating 0/10 - no pain  -AR       Row Name 05/16/25 1538          Plan of Care Review    Outcome Evaluation Continued weakness with limited activity toleranceduring PT today.  Able to stand up few times w/ min A using RW.  Ambulated 3' w/ min A using RW, slow shuffling steps, limited by fatigue and  SOB, Sp02 maintained >90% on 1L NC, HR to 139, nursing aware.  Declined further activty due to fatigue from OT and being in chair for few hours today.  Of note, pt reports animal services told her they are taking her dog at 10am 5/17 if it is still in her apt.  Recommend using BSC instead of purewick, sitting in chair for meals and DC to SNU, plan to f/u for PT 5/19, pt and RN notified.  -AR       Row Name 05/16/25 1538          Therapy Assessment/Plan (PT)    Rehab Potential (PT) good  -AR     Therapy Frequency (PT) 5 times/wk  -AR       Row Name 05/16/25 1538          Vital Signs    Pretreatment Heart Rate (beats/min) 112  -AR     Intratreatment Heart Rate (beats/min) 139  -AR     Posttreatment Heart Rate (beats/min) 115  -AR     O2 Delivery Pre Treatment supplemental O2  -AR       Row Name 05/16/25 1538          Positioning and Restraints    Pre-Treatment Position in bed  -AR     Post Treatment Position bed  -AR     In Bed notified nsg;supine;call light within reach;encouraged to call for assist;exit alarm on  -AR               User Key  (r) = Recorded By, (t) = Taken By, (c) = Cosigned By      Initials Name Provider Type    AR Eli Henry, PT Physical Therapist                   Outcome Measures       Row Name 05/16/25 1541 05/16/25 0810       How much help from another person do you currently need...    Turning from your back to your side while in flat bed without using bedrails? 4  -AR 3  -DC    Moving from lying on back to sitting on the side of a flat bed without bedrails? 3  -AR 3  -DC    Moving to and from a bed to a chair (including a wheelchair)? 3  -AR 3  -DC    Standing up from a chair using your arms (e.g., wheelchair, bedside chair)? 3  -AR 3  -DC    Climbing 3-5 steps with a railing? 2  -AR 2  -DC    To walk in hospital room? 2  -AR 2  -DC    AM-PAC 6 Clicks Score (PT) 17  -AR 16  -DC    Highest Level of Mobility Goal Stand (1 or More Minutes)-5  -AR Stand (1 or More Minutes)-5  -DC      Juan  Name 05/16/25 1541          Functional Assessment    Outcome Measure Options AM-PAC 6 Clicks Basic Mobility (PT)  -AR               User Key  (r) = Recorded By, (t) = Taken By, (c) = Cosigned By      Initials Name Provider Type    AR Eli Henry, PT Physical Therapist    Elizabeth Llanes, RN Registered Nurse                                 Physical Therapy Education       Title: PT OT SLP Therapies (In Progress)       Topic: Physical Therapy (In Progress)       Point: Mobility training (In Progress)       Learning Progress Summary            Patient Acceptance, E, NR by AR at 5/16/2025 1542    Acceptance, E, NR by DJ at 5/14/2025 1400                      Point: Home exercise program (In Progress)       Learning Progress Summary            Patient Acceptance, E, NR by AR at 5/16/2025 1542                      Point: Body mechanics (In Progress)       Learning Progress Summary            Patient Acceptance, E, NR by AR at 5/16/2025 1542    Acceptance, E, NR by DJ at 5/14/2025 1400                      Point: Precautions (In Progress)       Learning Progress Summary            Patient Acceptance, E, NR by AR at 5/16/2025 1542    Acceptance, E, NR by DJ at 5/14/2025 1400                                      User Key       Initials Effective Dates Name Provider Type Discipline    AR 06/16/21 -  Eli Henry, PT Physical Therapist PT    DAVIE 10/25/19 -  Renetta Ely PT Physical Therapist PT                  PT Recommendation and Plan     Outcome Evaluation: Continued weakness with limited activity toleranceduring PT today.  Able to stand up few times w/ min A using RW.  Ambulated 3' w/ min A using RW, slow shuffling steps, limited by fatigue and SOB, Sp02 maintained >90% on 1L NC, HR to 139, nursing aware.  Declined further activty due to fatigue from OT and being in chair for few hours today.  Of note, pt reports animal services told her they are taking her dog at 10am 5/17 if it is still in her apt.   Recommend using BSC instead of purewick, sitting in chair for meals and DC to SNU, plan to f/u for PT 5/19, pt and RN notified.     Time Calculation:         PT Charges       Row Name 05/16/25 1536             Time Calculation    Start Time 1410  -AR      Stop Time 1440  -AR      Time Calculation (min) 30 min  -AR      PT Received On 05/16/25  -AR      PT - Next Appointment 05/19/25  -AR                User Key  (r) = Recorded By, (t) = Taken By, (c) = Cosigned By      Initials Name Provider Type    AR Eli Henry PT Physical Therapist                  Therapy Charges for Today       Code Description Service Date Service Provider Modifiers Qty    27491075144 HC PT THER PROC EA 15 MIN 5/16/2025 Eli Henry, PT GP 1    20526015802 HC PT THERAPEUTIC ACT EA 15 MIN 5/16/2025 Eli Henry, PT GP 1            PT G-Codes  Outcome Measure Options: AM-PAC 6 Clicks Basic Mobility (PT)  AM-PAC 6 Clicks Score (PT): 17  AM-PAC 6 Clicks Score (OT): 17  PT Discharge Summary  Anticipated Discharge Disposition (PT): skilled nursing facility    Eli Henry PT  5/16/2025

## 2025-05-16 NOTE — THERAPY TREATMENT NOTE
Patient Name: Penelope Onofre  : 1955    MRN: [id number]                              Today's Date: 2025       Admit Date: 2025    Visit Dx:     ICD-10-CM ICD-9-CM   1. Generalized weakness  R53.1 780.79   2. Pneumonia of right lower lobe due to infectious organism  J18.9 486     Patient Active Problem List   Diagnosis    Arm pain, left    Generalized weakness    Dizziness    Pneumonia    Pleural effusion    At high risk for pressure injury of skin    Decubitus ulcer of sacral region, stage 1    Acute kidney failure    Dehydration    Elevated troponin    Elevated brain natriuretic peptide (BNP) level    Hypokalemia    Hyperglycemia     Past Medical History:   Diagnosis Date    Chronic back pain     Claudication     GERD (gastroesophageal reflux disease)      Past Surgical History:   Procedure Laterality Date    BREAST CYST EXCISION Right     TONSILLECTOMY      US GUIDED LYMPH NODE BIOPSY  5/15/2025      General Information       Row Name 25 1247          OT Time and Intention    Subjective Information complains of;weakness;fatigue  -KG     Document Type therapy note (daily note)  -KG     Mode of Treatment individual therapy;occupational therapy  -KG     Patient Effort adequate  -KG     Symptoms Noted During/After Treatment fatigue;dizziness;shortness of breath  -KG       Row Name 25 1247          General Information    Patient Profile Reviewed yes  -KG     Existing Precautions/Restrictions fall;oxygen therapy device and L/min  -KG       Row Name 25 1247          Safety Issues/Impairments Affecting Functional Mobility    Impairments Affecting Function (Mobility) balance;endurance/activity tolerance;strength;shortness of breath  -KG               User Key  (r) = Recorded By, (t) = Taken By, (c) = Cosigned By      Initials Name Provider Type    KG Jamie Rutherford OT Occupational Therapist                     Mobility/ADL's       Row Name 25 1247          Bed Mobility    Bed  Mobility supine-sit  -KG     Supine-Sit [place] (Bed Mobility) standby assist  -KG       Row Name 05/16/25 1247          Transfers    Transfers sit-stand transfer;stand-sit transfer  -KG       Row Name 05/16/25 1247          Sit-Stand Transfer    Sit-Stand [place] (Transfers) standby assist  -KG     Assistive Device (Sit-Stand Transfers) walker, front-wheeled  -KG     Comment, (Sit-Stand Transfer) impulsively prior to cueing by OT  -KG       Row Name 05/16/25 1247          Stand-Sit Transfer    Stand-Sit [place] (Transfers) contact guard  -KG     Assistive Device (Stand-Sit Transfers) walker, front-wheeled  -KG     Comment, (Stand-Sit Transfer) cues for safety, pt attempting to sit prior to fully reaching recliner  -KG       [facility] Name 05/16/25 124          Functional Mobility    Functional Mobility- Ind. Level minimum assist (75% patient effort)  -KG     Functional Mobility- Device walker, front-wheeled  -KG     Functional Mobility- Comment from EOB to bathroom -- very unsteady/impulsive  -KG       Row Name 05/16/25 1247          Activities of Daily Living    BADL Assessment/Intervention grooming  -KG       Row Name 05/16/25 1247          Grooming Assessment/Training    [place] Level (Grooming) grooming skills  -KG     Position (Grooming) sink side  -KG     Comment, (Grooming) began tasks w/ Min A for standing balance, pt quickly reporting she feels as if she is going to pass out, further activity deferred and pt was returned to recliner  -KG               User Key  (r) = Recorded By, (t) = Taken By, (c) = Cosigned By      Initials Name Provider Type    KG Jamie Rutherford OT Occupational Therapist                   Obj/Interventions       Row Name 05/16/25 1249          Motor Skills    Functional Endurance moderately impaired  -KG       Row Name 05/16/25 1249          Balance    Balance Assessment sitting static balance;sitting dynamic balance;sit to stand dynamic balance;standing static  balance;standing dynamic balance  -KG     Static Sitting Balance standby assist  -KG     Dynamic Sitting Balance standby assist  -KG     Position, Sitting Balance sitting edge of bed  -KG     Sit to Stand Dynamic Balance standby assist;contact guard  -KG     Static Standing Balance minimal assist  -KG     Dynamic Standing Balance minimal assist  -KG     Position/Device Used, Standing Balance walker, front-wheeled  -KG     Balance Interventions sitting;standing;sit to stand;supported;static;dynamic;occupation based/functional task  -KG               User Key  (r) = Recorded By, (t) = Taken By, (c) = Cosigned By      Initials Name Provider Type    KG Jamie Rutherford OT Occupational Therapist                   Goals/Plan    No documentation.                  Clinical Impression       Row Name 05/16/25 1249          Pain Assessment    Pretreatment Pain Rating 0/10 - no pain  -KG       Row Name 05/16/25 1249          Plan of Care Review    Plan of Care Reviewed With patient  -KG     Outcome Evaluation Pt seen this morning for OT treatment. Donned socks from long sitting w/ setup, transitioned to EOB w/ SBA. Performed impulsive STS w/ SBA prior to cueing by OT. Performed functional mobility to bathroom and began sink side ADLs w/ Min A + RW for standing balance, pt very unsteady/impulsive throughout. Pt had just begun hygiene task when she reports feeling as if she were going to pass out, further activity was deferred and pt was returned to recliner. Vitals obtained w/ BP and HR WFL, SpO2 reading 85%. Returned to 90s w/ rest and PLB. Pt continues to present w/ decreased strength, balance, activity tolerance, and safety awareness to perform near baseline. OT will f/u to address deficits. Pt remains a high fall risk and is most appropriate for SNF at this time.  -KG       Row Name 05/16/25 1249          Therapy Plan Review/Discharge Plan (OT)    Anticipated Discharge Disposition (OT) skilled nursing facility  -KG       Row  Name 05/16/25 1249          Vital Signs    Pre Patient Position Supine  -KG     Intra Patient Position Standing  -KG     Post Patient Position Sitting  -KG       Row Name 05/16/25 1249          Positioning and Restraints    Pre-Treatment Position in bed  -KG     Post Treatment Position chair  -KG     In Chair notified nsg;reclined;call light within reach;encouraged to call for assist;exit alarm on  -KG               User Key  (r) = Recorded By, (t) = Taken By, (c) = Cosigned By      Initials Name Provider Type    Jamie Vera OT Occupational Therapist                   Outcome Measures       Row Name 05/16/25 0810          How much help from another person do you currently need...    Turning from your back to your side while in flat bed without using bedrails? 3  -DC     Moving from lying on back to sitting on the side of a flat bed without bedrails? 3  -DC     Moving to and from a bed to a chair (including a wheelchair)? 3  -DC     Standing up from a chair using your arms (e.g., wheelchair, bedside chair)? 3  -DC     Climbing 3-5 steps with a railing? 2  -DC     To walk in hospital room? 2  -DC     AM-PAC 6 Clicks Score (PT) 16  -DC     Highest Level of Mobility Goal Stand (1 or More Minutes)-5  -DC               User Key  (r) = Recorded By, (t) = Taken By, (c) = Cosigned By      Initials Name Provider Type    DC Elizabeth Taylor, RN Registered Nurse                    Occupational Therapy Education       Title: PT OT SLP Therapies (In Progress)       Topic: Occupational Therapy (In Progress)       Point: ADL training (Done)       Learning Progress Summary            Patient Acceptance, E, VU by  at 5/14/2025 1332    Comment: OT goals, POC, dc recommendations                                      User Key       Initials Effective Dates Name Provider Type Discipline     04/24/25 -  Nataliia Mckeon, JEMMAR/L, CSRS Occupational Therapist OT                  OT Recommendation and Plan     Plan of Care  Review  Plan of Care Reviewed With: patient  Outcome Evaluation: Pt seen this morning for OT treatment. Donned socks from long sitting w/ setup, transitioned to EOB w/ SBA. Performed impulsive STS w/ SBA prior to cueing by OT. Performed functional mobility to bathroom and began sink side ADLs w/ Min A + RW for standing balance, pt very unsteady/impulsive throughout. Pt had just begun hygiene task when she reports feeling as if she were going to pass out, further activity was deferred and pt was returned to recliner. Vitals obtained w/ BP and HR WFL, SpO2 reading 85%. Returned to 90s w/ rest and PLB. Pt continues to present w/ decreased strength, balance, activity tolerance, and safety awareness to perform near baseline. OT will f/u to address deficits. Pt remains a high fall risk and is most appropriate for SNF at this time.     Time Calculation:         Time Calculation- OT       Row Name 05/16/25 1254             Time Calculation- OT    OT Start Time 1015  -KG      OT Stop Time 1042  -KG      OT Time Calculation (min) 27 min  -KG      Total Timed Code Minutes- OT 27 minute(s)  -KG      OT Received On 05/16/25  -KG      OT - Next Appointment 05/19/25  -KG         Timed Charges    70401 - OT Self Care/Mgmt Minutes 27  -KG         Total Minutes    Timed Charges Total Minutes 27  -KG       Total Minutes 27  -KG                User Key  (r) = Recorded By, (t) = Taken By, (c) = Cosigned By      Initials Name Provider Type    KG Jamie Rutherford OT Occupational Therapist                  Therapy Charges for Today       Code Description Service Date Service Provider Modifiers Qty    14430002718  OT SELF CARE/MGMT/TRAIN EA 15 MIN 5/16/2025 Jamie Rutherford OT GO 2                 Jamie Rutherford OT  5/16/2025

## 2025-05-17 LAB
ANION GAP SERPL CALCULATED.3IONS-SCNC: 11.3 MMOL/L (ref 5–15)
BASOPHILS # BLD AUTO: 0.02 10*3/MM3 (ref 0–0.2)
BASOPHILS NFR BLD AUTO: 0.3 % (ref 0–1.5)
BUN SERPL-MCNC: 7 MG/DL (ref 8–23)
BUN/CREAT SERPL: 6.6 (ref 7–25)
CALCIUM SPEC-SCNC: 9 MG/DL (ref 8.6–10.5)
CHLORIDE SERPL-SCNC: 109 MMOL/L (ref 98–107)
CO2 SERPL-SCNC: 21.7 MMOL/L (ref 22–29)
CREAT SERPL-MCNC: 1.06 MG/DL (ref 0.57–1)
DEPRECATED RDW RBC AUTO: 47.4 FL (ref 37–54)
EGFRCR SERPLBLD CKD-EPI 2021: 56.3 ML/MIN/1.73
EOSINOPHIL # BLD AUTO: 0.12 10*3/MM3 (ref 0–0.4)
EOSINOPHIL NFR BLD AUTO: 2 % (ref 0.3–6.2)
ERYTHROCYTE [DISTWIDTH] IN BLOOD BY AUTOMATED COUNT: 12.8 % (ref 12.3–15.4)
GLUCOSE SERPL-MCNC: 113 MG/DL (ref 65–99)
HCT VFR BLD AUTO: 38.9 % (ref 34–46.6)
HGB BLD-MCNC: 13.3 G/DL (ref 12–15.9)
IMM GRANULOCYTES # BLD AUTO: 0.01 10*3/MM3 (ref 0–0.05)
IMM GRANULOCYTES NFR BLD AUTO: 0.2 % (ref 0–0.5)
LYMPHOCYTES # BLD AUTO: 1.06 10*3/MM3 (ref 0.7–3.1)
LYMPHOCYTES NFR BLD AUTO: 17.3 % (ref 19.6–45.3)
MCH RBC QN AUTO: 34.8 PG (ref 26.6–33)
MCHC RBC AUTO-ENTMCNC: 34.2 G/DL (ref 31.5–35.7)
MCV RBC AUTO: 101.8 FL (ref 79–97)
MONOCYTES # BLD AUTO: 0.71 10*3/MM3 (ref 0.1–0.9)
MONOCYTES NFR BLD AUTO: 11.6 % (ref 5–12)
NEUTROPHILS NFR BLD AUTO: 4.22 10*3/MM3 (ref 1.7–7)
NEUTROPHILS NFR BLD AUTO: 68.6 % (ref 42.7–76)
NRBC BLD AUTO-RTO: 0 /100 WBC (ref 0–0.2)
PLATELET # BLD AUTO: 161 10*3/MM3 (ref 140–450)
PMV BLD AUTO: 10.3 FL (ref 6–12)
POTASSIUM SERPL-SCNC: 5.2 MMOL/L (ref 3.5–5.2)
POTASSIUM SERPL-SCNC: 5.8 MMOL/L (ref 3.5–5.2)
RBC # BLD AUTO: 3.82 10*6/MM3 (ref 3.77–5.28)
SODIUM SERPL-SCNC: 142 MMOL/L (ref 136–145)
WBC NRBC COR # BLD AUTO: 6.14 10*3/MM3 (ref 3.4–10.8)

## 2025-05-17 PROCEDURE — 84132 ASSAY OF SERUM POTASSIUM: CPT | Performed by: STUDENT IN AN ORGANIZED HEALTH CARE EDUCATION/TRAINING PROGRAM

## 2025-05-17 PROCEDURE — 94799 UNLISTED PULMONARY SVC/PX: CPT

## 2025-05-17 PROCEDURE — 25010000002 ENOXAPARIN PER 10 MG: Performed by: INTERNAL MEDICINE

## 2025-05-17 PROCEDURE — 80048 BASIC METABOLIC PNL TOTAL CA: CPT | Performed by: INTERNAL MEDICINE

## 2025-05-17 PROCEDURE — 25010000002 PIPERACILLIN SOD-TAZOBACTAM PER 1 G: Performed by: INTERNAL MEDICINE

## 2025-05-17 PROCEDURE — 94760 N-INVAS EAR/PLS OXIMETRY 1: CPT

## 2025-05-17 PROCEDURE — 25010000002 PROCHLORPERAZINE 10 MG/2ML SOLUTION: Performed by: STUDENT IN AN ORGANIZED HEALTH CARE EDUCATION/TRAINING PROGRAM

## 2025-05-17 PROCEDURE — 94664 DEMO&/EVAL PT USE INHALER: CPT

## 2025-05-17 PROCEDURE — 85025 COMPLETE CBC W/AUTO DIFF WBC: CPT | Performed by: INTERNAL MEDICINE

## 2025-05-17 PROCEDURE — 25010000002 ONDANSETRON PER 1 MG: Performed by: INTERNAL MEDICINE

## 2025-05-17 PROCEDURE — 94761 N-INVAS EAR/PLS OXIMETRY MLT: CPT

## 2025-05-17 RX ORDER — PROCHLORPERAZINE EDISYLATE 5 MG/ML
2.5 INJECTION INTRAMUSCULAR; INTRAVENOUS EVERY 6 HOURS PRN
Status: DISCONTINUED | OUTPATIENT
Start: 2025-05-17 | End: 2025-05-30 | Stop reason: HOSPADM

## 2025-05-17 RX ADMIN — ONDANSETRON 4 MG: 2 INJECTION, SOLUTION INTRAMUSCULAR; INTRAVENOUS at 15:27

## 2025-05-17 RX ADMIN — PANTOPRAZOLE SODIUM 40 MG: 40 TABLET, DELAYED RELEASE ORAL at 05:23

## 2025-05-17 RX ADMIN — Medication 10 ML: at 20:43

## 2025-05-17 RX ADMIN — GUAIFENESIN 1200 MG: 600 TABLET, EXTENDED RELEASE ORAL at 11:18

## 2025-05-17 RX ADMIN — MENTHOL, ZINC OXIDE 1 APPLICATION: .44; 20.6 OINTMENT TOPICAL at 20:39

## 2025-05-17 RX ADMIN — POTASSIUM CHLORIDE 40 MEQ: 1500 TABLET, EXTENDED RELEASE ORAL at 00:11

## 2025-05-17 RX ADMIN — PIPERACILLIN AND TAZOBACTAM 3.38 G: 3; .375 INJECTION, POWDER, FOR SOLUTION INTRAVENOUS at 03:36

## 2025-05-17 RX ADMIN — PIPERACILLIN AND TAZOBACTAM 3.38 G: 3; .375 INJECTION, POWDER, FOR SOLUTION INTRAVENOUS at 11:18

## 2025-05-17 RX ADMIN — MENTHOL, ZINC OXIDE 1 APPLICATION: .44; 20.6 OINTMENT TOPICAL at 11:21

## 2025-05-17 RX ADMIN — IPRATROPIUM BROMIDE AND ALBUTEROL SULFATE 3 ML: .5; 3 SOLUTION RESPIRATORY (INHALATION) at 10:48

## 2025-05-17 RX ADMIN — GUAIFENESIN 1200 MG: 600 TABLET, EXTENDED RELEASE ORAL at 20:38

## 2025-05-17 RX ADMIN — PIPERACILLIN AND TAZOBACTAM 3.38 G: 3; .375 INJECTION, POWDER, FOR SOLUTION INTRAVENOUS at 19:53

## 2025-05-17 RX ADMIN — IPRATROPIUM BROMIDE AND ALBUTEROL SULFATE 3 ML: .5; 3 SOLUTION RESPIRATORY (INHALATION) at 14:43

## 2025-05-17 RX ADMIN — IPRATROPIUM BROMIDE AND ALBUTEROL SULFATE 3 ML: .5; 3 SOLUTION RESPIRATORY (INHALATION) at 06:59

## 2025-05-17 RX ADMIN — ATORVASTATIN CALCIUM 20 MG: 20 TABLET, FILM COATED ORAL at 20:38

## 2025-05-17 RX ADMIN — ENOXAPARIN SODIUM 40 MG: 100 INJECTION SUBCUTANEOUS at 15:27

## 2025-05-17 RX ADMIN — PROCHLORPERAZINE EDISYLATE 2.5 MG: 5 INJECTION, SOLUTION INTRAMUSCULAR; INTRAVENOUS at 22:17

## 2025-05-17 RX ADMIN — Medication 10 ML: at 11:21

## 2025-05-17 NOTE — DISCHARGE PLACEMENT REQUEST
"Amrit Onofre (70 y.o. Female)       Date of Birth   1955    Social Security Number       Address   [address] [place] [address]    Home Phone   [phone]    MRN   [id number]       Congregation   None    Marital Status                               Admission Date   5/12/2025    Admission Type   Emergency    Admitting Provider   Oren Copeland MD    Attending Provider   Nataliia Palafox MD    Department, Room/Bed   [address], P587/1       Discharge Date       Discharge Disposition       Discharge Destination                                 Attending Provider: Natailia Palafox MD    Allergies: Codeine, Sulfa Antibiotics    Isolation: None   Infection: None   Code Status: CPR    Ht: 182.9 cm (72\")   Wt: 63.5 kg (140 lb)    Admission Cmt: None   Principal Problem: Generalized weakness [R53.1]                   Active Insurance as of 5/12/2025       Primary Coverage       Payor Plan Insurance Group Employer/Plan Group    ANTHEM MEDICARE REPLACEMENT ANTH MEDICARE ADVANTAGE HMO [id number]       Payor Plan Address Payor Plan Phone Number Payor Plan Fax Number Effective Dates    [address] [phone]  1/1/2025 - None Entered    [facility] [id number]         Subscriber Name Subscriber Birth Date Member ID       AMRIT ONOFRE 1955 [id number]                     Emergency Contacts        (Rel.) Home Phone Work Phone Mobile Phone    Ulises Diallo (Other) -- -- [phone]                "

## 2025-05-17 NOTE — CASE MANAGEMENT/SOCIAL WORK
Discharge Planning Assessment  [facility]     Patient Name: Penelope Onofre  MRN: [id number]  Today's Date: 5/17/2025    Admit Date: 5/12/2025    Plan: Referrals placed to [facility], [facility] and [facility] for SNF. Will need pre-cert.   Discharge Needs Assessment       Row Name 05/17/25 0837       Living Environment    People in Home alone    Current Living Arrangements apartment    Potentially Unsafe Housing Conditions none    Primary Care Provided by self    Provides Primary Care For pet(s)    Family Caregiver if Needed other (see comments)  Ulises Diallo/nephew [phone]    Quality of Family Relationships unable to assess    Able to Return to Prior Arrangements other (see comments)  Needs rehab       Resource/Environmental Concerns    Resource/Environmental Concerns none    Transportation Concerns no car       Transportation Needs    In the past 12 months, has lack of transportation kept you from medical appointments or from getting medications? yes    In the past 12 months, has lack of transportation kept you from meetings, work, or from getting things needed for daily living? Yes       Transition Planning    Patient/Family Anticipates Transition to inpatient rehabilitation facility    Patient/Family Anticipated Services at Transition     Transportation Anticipated health plan transportation       Discharge Needs Assessment    Readmission Within the Last 30 Days no previous admission in last 30 days    Current Outpatient/Agency/Support Group inpatient rehabilitation facility    Equipment Currently Used at Home none    Concerns to be Addressed discharge planning    Anticipated Changes Related to Illness none    Equipment Needed After Discharge walker, rolling;oxygen    Outpatient/Agency/Support Group Needs skilled nursing facility    Discharge Facility/Level of Care Needs nursing facility, skilled    Provided Post Acute Provider List? Yes    Post Acute Provider List Nursing Home     Provided Post Acute Provider Quality & Resource List? Yes    Post Acute Provider Quality and Resource List Nursing Home    Delivered To Patient    Method of Delivery In person    Current Discharge Risk lives alone                   Discharge Plan       Row Name 05/17/25 0843       Plan    Plan Referrals placed to [facility], [facility] and [facility] for SNF. Will need pre-cert.    Patient/Family in Agreement with Plan yes    Plan Comments Spoke with patient at bedside to complete initial assessment. Confirmed information on facesheet. PCP: Bonny Judge MD and Pharmacy: [facility] Patient lives alone in a 1st floor apartment. She's very concerned regarding her dog who is home alone. Staff reported a nurse from [facility] stopped by patient's apartment to feed her dog. Encouraged patient to contact her nephew for assistance. Denies difficulty affording medications. However, states she has difficulty with transportation. says her car isn't working at this time. Patient was IADLs and mobility. Currently wearing O2 @ 2/L per NC. Provided patient choice list for SNF. Agreeable with referrals placed to [facility], [facility] and [facility] for SNF. Will need pre-cert. Await call back regarding acceptance/bed availability. Continue to follow.....[facility]                  Continued Care and Services - Admitted Since 5/12/2025       Destination       Service Provider Request Status Services Address Phone Fax Patient Preferred    [facility] Pending - Request Sent -- [address] [id number] [phone] --    [facility] Pending - Request Sent -- [address] [facility] [address] [phone] --    [facility] Pending - Request Sent -- [address] [facility] [address] [phone] --                  Expected Discharge Date and Time       Expected Discharge  Date Expected Discharge Time    May 20, 2025            Demographic Summary    No documentation.                  Functional Status    No documentation.                  Psychosocial    No documentation.                  Abuse/Neglect    No documentation.                  Legal    No documentation.                  Substance Abuse    No documentation.                  Patient Forms    No documentation.                     Brittany Mcnair RN

## 2025-05-17 NOTE — PROGRESS NOTES
"  PROGRESS NOTE  Patient Name: Penelope Onofre  Age/Sex: 70 y.o. female  : 1955  MRN: [id number]    Date of Admission: 2025  Date of Encounter Visit: 25   LOS: 4 days   Patient Care Team:  Bonny Judge MD as PCP - General (Internal Medicine)    Chief Complaint: Lung mass, lymphadenopathy, tachycardia    Hospital course: Patient is having dyspnea with minor activity, she is still managing on room air but she is having dyspnea with the slightest exercise.  She is tachycardic low 100s  She is already on the Lovenox for DVT prophylaxis       REVIEW OF SYSTEMS:   CONSTITUTIONAL: no fever or chills  CARDIOVASCULAR: No chest pain, chest pressure or chest discomfort.  Palpitations  RESPIRATORY: Shortness of breath.   GASTROINTESTINAL: No anorexia, nausea, vomiting or diarrhea. No abdominal pain or blood.   HEMATOLOGIC: No bleeding or bruising.     Ventilator/Non-Invasive Ventilation Settings (From admission, onward)      None              Vital Signs  Temp:  [97.3 °F (36.3 °C)-97.9 °F (36.6 °C)] 97.7 °F (36.5 °C)  Heart Rate:  [100-133] 113  Resp:  [18-20] 18  BP: (106-137)/() 131/80  SpO2:  [90 %-99 %] 90 %  on  Flow (L/min) (Oxygen Therapy):  [1-5] 2 Device (Oxygen Therapy): room air    Intake/Output Summary (Last 24 hours) at 2025 1223  Last data filed at 2025 0426  Gross per 24 hour   Intake 580 ml   Output 850 ml   Net -270 ml     Flowsheet Rows      Flowsheet Row First Filed Value   Admission Height 182.9 cm (72\") Documented at 2025 0910   Admission Weight 63.6 kg (140 lb 3.4 oz) Documented at 2025 0910          Body mass index is 18.99 kg/m².      25  0910 25  1518   Weight: 63.6 kg (140 lb 3.4 oz) 63.5 kg (140 lb)       Physical Exam:  GEN:  No acute distress, alert, cooperative, well developed, looks older than her stated age  EYES:   Sclerae clear. No icterus. PERRL. Normal EOM  ENT:   External ears/nose normal, no oral lesions, no thrush, mucous " membranes moist  NECK:  Supple, midline trachea, no JVD  LUNGS: Normal chest on inspection,  diminished, faint crackles posteriorly, no wheezes. No rhonchi. Respirations regular, even and unlabored.   CV:  Regular rhythm and rapid heart rate. Normal S1/S2. No murmurs, gallops, or rubs noted.  ABD:  Soft, nontender and nondistended. Normal bowel sounds. No guarding  EXT:  Moves all extremities well. No cyanosis. No redness. No edema.   Skin: Dry, intact, no bleeding    Results Review:    Results From Last 14 Days   Lab Units 05/14/25  0637 05/13/25  0239   LACTATE mmol/L  --  1.3   LDH U/L 306*  --      Results from last 7 days   Lab Units 05/17/25  0611 05/16/25  0732 05/15/25  0641 05/14/25  0637 05/13/25  0611 05/12/25  2359   SODIUM mmol/L 142 139 141 137 135* 139   POTASSIUM mmol/L 5.8* 3.1* 4.2 3.8 3.9 3.4*   CHLORIDE mmol/L 109* 105 106 103 106 105   CO2 mmol/L 21.7* 20.0* 21.9* 20.0* 18.4* 20.5*   BUN mg/dL 7* 9 12 12 13 13   CREATININE mg/dL 1.06* 1.05* 1.20* 1.00 1.00 1.21*   CALCIUM mg/dL 9.0 8.3* 8.8 8.9 9.2 9.2   AST (SGOT) U/L  --   --   --   --   --  24   ALT (SGPT) U/L  --   --   --   --   --  7   ANION GAP mmol/L 11.3 14.0 13.1 14.0 10.6 13.5   ALBUMIN g/dL  --   --   --   --   --  3.6     Results from last 7 days   Lab Units 05/16/25  0732 05/15/25  0641 05/14/25  0637 05/13/25  0611 05/13/25  0120 05/12/25  2359   CK TOTAL U/L 61 68 59  --   --   --    HSTROP T ng/L  --   --   --  32* 30* 32*     Results from last 7 days   Lab Units 05/13/25  0611   TSH uIU/mL 4.800*     Results from last 7 days   Lab Units 05/12/25  2359   PROBNP pg/mL 1,374.0*     Results from last 7 days   Lab Units 05/17/25  0611 05/16/25  0732 05/15/25  0641 05/14/25  0637 05/13/25  0611 05/12/25  2359   WBC 10*3/mm3 6.14 5.08 6.27 6.19 5.99 6.45   HEMOGLOBIN g/dL 13.3 12.8 14.0 14.4 15.8 16.4*   HEMATOCRIT % 38.9 38.6 40.8 42.3 46.0 46.8*   PLATELETS 10*3/mm3 161 147 155 167 182 203   MCV fL 101.8* 102.7* 102.3* 99.5* 101.1*  "100.0*   NEUTROPHIL % % 68.6 60.4 65.3 70.7  --  61.0   LYMPHOCYTE % % 17.3* 24.2 18.3* 15.3*  --  23.3   MONOCYTES % % 11.6 11.8 12.6* 12.6*  --  13.2*   EOSINOPHIL % % 2.0 2.8 3.0 0.8  --  1.9   BASOPHIL % % 0.3 0.4 0.5 0.3  --  0.3   IMM GRAN % % 0.2 0.4 0.3 0.3  --  0.3         Results from last 7 days   Lab Units 05/13/25  1428   MAGNESIUM mg/dL 1.7           Invalid input(s): \"LDLCALC\"      Results from last 7 days   Lab Units 05/13/25  0611   HEMOGLOBIN A1C % 5.50     Glucose   Date/Time Value Ref Range Status   05/15/2025 2108 97 70 - 130 mg/dL Final     Results from last 7 days   Lab Units 05/13/25  1428 05/13/25  0239   PROCALCITONIN ng/mL 0.05  --    LACTATE mmol/L  --  1.3     Results from last 7 days   Lab Units 05/15/25  0319 05/14/25  0941 05/13/25  0242 05/13/25  0239   BLOODCX   --   --  No growth at 4 days No growth at 4 days   BODYFLDCX   --  No growth at 3 days  --   --    STREP PNEUMO AG  Negative  --   --   --      Results from last 7 days   Lab Units 05/14/25  0656   NITRITE UA  Negative   WBC UA /HPF 3-5*   BACTERIA UA /HPF None Seen   SQUAM EPITHEL UA /HPF 0-2     Results from last 7 days   Lab Units 05/13/25  1229   COVID19  Not Detected   ADENOVIRUS DETECTION BY PCR  Not Detected   CORONAVIRUS 229E  Not Detected   CORONAVIRUS HKU1  Not Detected   CORONAVIRUS NL63  Not Detected   CORONAVIRUS OC43  Not Detected   HUMAN METAPNEUMOVIRUS  Not Detected   HUMAN RHINOVIRUS/ENTEROVIRUS  Not Detected   INFLUENZA B PCR  Not Detected   PARAINFLUENZA 1  Not Detected   PARAINFLUENZA VIRUS 2  Not Detected   PARAINFLUENZA VIRUS 3  Not Detected   PARAINFLUENZA VIRUS 4  Not Detected   BORDETELLA PERTUSSIS PCR  Not Detected   BORDETELLA PARAPERTUSSIS PCR  Not Detected   CHLAMYDOPHILA PNEUMONIAE PCR  Not Detected   MYCOPLAMA PNEUMO PCR  Not Detected   RSV, PCR  Not Detected               Imaging:   Imaging Results (All)               I reviewed the patient's new clinical results.  I personally viewed and " interpreted the patient's imaging results:        Medication Review:   atorvastatin, 20 mg, Oral, Nightly  enoxaparin sodium, 40 mg, Subcutaneous, Q24H  guaiFENesin, 1,200 mg, Oral, Q12H  ipratropium-albuterol, 3 mL, Nebulization, 4x Daily - RT  lidocaine, 3 mL, Injection, Once  lidocaine 1% - EPINEPHrine 1:[id number], 10 mL, Injection, Once  Menthol-Zinc Oxide, 1 Application, Topical, BID  pantoprazole, 40 mg, Oral, Q AM  piperacillin-tazobactam, 3.375 g, Intravenous, Q8H  sodium chloride, 10 mL, Intravenous, Q12H             ASSESSMENT:   Acute hypoxic respiratory failure  Right pleural effusion s/p thoracentesis 5/14/25 with exudate  Bulky mediastinal and right hilar adenopathy  Suspected right hilar mass  Possible pneumonia  Right chest wall mass  Former smoker    PLAN:  Lymph node biopsy still pending, fluid cytology is negative.  Differential includes malignancy versus complication from remote histoplasma infection, in both cases prognosis is not very good  Exudative pleural effusion, on treatment for pneumonia with Zosyn  Plan for bronchoscopy with biopsy depending on the lymph node final pathology report.  As far as the tachycardia, the patient already had a recent CT angiogram that was negative, she is already on Lovenox, she has a large mass that causing compression of the right pulmonary artery and the right bronchus intermedius which can be responsible for some distress and tachycardia.    Discussed with primary team, nursing staff as well as the patient, notes reviewed she is new to me  Labs/Notes/films were independently reviewed and pertinent results are summarized above  The copied texts in this note were reviewed and they are accurate as of 05/17/25    Disposition: Continue to monitor    Trina Adler MD  05/17/25  12:23 EDT           Dictated utilizing Dragon dictation

## 2025-05-17 NOTE — PROGRESS NOTES
Name: Penelope Onofre ADMIT: 2025   : 1955  PCP: Bonny Judge MD    MRN: [id number] LOS: 4 days   AGE/SEX: 70 y.o. female  ROOM: [facility]     Subjective   Subjective   No acute events overnight.  Patient states she is feeling pretty well today.  Denies chest pain.  Breathing at baseline.  Currently on 2 L nasal cannula.  She continues to express concern about her dog being home.    Objective   Objective     Vital Signs  Temp:  [97.3 °F (36.3 °C)-97.9 °F (36.6 °C)] 97.7 °F (36.5 °C)  Heart Rate:  [100-133] 113  Resp:  [18-20] 18  BP: (106-137)/() 131/80  SpO2:  [90 %-99 %] 90 %  on  Flow (L/min) (Oxygen Therapy):  [1-5] 2;   Device (Oxygen Therapy): room air  Body mass index is 18.99 kg/m².    Physical Exam  General: Alert, no acute distress.  Sitting up in bed.  Chronically ill-appearing.  ENT: No conjunctival injection or scleral icterus. Moist mucous membranes.   Neuro: Eyes open and moving in all directions, generalized weakness, face symmetric, no focal deficits.   Lungs: Diminished aeration, no wheezing or crackles.  On 2 L nasal cannula.  Heart: RRR, no murmurs. No edema.  Abdomen: Soft, non-tender, non-distended. Normal bowel sounds. No hepatosplenomegaly.   Ext: Warm and well-perfused. No edema.   Skin: Soft tissue mass on right chest wall.    Results Review     I reviewed the patient's new clinical results:  Results from last 7 days   Lab Units 25  0611 25  0732 05/15/25  0641 25  0637   WBC 10*3/mm3 6.14 5.08 6.27 6.19   HEMOGLOBIN g/dL 13.3 12.8 14.0 14.4   PLATELETS 10*3/mm3 161 147 155 167     Results from last 7 days   Lab Units 25  0611 25  0732 05/15/25  0641 25  0637   SODIUM mmol/L 142 139 141 137   POTASSIUM mmol/L 5.8* 3.1* 4.2 3.8   CHLORIDE mmol/L 109* 105 106 103   CO2 mmol/L 21.7* 20.0* 21.9* 20.0*   BUN mg/dL 7* 9 12 12   CREATININE mg/dL 1.06* 1.05* 1.20* 1.00   GLUCOSE mg/dL 113* 96 84 118*   EGFR mL/min/1.73 56.3* 56.9* 48.5*  60.4     Results from last 7 days   Lab Units 05/12/25  2359   ALBUMIN g/dL 3.6   BILIRUBIN mg/dL 0.5   ALK PHOS U/L 91   AST (SGOT) U/L 24   ALT (SGPT) U/L 7     Results from last 7 days   Lab Units 05/17/25  0611 05/16/25  0732 05/15/25  0641 05/14/25  0637 05/13/25  1428 05/13/25  0611 05/12/25  2359   CALCIUM mg/dL 9.0 8.3* 8.8 8.9  --    < > 9.2   ALBUMIN g/dL  --   --   --   --   --   --  3.6   MAGNESIUM mg/dL  --   --   --   --  1.7  --   --     < > = values in this interval not displayed.     Results from last 7 days   Lab Units 05/13/25  1428 05/13/25  0239   PROCALCITONIN ng/mL 0.05  --    LACTATE mmol/L  --  1.3     Glucose   Date/Time Value Ref Range Status   05/15/2025 2108 97 70 - 130 mg/dL Final       US Guided Lymph Node Biopsy  Result Date: 5/15/2025  Technically successful ultrasound-guided right supraclavicular lymph node biopsy  This report was finalized on 5/15/2025 4:28 PM by Dr. Wu Ortega M.D on Workstation: [id number]        I have personally reviewed all medications:  Scheduled Medications  atorvastatin, 20 mg, Oral, Nightly  enoxaparin sodium, 40 mg, Subcutaneous, Q24H  guaiFENesin, 1,200 mg, Oral, Q12H  ipratropium-albuterol, 3 mL, Nebulization, 4x Daily - RT  lidocaine, 3 mL, Injection, Once  lidocaine 1% - EPINEPHrine 1:[id number], 10 mL, Injection, Once  Menthol-Zinc Oxide, 1 Application, Topical, BID  pantoprazole, 40 mg, Oral, Q AM  piperacillin-tazobactam, 3.375 g, Intravenous, Q8H  sodium chloride, 10 mL, Intravenous, Q12H    Infusions   Diet  Diet: Regular/House; Texture: Regular (IDDSI 7); Fluid Consistency: Thin (IDDSI 0)      Intake/Output Summary (Last 24 hours) at 5/17/2025 1145  Last data filed at 5/17/2025 0426  Gross per 24 hour   Intake 580 ml   Output 850 ml   Net -270 ml       Assessment/Plan     Active Hospital Problems    Diagnosis  POA   • **Generalized weakness [R53.1]  Yes   • Dizziness [R42]  Yes   • Pneumonia [J18.9]  Yes   • Pleural effusion [J90]  Yes   • At  high risk for pressure injury of skin [Z91.89]  Not Applicable   • Decubitus ulcer of sacral region, stage 1 [L89.151]  Yes   • Acute kidney failure [N17.9]  Yes   • Dehydration [E86.0]  Yes   • Elevated troponin [R79.89]  Yes   • Elevated brain natriuretic peptide (BNP) level [R79.89]  Yes   • Hypokalemia [E87.6]  Yes   • Hyperglycemia [R73.9]  Yes      Resolved Hospital Problems   No resolved problems to display.       70 y.o. female with Generalized weakness.    Right sided pneumonia  Pleural effusion  Acute hypoxic respiratory failure  -CT chest with evidence of right hilar mass and bulky mediastinal and hilar lymphadenopathy  -Blood cultures no growth today  -Pleural fluid culture no growth to date  - Pulmonology consulted  - Thoracentesis 5/14  - Continue Zosyn  - Pleural fluid cytology negative for malignancy  - LN biopsy with IR 5/15, pathology pending  - If lymph node biopsy returns negative, pulmonology considering bronchoscopy with EBUS  - Oxygen requirements have been pretty stable on 2 L.  Looks like she has bumped up to 5 L overnight but back at 2 L when I saw her.  Heart rate remains elevated.  Patient certainly at risk for PE given possible underlying malignancy.  D-dimer almost certainly to be elevated in this setting and not specific.  Patient may benefit from CT angiogram chest.  Will discuss with pulmonology.    Hyperkalemia  CKD  -Creatinine stable today at 1.06, baseline seems to be around 1.1  - Potassium up at 5.8 today, was replaced yesterday  - Recheck potassium now.  If still elevated, will treat with insulin/dextrose.    - Repeat BMP with morning labs    Weakness  - Most likely secondary to the above  - PT/OT following  - Likely SNF at discharge    Hypertension  -Home amlodipine on hold and BP appropriate  - Add back meds as needed based on BP trends    GERD  - PPI    Lovenox 40 mg SC daily for DVT prophylaxis.  Full code.  Discussed with patient, nursing staff, and CCP.  Anticipate  discharge to SNU facility timing yet to be determined.  Patient continues to endorse significant concern about her dog at home.  CCP at bedside during my evaluation today to talk with patient regarding potential options.  See CCP note for further details.      Nataliia Palafox MD  [facility]  05/17/25  11:45 EDT

## 2025-05-17 NOTE — PROGRESS NOTES
[facility]  [phone]  Dr. Agustin Orellana     Subjective:  LOS: 3    Chief Complaint:  Shortness of breath and fatigue     Patient doing same today.  Still awaiting results of biopsy and this was discussed with her.  All questions answered.    Objective   Vital Signs past 24hrs  Temp range: Temp (24hrs), Av.6 °F (36.4 °C), Min:97.3 °F (36.3 °C), Max:98.2 °F (36.8 °C)    BP range: BP: (106-121)/(62-85) 117/85  Pulse range: Heart Rate:  [] 108  Resp rate range: Resp:  [18-22] 20  Device (Oxygen Therapy): room airFlow (L/min) (Oxygen Therapy):  [1-4] 1  Oxygen range:SpO2:  [93 %-100 %] 94 %   Mechanical Ventilator:     Physical Exam  Vitals and nursing note reviewed.   Constitutional:       General: She is not in acute distress.     Appearance: She is cachectic.   HENT:      Head: Normocephalic and atraumatic.   Cardiovascular:      Rate and Rhythm: Normal rate and regular rhythm.      Heart sounds: No murmur heard.  Pulmonary:      Effort: Pulmonary effort is normal. No respiratory distress.      Breath sounds: Normal breath sounds. No wheezing, rhonchi or rales.   Abdominal:      General: Abdomen is flat.      Tenderness: There is no abdominal tenderness.   Skin:     General: Skin is warm and dry.      Findings: No rash.   Neurological:      Mental Status: She is alert.       Results Review:    I have reviewed the laboratory and imaging data since the last note by [facility] physician.  My annotations are noted in assessment and plan.      Result Review:  I have personally reviewed the results from last note by [facility] physician to 2025 20:28 EDT and agree with these findings:  [x]  Laboratory list / accordion  [x]  Microbiology  [x]  Radiology  [x]  EKG/Telemetry   [x]  Cardiology/Vascular   [x]  Pathology  [x]  Old records  []  Other:    Medication Review:  I have reviewed the current MAR.  My annotations are noted in assessment and plan.    atorvastatin, 20 mg, Oral, Nightly  enoxaparin  sodium, 40 mg, Subcutaneous, Q24H  guaiFENesin, 1,200 mg, Oral, Q12H  ipratropium-albuterol, 3 mL, Nebulization, 4x Daily - RT  lidocaine, 3 mL, Injection, Once  lidocaine 1% - EPINEPHrine 1:006351, 10 mL, Injection, Once  Menthol-Zinc Oxide, 1 Application, Topical, BID  pantoprazole, 40 mg, Oral, Q AM  piperacillin-tazobactam, 3.375 g, Intravenous, Q8H  potassium chloride ER, 40 mEq, Oral, Q4H  sodium chloride, 10 mL, Intravenous, Q12H           Lines, Drains & Airways       Active LDAs       Name Placement date Placement time Site Days    Peripheral IV 05/14/25 1427 20 G Anterior;Left;Proximal Forearm 05/14/25  1427  Forearm  2    External Urinary Catheter --  --  --  --                  No active isolations  Diet Orders (active) (From admission, onward)       Start     Ordered    05/16/25 1800  Dietary Nutrition Supplements Other (see comment); Boost Original  Daily With Breakfast & Dinner       05/16/25 1452    05/16/25 1453  Diet: Regular/House; Texture: Regular (IDDSI 7); Fluid Consistency: Thin (IDDSI 0)  Diet Effective Now         05/16/25 1452                      Assessment  Acute hypoxic respiratory failure  Right pleural effusion s/p thoracentesis 5/14/25 with exudate  Bulky mediastinal and right hilar adenopathy  Suspected right hilar mass  Possible pneumonia  Right chest wall mass  Former smoker     Plan  -Patient presented on 5/13/2025 with progressively worsening shortness of breath, fatigue and weakness for 6 months.  Found to have right pleural effusion and mediastinal and right hilar adenopathy.  - Continue weaning oxygen for goal O2 saturation greater than 88%  - Can continue antibiotics for now targeted at community-acquired pneumonia  - Thoracentesis on 5/14/2025 with 1 L of fluid removed.  Exudative by lights criteria.  Cytology negative for malignant cells.  -CT chest with contrast after thoracentesis shows further evidence of a right hilar mass with bulky mediastinal and  hilar  lymphadenopathy that is enclosing the right mainstem bronchus.  -IR supraclavicular lymph node biopsy (5/15/2025) with results pending  - At this point I am fairly certain that there is a malignant process going on.  Whether this represents a primary lung malignancy versus metastatic disease is unclear.  I think it is reasonable to continue antibiotics targeted at infection for now.  Cytology of the pleural fluid is negative for malignancy therefore I consulted IR who performed supraclavicular lymph node biopsy. Results are pending.  If this biopsy comes up negative then we certainly can do bronchoscopy with EBUS biopsy which will definitely give us the diagnosis.      Agustin Orellana DO   05/16/25  20:28 EDT      Part of this note may be an electronic transcription/translation of spoken language to printed text using the Dragon Dictation System.

## 2025-05-17 NOTE — PLAN OF CARE
Problem: Fall Injury Risk  Goal: Absence of Fall and Fall-Related Injury  Outcome: Progressing  Intervention: Identify and Manage Contributors  Recent Flowsheet Documentation  Taken 5/17/2025 0400 by Sabina Chappell RN  Medication Review/Management: medications reviewed  Self-Care Promotion: independence encouraged  Taken 5/17/2025 0200 by Sabina Chappell RN  Medication Review/Management: medications reviewed  Self-Care Promotion: independence encouraged  Taken 5/17/2025 0010 by Sabina Chappell RN  Medication Review/Management: medications reviewed  Self-Care Promotion: independence encouraged  Taken 5/16/2025 2200 by Sabina Chappell RN  Medication Review/Management: medications reviewed  Self-Care Promotion: independence encouraged  Taken 5/16/2025 2000 by Sabina Chappell RN  Medication Review/Management: medications reviewed  Self-Care Promotion: independence encouraged  Intervention: Promote Injury-Free Environment  Recent Flowsheet Documentation  Taken 5/17/2025 0400 by Sabina Chappell RN  Safety Promotion/Fall Prevention:   activity supervised   fall prevention program maintained   clutter free environment maintained   room organization consistent   safety round/check completed  Taken 5/17/2025 0200 by Sabina Chappell RN  Safety Promotion/Fall Prevention:   activity supervised   fall prevention program maintained   clutter free environment maintained   nonskid shoes/slippers when out of bed   safety round/check completed  Taken 5/17/2025 0010 by Sabina Chappell RN  Safety Promotion/Fall Prevention:   activity supervised   fall prevention program maintained   clutter free environment maintained   lighting adjusted   nonskid shoes/slippers when out of bed   room organization consistent   safety round/check completed  Taken 5/16/2025 2200 by Sabina Chappell RN  Safety Promotion/Fall Prevention:   clutter free environment maintained   activity supervised   fall prevention program maintained   nonskid shoes/slippers when out of  bed   room organization consistent   lighting adjusted  Taken 5/16/2025 2000 by Sabina Chappell RN  Safety Promotion/Fall Prevention:   clutter free environment maintained   fall prevention program maintained   lighting adjusted     Problem: Adult Inpatient Plan of Care  Goal: Absence of Hospital-Acquired Illness or Injury  Intervention: Prevent Skin Injury  Recent Flowsheet Documentation  Taken 5/17/2025 0400 by Sabina Chappell RN  Body Position:   position changed independently   turned   supine  Taken 5/17/2025 0200 by Sabina Chappell RN  Body Position: position changed independently  Taken 5/17/2025 0010 by Sabina Chappell RN  Body Position:   position changed independently   turned  Skin Protection:   incontinence pads utilized   transparent dressing maintained  Taken 5/16/2025 2200 by Sabina Chappell RN  Body Position: position changed independently  Taken 5/16/2025 2000 by Sabina Chappell RN  Body Position: position changed independently  Skin Protection:   incontinence pads utilized   transparent dressing maintained     Problem: Skin Injury Risk Increased  Goal: Skin Health and Integrity  Intervention: Optimize Skin Protection  Recent Flowsheet Documentation  Taken 5/17/2025 0400 by Sabina Chappell RN  Activity Management: bedrest  Head of Bed (HOB) Positioning: HOB elevated  Taken 5/17/2025 0200 by Sabina Chappell RN  Activity Management: bedrest  Head of Bed (HOB) Positioning: HOB elevated  Taken 5/17/2025 0010 by Sabina Chappell RN  Activity Management: bedrest  Pressure Reduction Techniques:   frequent weight shift encouraged   heels elevated off bed   weight shift assistance provided  Head of Bed (HOB) Positioning: HOB elevated  Pressure Reduction Devices: specialty bed utilized  Skin Protection:   incontinence pads utilized   transparent dressing maintained  Taken 5/16/2025 2200 by Sabina Chappell RN  Head of Bed (HOB) Positioning: HOB elevated  Taken 5/16/2025 2000 by Sabina Chappell RN  Activity Management:  bedrest  Pressure Reduction Techniques:   frequent weight shift encouraged   heels elevated off bed   weight shift assistance provided  Head of Bed (HOB) Positioning: HOB elevated  Pressure Reduction Devices: specialty bed utilized  Skin Protection:   incontinence pads utilized   transparent dressing maintained   Goal Outcome Evaluation:

## 2025-05-18 ENCOUNTER — APPOINTMENT (OUTPATIENT)
Dept: CT IMAGING | Facility: HOSPITAL | Age: 70
End: 2025-05-18
Payer: MEDICARE

## 2025-05-18 ENCOUNTER — APPOINTMENT (OUTPATIENT)
Dept: GENERAL RADIOLOGY | Facility: HOSPITAL | Age: 70
End: 2025-05-18
Payer: MEDICARE

## 2025-05-18 LAB
ANION GAP SERPL CALCULATED.3IONS-SCNC: 14 MMOL/L (ref 5–15)
BACTERIA SPEC AEROBE CULT: NORMAL
BACTERIA SPEC AEROBE CULT: NORMAL
BASOPHILS # BLD AUTO: 0.02 10*3/MM3 (ref 0–0.2)
BASOPHILS NFR BLD AUTO: 0.3 % (ref 0–1.5)
BUN SERPL-MCNC: 7 MG/DL (ref 8–23)
BUN/CREAT SERPL: 6.4 (ref 7–25)
CALCIUM SPEC-SCNC: 8.9 MG/DL (ref 8.6–10.5)
CHLORIDE SERPL-SCNC: 104 MMOL/L (ref 98–107)
CO2 SERPL-SCNC: 19 MMOL/L (ref 22–29)
CREAT SERPL-MCNC: 1.1 MG/DL (ref 0.57–1)
DEPRECATED RDW RBC AUTO: 49.7 FL (ref 37–54)
EGFRCR SERPLBLD CKD-EPI 2021: 53.8 ML/MIN/1.73
EOSINOPHIL # BLD AUTO: 0.07 10*3/MM3 (ref 0–0.4)
EOSINOPHIL NFR BLD AUTO: 0.9 % (ref 0.3–6.2)
ERYTHROCYTE [DISTWIDTH] IN BLOOD BY AUTOMATED COUNT: 12.9 % (ref 12.3–15.4)
GEN 5 1HR TROPONIN T REFLEX: 33 NG/L
GLUCOSE SERPL-MCNC: 100 MG/DL (ref 65–99)
HCT VFR BLD AUTO: 39.3 % (ref 34–46.6)
HGB BLD-MCNC: 13.4 G/DL (ref 12–15.9)
IMM GRANULOCYTES # BLD AUTO: 0.03 10*3/MM3 (ref 0–0.05)
IMM GRANULOCYTES NFR BLD AUTO: 0.4 % (ref 0–0.5)
LYMPHOCYTES # BLD AUTO: 1.25 10*3/MM3 (ref 0.7–3.1)
LYMPHOCYTES NFR BLD AUTO: 16.9 % (ref 19.6–45.3)
MCH RBC QN AUTO: 35.2 PG (ref 26.6–33)
MCHC RBC AUTO-ENTMCNC: 34.1 G/DL (ref 31.5–35.7)
MCV RBC AUTO: 103.1 FL (ref 79–97)
MONOCYTES # BLD AUTO: 0.77 10*3/MM3 (ref 0.1–0.9)
MONOCYTES NFR BLD AUTO: 10.4 % (ref 5–12)
NEUTROPHILS NFR BLD AUTO: 5.25 10*3/MM3 (ref 1.7–7)
NEUTROPHILS NFR BLD AUTO: 71.1 % (ref 42.7–76)
NRBC BLD AUTO-RTO: 0 /100 WBC (ref 0–0.2)
PLATELET # BLD AUTO: 145 10*3/MM3 (ref 140–450)
PMV BLD AUTO: 10.4 FL (ref 6–12)
POTASSIUM SERPL-SCNC: 4.9 MMOL/L (ref 3.5–5.2)
RBC # BLD AUTO: 3.81 10*6/MM3 (ref 3.77–5.28)
SODIUM SERPL-SCNC: 137 MMOL/L (ref 136–145)
TROPONIN T % DELTA: -3
TROPONIN T NUMERIC DELTA: -1 NG/L
TROPONIN T SERPL HS-MCNC: 34 NG/L
WBC NRBC COR # BLD AUTO: 7.39 10*3/MM3 (ref 3.4–10.8)

## 2025-05-18 PROCEDURE — 25510000001 IOPAMIDOL PER 1 ML: Performed by: STUDENT IN AN ORGANIZED HEALTH CARE EDUCATION/TRAINING PROGRAM

## 2025-05-18 PROCEDURE — 71045 X-RAY EXAM CHEST 1 VIEW: CPT

## 2025-05-18 PROCEDURE — 94761 N-INVAS EAR/PLS OXIMETRY MLT: CPT

## 2025-05-18 PROCEDURE — 25010000002 ENOXAPARIN PER 10 MG: Performed by: INTERNAL MEDICINE

## 2025-05-18 PROCEDURE — 93005 ELECTROCARDIOGRAM TRACING: CPT | Performed by: STUDENT IN AN ORGANIZED HEALTH CARE EDUCATION/TRAINING PROGRAM

## 2025-05-18 PROCEDURE — 94799 UNLISTED PULMONARY SVC/PX: CPT

## 2025-05-18 PROCEDURE — 94664 DEMO&/EVAL PT USE INHALER: CPT

## 2025-05-18 PROCEDURE — 25010000002 PIPERACILLIN SOD-TAZOBACTAM PER 1 G: Performed by: INTERNAL MEDICINE

## 2025-05-18 PROCEDURE — 94760 N-INVAS EAR/PLS OXIMETRY 1: CPT

## 2025-05-18 PROCEDURE — 84484 ASSAY OF TROPONIN QUANT: CPT | Performed by: STUDENT IN AN ORGANIZED HEALTH CARE EDUCATION/TRAINING PROGRAM

## 2025-05-18 PROCEDURE — 87385 HISTOPLASMA CAPSUL AG IA: CPT | Performed by: INTERNAL MEDICINE

## 2025-05-18 PROCEDURE — 93010 ELECTROCARDIOGRAM REPORT: CPT | Performed by: INTERNAL MEDICINE

## 2025-05-18 PROCEDURE — 80048 BASIC METABOLIC PNL TOTAL CA: CPT | Performed by: INTERNAL MEDICINE

## 2025-05-18 PROCEDURE — 85025 COMPLETE CBC W/AUTO DIFF WBC: CPT | Performed by: INTERNAL MEDICINE

## 2025-05-18 PROCEDURE — 71275 CT ANGIOGRAPHY CHEST: CPT

## 2025-05-18 RX ORDER — IPRATROPIUM BROMIDE AND ALBUTEROL SULFATE 2.5; .5 MG/3ML; MG/3ML
3 SOLUTION RESPIRATORY (INHALATION) 3 TIMES DAILY PRN
Status: DISCONTINUED | OUTPATIENT
Start: 2025-05-18 | End: 2025-05-30 | Stop reason: HOSPADM

## 2025-05-18 RX ORDER — PANTOPRAZOLE SODIUM 40 MG/10ML
40 INJECTION, POWDER, LYOPHILIZED, FOR SOLUTION INTRAVENOUS EVERY 12 HOURS SCHEDULED
Status: DISCONTINUED | OUTPATIENT
Start: 2025-05-18 | End: 2025-05-21

## 2025-05-18 RX ORDER — LIDOCAINE 4 G/G
1 PATCH TOPICAL
Status: DISCONTINUED | OUTPATIENT
Start: 2025-05-18 | End: 2025-05-30 | Stop reason: HOSPADM

## 2025-05-18 RX ORDER — SODIUM CHLORIDE 9 MG/ML
100 INJECTION, SOLUTION INTRAVENOUS CONTINUOUS
Status: ACTIVE | OUTPATIENT
Start: 2025-05-18 | End: 2025-05-19

## 2025-05-18 RX ORDER — IOPAMIDOL 755 MG/ML
100 INJECTION, SOLUTION INTRAVASCULAR
Status: COMPLETED | OUTPATIENT
Start: 2025-05-18 | End: 2025-05-18

## 2025-05-18 RX ORDER — HYDROXYZINE HYDROCHLORIDE 25 MG/1
25 TABLET, FILM COATED ORAL 3 TIMES DAILY PRN
Status: DISCONTINUED | OUTPATIENT
Start: 2025-05-18 | End: 2025-05-30 | Stop reason: HOSPADM

## 2025-05-18 RX ADMIN — IPRATROPIUM BROMIDE AND ALBUTEROL SULFATE 3 ML: .5; 3 SOLUTION RESPIRATORY (INHALATION) at 11:10

## 2025-05-18 RX ADMIN — HYDROXYZINE HYDROCHLORIDE 25 MG: 25 TABLET, FILM COATED ORAL at 10:17

## 2025-05-18 RX ADMIN — ENOXAPARIN SODIUM 40 MG: 100 INJECTION SUBCUTANEOUS at 13:52

## 2025-05-18 RX ADMIN — ATORVASTATIN CALCIUM 20 MG: 20 TABLET, FILM COATED ORAL at 22:00

## 2025-05-18 RX ADMIN — PANTOPRAZOLE SODIUM 40 MG: 40 INJECTION, POWDER, FOR SOLUTION INTRAVENOUS at 11:22

## 2025-05-18 RX ADMIN — HYDROXYZINE HYDROCHLORIDE 25 MG: 25 TABLET, FILM COATED ORAL at 17:23

## 2025-05-18 RX ADMIN — IOPAMIDOL 100 ML: 755 INJECTION, SOLUTION INTRAVENOUS at 20:26

## 2025-05-18 RX ADMIN — HYDROCODONE BITARTRATE AND ACETAMINOPHEN 1 TABLET: 5; 325 TABLET ORAL at 00:34

## 2025-05-18 RX ADMIN — HYDROCODONE BITARTRATE AND ACETAMINOPHEN 1 TABLET: 5; 325 TABLET ORAL at 22:00

## 2025-05-18 RX ADMIN — PIPERACILLIN AND TAZOBACTAM 3.38 G: 3; .375 INJECTION, POWDER, FOR SOLUTION INTRAVENOUS at 03:04

## 2025-05-18 RX ADMIN — MENTHOL, ZINC OXIDE 1 APPLICATION: .44; 20.6 OINTMENT TOPICAL at 08:17

## 2025-05-18 RX ADMIN — GUAIFENESIN 1200 MG: 600 TABLET, EXTENDED RELEASE ORAL at 22:00

## 2025-05-18 RX ADMIN — PANTOPRAZOLE SODIUM 40 MG: 40 TABLET, DELAYED RELEASE ORAL at 05:10

## 2025-05-18 RX ADMIN — PANTOPRAZOLE SODIUM 40 MG: 40 INJECTION, POWDER, FOR SOLUTION INTRAVENOUS at 22:02

## 2025-05-18 RX ADMIN — LIDOCAINE 1 PATCH: 4 PATCH TOPICAL at 10:17

## 2025-05-18 RX ADMIN — PIPERACILLIN AND TAZOBACTAM 3.38 G: 3; .375 INJECTION, POWDER, FOR SOLUTION INTRAVENOUS at 10:17

## 2025-05-18 RX ADMIN — Medication 10 ML: at 08:17

## 2025-05-18 RX ADMIN — PIPERACILLIN AND TAZOBACTAM 3.38 G: 3; .375 INJECTION, POWDER, FOR SOLUTION INTRAVENOUS at 18:06

## 2025-05-18 RX ADMIN — IPRATROPIUM BROMIDE AND ALBUTEROL SULFATE 3 ML: .5; 3 SOLUTION RESPIRATORY (INHALATION) at 06:44

## 2025-05-18 RX ADMIN — HYDROCODONE BITARTRATE AND ACETAMINOPHEN 1 TABLET: 5; 325 TABLET ORAL at 17:23

## 2025-05-18 RX ADMIN — GUAIFENESIN 1200 MG: 600 TABLET, EXTENDED RELEASE ORAL at 08:17

## 2025-05-18 NOTE — PROGRESS NOTES
Name: Penelope Onofre ADMIT: 2025   : 1955  PCP: Bonny Judge MD    MRN: [id number] LOS: 5 days   AGE/SEX: 70 y.o. female  ROOM: [facility]     Subjective   Subjective   No acute events overnight.  Patient states she is feeling okay this morning.  Having some left sided upper quadrant/rib pain.  Yesterday, she was concerned that she had hematemesis.  Nursing notes reviewed.  They report upon evaluating her emesis, she had some food particles noted.  There was dried blood from IV mishap on the bed.  No hematemesis noted.  Patient does not think she had any episodes of coughing up blood.  She has been eating and drinking okay.    Objective   Objective     Vital Signs  Temp:  [97.7 °F (36.5 °C)-97.9 °F (36.6 °C)] 97.7 °F (36.5 °C)  Heart Rate:  [106-115] 115  Resp:  [18-20] 18  BP: (131-153)/(80-95) 142/84  SpO2:  [90 %-100 %] 100 %  on  Flow (L/min) (Oxygen Therapy):  [2] 2;   Device (Oxygen Therapy): nasal cannula  Body mass index is 18.99 kg/m².    Physical Exam  General: Alert, no acute distress.  Sitting up in bed.  Chronically ill-appearing.  ENT: No conjunctival injection or scleral icterus. Moist mucous membranes.   Neuro: Eyes open and moving in all directions, generalized weakness, face symmetric, no focal deficits.   Lungs: Diminished aeration, no wheezing or crackles.  On 2 L nasal cannula.  Heart: RRR, no murmurs. No edema.  Abdomen: Soft, non-tender, non-distended. Normal bowel sounds. No hepatosplenomegaly.   Ext: Warm and well-perfused.  Mild tenderness to palpation of the left chest wall.  Skin: Soft tissue mass on right chest wall.    Results Review     I reviewed the patient's new clinical results:  Results from last 7 days   Lab Units 25  0604 25  0611 25  0732 05/15/25  0641   WBC 10*3/mm3 7.39 6.14 5.08 6.27   HEMOGLOBIN g/dL 13.4 13.3 12.8 14.0   PLATELETS 10*3/mm3 145 161 147 155     Results from last 7 days   Lab Units 25  0604 25  1127  05/17/25  0611 05/16/25  0732 05/15/25  0641   SODIUM mmol/L 137  --  142 139 141   POTASSIUM mmol/L 4.9 5.2 5.8* 3.1* 4.2   CHLORIDE mmol/L 104  --  109* 105 106   CO2 mmol/L 19.0*  --  21.7* 20.0* 21.9*   BUN mg/dL 7*  --  7* 9 12   CREATININE mg/dL 1.10*  --  1.06* 1.05* 1.20*   GLUCOSE mg/dL 100*  --  113* 96 84   EGFR mL/min/1.73 53.8*  --  56.3* 56.9* 48.5*     Results from last 7 days   Lab Units 05/12/25  2359   ALBUMIN g/dL 3.6   BILIRUBIN mg/dL 0.5   ALK PHOS U/L 91   AST (SGOT) U/L 24   ALT (SGPT) U/L 7     Results from last 7 days   Lab Units 05/18/25  0604 05/17/25  0611 05/16/25  0732 05/15/25  0641 05/14/25  0637 05/13/25  1428 05/13/25  0611 05/12/25  2359   CALCIUM mg/dL 8.9 9.0 8.3* 8.8   < >  --    < > 9.2   ALBUMIN g/dL  --   --   --   --   --   --   --  3.6   MAGNESIUM mg/dL  --   --   --   --   --  1.7  --   --     < > = values in this interval not displayed.     Results from last 7 days   Lab Units 05/13/25  1428 05/13/25  0239   PROCALCITONIN ng/mL 0.05  --    LACTATE mmol/L  --  1.3     Glucose   Date/Time Value Ref Range Status   05/15/2025 2108 97 70 - 130 mg/dL Final       No radiology results for the last day      I have personally reviewed all medications:  Scheduled Medications  atorvastatin, 20 mg, Oral, Nightly  enoxaparin sodium, 40 mg, Subcutaneous, Q24H  guaiFENesin, 1,200 mg, Oral, Q12H  ipratropium-albuterol, 3 mL, Nebulization, 4x Daily - RT  lidocaine, 3 mL, Injection, Once  lidocaine 1% - EPINEPHrine [id number], 10 mL, Injection, Once  Menthol-Zinc Oxide, 1 Application, Topical, BID  pantoprazole, 40 mg, Oral, Q AM  piperacillin-tazobactam, 3.375 g, Intravenous, Q8H  sodium chloride, 10 mL, Intravenous, Q12H    Infusions   Diet  Diet: Regular/House; Texture: Regular (IDDSI 7); Fluid Consistency: Thin (IDDSI 0)      Intake/Output Summary (Last 24 hours) at 5/18/2025 0941  Last data filed at 5/18/2025 0300  Gross per 24 hour   Intake --   Output 700 ml   Net -700 ml        Assessment/Plan     Active Hospital Problems    Diagnosis  POA   • **Generalized weakness [R53.1]  Yes   • Dizziness [R42]  Yes   • Pneumonia [J18.9]  Yes   • Pleural effusion [J90]  Yes   • At high risk for pressure injury of skin [Z91.89]  Not Applicable   • Decubitus ulcer of sacral region, stage 1 [L89.151]  Yes   • Acute kidney failure [N17.9]  Yes   • Dehydration [E86.0]  Yes   • Elevated troponin [R79.89]  Yes   • Elevated brain natriuretic peptide (BNP) level [R79.89]  Yes   • Hypokalemia [E87.6]  Yes   • Hyperglycemia [R73.9]  Yes      Resolved Hospital Problems   No resolved problems to display.     70 y.o. female with Generalized weakness.    Right sided pneumonia  Pleural effusion  Acute hypoxic respiratory failure  - CT chest with evidence of right hilar mass and bulky mediastinal and hilar lymphadenopathy  - Blood cultures no growth to date  - Pleural fluid culture no growth to date  - Pulmonology consulted  - Thoracentesis 5/14  - Continue Zosyn  - Pleural fluid cytology negative for malignancy  - LN biopsy with IR 5/15, pathology pending  - If lymph node biopsy returns negative, pulmonology considering bronchoscopy with EBUS  - Oxygen requirements stable 2 L nasal cannula.  Still tachycardic.  Discussed with pulmonology yesterday.  They did not feel CT angiogram needed at this time given recent CT with contrast.  Patient's large mass and other issues likely contributing to her hypoxia/tachycardia.  Will continue to monitor.  - Left-sided chest wall pain seems like it may be musculoskeletal.  Will order a lidocaine patch today to see if this helps.  Further workup if no improvement.    Hyperkalemia  CKD  Metabolic acidosis  -Creatinine stable today at 1.1 now, baseline seems to be around 1.1  - Potassium normal today  -Bicarbonate down a bit at 19, anion gap normal  - Repeat BMP with morning labs    Weakness  - Most likely secondary to the above  - PT/OT following  - Likely SNF at  discharge    Hypertension  -Home amlodipine on hold and BP appropriate  - Add back meds as needed based on BP trends    GERD  - PPI, will transition to IV given possible reflux complaints    Lovenox 40 mg SC daily for DVT prophylaxis.  Full code.  Discussed with patient, nursing staff, and CCP.  Anticipate discharge to SNU facility timing yet to be determined.    Nataliia Palafox MD  [facility]  05/18/25  09:41 EDT

## 2025-05-18 NOTE — PLAN OF CARE
Problem: Fall Injury Risk  Goal: Absence of Fall and Fall-Related Injury  Outcome: Progressing  Intervention: Identify and Manage Contributors  Recent Flowsheet Documentation  Taken 5/18/2025 0400 by Sabina Chappell RN  Medication Review/Management: medications reviewed  Self-Care Promotion: independence encouraged  Taken 5/18/2025 0200 by Sabina Chappell RN  Medication Review/Management: medications reviewed  Self-Care Promotion: independence encouraged  Taken 5/18/2025 0000 by Sabina Chappell RN  Medication Review/Management: medications reviewed  Self-Care Promotion: independence encouraged  Taken 5/17/2025 2205 by Sabina Chappell RN  Medication Review/Management: medications reviewed  Self-Care Promotion: independence encouraged  Taken 5/17/2025 1952 by Sabina Chappell RN  Medication Review/Management: medications reviewed  Self-Care Promotion: independence encouraged  Intervention: Promote Injury-Free Environment  Recent Flowsheet Documentation  Taken 5/18/2025 0400 by Sabina Chappell RN  Safety Promotion/Fall Prevention:   activity supervised   clutter free environment maintained   fall prevention program maintained   nonskid shoes/slippers when out of bed   safety round/check completed  Taken 5/18/2025 0200 by Sabina Chappell RN  Safety Promotion/Fall Prevention:   activity supervised   clutter free environment maintained   fall prevention program maintained   nonskid shoes/slippers when out of bed   safety round/check completed  Taken 5/18/2025 0000 by Sabina Chappell RN  Safety Promotion/Fall Prevention:   activity supervised   clutter free environment maintained   fall prevention program maintained   nonskid shoes/slippers when out of bed   safety round/check completed  Taken 5/17/2025 2205 by Sabina Chappell RN  Safety Promotion/Fall Prevention:   activity supervised   clutter free environment maintained   fall prevention program maintained   nonskid shoes/slippers when out of bed   safety round/check completed  Taken  5/17/2025 1952 by Sabina Chappell RN  Safety Promotion/Fall Prevention:   activity supervised   clutter free environment maintained   fall prevention program maintained   nonskid shoes/slippers when out of bed   safety round/check completed     Problem: Fall Injury Risk  Goal: Absence of Fall and Fall-Related Injury  Intervention: Identify and Manage Contributors  Recent Flowsheet Documentation  Taken 5/18/2025 0400 by Sabina Chappell RN  Medication Review/Management: medications reviewed  Self-Care Promotion: independence encouraged  Taken 5/18/2025 0200 by Sabina Chappell RN  Medication Review/Management: medications reviewed  Self-Care Promotion: independence encouraged  Taken 5/18/2025 0000 by Sabina Chappell RN  Medication Review/Management: medications reviewed  Self-Care Promotion: independence encouraged  Taken 5/17/2025 2205 by Sabina Chappell RN  Medication Review/Management: medications reviewed  Self-Care Promotion: independence encouraged  Taken 5/17/2025 1952 by Sabina Chappell RN  Medication Review/Management: medications reviewed  Self-Care Promotion: independence encouraged     Problem: Fall Injury Risk  Goal: Absence of Fall and Fall-Related Injury  Intervention: Promote Injury-Free Environment  Recent Flowsheet Documentation  Taken 5/18/2025 0400 by Sabina Chappell RN  Safety Promotion/Fall Prevention:   activity supervised   clutter free environment maintained   fall prevention program maintained   nonskid shoes/slippers when out of bed   safety round/check completed  Taken 5/18/2025 0200 by Sabina Chappell RN  Safety Promotion/Fall Prevention:   activity supervised   clutter free environment maintained   fall prevention program maintained   nonskid shoes/slippers when out of bed   safety round/check completed  Taken 5/18/2025 0000 by Sabina Chappell RN  Safety Promotion/Fall Prevention:   activity supervised   clutter free environment maintained   fall prevention program maintained   nonskid shoes/slippers when  out of bed   safety round/check completed  Taken 5/17/2025 2205 by Sabina Chappell RN  Safety Promotion/Fall Prevention:   activity supervised   clutter free environment maintained   fall prevention program maintained   nonskid shoes/slippers when out of bed   safety round/check completed  Taken 5/17/2025 1952 by Sabina Chappell RN  Safety Promotion/Fall Prevention:   activity supervised   clutter free environment maintained   fall prevention program maintained   nonskid shoes/slippers when out of bed   safety round/check completed     Problem: Adult Inpatient Plan of Care  Goal: Absence of Hospital-Acquired Illness or Injury  Intervention: Prevent Skin Injury  Recent Flowsheet Documentation  Taken 5/18/2025 0400 by Sabina Chappell RN  Body Position: position changed independently  Taken 5/18/2025 0200 by Sabina Chappell RN  Body Position: position changed independently  Taken 5/18/2025 0000 by Sabina Chappell RN  Body Position:   position changed independently   foot of bed elevated  Taken 5/17/2025 2205 by Sabina Chappell RN  Body Position:   position changed independently   foot of bed elevated  Taken 5/17/2025 1952 by Sabina Chappell RN  Body Position: position changed independently     Problem: Adult Inpatient Plan of Care  Goal: Absence of Hospital-Acquired Illness or Injury  Intervention: Prevent and Manage VTE (Venous Thromboembolism) Risk  Recent Flowsheet Documentation  Taken 5/17/2025 1952 by Sabina Chappell RN  VTE Prevention/Management:   bilateral   SCDs (sequential compression devices) on     Problem: Adult Inpatient Plan of Care  Goal: Absence of Hospital-Acquired Illness or Injury  Intervention: Prevent Infection  Recent Flowsheet Documentation  Taken 5/18/2025 0400 by Sabina Chappell RN  Infection Prevention:   single patient room provided   rest/sleep promoted  Taken 5/18/2025 0200 by Sabina Chappell RN  Infection Prevention: rest/sleep promoted  Taken 5/18/2025 0000 by Sabina Chappell RN  Infection Prevention:    single patient room provided   rest/sleep promoted  Taken 5/17/2025 2205 by Sabina Chappell RN  Infection Prevention:   single patient room provided   rest/sleep promoted  Taken 5/17/2025 1952 by Sabina Chappell RN  Infection Prevention:   rest/sleep promoted   single patient room provided     Problem: Adult Inpatient Plan of Care  Goal: Optimal Comfort and Wellbeing  Intervention: Monitor Pain and Promote Comfort  Recent Flowsheet Documentation  Taken 5/18/2025 0007 by Sabina Chappell RN  Pain Management Interventions:   pain medication given   cold applied  Intervention: Provide Person-Centered Care  Recent Flowsheet Documentation  Taken 5/18/2025 0000 by Sabina Chappell RN  Trust Relationship/Rapport:   care explained   questions answered   questions encouraged  Taken 5/17/2025 1952 by Sabina Chappell RN  Trust Relationship/Rapport:   care explained   questions answered   reassurance provided     Problem: Adult Inpatient Plan of Care  Goal: Optimal Comfort and Wellbeing  Intervention: Monitor Pain and Promote Comfort  Recent Flowsheet Documentation  Taken 5/18/2025 0007 by Sabina Chappell RN  Pain Management Interventions:   pain medication given   cold applied     Problem: Skin Injury Risk Increased  Goal: Skin Health and Integrity  Intervention: Optimize Skin Protection  Recent Flowsheet Documentation  Taken 5/18/2025 0400 by Sabina Chappell RN  Activity Management: activity minimized  Head of Bed (HOB) Positioning: HOB elevated  Taken 5/18/2025 0200 by Sabina Chappell RN  Activity Management: activity minimized  Head of Bed (HOB) Positioning: HOB elevated  Taken 5/18/2025 0000 by Sabina Chappell RN  Activity Management: activity minimized  Head of Bed (HOB) Positioning: HOB elevated  Taken 5/17/2025 2205 by Sabina Chappell RN  Activity Management: activity minimized  Head of Bed (HOB) Positioning: HOB elevated  Taken 5/17/2025 1952 by Sabina Chappell RN  Activity Management: activity minimized  Head of Bed (HOB) Positioning: HOB  elevated     Problem: Skin Injury Risk Increased  Goal: Skin Health and Integrity  Intervention: Optimize Skin Protection  Recent Flowsheet Documentation  Taken 5/18/2025 0400 by Sabina Chappell, RN  Activity Management: activity minimized  Head of Bed (HOB) Positioning: HOB elevated  Taken 5/18/2025 0200 by Sabina Chappell, RN  Activity Management: activity minimized  Head of Bed (HOB) Positioning: HOB elevated  Taken 5/18/2025 0000 by Sabina Chappell, RN  Activity Management: activity minimized  Head of Bed (HOB) Positioning: HOB elevated  Taken 5/17/2025 2205 by Sabina Chappell, RN  Activity Management: activity minimized  Head of Bed (HOB) Positioning: HOB elevated  Taken 5/17/2025 1952 by Sabina Chappell, RN  Activity Management: activity minimized  Head of Bed (HOB) Positioning: HOB elevated   Goal Outcome Evaluation:

## 2025-05-18 NOTE — NURSING NOTE
"During shift changed the patient complained that she had been vomiting blood since before lunch time; consulted with the day shift nurse who stated that she had never heard the patient mention or complain about vomiting blood. Patient insisted that she vomited blood and seemed convinced that it was blood. Upon assessment patient, she was referring to a very small food debris that was spat out and small amount of blood smear from her IV on her bedding. Charge nurse (Hardy) came to the patient's room and addressed the patient's complaint.      Patient also complained of her lungs hurting; RT came to the patient's room but patient refused care. After RT left the room, primary nurse questioned the patient why she refused her scheduled breathing treatment. Patient stated that her \"left upper abd is hurting, not my lungs.\" Patient asked for cold washcloth and pain medication. Pain medication given and cold compress. A few minutes later, patient complained of feeling nauseated. Gave compazine.       Patient was given a full bath and refused to participate in her care. Patient does not utilize self-care.  Primary nurse, other staff nurses, and NA provided the outmost care for this patient throughout the night. Patient is still dissatisfied with her care; despite multiple attempts to encourage, educate, and provide excellent care . She stated she used to work for [employer] as a . Care ongoing.  "

## 2025-05-18 NOTE — PROGRESS NOTES
"  PROGRESS NOTE  Patient Name: Penelope Onofre  Age/Sex: 70 y.o. female  : 1955  MRN: [id number]    Date of Admission: 2025  Date of Encounter Visit: 25   LOS: 5 days   Patient Care Team:  Bonny Judge MD as PCP - General (Internal Medicine)    Chief Complaint: Lung mass, lymphadenopathy, tachycardia    Hospital course: Patient is  complaining of insomnia  She has been dozing on and off during the daytime and was asking for a sleep aid and I advised against it and recommended that the patient stays awake during the daytime.  She is very anxious about any further procedures and we did discuss the bronchoscopy and reassured that she will be asleep throughout the procedure.  Her lymph node biopsy is still pending       REVIEW OF SYSTEMS:   CONSTITUTIONAL: no fever or chills  CARDIOVASCULAR: No chest pain, chest pressure or chest discomfort.  Palpitations  RESPIRATORY: Shortness of breath.   GASTROINTESTINAL: No anorexia, nausea, vomiting or diarrhea. No abdominal pain or blood.   HEMATOLOGIC: No bleeding or bruising.     Ventilator/Non-Invasive Ventilation Settings (From admission, onward)      None              Vital Signs  Temp:  [97.7 °F (36.5 °C)-97.9 °F (36.6 °C)] 97.7 °F (36.5 °C)  Heart Rate:  [106-115] 115  Resp:  [18-20] 18  BP: (131-153)/(80-95) 142/84  SpO2:  [90 %-100 %] 100 %  on  Flow (L/min) (Oxygen Therapy):  [2] 2 Device (Oxygen Therapy): nasal cannula    Intake/Output Summary (Last 24 hours) at 2025 1005  Last data filed at 2025 0300  Gross per 24 hour   Intake --   Output 700 ml   Net -700 ml     Flowsheet Rows      Flowsheet Row First Filed Value   Admission Height 182.9 cm (72\") Documented at 2025 0910   Admission Weight 63.6 kg (140 lb 3.4 oz) Documented at 2025 0910          Body mass index is 18.99 kg/m².      25  0910 25  1518   Weight: 63.6 kg (140 lb 3.4 oz) 63.5 kg (140 lb)       Physical Exam:  GEN:  No acute distress, alert, " cooperative, well developed, looks older than her stated age  EYES:   Sclerae clear. No icterus. PERRL. Normal EOM  ENT:   External ears/nose normal, no oral lesions, no thrush, mucous membranes moist  NECK:  Supple, midline trachea, no JVD  LUNGS: Normal chest on inspection,  diminished, faint crackles posteriorly, no wheezes. No rhonchi. Respirations regular, even and unlabored.   CV:  Regular rhythm and rapid heart rate. Normal S1/S2. No murmurs, gallops, or rubs noted.  ABD:  Soft, nontender and nondistended. Normal bowel sounds. No guarding  EXT:  Moves all extremities well. No cyanosis. No redness. No edema.   Skin: Dry, intact, no bleeding    Results Review:    Results From Last 14 Days   Lab Units 05/14/25  0637 05/13/25  0239   LACTATE mmol/L  --  1.3   LDH U/L 306*  --      Results from last 7 days   Lab Units 05/18/25  0604 05/17/25  1127 05/17/25  0611 05/16/25  0732 05/15/25  0641 05/14/25  0637 05/13/25  0611 05/12/25  2359   SODIUM mmol/L 137  --  142 139 141 137 135* 139   POTASSIUM mmol/L 4.9 5.2 5.8* 3.1* 4.2 3.8 3.9 3.4*   CHLORIDE mmol/L 104  --  109* 105 106 103 106 105   CO2 mmol/L 19.0*  --  21.7* 20.0* 21.9* 20.0* 18.4* 20.5*   BUN mg/dL 7*  --  7* 9 12 12 13 13   CREATININE mg/dL 1.10*  --  1.06* 1.05* 1.20* 1.00 1.00 1.21*   CALCIUM mg/dL 8.9  --  9.0 8.3* 8.8 8.9 9.2 9.2   AST (SGOT) U/L  --   --   --   --   --   --   --  24   ALT (SGPT) U/L  --   --   --   --   --   --   --  7   ANION GAP mmol/L 14.0  --  11.3 14.0 13.1 14.0 10.6 13.5   ALBUMIN g/dL  --   --   --   --   --   --   --  3.6     Results from last 7 days   Lab Units 05/16/25  0732 05/15/25  0641 05/14/25  0637 05/13/25  0611 05/13/25  0120 05/12/25  2359   CK TOTAL U/L 61 68 59  --   --   --    HSTROP T ng/L  --   --   --  32* 30* 32*     Results from last 7 days   Lab Units 05/13/25  0611   TSH uIU/mL 4.800*     Results from last 7 days   Lab Units 05/12/25  2359   PROBNP pg/mL 1,374.0*     Results from last 7 days   Lab  "Units 05/18/25  0604 05/17/25  0611 05/16/25  0732 05/15/25  0641 05/14/25  0637 05/13/25  0611 05/12/25  2359   WBC 10*3/mm3 7.39 6.14 5.08 6.27 6.19 5.99 6.45   HEMOGLOBIN g/dL 13.4 13.3 12.8 14.0 14.4 15.8 16.4*   HEMATOCRIT % 39.3 38.9 38.6 40.8 42.3 46.0 46.8*   PLATELETS 10*3/mm3 145 161 147 155 167 182 203   MCV fL 103.1* 101.8* 102.7* 102.3* 99.5* 101.1* 100.0*   NEUTROPHIL % % 71.1 68.6 60.4 65.3 70.7  --  61.0   LYMPHOCYTE % % 16.9* 17.3* 24.2 18.3* 15.3*  --  23.3   MONOCYTES % % 10.4 11.6 11.8 12.6* 12.6*  --  13.2*   EOSINOPHIL % % 0.9 2.0 2.8 3.0 0.8  --  1.9   BASOPHIL % % 0.3 0.3 0.4 0.5 0.3  --  0.3   IMM GRAN % % 0.4 0.2 0.4 0.3 0.3  --  0.3         Results from last 7 days   Lab Units 05/13/25  1428   MAGNESIUM mg/dL 1.7           Invalid input(s): \"LDLCALC\"      Results from last 7 days   Lab Units 05/13/25  0611   HEMOGLOBIN A1C % 5.50     Glucose   Date/Time Value Ref Range Status   05/15/2025 2108 97 70 - 130 mg/dL Final     Results from last 7 days   Lab Units 05/13/25  1428 05/13/25  0239   PROCALCITONIN ng/mL 0.05  --    LACTATE mmol/L  --  1.3     Results from last 7 days   Lab Units 05/15/25  0319 05/14/25  0941 05/13/25  0242 05/13/25  0239   BLOODCX   --   --  No growth at 5 days No growth at 5 days   BODYFLDCX   --  No growth at 4 days  --   --    STREP PNEUMO AG  Negative  --   --   --      Results from last 7 days   Lab Units 05/14/25  0656   NITRITE UA  Negative   WBC UA /HPF 3-5*   BACTERIA UA /HPF None Seen   SQUAM EPITHEL UA /HPF 0-2     Results from last 7 days   Lab Units 05/13/25  1229   COVID19  Not Detected   ADENOVIRUS DETECTION BY PCR  Not Detected   CORONAVIRUS 229E  Not Detected   CORONAVIRUS HKU1  Not Detected   CORONAVIRUS NL63  Not Detected   CORONAVIRUS OC43  Not Detected   HUMAN METAPNEUMOVIRUS  Not Detected   HUMAN RHINOVIRUS/ENTEROVIRUS  Not Detected   INFLUENZA B PCR  Not Detected   PARAINFLUENZA 1  Not Detected   PARAINFLUENZA VIRUS 2  Not Detected "   PARAINFLUENZA VIRUS 3  Not Detected   PARAINFLUENZA VIRUS 4  Not Detected   BORDETELLA PERTUSSIS PCR  Not Detected   BORDETELLA PARAPERTUSSIS PCR  Not Detected   CHLAMYDOPHILA PNEUMONIAE PCR  Not Detected   MYCOPLAMA PNEUMO PCR  Not Detected   RSV, PCR  Not Detected               Imaging:   Imaging Results (All)               I reviewed the patient's new clinical results.  I personally viewed and interpreted the patient's imaging results:        Medication Review:   atorvastatin, 20 mg, Oral, Nightly  enoxaparin sodium, 40 mg, Subcutaneous, Q24H  guaiFENesin, 1,200 mg, Oral, Q12H  ipratropium-albuterol, 3 mL, Nebulization, 4x Daily - RT  Lidocaine, 1 patch, Transdermal, Q24H  Menthol-Zinc Oxide, 1 Application, Topical, BID  pantoprazole, 40 mg, Intravenous, Q12H  piperacillin-tazobactam, 3.375 g, Intravenous, Q8H  sodium chloride, 10 mL, Intravenous, Q12H             ASSESSMENT:   Acute hypoxic respiratory failure  Right pleural effusion s/p thoracentesis 5/14/25 with exudate  Bulky mediastinal and right hilar adenopathy  Suspected right hilar mass  Possible pneumonia  Right chest wall mass  Former smoker    PLAN:  Lymph node biopsy still pending, fluid cytology is negative.  Differential includes malignancy versus complication from  histoplasma infection, in both cases prognosis is not very good  Will check histoplasma antigen  Consider bronchoscopy if the lymph node biopsy comes back negative.  As for the insomnia, advised against any hypnotics, patient is to stay up during the daytime and avoid any extensive napping.  Discussed with primary team, nursing staff as well as the patient, notes reviewed she is new to me  Labs/Notes/films were independently reviewed and pertinent results are summarized above  The copied texts in this note were reviewed and they are accurate as of 05/18/25    Disposition: Continue to monitor    Trina Adler MD  05/18/25  10:05 EDT           Dictated utilizing Dragon dictation

## 2025-05-18 NOTE — PLAN OF CARE
Problem: Fatigue  Goal: Improved Activity Tolerance  Outcome: Progressing  Intervention: Promote Improved Energy  Description: Evaluate perception and impact of fatigue through self-report or a validated tool.Promote activity and exercise to maintain stamina; pace activity to tolerance.Encourage use of stress-management techniques (e.g., coping strategies, psychosocial support, distraction, relaxation, massage).Coordinate and cluster care; schedule activity at time of peak energy and alternate activity with rest.Facilitate sleep/rest patterns, such as maintaining a bedtime routine; avoid long naps; adjust sleep environment to manage impact of fatigue.Reassess effectiveness of interventions at regular intervals.  Recent Flowsheet Documentation  Taken 5/18/2025 1746 by Franca Kurtz RN  Activity Management: activity minimized  Taken 5/18/2025 1614 by Franca Kurtz RN  Activity Management: activity minimized  Taken 5/18/2025 1414 by Franca Kurtz RN  Activity Management: activity minimized  Taken 5/18/2025 1211 by Franca Kurtz RN  Activity Management: activity minimized  Taken 5/18/2025 1013 by Franca Kurtz RN  Activity Management: activity minimized  Taken 5/18/2025 0815 by Franca Kurtz RN  Activity Management: activity minimized  Sleep/Rest Enhancement: awakenings minimized   Goal Outcome Evaluation:              Outcome Evaluation: pt very anxious, no appetitie, complaints of lack of sleep, stated her chest felt hollow, MD notifed, pain/anxiety meds given, a/ox4

## 2025-05-19 LAB
ANION GAP SERPL CALCULATED.3IONS-SCNC: 11 MMOL/L (ref 5–15)
BACTERIA FLD CULT: NORMAL
BACTERIA SPEC ANAEROBE CULT: NORMAL
BASOPHILS # BLD AUTO: 0.05 10*3/MM3 (ref 0–0.2)
BASOPHILS NFR BLD AUTO: 0.8 % (ref 0–1.5)
BUN SERPL-MCNC: 8 MG/DL (ref 8–23)
BUN/CREAT SERPL: 7.3 (ref 7–25)
CALCIUM SPEC-SCNC: 9.3 MG/DL (ref 8.6–10.5)
CHLORIDE SERPL-SCNC: 105 MMOL/L (ref 98–107)
CO2 SERPL-SCNC: 20 MMOL/L (ref 22–29)
CREAT SERPL-MCNC: 1.09 MG/DL (ref 0.57–1)
CYTO UR: NORMAL
DEPRECATED RDW RBC AUTO: 49.4 FL (ref 37–54)
EGFRCR SERPLBLD CKD-EPI 2021: 54.4 ML/MIN/1.73
EOSINOPHIL # BLD AUTO: 0.19 10*3/MM3 (ref 0–0.4)
EOSINOPHIL NFR BLD AUTO: 3 % (ref 0.3–6.2)
ERYTHROCYTE [DISTWIDTH] IN BLOOD BY AUTOMATED COUNT: 12.8 % (ref 12.3–15.4)
GLUCOSE SERPL-MCNC: 75 MG/DL (ref 65–99)
GRAM STN SPEC: NORMAL
GRAM STN SPEC: NORMAL
HCT VFR BLD AUTO: 41.4 % (ref 34–46.6)
HGB BLD-MCNC: 13.9 G/DL (ref 12–15.9)
IMM GRANULOCYTES # BLD AUTO: 0.02 10*3/MM3 (ref 0–0.05)
IMM GRANULOCYTES NFR BLD AUTO: 0.3 % (ref 0–0.5)
LAB AP CASE REPORT: NORMAL
LAB AP CLINICAL INFORMATION: NORMAL
LAB AP DIAGNOSIS COMMENT: NORMAL
LAB AP SPECIAL STAINS: NORMAL
LYMPHOCYTES # BLD AUTO: 1.26 10*3/MM3 (ref 0.7–3.1)
LYMPHOCYTES NFR BLD AUTO: 20.2 % (ref 19.6–45.3)
MCH RBC QN AUTO: 34.9 PG (ref 26.6–33)
MCHC RBC AUTO-ENTMCNC: 33.6 G/DL (ref 31.5–35.7)
MCV RBC AUTO: 104 FL (ref 79–97)
MONOCYTES # BLD AUTO: 0.62 10*3/MM3 (ref 0.1–0.9)
MONOCYTES NFR BLD AUTO: 10 % (ref 5–12)
NEUTROPHILS NFR BLD AUTO: 4.09 10*3/MM3 (ref 1.7–7)
NEUTROPHILS NFR BLD AUTO: 65.7 % (ref 42.7–76)
NRBC BLD AUTO-RTO: 0 /100 WBC (ref 0–0.2)
PATH REPORT.FINAL DX SPEC: NORMAL
PATH REPORT.GROSS SPEC: NORMAL
PLATELET # BLD AUTO: 136 10*3/MM3 (ref 140–450)
PMV BLD AUTO: 10.1 FL (ref 6–12)
POTASSIUM SERPL-SCNC: 5.2 MMOL/L (ref 3.5–5.2)
QT INTERVAL: 349 MS
QTC INTERVAL: 471 MS
RBC # BLD AUTO: 3.98 10*6/MM3 (ref 3.77–5.28)
SODIUM SERPL-SCNC: 136 MMOL/L (ref 136–145)
WBC NRBC COR # BLD AUTO: 6.23 10*3/MM3 (ref 3.4–10.8)

## 2025-05-19 PROCEDURE — 85025 COMPLETE CBC W/AUTO DIFF WBC: CPT | Performed by: INTERNAL MEDICINE

## 2025-05-19 PROCEDURE — 80048 BASIC METABOLIC PNL TOTAL CA: CPT | Performed by: INTERNAL MEDICINE

## 2025-05-19 PROCEDURE — 25010000002 ENOXAPARIN PER 10 MG: Performed by: INTERNAL MEDICINE

## 2025-05-19 PROCEDURE — 94799 UNLISTED PULMONARY SVC/PX: CPT

## 2025-05-19 PROCEDURE — 97530 THERAPEUTIC ACTIVITIES: CPT

## 2025-05-19 PROCEDURE — 25010000002 PIPERACILLIN SOD-TAZOBACTAM PER 1 G: Performed by: INTERNAL MEDICINE

## 2025-05-19 PROCEDURE — 97110 THERAPEUTIC EXERCISES: CPT

## 2025-05-19 PROCEDURE — 99222 1ST HOSP IP/OBS MODERATE 55: CPT | Performed by: INTERNAL MEDICINE

## 2025-05-19 RX ORDER — AMLODIPINE BESYLATE 5 MG/1
5 TABLET ORAL
Status: DISCONTINUED | OUTPATIENT
Start: 2025-05-19 | End: 2025-05-30 | Stop reason: HOSPADM

## 2025-05-19 RX ADMIN — HYDROXYZINE HYDROCHLORIDE 25 MG: 25 TABLET, FILM COATED ORAL at 11:28

## 2025-05-19 RX ADMIN — PIPERACILLIN AND TAZOBACTAM 3.38 G: 3; .375 INJECTION, POWDER, FOR SOLUTION INTRAVENOUS at 11:28

## 2025-05-19 RX ADMIN — PIPERACILLIN AND TAZOBACTAM 3.38 G: 3; .375 INJECTION, POWDER, FOR SOLUTION INTRAVENOUS at 03:09

## 2025-05-19 RX ADMIN — ATORVASTATIN CALCIUM 20 MG: 20 TABLET, FILM COATED ORAL at 20:05

## 2025-05-19 RX ADMIN — AMLODIPINE BESYLATE 5 MG: 5 TABLET ORAL at 10:27

## 2025-05-19 RX ADMIN — HYDROCODONE BITARTRATE AND ACETAMINOPHEN 1 TABLET: 5; 325 TABLET ORAL at 20:05

## 2025-05-19 RX ADMIN — Medication 10 ML: at 10:28

## 2025-05-19 RX ADMIN — IPRATROPIUM BROMIDE AND ALBUTEROL SULFATE 3 ML: .5; 3 SOLUTION RESPIRATORY (INHALATION) at 06:43

## 2025-05-19 RX ADMIN — Medication 10 ML: at 20:09

## 2025-05-19 RX ADMIN — PANTOPRAZOLE SODIUM 40 MG: 40 INJECTION, POWDER, FOR SOLUTION INTRAVENOUS at 20:07

## 2025-05-19 RX ADMIN — GUAIFENESIN 1200 MG: 600 TABLET, EXTENDED RELEASE ORAL at 10:27

## 2025-05-19 RX ADMIN — LIDOCAINE 1 PATCH: 4 PATCH TOPICAL at 10:27

## 2025-05-19 RX ADMIN — ENOXAPARIN SODIUM 40 MG: 100 INJECTION SUBCUTANEOUS at 14:52

## 2025-05-19 RX ADMIN — PANTOPRAZOLE SODIUM 40 MG: 40 INJECTION, POWDER, FOR SOLUTION INTRAVENOUS at 10:27

## 2025-05-19 RX ADMIN — MENTHOL, ZINC OXIDE 1 APPLICATION: .44; 20.6 OINTMENT TOPICAL at 10:28

## 2025-05-19 RX ADMIN — GUAIFENESIN 1200 MG: 600 TABLET, EXTENDED RELEASE ORAL at 20:05

## 2025-05-19 NOTE — THERAPY TREATMENT NOTE
Patient Name: Penelope Onofre  : 1955    MRN: [id number]                              Today's Date: 2025       Admit Date: 2025    Visit Dx:     ICD-10-CM ICD-9-CM   1. Generalized weakness  R53.1 780.79   2. Pneumonia of right lower lobe due to infectious organism  J18.9 486     Patient Active Problem List   Diagnosis    Arm pain, left    Generalized weakness    Dizziness    Pneumonia    Pleural effusion    At high risk for pressure injury of skin    Decubitus ulcer of sacral region, stage 1    Acute kidney failure    Dehydration    Elevated troponin    Elevated brain natriuretic peptide (BNP) level    Hypokalemia    Hyperglycemia     Past Medical History:   Diagnosis Date    Chronic back pain     Claudication     GERD (gastroesophageal reflux disease)      Past Surgical History:   Procedure Laterality Date    BREAST CYST EXCISION Right     TONSILLECTOMY      US GUIDED LYMPH NODE BIOPSY  5/15/2025      General Information       Row Name 25 1400          OT Time and Intention    Document Type therapy note (daily note)  -SM     Mode of Treatment occupational therapy;individual therapy  -SM     Patient Effort fair  -SM       Row Name 25 1400          General Information    Patient Profile Reviewed yes  -SM     Existing Precautions/Restrictions fall;oxygen therapy device and L/min  2L  -SM       Row Name 25 1400          Cognition    Orientation Status (Cognition) oriented x 3  -SM       Row Name 25 1400          Safety Issues/Impairments Affecting Functional Mobility    Safety Issues Affecting Function (Mobility) awareness of need for assistance;insight into deficits/self-awareness  -SM     Impairments Affecting Function (Mobility) balance;endurance/activity tolerance;strength;shortness of breath  -SM               User Key  (r) = Recorded By, (t) = Taken By, (c) = Cosigned By      Initials Name Provider Type    Nataliia Lerma, MITALI/L, CSRS Occupational Therapist                      Mobility/ADL's       [facility] Name 05/19/25 1400          Bed Mobility    Supine-Sit [place] (Bed Mobility) standby assist  -     Sit-Supine [place] (Bed Mobility) standby assist  -[facility] Name 05/19/25 1400          Transfers    Comment, (Transfers) pt declined despite encouragement  -[facility] Name 05/19/25 1400          Activities of Daily Living    BADL Assessment/Intervention toileting;feeding  -SM       Row Name 05/19/25 1400          Toileting Assessment/Training    [place] Level (Toileting) dependent (less than 25% patient effort)  -     Position (Toileting) supine  -     Comment, (Toileting) pt using purewick/brief  -[facility] Name 05/19/25 1400          Self-Feeding Assessment/Training    [place] Level (Feeding) independent  -     Position (Feeding) sitting up in bed  -               User Key  (r) = Recorded By, (t) = Taken By, (c) = Cosigned By      Initials Name Provider Type     Nataliia Mckeon, OTR/L, CSRS Occupational Therapist                   Obj/Interventions       [facility] Name 05/19/25 1401          Shoulder (Therapeutic Exercise)    Shoulder (Therapeutic Exercise) AROM (active range of motion)  -     Shoulder AROM (Therapeutic Exercise) bilateral;10 repetitions;sitting  shoulder rolls  -[facility] Name 05/19/25 1401          Elbow/Forearm (Therapeutic Exercise)    Elbow/Forearm (Therapeutic Exercise) AROM (active range of motion)  -     Elbow/Forearm AROM (Therapeutic Exercise) bilateral;flexion;extension;10 repetitions;sitting  -[facility] Name 05/19/25 1401          Motor Skills    Functional Endurance poor  -     Therapeutic Exercise elbow/forearm;shoulder  -SM       Row Name 05/19/25 1401          Balance    Static Sitting Balance standby assist  -     Position, Sitting Balance sitting edge of bed  -               User Key  (r) = Recorded By, (t) = Taken By, (c) = Cosigned By      Initials Name Provider Type    BETHEL Mckeon  "Nataliia OTR/L, CSRS Occupational Therapist                   Goals/Plan    No documentation.                  Clinical Impression       Row Name 05/19/25 1401          Pain Assessment    Pretreatment Pain Rating 0/10 - no pain  -     Posttreatment Pain Rating 0/10 - no pain  -       Row Name 05/19/25 1401          Plan of Care Review    Plan of Care Reviewed With patient  -     Progress declining  -     Outcome Evaluation Pt tolerates session poorly. Pt needs encouragement throughout session. Pt sat EOB and performed minimal UE exercises prior to reporting fatique. She declined attempt at standing, transfering OOB, OOB or EOB ADLs today reporting \"I know I just cant do it\". Pt tolerates sitting EOB ~5 min today. Pt c/o of her lungs as her biggest concern and fearful she will become too SOB with activity. OT monitored vitals throughout encounter- see note for details.  -       Row Name 05/19/25 1401          Therapy Plan Review/Discharge Plan (OT)    Anticipated Discharge Disposition (OT) skilled nursing facility  -       Row Name 05/19/25 1401          Vital Signs    Pretreatment Heart Rate (beats/min) 114  -SM     Intratreatment Heart Rate (beats/min) 126  -SM     Pre SpO2 (%) 95  -SM     O2 Delivery Pre Treatment supplemental O2  -SM     Intra SpO2 (%) 97  -SM     O2 Delivery Intra Treatment supplemental O2  -SM     Post SpO2 (%) 95  -SM     O2 Delivery Post Treatment supplemental O2  -SM       Row Name 05/19/25 1401          Positioning and Restraints    Pre-Treatment Position in bed  -     Post Treatment Position bed  -SM     In Bed fowlers;encouraged to call for assist;exit alarm on;notified nsg;call light within reach  -               User Key  (r) = Recorded By, (t) = Taken By, (c) = Cosigned By      Initials Name Provider Type    Nataliia Lerma OTR/L, CSRS Occupational Therapist                   Outcome Measures       Row Name 05/19/25 1405          How much help from another is " "currently needed...    Putting on and taking off regular lower body clothing? 2  -SM     Bathing (including washing, rinsing, and drying) 2  -SM     Toileting (which includes using toilet bed pan or urinal) 1  -SM     Putting on and taking off regular upper body clothing 3  -SM     Taking care of personal grooming (such as brushing teeth) 3  -SM     Eating meals 4  -SM     AM-PAC 6 Clicks Score (OT) 15  -       Row Name 05/19/25 1407          Functional Assessment    Outcome Measure Options AM-PAC 6 Clicks Daily Activity (OT)  -               User Key  (r) = Recorded By, (t) = Taken By, (c) = Cosigned By      Initials Name Provider Type    Nataliia Lerma, OTR/L, CSRS Occupational Therapist                      OT Recommendation and Plan  Therapy Frequency (OT): 5 times/wk  Plan of Care Review  Plan of Care Reviewed With: patient  Progress: declining  Outcome Evaluation: Pt tolerates session poorly. Pt needs encouragement throughout session. Pt sat EOB and performed minimal UE exercises prior to reporting fatique. She declined attempt at standing, transfering OOB, OOB or EOB ADLs today reporting \"I know I just cant do it\". Pt tolerates sitting EOB ~5 min today. Pt c/o of her lungs as her biggest concern and fearful she will become too SOB with activity. OT monitored vitals throughout encounter- see note for details.     Time Calculation:   Evaluation Complexity (OT)  Review Occupational Profile/Medical/Therapy History Complexity: expanded/moderate complexity  Assessment, Occupational Performance/Identification of Deficit Complexity: 3-5 performance deficits  Clinical Decision Making Complexity (OT): detailed assessment/moderate complexity  Overall Complexity of Evaluation (OT): moderate complexity     Time Calculation- OT       Row Name 05/19/25 1405             Time Calculation- OT    OT Start Time 1338  -      OT Stop Time 1351  -      OT Time Calculation (min) 13 min  -      OT Received On 05/19/25 "  -SM      OT - Next Appointment 05/20/25  -         Timed Charges    16070 - OT Therapeutic Activity Minutes 13  -SM         Total Minutes    Timed Charges Total Minutes 13  -SM       Total Minutes 13  -SM                User Key  (r) = Recorded By, (t) = Taken By, (c) = Cosigned By      Initials Name Provider Type    Nataliia Lerma OTR/L, CSRS Occupational Therapist                  Therapy Charges for Today       Code Description Service Date Service Provider Modifiers Qty    [id number]  OT THERAPEUTIC ACT EA 15 MIN 5/19/2025 Nataliia Mckeon OTR/L, CSRS GO 1                 MITALI Russell/KIRILL, CSRS  5/19/2025

## 2025-05-19 NOTE — PLAN OF CARE
"Goal Outcome Evaluation:  Plan of Care Reviewed With: patient        Progress: declining  Outcome Evaluation: Pt tolerates session poorly. Pt needs encouragement throughout session. Pt sat EOB and performed minimal UE exercises prior to reporting fatique. She declined attempt at standing, transfering OOB, OOB or EOB ADLs today reporting \"I know I just cant do it\". Pt tolerates sitting EOB ~5 min today. Pt c/o of her lungs as her biggest concern and fearful she will become too SOB with activity. OT monitored vitals throughout encounter- see note for details.    Anticipated Discharge Disposition (OT): skilled nursing facility                        "

## 2025-05-19 NOTE — THERAPY TREATMENT NOTE
Patient Name: Penelope Onofre  : 1955    MRN: [id number]                              Today's Date: 2025       Admit Date: 2025    Visit Dx:     ICD-10-CM ICD-9-CM   1. Generalized weakness  R53.1 780.79   2. Pneumonia of right lower lobe due to infectious organism  J18.9 486     Patient Active Problem List   Diagnosis    Arm pain, left    Generalized weakness    Dizziness    Pneumonia    Pleural effusion    At high risk for pressure injury of skin    Decubitus ulcer of sacral region, stage 1    Acute kidney failure    Dehydration    Elevated troponin    Elevated brain natriuretic peptide (BNP) level    Hypokalemia    Hyperglycemia     Past Medical History:   Diagnosis Date    Chronic back pain     Claudication     GERD (gastroesophageal reflux disease)      Past Surgical History:   Procedure Laterality Date    BREAST CYST EXCISION Right     TONSILLECTOMY      US GUIDED LYMPH NODE BIOPSY  5/15/2025      General Information       Row Name 25 1415          Physical Therapy Time and Intention    Document Type therapy note (daily note)  -AR     Mode of Treatment physical therapy  -AR       Row Name 25 1415          General Information    Patient Profile Reviewed yes  -AR     Existing Precautions/Restrictions fall;oxygen therapy device and L/min  -AR       Row Name 25 1415          Cognition    Orientation Status (Cognition) oriented x 3  -AR       Row Name 25 1415          Safety Issues/Impairments Affecting Functional Mobility    Safety Issues Affecting Function (Mobility) problem-solving  -AR               User Key  (r) = Recorded By, (t) = Taken By, (c) = Cosigned By      Initials Name Provider Type    AR Eli Henry PT Physical Therapist                   Mobility       Row Name 25 1415          Bed Mobility    Supine-Sit [place] (Bed Mobility) standby assist  -AR     Sit-Supine [place] (Bed Mobility) not tested  -AR     Assistive Device (Bed Mobility)  bed rails;head of bed elevated  -AR       Row Name 05/19/25 1415          Sit-Stand Transfer    Sit-Stand [place] (Transfers) minimum assist (75% patient effort)  -AR     Assistive Device (Sit-Stand Transfers) walker, front-wheeled  -AR       Row Name 05/19/25 1415          Gait/Stairs (Locomotion)    [place] Level (Gait) minimum assist (75% patient effort)  -AR     Assistive Device (Gait) walker, front-wheeled  -AR     Patient was able to Ambulate yes  -AR     Distance in Feet (Gait) 1  -AR     Deviations/Abnormal Patterns (Gait) festinating/shuffling;stride length decreased;gait speed decreased  -AR     Bilateral Gait Deviations forward flexed posture  -AR     Comment, (Gait/Stairs) few smalll shuffling steps at EOB< limited by fatigue and SOB  -AR               User Key  (r) = Recorded By, (t) = Taken By, (c) = Cosigned By      Initials Name Provider Type    Eli Howard, PT Physical Therapist                   Obj/Interventions       Row Name 05/19/25 1416          Balance    Static Standing Balance minimal assist  -AR     Position/Device Used, Standing Balance walker, rolling  -AR               User Key  (r) = Recorded By, (t) = Taken By, (c) = Cosigned By      Initials Name Provider Type    Eli Howard, PT Physical Therapist                   Goals/Plan    No documentation.                  Clinical Impression       [facility] Name 05/19/25 1416          Pain    Pretreatment Pain Rating 0/10 - no pain  -AR     Posttreatment Pain Rating 0/10 - no pain  -AR     Response to Pain Interventions activity participation with tolerable pain  -AR       Row Name 05/19/25 1416          Plan of Care Review    Outcome Evaluation Continued impaired endurance, balance, activity tolerance and independence w/ mobility during PT today.  Able tos it up w/ SBA.  Stood and took few steps at EOB w/ min A using RW, limited by fatigue and SOB.  Sp02 to 91% and HR from 110s at rest to 139.  Recommend DC to SNU, pt  normally lives at home independently.  -AR       Row Name 05/19/25 1416          Therapy Assessment/Plan (PT)    Rehab Potential (PT) good  -AR     Criteria for Skilled Interventions Met (PT) yes  -AR     Therapy Frequency (PT) 5 times/wk  -AR       Row Name 05/19/25 1416          Vital Signs    O2 Delivery Pre Treatment supplemental O2  -AR       Row Name 05/19/25 1416          Positioning and Restraints    Pre-Treatment Position in bed  -AR     Post Treatment Position bed  declined recliner  -AR     In Bed supine;call light within reach;encouraged to call for assist;exit alarm on  -AR               User Key  (r) = Recorded By, (t) = Taken By, (c) = Cosigned By      Initials Name Provider Type    AR Eli Henry PT Physical Therapist                   Outcome Measures       Row Name 05/19/25 1418          How much help from another person do you currently need...    Turning from your back to your side while in flat bed without using bedrails? 4  -AR     Moving from lying on back to sitting on the side of a flat bed without bedrails? 3  -AR     Moving to and from a bed to a chair (including a wheelchair)? 3  -AR     Standing up from a chair using your arms (e.g., wheelchair, bedside chair)? 3  -AR     Climbing 3-5 steps with a railing? 2  -AR     To walk in hospital room? 2  -AR     AM-PAC 6 Clicks Score (PT) 17  -AR     Highest Level of Mobility Goal Stand (1 or More Minutes)-5  -AR       Row Name 05/19/25 1418 05/19/25 1405       Functional Assessment    Outcome Measure Options AM-PAC 6 Clicks Basic Mobility (PT)  -AR AM-PAC 6 Clicks Daily Activity (OT)  -SM              User Key  (r) = Recorded By, (t) = Taken By, (c) = Cosigned By      Initials Name Provider Type    Eli Howard PT Physical Therapist    Nataliia Lerma OTR/L, CSRS Occupational Therapist                                 Physical Therapy Education       Title: PT OT SLP Therapies (In Progress)       Topic: Physical Therapy (In  Progress)       Point: Mobility training (In Progress)       Learning Progress Summary            Patient Acceptance, E, NR by AR at 5/19/2025 1418    Acceptance, E, VU by MW at 5/18/2025 0437    Acceptance, E, NR by AR at 5/16/2025 1542    Acceptance, E, NR by DJ at 5/14/2025 1400                      Point: Home exercise program (In Progress)       Learning Progress Summary            Patient Acceptance, E, NR by AR at 5/19/2025 1418    Acceptance, E, VU by MW at 5/18/2025 0437    Acceptance, E, NR by AR at 5/16/2025 1542                      Point: Body mechanics (In Progress)       Learning Progress Summary            Patient Acceptance, E, NR by AR at 5/19/2025 1418    Acceptance, E, NR by AR at 5/16/2025 1542    Acceptance, E, NR by DJ at 5/14/2025 1400                      Point: Precautions (In Progress)       Learning Progress Summary            Patient Acceptance, E, NR by AR at 5/19/2025 1418    Acceptance, E, NR by AR at 5/16/2025 1542    Acceptance, E, NR by DJ at 5/14/2025 1400                                      User Key       Initials Effective Dates Name Provider Type Discipline    AR 06/16/21 -  Eli Henry, PT Physical Therapist PT    DJ 10/25/19 -  Renetta Ely PT Physical Therapist PT     02/18/25 -  Sabina Chappell, RN Registered Nurse Nurse                  PT Recommendation and Plan     Outcome Evaluation: Continued impaired endurance, balance, activity tolerance and independence w/ mobility during PT today.  Able tos it up w/ SBA.  Stood and took few steps at EOB w/ min A using RW, limited by fatigue and SOB.  Sp02 to 91% and HR from 110s at rest to 139.  Recommend DC to SNU, pt normally lives at home independently.     Time Calculation:         PT Charges       Row Name 05/19/25 1414             Time Calculation    Start Time 1359  -AR      Stop Time 1409  -AR      Time Calculation (min) 10 min  -AR      PT Received On 05/19/25  -AR      PT - Next Appointment 05/21/25  -AR                 User Key  (r) = Recorded By, (t) = Taken By, (c) = Cosigned By      Initials Name Provider Type    AR Eli Henry, PT Physical Therapist                  Therapy Charges for Today       Code Description Service Date Service Provider Modifiers Qty    59529473310 HC PT THER PROC EA 15 MIN 5/19/2025 Eli Henry, FARZANEH GP 1            PT G-Codes  Outcome Measure Options: AM-PAC 6 Clicks Basic Mobility (PT)  AM-PAC 6 Clicks Score (PT): 17  AM-PAC 6 Clicks Score (OT): 15  PT Discharge Summary  Anticipated Discharge Disposition (PT): skilled nursing facility    Eli Henry PT  5/19/2025

## 2025-05-19 NOTE — PROGRESS NOTES
Name: Penelope Onofre ADMIT: 2025   : 1955  PCP: Bonny Judge MD    MRN: [id number] LOS: 6 days   AGE/SEX: 70 y.o. female  ROOM: [facility]     Subjective   Subjective   No acute events overnight.  Was notified yesterday afternoon that patient was having left-sided chest pain.  EKG and troponin obtained.  These were unchanged from previous checks.  Decided to get CT angiogram to rule out PE and this was negative.  Patient not having the left-sided this morning.  Denies significant shortness of breath.  She is feeling anxious.    Objective   Objective     Vital Signs  Temp:  [97.7 °F (36.5 °C)-98.6 °F (37 °C)] 97.9 °F (36.6 °C)  Heart Rate:  [] 71  Resp:  [18-20] 20  BP: (124-148)/(73-92) 141/83  SpO2:  [94 %-100 %] 94 %  on  Flow (L/min) (Oxygen Therapy):  [2] 2;   Device (Oxygen Therapy): nasal cannula  Body mass index is 18.99 kg/m².    Physical Exam  General: Alert, no acute distress.  Sitting up in bed.  Chronically ill-appearing.  Anxious appearing.  ENT: No conjunctival injection or scleral icterus. Moist mucous membranes.   Neuro: Eyes open and moving in all directions, generalized weakness, face symmetric, no focal deficits.   Lungs: Diminished aeration, no wheezing or crackles.  On 2 L nasal cannula.  Heart: RRR, no murmurs. No edema.  Abdomen: Soft, non-tender, non-distended. Normal bowel sounds. No hepatosplenomegaly.   Ext: Warm and well-perfused.  Mild tenderness to palpation of the left chest wall.  Skin: Soft tissue mass on right chest wall.    Results Review     I reviewed the patient's new clinical results:  Results from last 7 days   Lab Units 25  0525  0604 25  0611 25  0732   WBC 10*3/mm3 6.23 7.39 6.14 5.08   HEMOGLOBIN g/dL 13.9 13.4 13.3 12.8   PLATELETS 10*3/mm3 136* 145 161 147     Results from last 7 days   Lab Units 25  0527 25  0604 25  1127 25  0611 25  0732   SODIUM mmol/L 136 137  --  142 139   POTASSIUM  "mmol/L 5.2 4.9 5.2 5.8* 3.1*   CHLORIDE mmol/L 105 104  --  109* 105   CO2 mmol/L 20.0* 19.0*  --  21.7* 20.0*   BUN mg/dL 8 7*  --  7* 9   CREATININE mg/dL 1.09* 1.10*  --  1.06* 1.05*   GLUCOSE mg/dL 75 100*  --  113* 96   EGFR mL/min/1.73 54.4* 53.8*  --  56.3* 56.9*     Results from last 7 days   Lab Units 05/12/25  2359   ALBUMIN g/dL 3.6   BILIRUBIN mg/dL 0.5   ALK PHOS U/L 91   AST (SGOT) U/L 24   ALT (SGPT) U/L 7     Results from last 7 days   Lab Units 05/19/25  0527 05/18/25  0604 05/17/25  0611 05/16/25  0732 05/14/25  0637 05/13/25  1428 05/13/25  0611 05/12/25  2359   CALCIUM mg/dL 9.3 8.9 9.0 8.3*   < >  --    < > 9.2   ALBUMIN g/dL  --   --   --   --   --   --   --  3.6   MAGNESIUM mg/dL  --   --   --   --   --  1.7  --   --     < > = values in this interval not displayed.     Results from last 7 days   Lab Units 05/13/25 1428 05/13/25  0239   PROCALCITONIN ng/mL 0.05  --    LACTATE mmol/L  --  1.3     No results found for: \"HGBA1C\", \"POCGLU\"      CT Angiogram Chest  Result Date: 5/18/2025   Pulmonary arteries are well-opacified, and there is no evidence of pulmonary embolism.  Redemonstrated large right hilar mediastinal mass, with moderate to large right pleural effusion and posterior basilar compressive atelectasis in the right lung, no new pulmonary parenchymal abnormality when compared to 5/14/2025.    This report was finalized on 5/18/2025 9:16 PM by Dr. Ty Mock M.D on Workstation: [id number]          I have personally reviewed all medications:  Scheduled Medications  atorvastatin, 20 mg, Oral, Nightly  enoxaparin sodium, 40 mg, Subcutaneous, Q24H  guaiFENesin, 1,200 mg, Oral, Q12H  ipratropium-albuterol, 3 mL, Nebulization, 4x Daily - RT  Lidocaine, 1 patch, Transdermal, Q24H  Menthol-Zinc Oxide, 1 Application, Topical, BID  pantoprazole, 40 mg, Intravenous, Q12H  piperacillin-tazobactam, 3.375 g, Intravenous, Q8H  sodium chloride, 10 mL, Intravenous, Q12H    Infusions   Diet  Diet: " Regular/House; Texture: Regular (IDDSI 7); Fluid Consistency: Thin (IDDSI 0)      Intake/Output Summary (Last 24 hours) at 5/19/2025 0939  Last data filed at 5/19/2025 0550  Gross per 24 hour   Intake --   Output 1850 ml   Net -1850 ml       Assessment/Plan     Active Hospital Problems    Diagnosis  POA   • **Generalized weakness [R53.1]  Yes   • Dizziness [R42]  Yes   • Pneumonia [J18.9]  Yes   • Pleural effusion [J90]  Yes   • At high risk for pressure injury of skin [Z91.89]  Not Applicable   • Decubitus ulcer of sacral region, stage 1 [L89.151]  Yes   • Acute kidney failure [N17.9]  Yes   • Dehydration [E86.0]  Yes   • Elevated troponin [R79.89]  Yes   • Elevated brain natriuretic peptide (BNP) level [R79.89]  Yes   • Hypokalemia [E87.6]  Yes   • Hyperglycemia [R73.9]  Yes      Resolved Hospital Problems   No resolved problems to display.     70 y.o. female with Generalized weakness.    Right sided pneumonia  Pleural effusion  Acute hypoxic respiratory failure  - CT chest with evidence of right hilar mass and bulky mediastinal and hilar lymphadenopathy  - Blood cultures no growth to date  - Pleural fluid culture no growth to date  - Pulmonology consulted  - Thoracentesis 5/14  - Continue Zosyn  - Pleural fluid cytology negative for malignancy  - LN biopsy with IR 5/15, pathology pending  - If lymph node biopsy returns negative, pulmonology considering bronchoscopy with EBUS  - CT angiogram obtained 5/18 showing large right hilar mediastinal mass with moderate to large right pleural effusion, will defer decision regarding repeat thoracentesis to pulmonology    Chest pain  - Patient has been endorsing left-sided chest pain  - Troponin mildly elevated but flat  - EKG with no acute ischemic changes  - CT angiogram chest negative for PE  - Recent echocardiogram with no obvious wall motion abnormalities  - Added lidocaine patch yesterday  - Given ongoing symptoms and no obvious etiology identified, will ask cardiology  to evaluate today    Hyperkalemia  CKD  Metabolic acidosis  -Creatinine stable today and back at baseline  - Potassium normal today  -Bicarbonate improved to 20, anion gap normal  - Repeat BMP with morning labs    Weakness  - Most likely secondary to the above  - PT/OT following  - Likely SNF at discharge    Hypertension  - Blood pressure trending a bit high, will add back home amlodipine at 5 mg daily  - Titrate meds as needed based on BP trends    GERD  - IV PPI    Lovenox 40 mg SC daily for DVT prophylaxis.  Full code.  Discussed with patient, nursing staff, and CCP.  Anticipate discharge to SNU facility timing yet to be determined.    Nataliia Palafox MD  [facility]  05/19/25  09:34 EDT

## 2025-05-19 NOTE — CASE MANAGEMENT/SOCIAL WORK
Continued Stay Note  [facility]     Patient Name: Penelope Onofre  MRN: [id number]  Today's Date: 5/19/2025    Admit Date: 5/12/2025    Plan: [facility] for SNF accepted and  pre-cert approved 5/19/25.   Discharge Plan       Row Name 05/19/25 1538       Plan    Plan [facility] for SNF accepted and  pre-cert approved 5/19/25.    Patient/Family in Agreement with Plan yes    Plan Comments [facility] for SNF accepted and  pre-cert approved. MD states patient should be ready for discharge 1-2 days. Packet: office  Pharmacy: updated  Pre-cert: approved 5/19/25  Transport: need to arrange.....PRC                   Discharge Codes    No documentation.                 Expected Discharge Date and Time       Expected Discharge Date Expected Discharge Time    May 21, 2025               Brittany Mcnair RN

## 2025-05-19 NOTE — PLAN OF CARE
Goal Outcome Evaluation:  Plan of Care Reviewed With: patient        Progress: no change  Outcome Evaluation: Pt A&Ox4, VSS on 2L and no c/o pain. Main c/o was anxiety - treated with Atarax. Seemed to help. Still awaiting biopsy results. Cardiology consulted - signed off. Amlodipine added. Hem/Onn consulted. Will CTM.

## 2025-05-19 NOTE — PROGRESS NOTES
[facility]     Mar/chart reviewed  Follow up lung mass with associted hilar adenopathy and pleural effusion  Had chest pain last night, note ct angio ordered  No increased shorntess of breath today    Vital Sign Min/Max for last 24 hours  Temp  Min: 97.9 °F (36.6 °C)  Max: 98.6 °F (37 °C)   BP  Min: 124/73  Max: 146/86   Pulse  Min: 71  Max: 113   Resp  Min: 18  Max: 20   SpO2  Min: 94 %  Max: 99 %   Flow (L/min) (Oxygen Therapy)  Min: 2  Max: 2   No data recorded     Appears ill, axox3,   perrl, eomi, no icterus,  mmm, no jvd, trachea midline, neck supple,  chest decreased ae bilaterally, no crackles, no wheezes,   rrr,   soft, nt, nd +bs,  no c/c/ e  Skin warm, dry no rashes    Labs: 5/19: reviewed:  Glucose 75  Bun 8  Cr 1.09  Bicarb 20  Wbc 6  Hgb 13.9  Plts 136    A/P:  1. AHRF - oxygen as needed  2. Right pleural effusino -- s/p thora c/w exudate but cytology, note yield on malignant effusion on single thoracentesis tends to be low.  3. Lung mass with hilar adenopathy -- awaiting pathology, if neg will need bronch/ebus  4. Possible pna -- antibotics as previously outlined    Would not repeat thoracentesis at this point unless for thereapeutic benefit. Awaiting pathology still    Patient is new to me today

## 2025-05-19 NOTE — CONSULTS
Patient Name: Penelope Onofre  :1955  70 y.o.    Date of Admission: 2025  Encounter Provider: Eduarda Asencio MD  Date of Encounter Visit: 25  Place of Service: [facility] CARDIOLOGY  Referring Provider: Manjeet Christiansen MD  Patient Care Team:  Bonny Judge MD as PCP - General (Internal Medicine)      Chief complaint: Generalized weakness      History of Present Illness:    Penelope Onofre is a 70 year old female. not followed by our practice who presented to the emergency room on 24 with complaints of dizziness, weakness, and malnutrition. Her initial O2 sats were 80% on room air. She was admittied to [facility] with a pulmonary consult at this time for further work up. She was found to have a large right plueral effusion and large mediastinal and hilar lymphadenopathy. She reports a 40 pack year smoking history and quit one year ago. She had a thoracentesis on , pleural fluid negative for malignancy, and a lymph node bx on 5/15, results pending. On  she complained of left sided chest pain, she had an EKG and troponin which were unchanged from lesley, and  CTA to rule out PE which was negative.     She has a chest discomfort over her left chest which she says is worse when she is feeling emotionally stressed.  It is better when she is not feeling stressed.  She has a lot of dyspnea on exertion and house for about 8 months.  She generally does not have chest pain with exertion.    Echocardiogram 25    Left ventricular systolic function is normal.    Left ventricular diastolic function is consistent with (grade I) impaired relaxation.    Normal valvular structure and function    Past Medical History:   Diagnosis Date    Chronic back pain     Claudication     GERD (gastroesophageal reflux disease)        Past Surgical History:   Procedure Laterality Date    BREAST CYST EXCISION Right     TONSILLECTOMY      US GUIDED LYMPH NODE BIOPSY  5/15/2025          Prior to Admission medications    Medication Sig Start Date End Date Taking? Authorizing Provider   acetaminophen (TYLENOL) 500 MG tablet Take 1 tablet by mouth Every 6 (Six) Hours As Needed for Mild Pain.   Yes Emergency, Nurse TRISTAN Aquino   amLODIPine (NORVASC) 10 MG tablet Take 1 tablet by mouth Daily. 1/25/19  Yes Yohana Perea PA-C   atorvastatin (LIPITOR) 20 MG tablet Take 1 tablet by mouth Every Night.   Yes Provider, MD Xuan   omeprazole (PriLOSEC) 40 MG capsule Take 1 capsule by mouth Daily.   Yes Emergency, Nurse Epic, RN   cyclobenzaprine (FLEXERIL) 10 MG tablet Take 1 tablet by mouth 3 (Three) Times a Day As Needed for Muscle Spasms. 1/22/19   Patricio Reyes MD   HYDROcodone-acetaminophen (NORCO) 5-325 MG per tablet Take 1 tablet by mouth Every 4 (Four) Hours As Needed (pain). 11/17/18   Augustus Lawson MD   naproxen (NAPROSYN) 500 MG tablet Take 1 tablet by mouth 2 (Two) Times a Day With Meals. 1/22/19   Patricio Reyes MD   Terbinafine-Hydryprop Chitosan 250 MG & 1% kit by Combination route.    Emergency, Nurse TRISTAN Aquino       Allergies   Allergen Reactions    Codeine Unknown - Low Severity    Sulfa Antibiotics Unknown - Low Severity       Social History     Socioeconomic History    Marital status:    Tobacco Use    Smoking status: Former     Current packs/day: 1.00     Average packs/day: 1 pack/day for 40.0 years (40.0 ttl pk-yrs)     Types: Cigarettes    Smokeless tobacco: Never    Tobacco comments:     quit 2 weeks ago   Vaping Use    Vaping status: Never Used   Substance and Sexual Activity    Alcohol use: No    Drug use: No    Sexual activity: Defer       Family History   Problem Relation Age of Onset    Alzheimer's disease Mother     Cancer Father        REVIEW OF SYSTEMS:   All other systems reviewed and negative.        Objective:     Vitals:    05/18/25 1944 05/19/25 0028 05/19/25 0314 05/19/25 0751   BP: 146/86 135/78 124/73 141/83   BP Location: Left arm Right arm  Right arm Right arm   Patient Position: Lying Lying Lying Lying   Pulse: 102 99 89 71   Resp: 18 18 18 20   Temp: 98.6 °F (37 °C) 98.5 °F (36.9 °C) 98.5 °F (36.9 °C) 97.9 °F (36.6 °C)   TempSrc: Oral Oral Oral Oral   SpO2: 98% 98% 94%    Weight:       Height:         Body mass index is 18.99 kg/m².    Intake/Output Summary (Last 24 hours) at 5/19/2025 1221  Last data filed at 5/19/2025 0550  Gross per 24 hour   Intake --   Output 1850 ml   Net -1850 ml       Lab Review:     Results from last 7 days   Lab Units 05/19/25  0527 05/13/25  0611 05/12/25  2359   SODIUM mmol/L 136   < > 139   POTASSIUM mmol/L 5.2   < > 3.4*   CHLORIDE mmol/L 105   < > 105   CO2 mmol/L 20.0*   < > 20.5*   BUN mg/dL 8   < > 13   CREATININE mg/dL 1.09*   < > 1.21*   CALCIUM mg/dL 9.3   < > 9.2   BILIRUBIN mg/dL  --   --  0.5   ALK PHOS U/L  --   --  91   ALT (SGPT) U/L  --   --  7   AST (SGOT) U/L  --   --  24   GLUCOSE mg/dL 75   < > 135*    < > = values in this interval not displayed.     Results from last 7 days   Lab Units 05/18/25  2120 05/18/25  1835 05/16/25  0732 05/15/25  0641 05/14/25  0637 05/13/25  0611   CK TOTAL U/L  --   --  61 68 59  --    HSTROP T ng/L 33* 34*  --   --   --  32*     Results from last 7 days   Lab Units 05/19/25  0527   WBC 10*3/mm3 6.23   HEMOGLOBIN g/dL 13.9   HEMATOCRIT % 41.4   PLATELETS 10*3/mm3 136*         Results from last 7 days   Lab Units 05/13/25  1428   MAGNESIUM mg/dL 1.7         I personally viewed and interpreted the patient's EKG/Telemetry data.                  Assessment and Plan:       1.  Noncardiac chest pain.  Troponins flat consistent with a type II myocardial infarction.  EKG without acute changes.  Echocardiogram with normal LV systolic function.  I reviewed the CTA of her chest from yesterday.  It is not gated but she has minimal coronary artery calcification mostly located in the mid LAD and mid circumflex.  There is no also no pulmonary embolus.  I do not recommend any further  cardiac testing at this time.  I recommend proceeding with what ever procedures she might need for evaluation of her cancer.  2.  Right sided pleural effusion/pneumonia/cancer  3.  Hypertension.  Agree with the addition of amlodipine.    Eduarda Asencio MD  05/19/25  12:21 EDT

## 2025-05-19 NOTE — PLAN OF CARE
Goal Outcome Evaluation:      No change      Problem: Fatigue  Goal: Improved Activity Tolerance  Outcome: Progressing     Problem: Adult Inpatient Plan of Care  Goal: Plan of Care Review  Outcome: Progressing  Goal: Patient-Specific Goal (Individualized)  Outcome: Progressing  Goal: Absence of Hospital-Acquired Illness or Injury  Outcome: Progressing  Intervention: Identify and Manage Fall Risk  Recent Flowsheet Documentation  Taken 5/19/2025 0434 by Geovanni Abebe RN  Safety Promotion/Fall Prevention:   safety round/check completed   room organization consistent   nonskid shoes/slippers when out of bed   fall prevention program maintained   clutter free environment maintained  Taken 5/19/2025 0202 by Geovanni Abebe RN  Safety Promotion/Fall Prevention:   safety round/check completed   room organization consistent   nonskid shoes/slippers when out of bed   fall prevention program maintained   clutter free environment maintained  Taken 5/19/2025 0055 by Geovanni Abebe RN  Safety Promotion/Fall Prevention:   nonskid shoes/slippers when out of bed   safety round/check completed   room organization consistent   fall prevention program maintained   clutter free environment maintained  Taken 5/18/2025 2200 by Geovanni Abebe RN  Safety Promotion/Fall Prevention:   safety round/check completed   room organization consistent   nonskid shoes/slippers when out of bed   fall prevention program maintained   clutter free environment maintained  Intervention: Prevent Skin Injury  Recent Flowsheet Documentation  Taken 5/19/2025 0434 by Geovanni Abebe RN  Body Position:   position changed independently   weight shifting   supine  Taken 5/19/2025 0202 by Geovanni Abebe RN  Body Position:   position changed independently   position maintained  Taken 5/19/2025 0055 by Geovanni Abebe RN  Body Position:   position changed independently   supine   sitting up in bed  Taken 5/18/2025 2200 by Geovanni Abebe RN  Body Position:    weight shifting   position changed independently  Intervention: Prevent Infection  Recent Flowsheet Documentation  Taken 5/19/2025 0434 by Geovanni Abebe RN  Infection Prevention:   single patient room provided   rest/sleep promoted   hand hygiene promoted  Taken 5/19/2025 0202 by Geovanni Abebe RN  Infection Prevention:   single patient room provided   rest/sleep promoted   hand hygiene promoted  Taken 5/19/2025 0055 by Geovanni Abebe RN  Infection Prevention:   rest/sleep promoted   single patient room provided   hand hygiene promoted  Taken 5/18/2025 2200 by Geovanni Abebe RN  Infection Prevention:   single patient room provided   rest/sleep promoted   hand hygiene promoted  Goal: Optimal Comfort and Wellbeing  Outcome: Progressing  Intervention: Monitor Pain and Promote Comfort  Recent Flowsheet Documentation  Taken 5/18/2025 2200 by Geovanni Abebe RN  Pain Management Interventions: pain medication given  Intervention: Provide Person-Centered Care  Recent Flowsheet Documentation  Taken 5/19/2025 0055 by Geovanni Abebe RN  Trust Relationship/Rapport:   care explained   choices provided   thoughts/feelings acknowledged  Taken 5/18/2025 2200 by Geovanni Abebe RN  Trust Relationship/Rapport:   care explained   choices provided   thoughts/feelings acknowledged  Goal: Readiness for Transition of Care  Outcome: Progressing     Problem: Skin Injury Risk Increased  Goal: Skin Health and Integrity  Outcome: Progressing  Intervention: Optimize Skin Protection  Recent Flowsheet Documentation  Taken 5/19/2025 0434 by Geovanni Abebe RN  Head of Bed (HOB) Positioning: HOB elevated  Taken 5/19/2025 0202 by Geovanni Abebe RN  Head of Bed (HOB) Positioning: HOB elevated  Taken 5/19/2025 0055 by Geovanni Abebe RN  Head of Bed (HOB) Positioning: HOB elevated  Taken 5/18/2025 2200 by Geovanni Abebe RN  Head of Bed (HOB) Positioning: HOB elevated     Problem: Fall Injury Risk  Goal: Absence of Fall and Fall-Related  Injury  Outcome: Progressing  Intervention: Identify and Manage Contributors  Recent Flowsheet Documentation  Taken 5/19/2025 0434 by Geovanni Abebe RN  Medication Review/Management:   medications reviewed   high-risk medications identified  Taken 5/19/2025 0202 by Geovanni Abebe RN  Medication Review/Management:   medications reviewed   high-risk medications identified  Taken 5/19/2025 0055 by Geovanni Abebe RN  Medication Review/Management:   medications reviewed   high-risk medications identified  Taken 5/18/2025 2200 by Geovanni Abebe RN  Medication Review/Management:   medications reviewed   high-risk medications identified  Intervention: Promote Injury-Free Environment  Recent Flowsheet Documentation  Taken 5/19/2025 0434 by Geovanni Abebe RN  Safety Promotion/Fall Prevention:   safety round/check completed   room organization consistent   nonskid shoes/slippers when out of bed   fall prevention program maintained   clutter free environment maintained  Taken 5/19/2025 0202 by Geovanni Abebe RN  Safety Promotion/Fall Prevention:   safety round/check completed   room organization consistent   nonskid shoes/slippers when out of bed   fall prevention program maintained   clutter free environment maintained  Taken 5/19/2025 0055 by Geovanni Abebe RN  Safety Promotion/Fall Prevention:   nonskid shoes/slippers when out of bed   safety round/check completed   room organization consistent   fall prevention program maintained   clutter free environment maintained  Taken 5/18/2025 2200 by Geovanni Abebe RN  Safety Promotion/Fall Prevention:   safety round/check completed   room organization consistent   nonskid shoes/slippers when out of bed   fall prevention program maintained   clutter free environment maintained     Problem: Comorbidity Management  Goal: Maintenance of COPD Symptom Control  Outcome: Progressing  Intervention: Maintain COPD (Chronic Obstructive Pulmonary Disease) Symptom Control  Recent  Flowsheet Documentation  Taken 5/19/2025 0434 by Geovanni Abebe RN  Medication Review/Management:   medications reviewed   high-risk medications identified  Taken 5/19/2025 0202 by Geovanni Abebe RN  Medication Review/Management:   medications reviewed   high-risk medications identified  Taken 5/19/2025 0055 by Geovanni Abebe RN  Medication Review/Management:   medications reviewed   high-risk medications identified  Taken 5/18/2025 2200 by Geovanni Abebe RN  Medication Review/Management:   medications reviewed   high-risk medications identified  Goal: Blood Pressure in Desired Range  Outcome: Progressing  Intervention: Maintain Blood Pressure Management  Recent Flowsheet Documentation  Taken 5/19/2025 0434 by Geovanni Abebe RN  Medication Review/Management:   medications reviewed   high-risk medications identified  Taken 5/19/2025 0202 by Geovanni Abebe RN  Medication Review/Management:   medications reviewed   high-risk medications identified  Taken 5/19/2025 0055 by Geovanni Abebe RN  Medication Review/Management:   medications reviewed   high-risk medications identified  Taken 5/18/2025 2200 by Geovanni Abebe RN  Medication Review/Management:   medications reviewed   high-risk medications identified

## 2025-05-19 NOTE — PROGRESS NOTES
"Nutrition Services    Patient Name: Penelope Onofre  YOB: 1955  MRN: [id number]  Admission date: 5/12/2025    Assessment Date:  05/19/25    Recommend advance diet as soon as able. Will offer supplements to help with nutrient intake if desired. Will perform NFPE at follow up.     NUTRITION EVALUATION      Reason for Encounter BMI < 19.0   Diagnosis/Problem Admission Diagnosis:  Generalized weakness [R53.1]  Pneumonia of right lower lobe due to infectious organism [J18.9]    Problem List:    Generalized weakness    Dizziness    Pneumonia    Pleural effusion    At high risk for pressure injury of skin    Decubitus ulcer of sacral region, stage 1    Acute kidney failure    Dehydration    Elevated troponin    Elevated brain natriuretic peptide (BNP) level    Hypokalemia    Hyperglycemia     Narrative Pt came to hospital with shortness of breath. She had 1L of fluid removed from her lung on 5/14 and CT chest with contrast after thoracentesis shows further evidence of a right hilar mass.     5/19 Patient reports anxiety about diet and Boost supplement.  States she cannot drink Boost due to her kidneys.  Willing to drink Novasource Renal.  Will also trial Boost Breeze.  Suggest low K diet when po resumes.  Pathology still pending       PO Diet NPO Diet NPO Type: Sips with Meds   Allergies NKFA   Supplements Boost Plus, BID   PO Intake % ~50%       Chewing/Swallowing Difficulty no issues identified at this time, evaluated by SLP and safe for normal textures and thin liquids.        Medications reviewed, protonix, antibiotics   Labs  reviewed       Physical Findings alert, oriented, on oxygen therapy     Edema no edema    GI Function WDL, last bowel movement: 5/16   Skin Status pressure injury: Stage 1 midline anus    Lines/Drains none   I/O reviewed       Height  Weight  BMI  Weight Trend     Height: 182.9 cm (72\")  Weight: 63.5 kg (140 lb) (05/13/25 1518)  Body mass index is 18.99 kg/m².  Stable per chart " review (2 pound loss since office visit 1 year ago)    Weight change: No significant changes       NFPE Unable to perform due to: pt extremely anxious       Nutrition Problem (PES) Problem: Inadequate Oral Intake  Etiology: Other: Clinical course    Signs/Symptoms: NPO and PO intake 0% since admission       Intervention/Plan Adding Novasource Renal BID (Provides 950 kcals, 43 g protein if consumed)  and   Boost Breeze BID (provides 500 kcals, 18 g protein if consumed)  to help po intake when diet resumes     Will perform NFPE at follow up if pt condition allows.     RD to follow up per protocol.     Results from last 7 days   Lab Units 05/19/25  0527 05/18/25  0604 05/17/25  1127 05/17/25  0611 05/13/25  0611 05/12/25  2359   SODIUM mmol/L 136 137  --  142   < > 139   POTASSIUM mmol/L 5.2 4.9 5.2 5.8*   < > 3.4*   CHLORIDE mmol/L 105 104  --  109*   < > 105   CO2 mmol/L 20.0* 19.0*  --  21.7*   < > 20.5*   BUN mg/dL 8 7*  --  7*   < > 13   CREATININE mg/dL 1.09* 1.10*  --  1.06*   < > 1.21*   CALCIUM mg/dL 9.3 8.9  --  9.0   < > 9.2   BILIRUBIN mg/dL  --   --   --   --   --  0.5   ALK PHOS U/L  --   --   --   --   --  91   ALT (SGPT) U/L  --   --   --   --   --  7   AST (SGOT) U/L  --   --   --   --   --  24   GLUCOSE mg/dL 75 100*  --  113*   < > 135*    < > = values in this interval not displayed.     Results from last 7 days   Lab Units 05/19/25  0527 05/14/25  0637 05/13/25  1428   MAGNESIUM mg/dL  --   --  1.7   HEMOGLOBIN g/dL 13.9   < >  --    HEMATOCRIT % 41.4   < >  --     < > = values in this interval not displayed.     Lab Results   Component Value Date    HGBA1C 5.50 05/13/2025     Wt Readings from Last 10 Encounters:   05/13/25 63.5 kg (140 lb)   01/25/19 76.2 kg (168 lb 1.6 oz)   01/22/19 83.9 kg (185 lb)   11/17/18 82.6 kg (182 lb)   06/29/18 90.7 kg (200 lb)   04/28/18 93 kg (205 lb)   04/20/18 90.7 kg (200 lb)   05/16/17 95.3 kg (210 lb)   01/03/17 99.3 kg (219 lb)   07/27/16 98 kg (216 lb)        Electronically signed by:  Jeff Snowden RD  05/19/25 18:01 EDT

## 2025-05-19 NOTE — CASE MANAGEMENT/SOCIAL WORK
Post-Acute Authorization Submission      Post Acute Pre-Cert Documentation  Request Submitted by Facility - Type:: Hospital  Post-Acute Authorization Type Submitted:: SNF  Date Post Acute Pre-Cert Inititated per Facility: 05/19/25  Date Post Acute Pre-Cert Completed: 05/19/25  Accepting Facility: [facility] Discharge Date Requested: 05/20/25  All Clinicals Submitted?: Yes  Had Accepting Facility at Time of Submission: Yes  Response Received from Insurance?: Approval  Response Communicated to:: , Accepting Facility Liaison, Accepting Facility Auth Department  Authorization Number:: APPROVED [id number]/[id number] [id number]  Post Acute Pre-Cert Initiated Comment: VALID TO ADMIT UPTO 5/23/25. AUTH DUPLICATED, INITIAL AUTH ENDING IN 0175 VOIDED. CORRECT AUTH APPROVAL# LISTED ABOVE.              Efra Ellis, PCT

## 2025-05-19 NOTE — PLAN OF CARE
Goal Outcome Evaluation:              Outcome Evaluation: Continued impaired endurance, balance, activity tolerance and independence w/ mobility during PT today.  Able tos it up w/ SBA.  Stood and took few steps at EOB w/ min A using RW, limited by fatigue and SOB.  Sp02 to 91% and HR from 110s at rest to 139.  Recommend DC to SNU, pt normally lives at home independently.    Anticipated Discharge Disposition (PT): skilled nursing facility

## 2025-05-19 NOTE — CASE MANAGEMENT/SOCIAL WORK
Post-Acute Authorization Submission      Post Acute Pre-Cert Documentation  Request Submitted by Facility - Type:: Hospital  Post-Acute Authorization Type Submitted:: SNF  Date Post Acute Pre-Cert Inititated per Facility: 05/19/25  Accepting Facility: [facility] Discharge Date Requested: 05/20/25  All Clinicals Submitted?: Yes  Had Accepting Facility at Time of Submission: Yes  Response Communicated to:: , Accepting Facility Liaison  Authorization Number:: PENDING [id number]              NONA Thurman

## 2025-05-19 NOTE — CASE MANAGEMENT/SOCIAL WORK
Continued Stay Note  [facility]     Patient Name: Penelope Onofre  MRN: [id number]  Today's Date: 5/19/2025    Admit Date: 5/12/2025    Plan: [facility] for SNF accepted pending pre-cert (submitted 5/19/25).   Discharge Plan       Row Name 05/19/25 1229       Plan    Plan [facility] for SNF accepted pending pre-cert (submitted 5/19/25).    Patient/Family in Agreement with Plan yes    Plan Comments Spoke with [facility] who states can accept patient pending pre-cert. Packet: office  Pharmacy: updated  Pre-cert: submitted 5/19/25  Transport: need to arrange. Continue to follow....[facility]                   Discharge Codes    No documentation.                 Expected Discharge Date and Time       Expected Discharge Date Expected Discharge Time    May 20, 2025               Brittany Mcnair RN

## 2025-05-20 PROBLEM — C34.2: Status: ACTIVE | Noted: 2025-05-20

## 2025-05-20 LAB
ANION GAP SERPL CALCULATED.3IONS-SCNC: 14 MMOL/L (ref 5–15)
BASOPHILS # BLD AUTO: 0.06 10*3/MM3 (ref 0–0.2)
BASOPHILS NFR BLD AUTO: 0.9 % (ref 0–1.5)
BUN SERPL-MCNC: 11 MG/DL (ref 8–23)
BUN/CREAT SERPL: 9.4 (ref 7–25)
CALCIUM SPEC-SCNC: 9 MG/DL (ref 8.6–10.5)
CHLORIDE SERPL-SCNC: 106 MMOL/L (ref 98–107)
CO2 SERPL-SCNC: 17 MMOL/L (ref 22–29)
CREAT SERPL-MCNC: 1.17 MG/DL (ref 0.57–1)
DEPRECATED RDW RBC AUTO: 48.6 FL (ref 37–54)
EGFRCR SERPLBLD CKD-EPI 2021: 50 ML/MIN/1.73
EOSINOPHIL # BLD AUTO: 0.21 10*3/MM3 (ref 0–0.4)
EOSINOPHIL NFR BLD AUTO: 3.3 % (ref 0.3–6.2)
ERYTHROCYTE [DISTWIDTH] IN BLOOD BY AUTOMATED COUNT: 12.8 % (ref 12.3–15.4)
GLUCOSE SERPL-MCNC: 68 MG/DL (ref 65–99)
HCT VFR BLD AUTO: 41.5 % (ref 34–46.6)
HGB BLD-MCNC: 13.9 G/DL (ref 12–15.9)
IMM GRANULOCYTES # BLD AUTO: 0.03 10*3/MM3 (ref 0–0.05)
IMM GRANULOCYTES NFR BLD AUTO: 0.5 % (ref 0–0.5)
LYMPHOCYTES # BLD AUTO: 1.51 10*3/MM3 (ref 0.7–3.1)
LYMPHOCYTES NFR BLD AUTO: 23.7 % (ref 19.6–45.3)
MCH RBC QN AUTO: 34.5 PG (ref 26.6–33)
MCHC RBC AUTO-ENTMCNC: 33.5 G/DL (ref 31.5–35.7)
MCV RBC AUTO: 103 FL (ref 79–97)
MONOCYTES # BLD AUTO: 0.64 10*3/MM3 (ref 0.1–0.9)
MONOCYTES NFR BLD AUTO: 10 % (ref 5–12)
NEUTROPHILS NFR BLD AUTO: 3.93 10*3/MM3 (ref 1.7–7)
NEUTROPHILS NFR BLD AUTO: 61.6 % (ref 42.7–76)
NRBC BLD AUTO-RTO: 0 /100 WBC (ref 0–0.2)
PLATELET # BLD AUTO: 164 10*3/MM3 (ref 140–450)
PMV BLD AUTO: 10.5 FL (ref 6–12)
POTASSIUM SERPL-SCNC: 4.7 MMOL/L (ref 3.5–5.2)
RBC # BLD AUTO: 4.03 10*6/MM3 (ref 3.77–5.28)
SODIUM SERPL-SCNC: 137 MMOL/L (ref 136–145)
URATE SERPL-MCNC: 5.3 MG/DL (ref 2.4–5.7)
WBC NRBC COR # BLD AUTO: 6.38 10*3/MM3 (ref 3.4–10.8)

## 2025-05-20 PROCEDURE — 94799 UNLISTED PULMONARY SVC/PX: CPT

## 2025-05-20 PROCEDURE — 99223 1ST HOSP IP/OBS HIGH 75: CPT | Performed by: INTERNAL MEDICINE

## 2025-05-20 PROCEDURE — 84550 ASSAY OF BLOOD/URIC ACID: CPT | Performed by: INTERNAL MEDICINE

## 2025-05-20 PROCEDURE — 94664 DEMO&/EVAL PT USE INHALER: CPT

## 2025-05-20 PROCEDURE — 94760 N-INVAS EAR/PLS OXIMETRY 1: CPT

## 2025-05-20 PROCEDURE — 94761 N-INVAS EAR/PLS OXIMETRY MLT: CPT

## 2025-05-20 PROCEDURE — 80048 BASIC METABOLIC PNL TOTAL CA: CPT | Performed by: INTERNAL MEDICINE

## 2025-05-20 PROCEDURE — 85025 COMPLETE CBC W/AUTO DIFF WBC: CPT | Performed by: INTERNAL MEDICINE

## 2025-05-20 PROCEDURE — 25010000002 ENOXAPARIN PER 10 MG: Performed by: INTERNAL MEDICINE

## 2025-05-20 RX ORDER — DIAZEPAM 10 MG/2ML
5 INJECTION, SOLUTION INTRAMUSCULAR; INTRAVENOUS ONCE
Status: DISCONTINUED | OUTPATIENT
Start: 2025-05-20 | End: 2025-05-21

## 2025-05-20 RX ADMIN — GUAIFENESIN 1200 MG: 600 TABLET, EXTENDED RELEASE ORAL at 20:04

## 2025-05-20 RX ADMIN — GUAIFENESIN 1200 MG: 600 TABLET, EXTENDED RELEASE ORAL at 09:40

## 2025-05-20 RX ADMIN — IPRATROPIUM BROMIDE AND ALBUTEROL SULFATE 3 ML: .5; 3 SOLUTION RESPIRATORY (INHALATION) at 14:46

## 2025-05-20 RX ADMIN — IPRATROPIUM BROMIDE AND ALBUTEROL SULFATE 3 ML: .5; 3 SOLUTION RESPIRATORY (INHALATION) at 06:53

## 2025-05-20 RX ADMIN — MENTHOL, ZINC OXIDE 1 APPLICATION: .44; 20.6 OINTMENT TOPICAL at 20:07

## 2025-05-20 RX ADMIN — MENTHOL, ZINC OXIDE 1 APPLICATION: .44; 20.6 OINTMENT TOPICAL at 09:41

## 2025-05-20 RX ADMIN — AMLODIPINE BESYLATE 5 MG: 5 TABLET ORAL at 09:40

## 2025-05-20 RX ADMIN — Medication 10 ML: at 09:35

## 2025-05-20 RX ADMIN — IPRATROPIUM BROMIDE AND ALBUTEROL SULFATE 3 ML: .5; 3 SOLUTION RESPIRATORY (INHALATION) at 10:47

## 2025-05-20 RX ADMIN — LIDOCAINE 1 PATCH: 4 PATCH TOPICAL at 09:41

## 2025-05-20 RX ADMIN — ATORVASTATIN CALCIUM 20 MG: 20 TABLET, FILM COATED ORAL at 20:04

## 2025-05-20 RX ADMIN — PANTOPRAZOLE SODIUM 40 MG: 40 INJECTION, POWDER, FOR SOLUTION INTRAVENOUS at 20:04

## 2025-05-20 RX ADMIN — Medication 10 ML: at 20:07

## 2025-05-20 RX ADMIN — ENOXAPARIN SODIUM 40 MG: 100 INJECTION SUBCUTANEOUS at 13:52

## 2025-05-20 RX ADMIN — PANTOPRAZOLE SODIUM 40 MG: 40 INJECTION, POWDER, FOR SOLUTION INTRAVENOUS at 09:41

## 2025-05-20 NOTE — PAYOR COMM NOTE
"Amrit Onofre (70 y.o. Female)     ATTN: NURSE REVIEWER  [facility]: [facility]   REF: [id number]  PLS REPLY TO MICHAEL CARRERA [phone] OR FAX# [phone]      Date of Birth   1955    Social Security Number       Address   [address] [place] [address] [address] [place] [address]    Home Phone   [phone]    MRN   [id number]       Lutheran   None    Marital Status                               Admission Date   5/12/2025    Admission Type   Emergency    Admitting Provider   Oren Copeland MD    Attending Provider   Nataliia Palafox MD    Department, Room/Bed   [address], P587/1       Discharge Date       Discharge Disposition       Discharge Destination                                 Attending Provider: Nataliia Palafox MD    Allergies: Codeine, Sulfa Antibiotics    Isolation: None   Infection: None   Code Status: CPR    Ht: 182.9 cm (72\")   Wt: 63.5 kg (140 lb)    Admission Cmt: None   Principal Problem: Generalized weakness [R53.1]                   Active Insurance as of 5/12/2025       Primary Coverage       Payor Plan Insurance Group Employer/Plan Group    ANTHEM MEDICARE REPLACEMENT ANTHEM MEDICARE ADVANTAGE HMO [id number]       Payor Plan Address Payor Plan Phone Number Payor Plan Fax Number Effective Dates    [address] [phone]  1/1/2025 - None Entered    [facility] [id number]         Subscriber Name Subscriber Birth Date Member ID       AMRIT ONOFRE 1955 [id number]                     Emergency Contacts        (Rel.) Home Phone Work Phone Mobile Phone    Imani,Kenny (Other) -- -- [phone]              Facility-Administered Medications as of 5/20/2025   Medication Dose Route Frequency Provider Last Rate Last Admin    acetaminophen (TYLENOL) tablet 650 mg  650 mg Oral Q4H PRN Oren Copeland MD        Or    acetaminophen (TYLENOL) 160 MG/5ML oral solution 650 mg  650 mg Oral Q4H PRN Oren Copeland MD        Or    " acetaminophen (TYLENOL) suppository 650 mg  650 mg Rectal Q4H PRN Oren Copeland MD        acetaminophen (TYLENOL) tablet 500 mg  500 mg Oral Q6H PRN Oren Copeland MD        amLODIPine (NORVASC) tablet 5 mg  5 mg Oral Q24H Nataliia Palafox MD   5 mg at 05/20/25 0940    atorvastatin (LIPITOR) tablet 20 mg  20 mg Oral Nightly Oren Copeland MD   20 mg at 05/19/25 2005    [COMPLETED] azithromycin (ZITHROMAX) 500 mg in sodium chloride 0.9 % 250 mL IVPB-VTB  500 mg Intravenous Q24H Oren Copeland MD   500 mg at 05/15/25 1320    calcium carbonate (TUMS) chewable tablet 500 mg (200 mg elemental)  2 tablet Oral BID PRN Oren Copeland MD        Calcium Replacement - Follow Nurse / BPA Driven Protocol   Not Applicable PRN Oren Copeland MD        [COMPLETED] cefTRIAXone (ROCEPHIN) 2,000 mg in sodium chloride 0.9 % 100 mL MBP  2,000 mg Intravenous Once Nirav Kruger MD   Stopped at 05/13/25 0330    diazePAM (VALIUM) injection 5 mg  5 mg Intravenous Once Yannick Koroma MD PhD        enoxaparin sodium (LOVENOX) syringe 40 mg  40 mg Subcutaneous Q24H Oren Copeland MD   40 mg at 05/20/25 1352    guaiFENesin (MUCINEX) 12 hr tablet 1,200 mg  1,200 mg Oral Q12H Oren Copeland MD   1,200 mg at 05/20/25 0940    HYDROcodone-acetaminophen (NORCO) 5-325 MG per tablet 1 tablet  1 tablet Oral Q4H PRN Oren Copeland MD   1 tablet at 05/19/25 2005    hydrOXYzine (ATARAX) tablet 25 mg  25 mg Oral TID PRN Nataliia Palafox MD   25 mg at 05/19/25 1128    [COMPLETED] iopamidol (ISOVUE-300) 61 % injection 100 mL  100 mL Intravenous Once in imaging Oren Copeland MD   95 mL at 05/14/25 1549    [COMPLETED] iopamidol (ISOVUE-370) 76 % injection 100 mL  100 mL Intravenous Once in imaging [place], MD Nataliia   100 mL at 05/18/25 2026    ipratropium-albuterol (DUO-NEB) nebulizer solution 3 mL  3 mL Nebulization 4x Daily - RT Oren Copeland MD   3 mL at 05/20/25 1446    ipratropium-albuterol (DUO-NEB) nebulizer  solution 3 mL  3 mL Nebulization TID PRN Nataliia Palafox MD        [COMPLETED] lidocaine (XYLOCAINE) 1 % injection 10 mL  10 mL Injection Once Phillip Starkey MD   5 mL at 05/14/25 0935    [COMPLETED] lidocaine (XYLOCAINE) 1 % injection 10 mL  10 mL Subcutaneous Once Wu Ortega MD   3 mL at 05/15/25 1559    Lidocaine 4 % 1 patch  1 patch Transdermal Q24H Nataliia Palafox MD   1 patch at 05/20/25 0941    [COMPLETED] LORazepam (ATIVAN) injection 1 mg  1 mg Intravenous Once PRN Agustin Orellana DO   1 mg at 05/14/25 0843    LORazepam (ATIVAN) injection 1 mg  1 mg Intravenous Once PRN Agustin Orellana DO        Magnesium Standard Dose Replacement - Follow Nurse / BPA Driven Protocol   Not Applicable PRN Oren Copeland MD        Menthol-Zinc Oxide 1 Application  1 Application Topical BID Oren Copeland MD   1 Application at 05/20/25 0941    nitroglycerin (NITROSTAT) SL tablet 0.4 mg  0.4 mg Sublingual Q5 Min PRN Oren Copeland MD        ondansetron ODT (ZOFRAN-ODT) disintegrating tablet 4 mg  4 mg Oral Q6H PRN Oren Copeland MD        Or    ondansetron (ZOFRAN) injection 4 mg  4 mg Intravenous Q6H PRN Oren Copeland MD   4 mg at 05/17/25 1527    pantoprazole (PROTONIX) injection 40 mg  40 mg Intravenous Q12H Nataliia Palafox MD   40 mg at 05/20/25 0941    [COMPLETED] perflutren (DEFINITY) 8.476 mg in Sodium Chloride (PF) 0.9 % 10 mL injection  2 mL Intravenous Once in imaging Oren Copeland MD   2 mL at 05/13/25 1521    Phosphorus Replacement - Follow Nurse / BPA Driven Protocol   Not Applicable PRN Oren Copeland MD        [COMPLETED] piperacillin-tazobactam (ZOSYN) 3.375 g IVPB in 100 mL NS MBP (CD)  3.375 g Intravenous Q8H Oren Copeland MD   3.375 g at 05/19/25 1128    [COMPLETED] potassium chloride (KLOR-CON M20) CR tablet 40 mEq  40 mEq Oral Q4H Oren Copeland MD   40 mEq at 05/17/25 0011    Potassium Replacement - Follow Nurse / BPA Driven Protocol   Not  Applicable PRN Oren Copeland MD        prochlorperazine (COMPAZINE) injection 2.5 mg  2.5 mg Intravenous Q6H PRN Nataliia Palafox MD   2.5 mg at 25 2217    sodium chloride 0.9 % flush 10 mL  10 mL Intravenous Q12H Oren Copeland MD   10 mL at 25 0935    sodium chloride 0.9 % flush 10 mL  10 mL Intravenous PRN Oren Copeland MD        sodium chloride 0.9 % infusion 40 mL  40 mL Intravenous BEARN Oren Copeland MD        [] sodium chloride 0.9 % infusion  100 mL/hr Intravenous Continuous Nataliia Palafox MD         Lab Results (last 24 hours)       Procedure Component Value Units Date/Time    Uric Acid [[id number]]  (Normal) Collected: 25    Specimen: Blood Updated: 25 1253     Uric Acid 5.3 mg/dL     Basic Metabolic Panel [[id number]]  (Abnormal) Collected: 25    Specimen: Blood Updated: 25 0638     Glucose 68 mg/dL      BUN 11 mg/dL      Creatinine 1.17 mg/dL      Sodium 137 mmol/L      Potassium 4.7 mmol/L      Comment: Slight hemolysis detected by analyzer. Result may be falsely elevated.        Chloride 106 mmol/L      CO2 17.0 mmol/L      Calcium 9.0 mg/dL      BUN/Creatinine Ratio 9.4     Anion Gap 14.0 mmol/L      eGFR 50.0 mL/min/1.73     Narrative:      GFR Categories in Chronic Kidney Disease (CKD)              GFR Category          GFR (mL/min/1.73)    Interpretation  G1                    90 or greater        Normal or high (1)  G2                    60-89                Mild decrease (1)  G3a                   45-59                Mild to moderate decrease  G3b                   30-44                Moderate to severe decrease  G4                    15-29                Severe decrease  G5                    14 or less           Kidney failure    (1)In the absence of evidence of kidney disease, neither GFR category G1 or G2 fulfill the criteria for CKD.    eGFR calculation  CKD-EPI creatinine equation, which does not include race as a factor     CBC & Differential [[id number]]  (Abnormal) Collected: 05/20/25 0515    Specimen: Blood Updated: 05/20/25 0618    Narrative:      The following orders were created for panel order CBC & Differential.  Procedure                               Abnormality         Status                     ---------                               -----------         ------                     CBC Auto Differential[[id number]]        Abnormal            Final result                 Please view results for these tests on the individual orders.    CBC Auto Differential [[id number]]  (Abnormal) Collected: 05/20/25 0515    Specimen: Blood Updated: 05/20/25 0618     WBC 6.38 10*3/mm3      RBC 4.03 10*6/mm3      Hemoglobin 13.9 g/dL      Hematocrit 41.5 %      .0 fL      MCH 34.5 pg      MCHC 33.5 g/dL      RDW 12.8 %      RDW-SD 48.6 fl      MPV 10.5 fL      Platelets 164 10*3/mm3      Neutrophil % 61.6 %      Lymphocyte % 23.7 %      Monocyte % 10.0 %      Eosinophil % 3.3 %      Basophil % 0.9 %      Immature Grans % 0.5 %      Neutrophils, Absolute 3.93 10*3/mm3      Lymphocytes, Absolute 1.51 10*3/mm3      Monocytes, Absolute 0.64 10*3/mm3      Eosinophils, Absolute 0.21 10*3/mm3      Basophils, Absolute 0.06 10*3/mm3      Immature Grans, Absolute 0.03 10*3/mm3      nRBC 0.0 /100 WBC           Imaging Results (Last 24 Hours)       ** No results found for the last 24 hours. **          ECG/EMG Results (last 24 hours)       Procedure Component Value Units Date/Time    Telemetry Scan [[id number]] Resulted: 05/12/25     Updated: 05/20/25 0135          Orders (last 24 hrs)        Start     Ordered    05/20/25 1330  diazePAM (VALIUM) injection 5 mg  Once         05/20/25 1239    05/20/25 1236  MRI Brain With & Without Contrast  1 Time Imaging        Comments: Patient has anxiety, will be given medication prior to MRI    05/20/25 1239    05/20/25 1223  Uric Acid  Once         05/20/25 1223    05/20/25 0800  Dietary Nutrition Supplements  Novasource Renal (Nepro)  Daily With Breakfast & Dinner       05/19/25 1809 05/20/25 0800  Dietary Nutrition Supplements Boost Breeze (Ensure Clear)  Daily With Breakfast & Lunch       05/19/25 1809 05/20/25 0600  CBC Auto Differential  PROCEDURE ONCE         05/19/25 2201 05/19/25 1850  Diet: Regular/House, Renal; Low Potassium; Texture: Regular (IDDSI 7); Fluid Consistency: Thin (IDDSI 0)  Diet Effective Now         05/19/25 1850 05/19/25 1827  Diet: Regular/House; Texture: Regular (IDDSI 7); Fluid Consistency: Thin (IDDSI 0)  Diet Effective Now,   Status:  Canceled         05/19/25 1827 05/19/25 1030  amLODIPine (NORVASC) tablet 5 mg  Every 24 Hours Scheduled         05/19/25 0941    05/18/25 1800  Dietary Nutrition Supplements Other (see comment); Boost Original (vanilla)  Daily With Breakfast & Dinner,   Status:  Canceled       05/18/25 1658    05/18/25 1753  ipratropium-albuterol (DUO-NEB) nebulizer solution 3 mL  3 Times Daily PRN         05/18/25 1753    05/18/25 1045  Lidocaine 4 % 1 patch  Every 24 Hours Scheduled         05/18/25 0959    05/18/25 1045  pantoprazole (PROTONIX) injection 40 mg  Every 12 Hours Scheduled         05/18/25 0959    05/18/25 0822  hydrOXYzine (ATARAX) tablet 25 mg  3 Times Daily PRN         05/18/25 0822    05/17/25 1654  prochlorperazine (COMPAZINE) injection 2.5 mg  Every 6 Hours PRN         05/17/25 1654    05/16/25 1518  Phosphorus Replacement - Follow Nurse / BPA Driven Protocol  As Needed         05/16/25 1519    05/16/25 1518  Calcium Replacement - Follow Nurse / BPA Driven Protocol  As Needed         05/16/25 1519    05/16/25 1518  Potassium Replacement - Follow Nurse / BPA Driven Protocol  As Needed         05/16/25 1519    05/16/25 1518  Magnesium Standard Dose Replacement - Follow Nurse / BPA Driven Protocol  As Needed         05/16/25 1519    05/15/25 1407  LORazepam (ATIVAN) injection 1 mg  Once As Needed         05/15/25 1407    05/14/25 0600  CBC &  "Differential  Daily       05/13/25 1206    05/14/25 0600  Basic Metabolic Panel  Daily       05/13/25 1206    05/13/25 2100  atorvastatin (LIPITOR) tablet 20 mg  Nightly         05/13/25 1158    05/13/25 1630  ipratropium-albuterol (DUO-NEB) nebulizer solution 3 mL  4 Times Daily - RT         05/13/25 1206    05/13/25 1300  guaiFENesin (MUCINEX) 12 hr tablet 1,200 mg  Every 12 Hours Scheduled         05/13/25 1206    05/13/25 1300  enoxaparin sodium (LOVENOX) syringe 40 mg  Every 24 Hours         05/13/25 1206    05/13/25 1215  Menthol-Zinc Oxide 1 Application  2 Times Daily         05/13/25 1124    05/13/25 1207  Orthostatic Blood Pressure  Daily      Comments: Please secure chat me with the results    05/13/25 1206    05/13/25 1156  acetaminophen (TYLENOL) tablet 500 mg  Every 6 Hours PRN         05/13/25 1158    05/13/25 1156  HYDROcodone-acetaminophen (NORCO) 5-325 MG per tablet 1 tablet  Every 4 Hours PRN         05/13/25 1158    05/13/25 1129  Silicone Border Dressing to Bony Prominences  Every Shift       05/13/25 1128    05/13/25 0800  Oral Care  2 Times Daily       05/13/25 0130    05/13/25 0400  Vital Signs  Every 4 Hours       05/13/25 0130    05/13/25 0146  sodium chloride 0.9 % flush 10 mL  Every 12 Hours Scheduled         05/13/25 0130    05/13/25 0127  calcium carbonate (TUMS) chewable tablet 500 mg (200 mg elemental)  2 Times Daily PRN         05/13/25 0130    05/13/25 0126  ondansetron ODT (ZOFRAN-ODT) disintegrating tablet 4 mg  Every 6 Hours PRN        Placed in \"Or\" Linked Group    05/13/25 0130    05/13/25 0126  ondansetron (ZOFRAN) injection 4 mg  Every 6 Hours PRN        Placed in \"Or\" Linked Group    05/13/25 0130    05/13/25 0126  acetaminophen (TYLENOL) tablet 650 mg  Every 4 Hours PRN        Placed in \"Or\" Linked Group    05/13/25 0130    05/13/25 0126  acetaminophen (TYLENOL) 160 MG/5ML oral solution 650 mg  Every 4 Hours PRN        Placed in \"Or\" Linked Group    05/13/25 0130    05/13/25 " "0126  acetaminophen (TYLENOL) suppository 650 mg  Every 4 Hours PRN        Placed in \"Or\" Linked Group    25 0130    25 0126  Intake & Output  Every Shift       25 01325 012  nitroglycerin (NITROSTAT) SL tablet 0.4 mg  Every 5 Minutes PRN         25 0130    25 012  sodium chloride 0.9 % flush 10 mL  As Needed         25 01325 012  sodium chloride 0.9 % infusion 40 mL  As Needed         25 013    --  atorvastatin (LIPITOR) 20 MG tablet  Nightly         25 0930                  Operative/Procedure Notes (last 24 hours)  Notes from 25 172 through 25 172   No notes of this type exist for this encounter.          Physician Progress Notes (last 24 hours)        aNtaliia Palafox MD at 25 1116              Name: Penelope Onofre ADMIT: 2025   : 1955  PCP: Bonny Judge MD    MRN: [id number] LOS: 7 days   AGE/SEX: 70 y.o. female  ROOM: [facility]     Subjective   Subjective   No acute events overnight.  Patient feeling pretty well this morning.  On 1 L nasal cannula.  States that her chest pain has resolved.  States she does still have shortness of breath.  Biopsy results are back and I did discuss results with patient.    Objective   Objective     Vital Signs  Temp:  [97.3 °F (36.3 °C)-98.2 °F (36.8 °C)] 97.7 °F (36.5 °C)  Heart Rate:  [107-108] 107  Resp:  [16-20] 20  BP: (107-138)/(78-88) 130/86  SpO2:  [93 %-95 %] 95 %  on  Flow (L/min) (Oxygen Therapy):  [1-2] 1;   Device (Oxygen Therapy): nasal cannula  Body mass index is 18.99 kg/m².    Physical Exam  General: Alert, no acute distress.  Sitting up in bed.  Chronically ill-appearing.  Anxious appearing.  ENT: No conjunctival injection or scleral icterus. Moist mucous membranes.   Neuro: Eyes open and moving in all directions, generalized weakness, face symmetric, no focal deficits.   Lungs: Diminished aeration, no wheezing or crackles.  On 1 L nasal cannula.  Heart: " "RRR, no murmurs. No edema.  Abdomen: Soft, non-tender, non-distended. Normal bowel sounds. No hepatosplenomegaly.   Ext: Warm and well-perfused.  Mild tenderness to palpation of the left chest wall.  Skin: Soft tissue mass on right chest wall.    Results Review     I reviewed the patient's new clinical results:  Results from last 7 days   Lab Units 05/20/25  0515 05/19/25  0527 05/18/25  0604 05/17/25  0611   WBC 10*3/mm3 6.38 6.23 7.39 6.14   HEMOGLOBIN g/dL 13.9 13.9 13.4 13.3   PLATELETS 10*3/mm3 164 136* 145 161     Results from last 7 days   Lab Units 05/20/25  0515 05/19/25  0527 05/18/25  0604 05/17/25  1127 05/17/25  0611   SODIUM mmol/L 137 136 137  --  142   POTASSIUM mmol/L 4.7 5.2 4.9 5.2 5.8*   CHLORIDE mmol/L 106 105 104  --  109*   CO2 mmol/L 17.0* 20.0* 19.0*  --  21.7*   BUN mg/dL 11 8 7*  --  7*   CREATININE mg/dL 1.17* 1.09* 1.10*  --  1.06*   GLUCOSE mg/dL 68 75 100*  --  113*   EGFR mL/min/1.73 50.0* 54.4* 53.8*  --  56.3*           Results from last 7 days   Lab Units 05/20/25  0515 05/19/25 0527 05/18/25  0604 05/17/25  0611 05/14/25  0637 05/13/25  1428   CALCIUM mg/dL 9.0 9.3 8.9 9.0   < >  --    MAGNESIUM mg/dL  --   --   --   --   --  1.7    < > = values in this interval not displayed.     Results from last 7 days   Lab Units 05/13/25  1428   PROCALCITONIN ng/mL 0.05     No results found for: \"HGBA1C\", \"POCGLU\"      CT Angiogram Chest  Result Date: 5/18/2025   Pulmonary arteries are well-opacified, and there is no evidence of pulmonary embolism.  Redemonstrated large right hilar mediastinal mass, with moderate to large right pleural effusion and posterior basilar compressive atelectasis in the right lung, no new pulmonary parenchymal abnormality when compared to 5/14/2025.    This report was finalized on 5/18/2025 9:16 PM by Dr. Ty Mock M.D on Workstation: [id number]          I have personally reviewed all medications:  Scheduled Medications  amLODIPine, 5 mg, Oral, " Q24H  atorvastatin, 20 mg, Oral, Nightly  enoxaparin sodium, 40 mg, Subcutaneous, Q24H  guaiFENesin, 1,200 mg, Oral, Q12H  ipratropium-albuterol, 3 mL, Nebulization, 4x Daily - RT  Lidocaine, 1 patch, Transdermal, Q24H  Menthol-Zinc Oxide, 1 Application, Topical, BID  pantoprazole, 40 mg, Intravenous, Q12H  sodium chloride, 10 mL, Intravenous, Q12H    Infusions   Diet  Diet: Regular/House, Renal; Low Potassium; Texture: Regular (IDDSI 7); Fluid Consistency: Thin (IDDSI 0)      Intake/Output Summary (Last 24 hours) at 5/20/2025 1117  Last data filed at 5/19/2025 1700  Gross per 24 hour   Intake 240 ml   Output 500 ml   Net -260 ml       Assessment/Plan     Active Hospital Problems    Diagnosis  POA    **Generalized weakness [R53.1]  Yes    Dizziness [R42]  Yes    Pneumonia [J18.9]  Yes    Pleural effusion [J90]  Yes    At high risk for pressure injury of skin [Z91.89]  Not Applicable    Decubitus ulcer of sacral region, stage 1 [L89.151]  Yes    Acute kidney failure [N17.9]  Yes    Dehydration [E86.0]  Yes    Elevated troponin [R79.89]  Yes    Elevated brain natriuretic peptide (BNP) level [R79.89]  Yes    Hypokalemia [E87.6]  Yes    Hyperglycemia [R73.9]  Yes      Resolved Hospital Problems   No resolved problems to display.     70 y.o. female with Generalized weakness.    Right sided pneumonia  Pleural effusion  Acute hypoxic respiratory failure  - CT chest with evidence of right hilar mass and bulky mediastinal and hilar lymphadenopathy  - Blood cultures no growth to date  - Pleural fluid culture no growth to date  - Pulmonology consulted and following  - Thoracentesis 5/14  - S/p course of Zosyn  - Pleural fluid cytology negative for malignancy  - LN biopsy with IR 5/15, pathology has returned for metastatic small cell lung cancer  - CT angiogram obtained 5/18 showing large right hilar mediastinal mass with moderate to large right pleural effusion, will defer decision regarding repeat thoracentesis to  pulmonology  - Discussed results of biopsy with patient today  - Consult oncology today    Chest pain  - Patient has been endorsing left-sided chest pain, resolved this morning  - Troponin mildly elevated but flat  - EKG with no acute ischemic changes  - CT angiogram chest negative for PE  - Recent echocardiogram with no obvious wall motion abnormalities  - Added lidocaine patch   - Cardiology consulted, no further workup needed    Hyperkalemia  CKD  Metabolic acidosis  -Creatinine stable today and back at baseline  - Potassium normal today  -Bicarbonate decreased to 17 today, anion gap normal  - Repeat BMP with morning labs    Weakness  - Most likely secondary to the above  - PT/OT following  - Likely SNF at discharge    Hypertension  - Blood pressure improved, continue amlodipine  - Titrate meds as needed based on BP trends    GERD  - IV PPI    Lovenox 40 mg SC daily for DVT prophylaxis.  Full code.  Discussed with patient, nursing staff, and CCP.  Anticipate discharge to SNU facility timing yet to be determined.  At this point, not sure when patient will be ready.  Oncology consulted today.  Plan to be determined.    Nataliia Palafox MD  [facility]  25  11:17 EDT      Electronically signed by Nataliia Palafox MD at 25 1123       Agustin Orellana DO at 25 5607              [facility]  [phone]  Dr. Agustin Orellana     Subjective:  LOS: 7    Chief Complaint:   Shortness of breath and fatigue     Patient was doing okay today.  She is still very anxious about her cancer diagnosis.  She states that she would have already informed of this prior to my arrival.  I did answer questions to the best of my ability and discussed with her that oncology was consulted and will see her today.    Objective   Vital Signs past 24hrs  Temp range: Temp (24hrs), Av.7 °F (36.5 °C), Min:97.3 °F (36.3 °C), Max:98.2 °F (36.8 °C)    BP range: BP: (107-138)/(78-88)  130/86  Pulse range: Heart Rate:  [107-108] 107  Resp rate range: Resp:  [16-20] 20  Device (Oxygen Therapy): nasal cannulaFlow (L/min) (Oxygen Therapy):  [1-2] 1  Oxygen range:SpO2:  [93 %-95 %] 95 %   Mechanical Ventilator:     Physical Exam  Vitals and nursing note reviewed.   Constitutional:       General: She is not in acute distress.     Appearance: She is cachectic.   HENT:      Head: Normocephalic and atraumatic.   Cardiovascular:      Rate and Rhythm: Normal rate and regular rhythm.      Heart sounds: No murmur heard.  Pulmonary:      Effort: Pulmonary effort is normal. No respiratory distress.      Breath sounds: Normal breath sounds. No wheezing, rhonchi or rales.   Abdominal:      General: Abdomen is flat.      Tenderness: There is no abdominal tenderness.   Skin:     General: Skin is warm and dry.      Findings: No rash.   Neurological:      Mental Status: She is alert.       Results Review:    I have reviewed the laboratory and imaging data since the last note by [facility] physician.  My annotations are noted in assessment and plan.      Result Review:  I have personally reviewed the results from last note by [facility] physician to 5/20/2025 09:29 EDT and agree with these findings:  [x]  Laboratory list / accordion  [x]  Microbiology  [x]  Radiology  [x]  EKG/Telemetry   [x]  Cardiology/Vascular   [x]  Pathology  [x]  Old records  []  Other:    Medication Review:  I have reviewed the current MAR.  My annotations are noted in assessment and plan.    amLODIPine, 5 mg, Oral, Q24H  atorvastatin, 20 mg, Oral, Nightly  enoxaparin sodium, 40 mg, Subcutaneous, Q24H  guaiFENesin, 1,200 mg, Oral, Q12H  ipratropium-albuterol, 3 mL, Nebulization, 4x Daily - RT  Lidocaine, 1 patch, Transdermal, Q24H  Menthol-Zinc Oxide, 1 Application, Topical, BID  pantoprazole, 40 mg, Intravenous, Q12H  sodium chloride, 10 mL, Intravenous, Q12H           Lines, Drains & Airways       Active LDAs       Name Placement date Placement time Site  Days    Peripheral IV 05/18/25 1846 20 G Anterior;Left Forearm 05/18/25 1846  Forearm  1    External Urinary Catheter --  --  --  --                  No active isolations  Diet Orders (active) (From admission, onward)       Start     Ordered    05/20/25 0800  Dietary Nutrition Supplements Novasource Renal (Nepro)  Daily With Breakfast & Dinner       05/19/25 1809 05/20/25 0800  Dietary Nutrition Supplements Boost Breeze (Ensure Clear)  Daily With Breakfast & Lunch       05/19/25 1809 05/19/25 1850  Diet: Regular/House, Renal; Low Potassium; Texture: Regular (IDDSI 7); Fluid Consistency: Thin (IDDSI 0)  Diet Effective Now         05/19/25 1850                      Assessment  Acute hypoxic respiratory failure  Right pleural effusion s/p thoracentesis 5/14/25 with exudate  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  Found to have right pleural effusion and mediastinal and right hilar adenopathy.  - Continue weaning oxygen for goal O2 saturation greater than 88%  - Completed course of antibiotics for possible pneumonia  - Thoracentesis on 5/14/2025 with 1 L of fluid removed.  Exudative by lights criteria.  Cytology negative for malignant cells.  -CT chest with contrast after thoracentesis shows further evidence of a right hilar mass with bulky mediastinal and hilar lymphadenopathy that is enclosing the right mainstem bronchus.  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      Electronically signed by  Agustin Orellana,  at 05/20/25 1315          Consult Notes (last 24 hours)        Lucy Howell at 05/20/25 1533          I was requested by the patient to see her.  She and I had a lengthy discussion about her future.  She informed me that she has only a year to live.  She shared that she was afraid of dying.  We talked about that for a while.  She also shared that she is trying to make up her mind regarding all the tests.  She stated that she has the possibility of having an MRI.  She really didn't want to do that.  There was no clear reason, but I believe she is afraid to hear the outcome.   As we continued our conversation we talked about what she wants and how she wants to live her final days.  She wanted more information on how would the Chemotherapy affect her final days.  She stated clearly that she does not want radiation treatments.      We talked about her options and I stated to her that this is her life and she can decided on what she wants.  I also talked to her about Palliative care since she has less than a year to live.  We wrapped up our time with prayer.      Electronically signed by Lucy Howell at 05/20/25 1542       Yannick Koroma MD PhD at 05/20/25 1100        Consult Orders    1. Hematology & Oncology Inpatient Consult [[id number]] ordered by Emilio Mejia MD at 05/19/25 1408                   Subjective  .     REASON FOR CONSULTATION:     Provide an opinion on any further workup or treatment of newly diagnosed the biopsy confirmed the small cell lung cancer.                             REQUESTING PHYSICIAN: Nataliia Palafox MD    RECORDS OBTAINED:  Records of the patient's history including those obtained from the referring provider were reviewed and summarized in detail.    HISTORY OF PRESENT ILLNESS:  The patient is a 70 y.o. year old female with a history of long-term cigarette smoking, GERD, chronic back pain, who presented to [facility] at midnight on  05/12/2025 because of dyspnea. The patient reports she has not been feeling well for the past several months, with significant fatigue and waxing and waning with her symptoms, and decreased appetite especially since the diagnosis of her cancer.  She also admits about 20 pound weight loss in the past 6 months. She denies dysphagia, odynophagia.     The patient is a long-term cigarette smoker, she started smoking cigarettes about at the age of 14, and quit 8 months ago again. She had intermittently quit smoking for 3 times, adding up to a total of 21 years in between.  Despite of that, she would still have accumulatively 35 years of cigarette smoking. The patient denies cough, hemoptysis.    In the ER the patient had chest x-ray examination in the very early morning on 05/13/2025 and this reported large right pleural effusion and dense right basilar consolidation, possible infiltrate in the right upper lobe. The patient had CT of the head without contrast which showed no apparent acute intracranial hemorrhage or mass. CT of the chest without contrast reported bulky mediastinal and right hilar lymphadenopathy measuring up to 3 cm. There was moderate right pleural effusion near complete atelectasis of the right lower lobe. There are also small lung nodules in the right lung.    The patient subsequently had ultrasound guided thoracentesis on 05/14/2025 and with 1 liter pleural effusion removed. Cytology study was negative for malignancy.     The patient then subsequently had CT of the chest with IV contrast on 05/14/2025 and this reported residual right side pleural effusion, moderate size. Significantly decreased in the right lower lobe atelectasis. There was bulky confluent mediastinal and right hilar adenopathy measuring up to 8.6 x 8.0 cm. There is also separate bulky anterior mediastinal lymphadenopathy and bulky right supraclavicular lymphadenopathy. The bulky adenopathy is contiguous with a right middle lobe mass  measuring 4.8 x 3.2 cm. There is occlusion of the right main bronchus and right intralobular bronchus. There is a small pericardial effusion measuring up to 1 cm in diameter. No left pleural effusion.     This patient ultimately had ultrasound guided right supraclavicular lymph node biopsy on 05/15/2025. Pathology evaluation reported this to be metastatic pulmonary small cell carcinoma. Samples were positive for AE1/AE3, TTF-1, chromogranin A and CD 56, negative for CK7 and p40. Ki67 was > 95%.    The patient then had angiogram study of the chest on 05/18/2025 which reported moderate to large right pleural effusion. Redemonstrated hilar and mediastinal mass. No evidence for pulmonary emboli. The upper abdomen was without acute abnormalities.    The patient denies headaches, vision changes, no nausea, no vomiting. No change in personality.     The patient lives at home. She is retired from [employer] as [employer]. She lives by herself. She has no children. She has a relative who lives in [place] who she has been talking to.         Past Medical History:   Diagnosis Date    Chronic back pain     Claudication     GERD (gastroesophageal reflux disease)      Past Surgical History:   Procedure Laterality Date    BREAST CYST EXCISION Right     TONSILLECTOMY      US GUIDED LYMPH NODE BIOPSY  5/15/2025       MEDICATIONS    Current Facility-Administered Medications:     acetaminophen (TYLENOL) tablet 650 mg, 650 mg, Oral, Q4H PRN **OR** acetaminophen (TYLENOL) 160 MG/5ML oral solution 650 mg, 650 mg, Oral, Q4H PRN **OR** acetaminophen (TYLENOL) suppository 650 mg, 650 mg, Rectal, Q4H PRN, Oren Copeland MD    acetaminophen (TYLENOL) tablet 500 mg, 500 mg, Oral, Q6H PRN, Oren Copeland MD    amLODIPine (NORVASC) tablet 5 mg, 5 mg, Oral, Q24H, Nataliia Palafox MD, 5 mg at 05/20/25 0940    atorvastatin (LIPITOR) tablet 20 mg, 20 mg, Oral, Nightly, Oren Copeland MD, 20 mg at 05/19/25 2005     calcium carbonate (TUMS) chewable tablet 500 mg (200 mg elemental), 2 tablet, Oral, BID PRN, Oren Copeland MD    Calcium Replacement - Follow Nurse / BPA Driven Protocol, , Not Applicable, PRN, Oren Copeland MD    enoxaparin sodium (LOVENOX) syringe 40 mg, 40 mg, Subcutaneous, Q24H, Oren Copeland MD, 40 mg at 05/19/25 1452    guaiFENesin (MUCINEX) 12 hr tablet 1,200 mg, 1,200 mg, Oral, Q12H, Oren Copeland MD, 1,200 mg at 05/20/25 0940    HYDROcodone-acetaminophen (NORCO) 5-325 MG per tablet 1 tablet, 1 tablet, Oral, Q4H PRN, Oren Copeland MD, 1 tablet at 05/19/25 2005    hydrOXYzine (ATARAX) tablet 25 mg, 25 mg, Oral, TID PRN, Nataliia Palafox MD, 25 mg at 05/19/25 1128    ipratropium-albuterol (DUO-NEB) nebulizer solution 3 mL, 3 mL, Nebulization, 4x Daily - RT, Oren Copeland MD, 3 mL at 05/20/25 1047    ipratropium-albuterol (DUO-NEB) nebulizer solution 3 mL, 3 mL, Nebulization, TID PRN, Nataliia Palafox MD    Lidocaine 4 % 1 patch, 1 patch, Transdermal, Q24H, Nataliia Palafox MD, 1 patch at 05/20/25 0941    LORazepam (ATIVAN) injection 1 mg, 1 mg, Intravenous, Once PRN, Agustin Orellana DO    Magnesium Standard Dose Replacement - Follow Nurse / BPA Driven Protocol, , Not Applicable, PRN, Oren Copeland MD    Menthol-Zinc Oxide 1 Application, 1 Application, Topical, BID, Oren Copeland MD, 1 Application at 05/20/25 0941    nitroglycerin (NITROSTAT) SL tablet 0.4 mg, 0.4 mg, Sublingual, Q5 Min PRN, Oren Copeland MD    ondansetron ODT (ZOFRAN-ODT) disintegrating tablet 4 mg, 4 mg, Oral, Q6H PRN **OR** ondansetron (ZOFRAN) injection 4 mg, 4 mg, Intravenous, Q6H PRN, Oren Copeland MD, 4 mg at 05/17/25 1527    pantoprazole (PROTONIX) injection 40 mg, 40 mg, Intravenous, Q12H, Nataliia Palafox MD, 40 mg at 05/20/25 0941    Phosphorus Replacement - Follow Nurse / BPA Driven Protocol, , Not Applicable, PRN, Oren Copeland MD    Potassium Replacement - Follow Nurse / BPA Driven  Protocol, , Not Applicable, PRN, Oren Copeland MD    prochlorperazine (COMPAZINE) injection 2.5 mg, 2.5 mg, Intravenous, Q6H PRN, Nataliia Palafox MD, 2.5 mg at 05/17/25 2217    sodium chloride 0.9 % flush 10 mL, 10 mL, Intravenous, Q12H, Oren Copeland MD, 10 mL at 05/20/25 0935    sodium chloride 0.9 % flush 10 mL, 10 mL, Intravenous, PRN, Oren Copeland MD    sodium chloride 0.9 % infusion 40 mL, 40 mL, Intravenous, PRN, Oren Copeland MD    ALLERGIES:     Allergies   Allergen Reactions    Codeine Unknown - Low Severity    Sulfa Antibiotics Unknown - Low Severity       SOCIAL HISTORY:       Social History     Socioeconomic History    Marital status:    Tobacco Use    Smoking status: Former     Current packs/day: 1.00     Average packs/day: 1 pack/day for 40.0 years (40.0 ttl pk-yrs)     Types: Cigarettes    Smokeless tobacco: Never    Tobacco comments:     quit 2 weeks ago   Vaping Use    Vaping status: Never Used   Substance and Sexual Activity    Alcohol use: No    Drug use: No    Sexual activity: Defer         FAMILY HISTORY:  Family History   Problem Relation Age of Onset    Alzheimer's disease Mother     Cancer Father        REVIEW OF SYSTEMS:  Review of Systems    See HPI     Objective     Vitals:    05/20/25 0330 05/20/25 0653 05/20/25 0837 05/20/25 1047   BP: 126/88  130/86    BP Location: Left arm  Left arm    Patient Position: Lying  Lying    Pulse:       Resp: 16 20 20 20   Temp: 97.3 °F (36.3 °C)  97.7 °F (36.5 °C)    TempSrc: Oral  Oral    SpO2:   95%    Weight:       Height:              No data to display               PHYSICAL EXAM:      CONSTITUTIONAL:  Vital signs reviewed.  Well-developed female, cachectic.  No distress, looks comfortable.  EYES:  Conjunctivae and lids unremarkable.    EARS,NOSE,MOUTH,THROAT:  Ears and nose appear unremarkable.    RESPIRATORY:  Normal respiratory effort.  Decreased breathing sounds right lung.    CARDIOVASCULAR:  Normal S1, S2.  No murmurs  rubs or gallops.    GASTROINTESTINAL: Abdomen appears unremarkable.  Nontender.  No hepatomegaly.  No splenomegaly.  LYMPHATIC: Right supraclavicular adenopathy.   MUSCULOSKELETAL:  Unremarkable digits/nails.  No cyanosis or clubbing.  SKIN:  Warm.  No rashes.  PSYCHIATRIC:  Normal judgment and insight.  Normal mood and affect.      RECENT LABS:        WBC   Date Value Ref Range Status   05/20/2025 6.38 3.40 - 10.80 10*3/mm3 Final   05/19/2025 6.23 3.40 - 10.80 10*3/mm3 Final   05/18/2025 7.39 3.40 - 10.80 10*3/mm3 Final     Hemoglobin   Date Value Ref Range Status   05/20/2025 13.9 12.0 - 15.9 g/dL Final   05/19/2025 13.9 12.0 - 15.9 g/dL Final   05/18/2025 13.4 12.0 - 15.9 g/dL Final     Platelets   Date Value Ref Range Status   05/20/2025 164 140 - 450 10*3/mm3 Final   05/19/2025 136 (L) 140 - 450 10*3/mm3 Final   05/18/2025 145 140 - 450 10*3/mm3 Final     CMP:        Lab 05/20/25  0515 05/19/25  0527 05/18/25  0604 05/17/25  1127 05/17/25  0611 05/16/25  0732 05/15/25  0641 05/14/25  0637 05/13/25  1428   SODIUM 137 136 137  --  142 139   < > 137  --    POTASSIUM 4.7 5.2 4.9 5.2 5.8* 3.1*   < > 3.8  --    CHLORIDE 106 105 104  --  109* 105   < > 103  --    CO2 17.0* 20.0* 19.0*  --  21.7* 20.0*   < > 20.0*  --    ANION GAP 14.0 11.0 14.0  --  11.3 14.0   < > 14.0  --    BUN 11 8 7*  --  7* 9   < > 12  --    CREATININE 1.17* 1.09* 1.10*  --  1.06* 1.05*   < > 1.00  --    EGFR 50.0* 54.4* 53.8*  --  56.3* 56.9*   < > 60.4  --    GLUCOSE 68 75 100*  --  113* 96   < > 118*  --    CALCIUM 9.0 9.3 8.9  --  9.0 8.3*   < > 8.9  --    MAGNESIUM  --   --   --   --   --   --   --   --  1.7   TOTAL PROTEIN  --   --   --   --   --   --   --  6.4  --     < > = values in this interval not displayed.     Tissue Pathology Exam: [id number]  Order: [id number]   Status: Final result       Next appt: None    Test Result Released: No (scheduled for 5/22/2025  1:14 PM)    Specimen Information: Clavicle, Right; Tissue   0 Result  "Notes      Component  Ref Range & Units (hover)    Case Report   Surgical Pathology Report                         Case: [id number]                                   Authorizing Provider:  Orne Copeland MD       Collected:           05/15/2025 04:00 PM           Ordering Location:     [facility]  Received:            05/15/2025 04:32 PM                                  [address]                                                                       Pathologist:           Eden Tracy MD                                                     Specimen:    Clavicle, Right, rt supraclavicular lymph node                                            Clinical Information    Rt supraclavicular lymph node  Flow    Final Diagnosis   1.  Lymph node, right supraclavicular, ultrasound-guided core biopsies for adenopathy: METASTATIC PULMONARY SMALL CELL CARCINOMA.     SWM   Electronically signed by Eden Tracy MD on 5/19/2025 at 1314 EDT   Comment    Representative slides from this case were reviewed internally with Dr. Plasencia, who concurred.   Gross Description    1. Clavicle, Right.   Received in formalin labeled with the patient's name and designated  \"right supraclavicular lymph node\" is a single gray-white needle core biopsy measuring 1.2 x 0.1 cm in maximal tubal diameter.  The core is filtered and submitted in toto in cassette 1A.     hbt/uso/SWM     There also is tissue received in RPMI, consisting of a 1.1 x 0.1 cm white-tan elongated soft tissue core.  After initial histological examination the specimen in RPMI is submitted for permanent histological examination as 1B.     kls/uso/swm   Special Stains    You icing appropriate controls, multiple immunohistochemical stains were performed including AE1/AE3, CK7, TTF-1, synaptophysin, chromogranin A, CD56, p40 and Ki-67.  Tumor cells are positive for AE1/AE3, TTF-1, chromogranin A and CD56 and negative for CK7 and p40.  The Ki67 labeling " approximates greater than 95%.  The overall immunoprofile supports the rendered diagnosis.            Component  Ref Range & Units (hover)    Case Report   Non-gynecologic Cytology                          Case: [id number]                                   Authorizing Provider:  Agustin Orellana DO Collected:           05/14/2025 09:41 AM           Ordering Location:     [facility]  Received:            05/14/2025 12:47 PM                                  [address]                                                                       Pathologist:           Jillian Plasencia MD                                                           Specimen:    Pleural Cavity, Right Thoracentesis                                                        Final Diagnosis   Pleural Cavity, Right Thoracentesis:               A. Negative for epithelial malignancy.               B. Reactive mesothelial cells, histiocytes and numerous lymphocytes (lymphocytic effusion).    Electronically signed by Jillian Plasencia MD        IMAGING:  XR Chest 1 View  XR CHEST 1 VW-     Clinical: Chest pain     COMPARISON CT examination 5/18/2025 and chest radiograph 5/13/2025     FINDINGS: Patient is rotated on the current examination. The  cardiomediastinal silhouette is stable. The left lung is clear.  Right-sided pleural effusion is similar to or larger compared to the  previous examination. There is vague infiltrate/atelectasis right lung.  There is prominence of the right hilar and right paratracheal soft  tissues consistent with mass and/or adenopathy. The remainder is  unremarkable.     This report was finalized on 5/19/2025 6:33 AM by Dr. Carlos Stapleton M.D  on Workstation: [id number]         Assessment & Plan     ASSESSMENT:  1. Locally advanced small cell lung cancer with a right middle lobe lung mass and bulky right hilar adenopathy and mediastinal adenopathy as well as right supraclavicular adenopathy. Biopsy confirmed with high  grade Ki67 at more than 95%. I discussed with the patient today that I recommended to obtain brain MRI with and without contrast for further evaluation because that is the most sensitive study for looking for metastatic brain lesions. Asymptomatic the patient still could have metastatic disease considering this is aggressive small cell lung cancer. However the patient reports she has high anxiety and she is hesitant to have that study.    I also discussed with the patient the management options with aggressive chemotherapy with carboplatin plus -16 every 3 weeks and in conjunction with immunotherapy. Nevertheless I recommended to start the first cycle of chemotherapy as inpatient and immunotherapy would not be given per policy. I explained to her. We also discussed route without active treatment and in that case she would be pursued for hospice care. Without treatment I think her life expectancy probably is somewhere between 3-5 months and with treatment she could be about 12 months. The patient needs more time to think about all of these treatment logistics and also social support. Apparently she has no family members living in [place]. She has no children and also she is found not having good social support from neighbors or friends.     PLAN:  We recommended brain MRI with and without contrast for evaluation and she is hesitant to do that. If she is agreeable, we certainly could add medication such as Valium or Ativan before her MRI examination to see if she could tolerate.   I will come back to discuss with the patient tomorrow again for management plan and to see which direction she wants to go.   If she wants to have active chemotherapy, she will need Port-A-Catheter and also we would recommend first cycle of chemotherapy as inpatient.    Discussed with the patient and reviewed notes from the ER and primary team HLA. Also personally reviewed CT scan images from 05/13/2025 and 05/14/2025.    I also spoke  "with nursing staff.      I spent 76 minutes caring for Penelope on this date of service. This time includes time spent by me in the following activities: preparing for the visit, reviewing tests, obtaining and/or reviewing a separately obtained history, performing a medically appropriate examination and/or evaluation, counseling and educating the patient/family/caregiver, ordering medications, tests, or procedures, referring and communicating with other health care professionals, documenting information in the medical record, independently interpreting results and communicating that information with the patient/family/caregiver and care coordination       PHOEBE STOREY M.D., Ph.D.                     Electronically signed by Phoebe Storey MD PhD at 05/20/25 9767       Michael Triana at 05/20/25 0848          Saw pt per staff request for pt's distress about her recent cancer diagnosis and fear of dying.    Pt is asking for ondina Howell who knows the pt well.     Offered prayer and listened to pt's sharing about other losses and questions of \"why me and why now?\"    Pt is relieved to find a home for her dog.      ondina Triana    Electronically signed by Michael Triana at 05/20/25 0921       "

## 2025-05-20 NOTE — CASE MANAGEMENT/SOCIAL WORK
Continued Stay Note  [facility]     Patient Name: Penelope Onofre  MRN: [id number]  Today's Date: 5/20/2025    Admit Date: 5/12/2025    Plan: [facility] for SNF accepted and  pre-cert approved 5/19/25 (expires 5/23/25).   Discharge Plan       Row Name 05/20/25 1735       Plan    Plan [facility] for SNF accepted and  pre-cert approved 5/19/25 (expires 5/23/25).    Plan Comments Discussed possible plan of care with Silvana/[facility]. States patient would not be able to receive chemotherapy/immunotherapy while in rehab. Message sent to Dr. Koroma and Dr. Palafox to inform if patient decides to receive chemotherapy/immunotherapy, treatment would have to be on hold while she's in rehab. Await final decision. Continue to follow....[facility]                   Discharge Codes    No documentation.                 Expected Discharge Date and Time       Expected Discharge Date Expected Discharge Time    May 21, 2025               Brittany Mcnair RN

## 2025-05-20 NOTE — PLAN OF CARE
Problem: Adult Inpatient Plan of Care  Goal: Plan of Care Review  Outcome: Not Progressing  Flowsheets (Taken 5/20/2025 1855)  Progress: no change  Plan of Care Reviewed With: patient  Goal: Patient-Specific Goal (Individualized)  Outcome: Not Progressing  Goal: Absence of Hospital-Acquired Illness or Injury  Outcome: Not Progressing  Intervention: Identify and Manage Fall Risk  Recent Flowsheet Documentation  Taken 5/20/2025 1800 by Tano Osorio RN  Safety Promotion/Fall Prevention:   safety round/check completed   assistive device/personal items within reach  Taken 5/20/2025 1600 by Tano Osorio RN  Safety Promotion/Fall Prevention:   safety round/check completed   assistive device/personal items within reach  Taken 5/20/2025 1400 by Tano Osorio RN  Safety Promotion/Fall Prevention:   safety round/check completed   assistive device/personal items within reach  Taken 5/20/2025 1200 by Tano Osorio RN  Safety Promotion/Fall Prevention:   safety round/check completed   assistive device/personal items within reach  Taken 5/20/2025 1002 by Tano Osorio RN  Safety Promotion/Fall Prevention:   safety round/check completed   assistive device/personal items within reach  Taken 5/20/2025 0803 by Tano Osorio RN  Safety Promotion/Fall Prevention:   safety round/check completed   assistive device/personal items within reach  Intervention: Prevent Skin Injury  Recent Flowsheet Documentation  Taken 5/20/2025 1110 by Tano Osorio RN  Body Position: position changed independently  Skin Protection: transparent dressing maintained  Intervention: Prevent Infection  Recent Flowsheet Documentation  Taken 5/20/2025 1800 by Tano Osorio RN  Infection Prevention:   personal protective equipment utilized   environmental surveillance performed  Taken 5/20/2025 1600 by Tano Osorio RN  Infection Prevention:   personal protective equipment utilized    environmental surveillance performed  Taken 5/20/2025 1400 by Tano Osorio RN  Infection Prevention:   personal protective equipment utilized   environmental surveillance performed  Taken 5/20/2025 1200 by Tano Osorio RN  Infection Prevention:   personal protective equipment utilized   environmental surveillance performed  Taken 5/20/2025 1002 by Tano Osorio RN  Infection Prevention:   personal protective equipment utilized   environmental surveillance performed  Taken 5/20/2025 0803 by Tano Osorio RN  Infection Prevention:   personal protective equipment utilized   environmental surveillance performed  Goal: Optimal Comfort and Wellbeing  Outcome: Not Progressing  Intervention: Provide Person-Centered Care  Recent Flowsheet Documentation  Taken 5/20/2025 1110 by Tano Osorio RN  Trust Relationship/Rapport:   care explained   choices provided   emotional support provided   empathic listening provided   questions answered   questions encouraged   reassurance provided   thoughts/feelings acknowledged  Goal: Readiness for Transition of Care  Outcome: Not Progressing   Goal Outcome Evaluation:  Plan of Care Reviewed With: patient        Progress: no change

## 2025-05-20 NOTE — CONSULTS
"Saw pt per staff request for pt's distress about her recent cancer diagnosis and fear of dying.    Pt is asking for blas Howell who knows the pt well.     Offered prayer and listened to pt's sharing about other losses and questions of \"why me and why now?\"    Pt is relieved to find a home for her dog.      Chaplain Micah Triana  "

## 2025-05-20 NOTE — CONSULTS
I was requested by the patient to see her.  She and I had a lengthy discussion about her future.  She informed me that she has only a year to live.  She shared that she was afraid of dying.  We talked about that for a while.  She also shared that she is trying to make up her mind regarding all the tests.  She stated that she has the possibility of having an MRI.  She really didn't want to do that.  There was no clear reason, but I believe she is afraid to hear the outcome.   As we continued our conversation we talked about what she wants and how she wants to live her final days.  She wanted more information on how would the Chemotherapy affect her final days.  She stated clearly that she does not want radiation treatments.      We talked about her options and I stated to her that this is her life and she can decided on what she wants.  I also talked to her about Palliative care since she has less than a year to live.  We wrapped up our time with prayer.

## 2025-05-20 NOTE — PLAN OF CARE
Goal Outcome Evaluation:        Problem: Fatigue  Goal: Improved Activity Tolerance  Outcome: Progressing     Problem: Adult Inpatient Plan of Care  Goal: Plan of Care Review  Outcome: Progressing  Goal: Patient-Specific Goal (Individualized)  Outcome: Progressing  Goal: Absence of Hospital-Acquired Illness or Injury  Outcome: Progressing  Intervention: Identify and Manage Fall Risk  Recent Flowsheet Documentation  Taken 5/20/2025 0416 by Geovanni Abebe RN  Safety Promotion/Fall Prevention:   safety round/check completed   room organization consistent   nonskid shoes/slippers when out of bed   fall prevention program maintained   clutter free environment maintained  Taken 5/20/2025 0207 by Geovanni Abebe RN  Safety Promotion/Fall Prevention:   safety round/check completed   room organization consistent   nonskid shoes/slippers when out of bed   fall prevention program maintained   clutter free environment maintained  Taken 5/20/2025 0039 by Geovanni Abebe RN  Safety Promotion/Fall Prevention:   safety round/check completed   room organization consistent   nonskid shoes/slippers when out of bed   fall prevention program maintained   clutter free environment maintained  Taken 5/19/2025 2203 by Geovanni Abebe RN  Safety Promotion/Fall Prevention:   safety round/check completed   room organization consistent   nonskid shoes/slippers when out of bed   clutter free environment maintained   fall prevention program maintained  Taken 5/19/2025 2006 by Geovanni Abebe RN  Safety Promotion/Fall Prevention:   safety round/check completed   room organization consistent   nonskid shoes/slippers when out of bed   fall prevention program maintained   clutter free environment maintained  Intervention: Prevent Skin Injury  Recent Flowsheet Documentation  Taken 5/20/2025 0416 by Geovanni Abebe RN  Body Position: position maintained  Taken 5/20/2025 0207 by Geovanni Abebe RN  Body Position: weight shifting  Taken 5/20/2025 0039  by Geovanni Abebe RN  Body Position:   supine   weight shifting  Taken 5/19/2025 2203 by Geovanni Abebe RN  Body Position:   position changed independently   position maintained  Taken 5/19/2025 2006 by Geovanni Abebe RN  Body Position:   position changed independently   supine  Intervention: Prevent Infection  Recent Flowsheet Documentation  Taken 5/20/2025 0416 by Geovanni Abebe RN  Infection Prevention:   single patient room provided   rest/sleep promoted   hand hygiene promoted  Taken 5/20/2025 0207 by Geovanni Abebe RN  Infection Prevention:   single patient room provided   rest/sleep promoted   hand hygiene promoted  Taken 5/20/2025 0039 by Geovanni Abebe RN  Infection Prevention:   single patient room provided   rest/sleep promoted   hand hygiene promoted  Taken 5/19/2025 2203 by Geovanni Abebe RN  Infection Prevention:   rest/sleep promoted   single patient room provided   hand hygiene promoted  Taken 5/19/2025 2006 by Geovanni Abebe RN  Infection Prevention:   rest/sleep promoted   single patient room provided  Goal: Optimal Comfort and Wellbeing  Outcome: Progressing  Intervention: Provide Person-Centered Care  Recent Flowsheet Documentation  Taken 5/20/2025 0039 by Geovanni Abebe RN  Trust Relationship/Rapport:   care explained   choices provided   thoughts/feelings acknowledged  Taken 5/19/2025 2006 by Geovanni Abebe RN  Trust Relationship/Rapport:   care explained   choices provided   thoughts/feelings acknowledged  Goal: Readiness for Transition of Care  Outcome: Progressing     Problem: Skin Injury Risk Increased  Goal: Skin Health and Integrity  Outcome: Progressing  Intervention: Optimize Skin Protection  Recent Flowsheet Documentation  Taken 5/20/2025 0416 by Geovanni Abebe RN  Head of Bed (HOB) Positioning: HOB at 20-30 degrees  Taken 5/20/2025 0207 by Geovanni Abebe RN  Head of Bed (HOB) Positioning: HOB at 30 degrees  Taken 5/20/2025 0039 by Geovanni Abebe RN  Head of Bed (HOB)  Positioning: HOB at 30 degrees  Taken 5/19/2025 2203 by Geovanni Abebe RN  Head of Bed (HOB) Positioning: HOB at 30 degrees  Taken 5/19/2025 2006 by Geovanni Abebe RN  Head of Bed (HOB) Positioning: HOB at 30 degrees     Problem: Fall Injury Risk  Goal: Absence of Fall and Fall-Related Injury  Outcome: Progressing  Intervention: Identify and Manage Contributors  Recent Flowsheet Documentation  Taken 5/20/2025 0416 by Geovanni Abebe RN  Medication Review/Management:   medications reviewed   high-risk medications identified  Taken 5/20/2025 0207 by Geovanni Abebe RN  Medication Review/Management:   medications reviewed   high-risk medications identified  Taken 5/20/2025 0039 by Geovanni Abebe RN  Medication Review/Management:   medications reviewed   high-risk medications identified  Taken 5/19/2025 2203 by Geovanni Abebe RN  Medication Review/Management:   medications reviewed   high-risk medications identified  Taken 5/19/2025 2006 by Geovanni Abebe RN  Medication Review/Management:   medications reviewed   high-risk medications identified  Intervention: Promote Injury-Free Environment  Recent Flowsheet Documentation  Taken 5/20/2025 0416 by Geovanni Abebe RN  Safety Promotion/Fall Prevention:   safety round/check completed   room organization consistent   nonskid shoes/slippers when out of bed   fall prevention program maintained   clutter free environment maintained  Taken 5/20/2025 0207 by Geovanni Abebe RN  Safety Promotion/Fall Prevention:   safety round/check completed   room organization consistent   nonskid shoes/slippers when out of bed   fall prevention program maintained   clutter free environment maintained  Taken 5/20/2025 0039 by Geovanni Abebe RN  Safety Promotion/Fall Prevention:   safety round/check completed   room organization consistent   nonskid shoes/slippers when out of bed   fall prevention program maintained   clutter free environment maintained  Taken 5/19/2025 2203 by Mis  TRISTAN Galarza  Safety Promotion/Fall Prevention:   safety round/check completed   room organization consistent   nonskid shoes/slippers when out of bed   clutter free environment maintained   fall prevention program maintained  Taken 5/19/2025 2006 by Geovanni Abebe RN  Safety Promotion/Fall Prevention:   safety round/check completed   room organization consistent   nonskid shoes/slippers when out of bed   fall prevention program maintained   clutter free environment maintained     Problem: Comorbidity Management  Goal: Maintenance of COPD Symptom Control  Outcome: Progressing  Intervention: Maintain COPD (Chronic Obstructive Pulmonary Disease) Symptom Control  Recent Flowsheet Documentation  Taken 5/20/2025 0416 by Geovanni Abebe RN  Medication Review/Management:   medications reviewed   high-risk medications identified  Taken 5/20/2025 0207 by Geovanni Abebe RN  Medication Review/Management:   medications reviewed   high-risk medications identified  Taken 5/20/2025 0039 by Geovanni Abebe RN  Medication Review/Management:   medications reviewed   high-risk medications identified  Taken 5/19/2025 2203 by Geovanni Abebe RN  Medication Review/Management:   medications reviewed   high-risk medications identified  Taken 5/19/2025 2006 by Geovanni Abebe RN  Medication Review/Management:   medications reviewed   high-risk medications identified  Goal: Blood Pressure in Desired Range  Outcome: Progressing  Intervention: Maintain Blood Pressure Management  Recent Flowsheet Documentation  Taken 5/20/2025 0416 by Geovanni Abebe RN  Medication Review/Management:   medications reviewed   high-risk medications identified  Taken 5/20/2025 0207 by Geovanni Abebe RN  Medication Review/Management:   medications reviewed   high-risk medications identified  Taken 5/20/2025 0039 by Geovanni Abebe RN  Medication Review/Management:   medications reviewed   high-risk medications identified  Taken 5/19/2025 2203 by Geovanni Abebe  RN  Medication Review/Management:   medications reviewed   high-risk medications identified  Taken 5/19/2025 2006 by Geovanni Abebe RN  Medication Review/Management:   medications reviewed   high-risk medications identified

## 2025-05-20 NOTE — CONSULTS
.     REASON FOR CONSULTATION:     Provide an opinion on any further workup or treatment of newly diagnosed the biopsy confirmed the small cell lung cancer.                             REQUESTING PHYSICIAN: Nataliia Palafox MD    RECORDS OBTAINED:  Records of the patient's history including those obtained from the referring provider were reviewed and summarized in detail.    HISTORY OF PRESENT ILLNESS:  The patient is a 70 y.o. year old female with a history of long-term cigarette smoking, GERD, chronic back pain, who presented to [facility] at midnight on 05/12/2025 because of dyspnea. The patient reports she has not been feeling well for the past several months, with significant fatigue and waxing and waning with her symptoms, and decreased appetite especially since the diagnosis of her cancer.  She also admits about 20 pound weight loss in the past 6 months. She denies dysphagia, odynophagia.     The patient is a long-term cigarette smoker, she started smoking cigarettes about at the age of 14, and quit 8 months ago again. She had intermittently quit smoking for 3 times, adding up to a total of 21 years in between.  Despite of that, she would still have accumulatively 35 years of cigarette smoking. The patient denies cough, hemoptysis.    In the ER the patient had chest x-ray examination in the very early morning on 05/13/2025 and this reported large right pleural effusion and dense right basilar consolidation, possible infiltrate in the right upper lobe. The patient had CT of the head without contrast which showed no apparent acute intracranial hemorrhage or mass. CT of the chest without contrast reported bulky mediastinal and right hilar lymphadenopathy measuring up to 3 cm. There was moderate right pleural effusion near complete atelectasis of the right lower lobe. There are also small lung nodules in the right lung.    The patient subsequently had ultrasound guided thoracentesis on 05/14/2025  and with 1 liter pleural effusion removed. Cytology study was negative for malignancy.     The patient then subsequently had CT of the chest with IV contrast on 05/14/2025 and this reported residual right side pleural effusion, moderate size. Significantly decreased in the right lower lobe atelectasis. There was bulky confluent mediastinal and right hilar adenopathy measuring up to 8.6 x 8.0 cm. There is also separate bulky anterior mediastinal lymphadenopathy and bulky right supraclavicular lymphadenopathy. The bulky adenopathy is contiguous with a right middle lobe mass measuring 4.8 x 3.2 cm. There is occlusion of the right main bronchus and right intralobular bronchus. There is a small pericardial effusion measuring up to 1 cm in diameter. No left pleural effusion.     This patient ultimately had ultrasound guided right supraclavicular lymph node biopsy on 05/15/2025. Pathology evaluation reported this to be metastatic pulmonary small cell carcinoma. Samples were positive for AE1/AE3, TTF-1, chromogranin A and CD 56, negative for CK7 and p40. Ki67 was > 95%.    The patient then had angiogram study of the chest on 05/18/2025 which reported moderate to large right pleural effusion. Redemonstrated hilar and mediastinal mass. No evidence for pulmonary emboli. The upper abdomen was without acute abnormalities.    The patient denies headaches, vision changes, no nausea, no vomiting. No change in personality.     The patient lives at home. She is retired from [employer] as [employer] health. She lives by herself. She has no children. She has a relative who lives in [place] who she has been talking to.         Past Medical History:   Diagnosis Date    Chronic back pain     Claudication     GERD (gastroesophageal reflux disease)      Past Surgical History:   Procedure Laterality Date    BREAST CYST EXCISION Right     TONSILLECTOMY      US GUIDED LYMPH NODE BIOPSY  5/15/2025       MEDICATIONS    Current  Facility-Administered Medications:     acetaminophen (TYLENOL) tablet 650 mg, 650 mg, Oral, Q4H PRN **OR** acetaminophen (TYLENOL) 160 MG/5ML oral solution 650 mg, 650 mg, Oral, Q4H PRN **OR** acetaminophen (TYLENOL) suppository 650 mg, 650 mg, Rectal, Q4H PRN, Oren Copeland MD    acetaminophen (TYLENOL) tablet 500 mg, 500 mg, Oral, Q6H PRN, Oren Copeland MD    amLODIPine (NORVASC) tablet 5 mg, 5 mg, Oral, Q24H, Nataliia Palafox MD, 5 mg at 05/20/25 0940    atorvastatin (LIPITOR) tablet 20 mg, 20 mg, Oral, Nightly, Oren Copeland MD, 20 mg at 05/19/25 2005    calcium carbonate (TUMS) chewable tablet 500 mg (200 mg elemental), 2 tablet, Oral, BID PRN, Oren Copeland MD    Calcium Replacement - Follow Nurse / BPA Driven Protocol, , Not Applicable, PRN, Oren Copeland MD    enoxaparin sodium (LOVENOX) syringe 40 mg, 40 mg, Subcutaneous, Q24H, Oren Copeland MD, 40 mg at 05/19/25 1452    guaiFENesin (MUCINEX) 12 hr tablet 1,200 mg, 1,200 mg, Oral, Q12H, Oren Copeland MD, 1,200 mg at 05/20/25 0940    HYDROcodone-acetaminophen (NORCO) 5-325 MG per tablet 1 tablet, 1 tablet, Oral, Q4H PRN, Oren Copeland MD, 1 tablet at 05/19/25 2005    hydrOXYzine (ATARAX) tablet 25 mg, 25 mg, Oral, TID PRN, Nataliia Palafox MD, 25 mg at 05/19/25 1128    ipratropium-albuterol (DUO-NEB) nebulizer solution 3 mL, 3 mL, Nebulization, 4x Daily - RT, Oren Copeland MD, 3 mL at 05/20/25 1047    ipratropium-albuterol (DUO-NEB) nebulizer solution 3 mL, 3 mL, Nebulization, TID PRN, Nataliia Palafox MD    Lidocaine 4 % 1 patch, 1 patch, Transdermal, Q24H, Nataliia Palafox MD, 1 patch at 05/20/25 0941    LORazepam (ATIVAN) injection 1 mg, 1 mg, Intravenous, Once PRN, Agustin Orellana, DO    Magnesium Standard Dose Replacement - Follow Nurse / BPA Driven Protocol, , Not Applicable, PRN, Oren Copeland MD    Menthol-Zinc Oxide 1 Application, 1 Application, Topical, BID, Oren Copeland MD, 1 Application at 05/20/25  0941    nitroglycerin (NITROSTAT) SL tablet 0.4 mg, 0.4 mg, Sublingual, Q5 Min PRN, Oren Copeland MD    ondansetron ODT (ZOFRAN-ODT) disintegrating tablet 4 mg, 4 mg, Oral, Q6H PRN **OR** ondansetron (ZOFRAN) injection 4 mg, 4 mg, Intravenous, Q6H PRN, Oren Copeland MD, 4 mg at 05/17/25 1527    pantoprazole (PROTONIX) injection 40 mg, 40 mg, Intravenous, Q12H, Nataliia Palafox MD, 40 mg at 05/20/25 0941    Phosphorus Replacement - Follow Nurse / BPA Driven Protocol, , Not Applicable, PRMin MOTT Samer H, MD    Potassium Replacement - Follow Nurse / BPA Driven Protocol, , Not Applicable, PRN, Oren Copeland MD    prochlorperazine (COMPAZINE) injection 2.5 mg, 2.5 mg, Intravenous, Q6H PRN, Nataliia Palafox MD, 2.5 mg at 05/17/25 2217    sodium chloride 0.9 % flush 10 mL, 10 mL, Intravenous, Q12H, Oren Copeland MD, 10 mL at 05/20/25 0935    sodium chloride 0.9 % flush 10 mL, 10 mL, Intravenous, PRN, Oren Copeland MD    sodium chloride 0.9 % infusion 40 mL, 40 mL, Intravenous, PRN, Oren Copeland MD    ALLERGIES:     Allergies   Allergen Reactions    Codeine Unknown - Low Severity    Sulfa Antibiotics Unknown - Low Severity       SOCIAL HISTORY:       Social History     Socioeconomic History    Marital status:    Tobacco Use    Smoking status: Former     Current packs/day: 1.00     Average packs/day: 1 pack/day for 40.0 years (40.0 ttl pk-yrs)     Types: Cigarettes    Smokeless tobacco: Never    Tobacco comments:     quit 2 weeks ago   Vaping Use    Vaping status: Never Used   Substance and Sexual Activity    Alcohol use: No    Drug use: No    Sexual activity: Defer         FAMILY HISTORY:  Family History   Problem Relation Age of Onset    Alzheimer's disease Mother     Cancer Father        REVIEW OF SYSTEMS:  Review of Systems    See HPI          Vitals:    05/20/25 0330 05/20/25 0653 05/20/25 0837 05/20/25 1047   BP: 126/88  130/86    BP Location: Left arm  Left arm    Patient Position:  Lying  Lying    Pulse:       Resp: 16 20 20 20   Temp: 97.3 °F (36.3 °C)  97.7 °F (36.5 °C)    TempSrc: Oral  Oral    SpO2:   95%    Weight:       Height:              No data to display               PHYSICAL EXAM:      CONSTITUTIONAL:  Vital signs reviewed.  Well-developed female, cachectic.  No distress, looks comfortable.  EYES:  Conjunctivae and lids unremarkable.    EARS,NOSE,MOUTH,THROAT:  Ears and nose appear unremarkable.    RESPIRATORY:  Normal respiratory effort.  Decreased breathing sounds right lung.    CARDIOVASCULAR:  Normal S1, S2.  No murmurs rubs or gallops.    GASTROINTESTINAL: Abdomen appears unremarkable.  Nontender.  No hepatomegaly.  No splenomegaly.  LYMPHATIC: Right supraclavicular adenopathy.   MUSCULOSKELETAL:  Unremarkable digits/nails.  No cyanosis or clubbing.  SKIN:  Warm.  No rashes.  PSYCHIATRIC:  Normal judgment and insight.  Normal mood and affect.      RECENT LABS:        WBC   Date Value Ref Range Status   05/20/2025 6.38 3.40 - 10.80 10*3/mm3 Final   05/19/2025 6.23 3.40 - 10.80 10*3/mm3 Final   05/18/2025 7.39 3.40 - 10.80 10*3/mm3 Final     Hemoglobin   Date Value Ref Range Status   05/20/2025 13.9 12.0 - 15.9 g/dL Final   05/19/2025 13.9 12.0 - 15.9 g/dL Final   05/18/2025 13.4 12.0 - 15.9 g/dL Final     Platelets   Date Value Ref Range Status   05/20/2025 164 140 - 450 10*3/mm3 Final   05/19/2025 136 (L) 140 - 450 10*3/mm3 Final   05/18/2025 145 140 - 450 10*3/mm3 Final     CMP:        Lab 05/20/25  0515 05/19/25  0527 05/18/25  0604 05/17/25  1127 05/17/25  0611 05/16/25  0732 05/15/25  0641 05/14/25  0637 05/13/25  1428   SODIUM 137 136 137  --  142 139   < > 137  --    POTASSIUM 4.7 5.2 4.9 5.2 5.8* 3.1*   < > 3.8  --    CHLORIDE 106 105 104  --  109* 105   < > 103  --    CO2 17.0* 20.0* 19.0*  --  21.7* 20.0*   < > 20.0*  --    ANION GAP 14.0 11.0 14.0  --  11.3 14.0   < > 14.0  --    BUN 11 8 7*  --  7* 9   < > 12  --    CREATININE 1.17* 1.09* 1.10*  --  1.06* 1.05*   <  "> 1.00  --    EGFR 50.0* 54.4* 53.8*  --  56.3* 56.9*   < > 60.4  --    GLUCOSE 68 75 100*  --  113* 96   < > 118*  --    CALCIUM 9.0 9.3 8.9  --  9.0 8.3*   < > 8.9  --    MAGNESIUM  --   --   --   --   --   --   --   --  1.7   TOTAL PROTEIN  --   --   --   --   --   --   --  6.4  --     < > = values in this interval not displayed.     Tissue Pathology Exam: [id number]  Order: [id number]   Status: Final result       Next appt: None    Test Result Released: No (scheduled for 5/22/2025  1:14 PM)    Specimen Information: Clavicle, Right; Tissue   0 Result Notes      Component  Ref Range & Units (hover)    Case Report   Surgical Pathology Report                         Case: [id number]                                   Authorizing Provider:  Oren Copeland MD       Collected:           05/15/2025 04:00 PM           Ordering Location:     [facility]  Received:            05/15/2025 04:32 PM                                  [address] [place]                                                                       Pathologist:           Eden Tracy MD                                                     Specimen:    Clavicle, Right, rt supraclavicular lymph node                                            Clinical Information    Rt supraclavicular lymph node  Flow    Final Diagnosis   1.  Lymph node, right supraclavicular, ultrasound-guided core biopsies for adenopathy: METASTATIC PULMONARY SMALL CELL CARCINOMA.     SWM   Electronically signed by Eden Tracy MD on 5/19/2025 at 1314 EDT   Comment    Representative slides from this case were reviewed internally with Dr. Plasencia, who concurred.   Gross Description    1. Clavicle, Right.   Received in formalin labeled with the patient's name and designated  \"right supraclavicular lymph node\" is a single gray-white needle core biopsy measuring 1.2 x 0.1 cm in maximal tubal diameter.  The core is filtered and submitted in toto in cassette 1A.   "   hbt/uso/SWM     There also is tissue received in [facility], consisting of a 1.1 x 0.1 cm white-tan elongated soft tissue core.  After initial histological examination the specimen in [facility] is submitted for permanent histological examination as 1B.     kls/uso/swm   Special Stains    You icing appropriate controls, multiple immunohistochemical stains were performed including AE1/AE3, CK7, TTF-1, synaptophysin, chromogranin A, CD56, p40 and Ki-67.  Tumor cells are positive for AE1/AE3, TTF-1, chromogranin A and CD56 and negative for CK7 and p40.  The Ki67 labeling approximates greater than 95%.  The overall immunoprofile supports the rendered diagnosis.            Component  Ref Range & Units (hover)    Case Report   Non-gynecologic Cytology                          Case: [id number]                                   Authorizing Provider:  Agustin Orellana DO Collected:           05/14/2025 09:41 AM           Ordering Location:     [facility]  Received:            05/14/2025 12:47 PM                                  [address]                                                                       Pathologist:           Jillian Plasencia MD                                                           Specimen:    Pleural Cavity, Right Thoracentesis                                                        Final Diagnosis   Pleural Cavity, Right Thoracentesis:               A. Negative for epithelial malignancy.               B. Reactive mesothelial cells, histiocytes and numerous lymphocytes (lymphocytic effusion).    Electronically signed by Jillian Plasencia MD        IMAGING:  XR Chest 1 View  XR CHEST 1 VW-     Clinical: Chest pain     COMPARISON CT examination 5/18/2025 and chest radiograph 5/13/2025     FINDINGS: Patient is rotated on the current examination. The  cardiomediastinal silhouette is stable. The left lung is clear.  Right-sided pleural effusion is similar to or larger compared to the  previous  examination. There is vague infiltrate/atelectasis right lung.  There is prominence of the right hilar and right paratracheal soft  tissues consistent with mass and/or adenopathy. The remainder is  unremarkable.     This report was finalized on 5/19/2025 6:33 AM by Dr. Carlos Stapleton M.D  on Workstation: [id number]         Assessment & Plan     ASSESSMENT:  1. Locally advanced small cell lung cancer with a right middle lobe lung mass and bulky right hilar adenopathy and mediastinal adenopathy as well as right supraclavicular adenopathy. Biopsy confirmed with high grade Ki67 at more than 95%. I discussed with the patient today that I recommended to obtain brain MRI with and without contrast for further evaluation because that is the most sensitive study for looking for metastatic brain lesions. Asymptomatic the patient still could have metastatic disease considering this is aggressive small cell lung cancer. However the patient reports she has high anxiety and she is hesitant to have that study.    I also discussed with the patient the management options with aggressive chemotherapy with carboplatin plus -16 every 3 weeks and in conjunction with immunotherapy. Nevertheless I recommended to start the first cycle of chemotherapy as inpatient and immunotherapy would not be given per policy. I explained to her. We also discussed route without active treatment and in that case she would be pursued for hospice care. Without treatment I think her life expectancy probably is somewhere between 3-5 months and with treatment she could be about 12 months. The patient needs more time to think about all of these treatment logistics and also social support. Apparently she has no family members living in [place]. She has no children and also she is found not having good social support from neighbors or friends.     PLAN:  We recommended brain MRI with and without contrast for evaluation and she is hesitant to do that. If she is  agreeable, we certainly could add medication such as Valium or Ativan before her MRI examination to see if she could tolerate.   I will come back to discuss with the patient tomorrow again for management plan and to see which direction she wants to go.   If she wants to have active chemotherapy, she will need Port-A-Catheter and also we would recommend first cycle of chemotherapy as inpatient.    Discussed with the patient and reviewed notes from the ER and primary team HLA. Also personally reviewed CT scan images from 05/13/2025 and 05/14/2025.    I also spoke with nursing staff.      I spent 76 minutes caring for Penelope on this date of service. This time includes time spent by me in the following activities: preparing for the visit, reviewing tests, obtaining and/or reviewing a separately obtained history, performing a medically appropriate examination and/or evaluation, counseling and educating the patient/family/caregiver, ordering medications, tests, or procedures, referring and communicating with other health care professionals, documenting information in the medical record, independently interpreting results and communicating that information with the patient/family/caregiver and care coordination       PHOEBE STOREY M.D., Ph.D.

## 2025-05-20 NOTE — PROGRESS NOTES
[facility]  [phone]  Dr. Agustin Orellana     Subjective:  LOS: 7    Chief Complaint:   Shortness of breath and fatigue     Patient was doing okay today.  She is still very anxious about her cancer diagnosis.  She states that she would have already informed of this prior to my arrival.  I did answer questions to the best of my ability and discussed with her that oncology was consulted and will see her today.    Objective   Vital Signs past 24hrs  Temp range: Temp (24hrs), Av.7 °F (36.5 °C), Min:97.3 °F (36.3 °C), Max:98.2 °F (36.8 °C)    BP range: BP: (107-138)/(78-88) 130/86  Pulse range: Heart Rate:  [107-108] 107  Resp rate range: Resp:  [16-20] 20  Device (Oxygen Therapy): nasal cannulaFlow (L/min) (Oxygen Therapy):  [1-2] 1  Oxygen range:SpO2:  [93 %-95 %] 95 %   Mechanical Ventilator:     Physical Exam  Vitals and nursing note reviewed.   Constitutional:       General: She is not in acute distress.     Appearance: She is cachectic.   HENT:      Head: Normocephalic and atraumatic.   Cardiovascular:      Rate and Rhythm: Normal rate and regular rhythm.      Heart sounds: No murmur heard.  Pulmonary:      Effort: Pulmonary effort is normal. No respiratory distress.      Breath sounds: Normal breath sounds. No wheezing, rhonchi or rales.   Abdominal:      General: Abdomen is flat.      Tenderness: There is no abdominal tenderness.   Skin:     General: Skin is warm and dry.      Findings: No rash.   Neurological:      Mental Status: She is alert.       Results Review:    I have reviewed the laboratory and imaging data since the last note by [facility] physician.  My annotations are noted in assessment and plan.      Result Review:  I have personally reviewed the results from last note by [facility] physician to 2025 09:29 EDT and agree with these findings:  [x]  Laboratory list / accordion  [x]  Microbiology  [x]  Radiology  [x]  EKG/Telemetry   [x]  Cardiology/Vascular   [x]  Pathology  [x]  Old  records  []  Other:    Medication Review:  I have reviewed the current MAR.  My annotations are noted in assessment and plan.    amLODIPine, 5 mg, Oral, Q24H  atorvastatin, 20 mg, Oral, Nightly  enoxaparin sodium, 40 mg, Subcutaneous, Q24H  guaiFENesin, 1,200 mg, Oral, Q12H  ipratropium-albuterol, 3 mL, Nebulization, 4x Daily - RT  Lidocaine, 1 patch, Transdermal, Q24H  Menthol-Zinc Oxide, 1 Application, Topical, BID  pantoprazole, 40 mg, Intravenous, Q12H  sodium chloride, 10 mL, Intravenous, Q12H           Lines, Drains & Airways       Active LDAs       Name Placement date Placement time Site Days    Peripheral IV 05/18/25 1846 20 G Anterior;Left Forearm 05/18/25 1846  Forearm  1    External Urinary Catheter --  --  --  --                  No active isolations  Diet Orders (active) (From admission, onward)       Start     Ordered    05/20/25 0800  Dietary Nutrition Supplements Novasource Renal (Nepro)  Daily With Breakfast & Dinner       05/19/25 1809 05/20/25 0800  Dietary Nutrition Supplements Boost Breeze (Ensure Clear)  Daily With Breakfast & Lunch       05/19/25 1809 05/19/25 1850  Diet: Regular/House, Renal; Low Potassium; Texture: Regular (IDDSI 7); Fluid Consistency: Thin (IDDSI 0)  Diet Effective Now         05/19/25 1850                      Assessment  Acute hypoxic respiratory failure  Right pleural effusion s/p thoracentesis 5/14/25 with exudate  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  Found to have right pleural effusion and mediastinal and right hilar adenopathy.  - Continue weaning oxygen for goal O2 saturation greater than 88%  - Completed course of antibiotics for possible pneumonia  - Thoracentesis on 5/14/2025 with 1 L of fluid removed.  Exudative by lights criteria.  Cytology negative for  malignant cells.  -CT chest with contrast after thoracentesis shows further evidence of a right hilar mass with bulky mediastinal and hilar lymphadenopathy that is enclosing the right mainstem bronchus.  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

## 2025-05-20 NOTE — PROGRESS NOTES
Name: Penelope Onofre ADMIT: 2025   : 1955  PCP: Bonny Judge MD    MRN: [id number] LOS: 7 days   AGE/SEX: 70 y.o. female  ROOM: [facility]     Subjective   Subjective   No acute events overnight.  Patient feeling pretty well this morning.  On 1 L nasal cannula.  States that her chest pain has resolved.  States she does still have shortness of breath.  Biopsy results are back and I did discuss results with patient.    Objective   Objective     Vital Signs  Temp:  [97.3 °F (36.3 °C)-98.2 °F (36.8 °C)] 97.7 °F (36.5 °C)  Heart Rate:  [107-108] 107  Resp:  [16-20] 20  BP: (107-138)/(78-88) 130/86  SpO2:  [93 %-95 %] 95 %  on  Flow (L/min) (Oxygen Therapy):  [1-2] 1;   Device (Oxygen Therapy): nasal cannula  Body mass index is 18.99 kg/m².    Physical Exam  General: Alert, no acute distress.  Sitting up in bed.  Chronically ill-appearing.  Anxious appearing.  ENT: No conjunctival injection or scleral icterus. Moist mucous membranes.   Neuro: Eyes open and moving in all directions, generalized weakness, face symmetric, no focal deficits.   Lungs: Diminished aeration, no wheezing or crackles.  On 1 L nasal cannula.  Heart: RRR, no murmurs. No edema.  Abdomen: Soft, non-tender, non-distended. Normal bowel sounds. No hepatosplenomegaly.   Ext: Warm and well-perfused.  Mild tenderness to palpation of the left chest wall.  Skin: Soft tissue mass on right chest wall.    Results Review     I reviewed the patient's new clinical results:  Results from last 7 days   Lab Units 25  0515 25  0527 25  0604 25  0611   WBC 10*3/mm3 6.38 6.23 7.39 6.14   HEMOGLOBIN g/dL 13.9 13.9 13.4 13.3   PLATELETS 10*3/mm3 164 136* 145 161     Results from last 7 days   Lab Units 25  0515 25  0527 25  0604 25  1127 25  0611   SODIUM mmol/L 137 136 137  --  142   POTASSIUM mmol/L 4.7 5.2 4.9 5.2 5.8*   CHLORIDE mmol/L 106 105 104  --  109*   CO2 mmol/L 17.0* 20.0* 19.0*  --  21.7*  "  BUN mg/dL 11 8 7*  --  7*   CREATININE mg/dL 1.17* 1.09* 1.10*  --  1.06*   GLUCOSE mg/dL 68 75 100*  --  113*   EGFR mL/min/1.73 50.0* 54.4* 53.8*  --  56.3*           Results from last 7 days   Lab Units 05/20/25  0515 05/19/25  0527 05/18/25  0604 05/17/25  0611 05/14/25  0637 05/13/25  1428   CALCIUM mg/dL 9.0 9.3 8.9 9.0   < >  --    MAGNESIUM mg/dL  --   --   --   --   --  1.7    < > = values in this interval not displayed.     Results from last 7 days   Lab Units 05/13/25  1428   PROCALCITONIN ng/mL 0.05     No results found for: \"HGBA1C\", \"POCGLU\"      CT Angiogram Chest  Result Date: 5/18/2025   Pulmonary arteries are well-opacified, and there is no evidence of pulmonary embolism.  Redemonstrated large right hilar mediastinal mass, with moderate to large right pleural effusion and posterior basilar compressive atelectasis in the right lung, no new pulmonary parenchymal abnormality when compared to 5/14/2025.    This report was finalized on 5/18/2025 9:16 PM by Dr. Ty Mock M.D on Workstation: [id number]          I have personally reviewed all medications:  Scheduled Medications  amLODIPine, 5 mg, Oral, Q24H  atorvastatin, 20 mg, Oral, Nightly  enoxaparin sodium, 40 mg, Subcutaneous, Q24H  guaiFENesin, 1,200 mg, Oral, Q12H  ipratropium-albuterol, 3 mL, Nebulization, 4x Daily - RT  Lidocaine, 1 patch, Transdermal, Q24H  Menthol-Zinc Oxide, 1 Application, Topical, BID  pantoprazole, 40 mg, Intravenous, Q12H  sodium chloride, 10 mL, Intravenous, Q12H    Infusions   Diet  Diet: Regular/House, Renal; Low Potassium; Texture: Regular (IDDSI 7); Fluid Consistency: Thin (IDDSI 0)      Intake/Output Summary (Last 24 hours) at 5/20/2025 1117  Last data filed at 5/19/2025 1700  Gross per 24 hour   Intake 240 ml   Output 500 ml   Net -260 ml       Assessment/Plan     Active Hospital Problems    Diagnosis  POA    **Generalized weakness [R53.1]  Yes    Dizziness [R42]  Yes    Pneumonia [J18.9]  Yes    Pleural " effusion [J90]  Yes    At high risk for pressure injury of skin [Z91.89]  Not Applicable    Decubitus ulcer of sacral region, stage 1 [L89.151]  Yes    Acute kidney failure [N17.9]  Yes    Dehydration [E86.0]  Yes    Elevated troponin [R79.89]  Yes    Elevated brain natriuretic peptide (BNP) level [R79.89]  Yes    Hypokalemia [E87.6]  Yes    Hyperglycemia [R73.9]  Yes      Resolved Hospital Problems   No resolved problems to display.     70 y.o. female with Generalized weakness.    Right sided pneumonia  Pleural effusion  Acute hypoxic respiratory failure  - CT chest with evidence of right hilar mass and bulky mediastinal and hilar lymphadenopathy  - Blood cultures no growth to date  - Pleural fluid culture no growth to date  - Pulmonology consulted and following  - Thoracentesis 5/14  - S/p course of Zosyn  - Pleural fluid cytology negative for malignancy  - LN biopsy with IR 5/15, pathology has returned for metastatic small cell lung cancer  - CT angiogram obtained 5/18 showing large right hilar mediastinal mass with moderate to large right pleural effusion, will defer decision regarding repeat thoracentesis to pulmonology  - Discussed results of biopsy with patient today  - Consult oncology today    Chest pain  - Patient has been endorsing left-sided chest pain, resolved this morning  - Troponin mildly elevated but flat  - EKG with no acute ischemic changes  - CT angiogram chest negative for PE  - Recent echocardiogram with no obvious wall motion abnormalities  - Added lidocaine patch   - Cardiology consulted, no further workup needed    Hyperkalemia  CKD  Metabolic acidosis  -Creatinine stable today and back at baseline  - Potassium normal today  -Bicarbonate decreased to 17 today, anion gap normal  - Repeat BMP with morning labs    Weakness  - Most likely secondary to the above  - PT/OT following  - Likely SNF at discharge    Hypertension  - Blood pressure improved, continue amlodipine  - Titrate meds as needed  based on BP trends    GERD  - IV PPI    Lovenox 40 mg SC daily for DVT prophylaxis.  Full code.  Discussed with patient, nursing staff, and CCP.  Anticipate discharge to SNU facility timing yet to be determined.  At this point, not sure when patient will be ready.  Oncology consulted today.  Plan to be determined.    Nataliia Palafox MD  [facility]  05/20/25  11:17 EDT

## 2025-05-20 NOTE — PROGRESS NOTES
Nutrition Services    Patient Name:  Penelope Onofre  YOB: 1955  MRN: [id number]  Admit Date:  5/12/2025      NFPE completed on patient.     Patient meets ASPEN/AND criteria for nutrition diagnosis of severe malnutrition of chronic illness based on:   Severe deficit in nutrient intake AED BMI, % IBW  Severe loss of muscle mass  Severe loss of subcutaneous fat    Malnutrition Severity Assessment      Patient meets criteria for : Severe Malnutrition  Malnutrition Type (Last 8 Hours)       Malnutrition Severity Assessment       Row Name 05/20/25 1559       Malnutrition Severity Assessment    Malnutrition Type Chronic Disease - Related Malnutrition      Row Name 05/20/25 1600       Insufficient Energy Intake     Insufficient Energy Intake Findings Severe    Insufficient Energy Intake  <75% of est. energy requirement for > or equal to 3 months      Row Name 05/20/25 1601       Muscle Loss    Loss of Muscle Mass Findings Severe    [place] Severe - deep hollowing/scooping, lack of muscle to touch, facial bones well defined    Clavicle Bone Region Moderate - some protrusion in females, visible in males    Acromion Bone Region Moderate - acromion may slightly protrude    Dorsal Hand Region Moderate - slight depression    Patellar Region Severe - prominent bone, square looking, very little muscle definition    Anterior Thigh Region Severe - line/depression along thigh, obviously thin      Row Name 05/20/25 1601       Fat Loss    Subcutaneous Fat Loss Findings Severe    Orbital Region  Severe - pronounced hollowness/depression, dark circles, loose saggy skin    Upper Arm Region Severe - mostly skin, very little space between folds, fingers touch      Row Name 05/20/25 1601       Criteria Met (Must meet criteria for severity in at least 2 of these categories: M Wasting, Fat Loss, Fluid, Secondary Signs, Wt. Status, Intake)    Patient meets criteria for  Severe Malnutrition                                Education topic: Potassium, Other: Supplements     Resources given:  Discussion only     Advised regarding: Beverage choices, Food choices     Learner:  Patient     Readiness to learn: Accepting     RD contact info provided: Yes     Outpatient referral:        Electronically signed by:  Jeff Snowden RD  05/20/25 16:02 EDT

## 2025-05-21 ENCOUNTER — APPOINTMENT (OUTPATIENT)
Dept: CT IMAGING | Facility: HOSPITAL | Age: 70
End: 2025-05-21
Payer: MEDICARE

## 2025-05-21 PROBLEM — E43 SEVERE PROTEIN-CALORIE MALNUTRITION: Status: ACTIVE | Noted: 2025-05-21

## 2025-05-21 LAB
ANION GAP SERPL CALCULATED.3IONS-SCNC: 14.2 MMOL/L (ref 5–15)
BASOPHILS # BLD AUTO: 0.03 10*3/MM3 (ref 0–0.2)
BASOPHILS NFR BLD AUTO: 0.5 % (ref 0–1.5)
BUN SERPL-MCNC: 12 MG/DL (ref 8–23)
BUN/CREAT SERPL: 10.9 (ref 7–25)
CALCIUM SPEC-SCNC: 8.6 MG/DL (ref 8.6–10.5)
CHLORIDE SERPL-SCNC: 104 MMOL/L (ref 98–107)
CO2 SERPL-SCNC: 19.8 MMOL/L (ref 22–29)
CREAT SERPL-MCNC: 1.1 MG/DL (ref 0.57–1)
DEPRECATED RDW RBC AUTO: 46 FL (ref 37–54)
EGFRCR SERPLBLD CKD-EPI 2021: 53.8 ML/MIN/1.73
EOSINOPHIL # BLD AUTO: 0.17 10*3/MM3 (ref 0–0.4)
EOSINOPHIL NFR BLD AUTO: 2.8 % (ref 0.3–6.2)
ERYTHROCYTE [DISTWIDTH] IN BLOOD BY AUTOMATED COUNT: 12.7 % (ref 12.3–15.4)
GLUCOSE SERPL-MCNC: 116 MG/DL (ref 65–99)
HCT VFR BLD AUTO: 38.9 % (ref 34–46.6)
HGB BLD-MCNC: 13.6 G/DL (ref 12–15.9)
IMM GRANULOCYTES # BLD AUTO: 0.03 10*3/MM3 (ref 0–0.05)
IMM GRANULOCYTES NFR BLD AUTO: 0.5 % (ref 0–0.5)
LYMPHOCYTES # BLD AUTO: 1.33 10*3/MM3 (ref 0.7–3.1)
LYMPHOCYTES NFR BLD AUTO: 21.7 % (ref 19.6–45.3)
MCH RBC QN AUTO: 34.7 PG (ref 26.6–33)
MCHC RBC AUTO-ENTMCNC: 35 G/DL (ref 31.5–35.7)
MCV RBC AUTO: 99.2 FL (ref 79–97)
MONOCYTES # BLD AUTO: 0.71 10*3/MM3 (ref 0.1–0.9)
MONOCYTES NFR BLD AUTO: 11.6 % (ref 5–12)
NEUTROPHILS NFR BLD AUTO: 3.87 10*3/MM3 (ref 1.7–7)
NEUTROPHILS NFR BLD AUTO: 62.9 % (ref 42.7–76)
NRBC BLD AUTO-RTO: 0 /100 WBC (ref 0–0.2)
PLATELET # BLD AUTO: 175 10*3/MM3 (ref 140–450)
PMV BLD AUTO: 10.1 FL (ref 6–12)
POTASSIUM SERPL-SCNC: 3.9 MMOL/L (ref 3.5–5.2)
RBC # BLD AUTO: 3.92 10*6/MM3 (ref 3.77–5.28)
SODIUM SERPL-SCNC: 138 MMOL/L (ref 136–145)
WBC NRBC COR # BLD AUTO: 6.14 10*3/MM3 (ref 3.4–10.8)

## 2025-05-21 PROCEDURE — 70470 CT HEAD/BRAIN W/O & W/DYE: CPT

## 2025-05-21 PROCEDURE — 80048 BASIC METABOLIC PNL TOTAL CA: CPT | Performed by: INTERNAL MEDICINE

## 2025-05-21 PROCEDURE — 94799 UNLISTED PULMONARY SVC/PX: CPT

## 2025-05-21 PROCEDURE — 25510000001 IOPAMIDOL 61 % SOLUTION: Performed by: STUDENT IN AN ORGANIZED HEALTH CARE EDUCATION/TRAINING PROGRAM

## 2025-05-21 PROCEDURE — 85025 COMPLETE CBC W/AUTO DIFF WBC: CPT | Performed by: INTERNAL MEDICINE

## 2025-05-21 PROCEDURE — 99233 SBSQ HOSP IP/OBS HIGH 50: CPT | Performed by: INTERNAL MEDICINE

## 2025-05-21 PROCEDURE — 77001 FLUOROGUIDE FOR VEIN DEVICE: CPT | Performed by: STUDENT IN AN ORGANIZED HEALTH CARE EDUCATION/TRAINING PROGRAM

## 2025-05-21 PROCEDURE — 99999 PR OFFICE/OUTPT VISIT,PROCEDURE ONLY: CPT | Performed by: INTERNAL MEDICINE

## 2025-05-21 PROCEDURE — 76937 US GUIDE VASCULAR ACCESS: CPT | Performed by: STUDENT IN AN ORGANIZED HEALTH CARE EDUCATION/TRAINING PROGRAM

## 2025-05-21 PROCEDURE — 25010000002 ENOXAPARIN PER 10 MG: Performed by: INTERNAL MEDICINE

## 2025-05-21 PROCEDURE — 94761 N-INVAS EAR/PLS OXIMETRY MLT: CPT

## 2025-05-21 PROCEDURE — 94760 N-INVAS EAR/PLS OXIMETRY 1: CPT

## 2025-05-21 PROCEDURE — 94664 DEMO&/EVAL PT USE INHALER: CPT

## 2025-05-21 PROCEDURE — 99221 1ST HOSP IP/OBS SF/LOW 40: CPT | Performed by: STUDENT IN AN ORGANIZED HEALTH CARE EDUCATION/TRAINING PROGRAM

## 2025-05-21 RX ORDER — ALLOPURINOL 300 MG/1
300 TABLET ORAL DAILY
Status: DISCONTINUED | OUTPATIENT
Start: 2025-05-21 | End: 2025-05-30 | Stop reason: HOSPADM

## 2025-05-21 RX ORDER — PANTOPRAZOLE SODIUM 40 MG/1
40 TABLET, DELAYED RELEASE ORAL
Status: DISCONTINUED | OUTPATIENT
Start: 2025-05-21 | End: 2025-05-30 | Stop reason: HOSPADM

## 2025-05-21 RX ORDER — LORAZEPAM 0.5 MG/1
0.5 TABLET ORAL ONCE AS NEEDED
Status: DISCONTINUED | OUTPATIENT
Start: 2025-05-21 | End: 2025-05-25

## 2025-05-21 RX ORDER — IOPAMIDOL 612 MG/ML
100 INJECTION, SOLUTION INTRAVASCULAR
Status: COMPLETED | OUTPATIENT
Start: 2025-05-21 | End: 2025-05-21

## 2025-05-21 RX ADMIN — AMLODIPINE BESYLATE 5 MG: 5 TABLET ORAL at 09:15

## 2025-05-21 RX ADMIN — MENTHOL, ZINC OXIDE 1 APPLICATION: .44; 20.6 OINTMENT TOPICAL at 09:18

## 2025-05-21 RX ADMIN — IPRATROPIUM BROMIDE AND ALBUTEROL SULFATE 3 ML: .5; 3 SOLUTION RESPIRATORY (INHALATION) at 14:29

## 2025-05-21 RX ADMIN — MENTHOL, ZINC OXIDE 1 APPLICATION: .44; 20.6 OINTMENT TOPICAL at 19:38

## 2025-05-21 RX ADMIN — ENOXAPARIN SODIUM 40 MG: 100 INJECTION SUBCUTANEOUS at 13:44

## 2025-05-21 RX ADMIN — PANTOPRAZOLE SODIUM 40 MG: 40 TABLET, DELAYED RELEASE ORAL at 18:03

## 2025-05-21 RX ADMIN — ALLOPURINOL 300 MG: 300 TABLET ORAL at 13:44

## 2025-05-21 RX ADMIN — Medication 10 ML: at 09:18

## 2025-05-21 RX ADMIN — IOPAMIDOL 85 ML: 612 INJECTION, SOLUTION INTRAVENOUS at 15:50

## 2025-05-21 RX ADMIN — GUAIFENESIN 1200 MG: 600 TABLET, EXTENDED RELEASE ORAL at 19:38

## 2025-05-21 RX ADMIN — PANTOPRAZOLE SODIUM 40 MG: 40 INJECTION, POWDER, FOR SOLUTION INTRAVENOUS at 09:16

## 2025-05-21 RX ADMIN — IPRATROPIUM BROMIDE AND ALBUTEROL SULFATE 3 ML: .5; 3 SOLUTION RESPIRATORY (INHALATION) at 07:50

## 2025-05-21 RX ADMIN — IPRATROPIUM BROMIDE AND ALBUTEROL SULFATE 3 ML: .5; 3 SOLUTION RESPIRATORY (INHALATION) at 11:36

## 2025-05-21 RX ADMIN — LIDOCAINE 1 PATCH: 4 PATCH TOPICAL at 09:15

## 2025-05-21 RX ADMIN — ATORVASTATIN CALCIUM 20 MG: 20 TABLET, FILM COATED ORAL at 19:38

## 2025-05-21 RX ADMIN — GUAIFENESIN 1200 MG: 600 TABLET, EXTENDED RELEASE ORAL at 09:15

## 2025-05-21 NOTE — CONSULTS
"Consulted with oncologist to explain pt's resistance to MRI.  Pt continuing to process shock of cancer diagnosis and is grasping at any thought or behavior which gives her control.      Pt can state \"with treatment I've got a year, do nothing and I have three months.\"      Provided prayer and empathic pastoral support.    Chaplain Micah Triana  "

## 2025-05-21 NOTE — PROGRESS NOTES
LOS: 8 days   Patient Care Team:  Bonny Judge MD as PCP - General (Internal Medicine)    Chief Complaint: Small cell lung cancer, extensive stage.    Interval History:  5/21/2025 is reported by nursing staff patient does not want to have brain MRI examination due to claustrophobia despite we offered premedication for.  She however does wanted to have further discussion of palliative chemotherapy.  Patient looks weak but clinically stable.     A comprehensive 14 point review of systems was performed and was negative except as mentioned.    Vital Signs:  Temp:  [97.7 °F (36.5 °C)-98.5 °F (36.9 °C)] 97.7 °F (36.5 °C)  Heart Rate:  [] 110  Resp:  [18-20] 20  BP: ()/(68-80) 98/68    Intake/Output Summary (Last 24 hours) at 5/21/2025 1636  Last data filed at 5/21/2025 0500  Gross per 24 hour   Intake --   Output 700 ml   Net -700 ml       Physical Exam:  GENERAL:  Well-developed, cachectic female, in no acute distress.    SKIN:  Warm, dry without rashes, purpura or petechiae.  HEENT:  Normocephalic.   LYMPHATICS: Right upper chest wall/supraclavicular area has a mass.  No other cervical, supraclavicular or axillary adenopathy.  CHEST: Normal respiratory effort.  Lungs clear to auscultation. Good airflow.  CARDIAC:  Regular rate and rhythm. Normal S1,S2.  ABDOMEN:  Soft, no tender.  Bowel sounds normal.  EXTREMITIES:  No lower extremity edema.      Results Review:   CBC:      Lab 05/21/25  0605 05/20/25  0515 05/19/25  0527 05/18/25  0604 05/17/25  0611   WBC 6.14 6.38 6.23 7.39 6.14   HEMOGLOBIN 13.6 13.9 13.9 13.4 13.3   HEMATOCRIT 38.9 41.5 41.4 39.3 38.9   PLATELETS 175 164 136* 145 161   NEUTROS ABS 3.87 3.93 4.09 5.25 4.22   IMMATURE GRANS (ABS) 0.03 0.03 0.02 0.03 0.01   LYMPHS ABS 1.33 1.51 1.26 1.25 1.06   MONOS ABS 0.71 0.64 0.62 0.77 0.71   EOS ABS 0.17 0.21 0.19 0.07 0.12   MCV 99.2* 103.0* 104.0* 103.1* 101.8*     CMP:        Lab 05/21/25  0605 05/20/25  0515 05/19/25  0527 05/18/25  0604  05/17/25  1127 05/17/25  0611   SODIUM 138 137 136 137  --  142   POTASSIUM 3.9 4.7 5.2 4.9 5.2 5.8*   CHLORIDE 104 106 105 104  --  109*   CO2 19.8* 17.0* 20.0* 19.0*  --  21.7*   ANION GAP 14.2 14.0 11.0 14.0  --  11.3   BUN 12 11 8 7*  --  7*   CREATININE 1.10* 1.17* 1.09* 1.10*  --  1.06*   EGFR 53.8* 50.0* 54.4* 53.8*  --  56.3*   GLUCOSE 116* 68 75 100*  --  113*   CALCIUM 8.6 9.0 9.3 8.9  --  9.0         Results from last 7 days   Lab Units 05/18/25  2120 05/18/25  1835 05/16/25  0732 05/15/25  0641   CK TOTAL U/L  --   --  61 68   HSTROP T ng/L 33* 34*  --   --                        I reviewed the patient's new clinical results.          ASSESSMENT:  1.  Small cell lung cancer.    Locally advanced small cell lung cancer with a right middle lobe lung mass and bulky right hilar adenopathy and mediastinal adenopathy as well as right supraclavicular adenopathy. Biopsy confirmed with high grade Ki67 at more than 95%.   I discussed with the patient today on 5/20/2025 that I recommended to obtain brain MRI with and without contrast for further evaluation because that is the most sensitive study for looking for metastatic brain lesions. Asymptomatic the patient still could have metastatic disease considering this is aggressive small cell lung cancer. However the patient reports she has high anxiety and she is hesitant to have that study.   I also discussed with the patient the management options with aggressive chemotherapy with carboplatin plus -16 every 3 weeks and in conjunction with immunotherapy. Nevertheless I recommended to start the first cycle of chemotherapy as inpatient and immunotherapy would not be given per policy. I explained to her. We also discussed route without active treatment and in that case she would be pursued for hospice care. Without treatment I think her life expectancy probably is somewhere between 3-5 months and with treatment she could be about 12 months. The patient needs more time  to think about all of these treatment logistics and also social support. Apparently she has no family members living in [place]. She has no children and also she is found not having good social support from neighbors or friends.   5/21/2025 patient does not want to have brain MRI examination due to claustrophobia.  So we recommended she having CT of the head with and without contrast for evaluation although this is not the best study and the patient is aware of this.  Patient is agreeable.   Discussed with the patient on 5/21/2025, she does want to proceed with palliative chemotherapy.  We recommended first cycle chemotherapy to be given as inpatient so she would have time to recover at the nursing home.  Otherwise she likely will not improve clinically and probably will never have a chance to proceed with the chemotherapy.  Patient voiced understanding and agreeable with that strategy.       PLAN:  Patient does not want to have brain MRI with and without contrast for evaluation due to claustrophobia despite I offered premedication for that.    Will request CT scan for head with without contrast for evaluation for further staging purposes, noting that is not the best study for evaluation of possible brain mets.  Patient is agreeable for that.   Consult general surgery for PowerPort placement.   Plan to start chemotherapy palliative in nature, as soon as possible, with carboplatin on day 1, and -16 on day 1-3 with first cycle given as inpatient followed by short acting G-CSF for marrow support.  Chemotherapy supposed to be for every 3 weeks for 4-6 cycles.  Immunotherapy will be added as outpatient and that could be given for up to 1 year.   Patient likely will be discharged to rehab facility.   For future chemotherapy will be done as outpatient.  Will also get permission for adding atezolizumab in conjunction with chemotherapy regimen every 3 weeks.    Will start the patient on allopurinol 300 mg daily for  tumor lysis prophylaxis.   Patient will need chemotherapy teaching.  Patient to be transferred to oncology unit to start chemotherapy.         Patient's nursing staff presented during the whole time I spoke with the patient, for further evaluation and treatment logistics and the side effects.     Spoke with pharmacist in preparation for chemotherapy within the next 1 to 2 days.    Spoke with discharge planner for rehab facility placement late next week.      I spent 78 minutes caring for Penelope on this date of service. This time includes time spent by me in the following activities: preparing for the visit, reviewing tests, obtaining and/or reviewing a separately obtained history, performing a medically appropriate examination and/or evaluation, counseling and educating the patient/family/caregiver, ordering medications, tests, or procedures, referring and communicating with other health care professionals, documenting information in the medical record, independently interpreting results and communicating that information with the patient/family/caregiver and care coordination       Yannick Koroma MD PhD  05/21/25  16:36 EDT

## 2025-05-21 NOTE — PLAN OF CARE
Goal Outcome Evaluation:      Patient still thinking about moving forth with MRI and/or chemotherapy.      Problem: Fatigue  Goal: Improved Activity Tolerance  Outcome: Progressing     Problem: Adult Inpatient Plan of Care  Goal: Plan of Care Review  Outcome: Progressing  Goal: Patient-Specific Goal (Individualized)  Outcome: Progressing  Goal: Absence of Hospital-Acquired Illness or Injury  Outcome: Progressing  Intervention: Identify and Manage Fall Risk  Recent Flowsheet Documentation  Taken 5/21/2025 0455 by Geovanni Abebe RN  Safety Promotion/Fall Prevention:   safety round/check completed   room organization consistent   nonskid shoes/slippers when out of bed   fall prevention program maintained   clutter free environment maintained  Taken 5/21/2025 0204 by Geovanni Abebe RN  Safety Promotion/Fall Prevention:   safety round/check completed   room organization consistent   nonskid shoes/slippers when out of bed   fall prevention program maintained   clutter free environment maintained  Taken 5/21/2025 0057 by Geovanni Abebe RN  Safety Promotion/Fall Prevention:   safety round/check completed   room organization consistent   nonskid shoes/slippers when out of bed   fall prevention program maintained   clutter free environment maintained  Taken 5/20/2025 2003 by Geovanni Abebe RN  Safety Promotion/Fall Prevention:   safety round/check completed   room organization consistent   nonskid shoes/slippers when out of bed   fall prevention program maintained   clutter free environment maintained  Intervention: Prevent Skin Injury  Recent Flowsheet Documentation  Taken 5/21/2025 0455 by Geovanni Abebe RN  Body Position:   turned   position changed independently   sitting up in bed  Taken 5/21/2025 0204 by Geovanni Abebe RN  Body Position:   weight shifting   right  Taken 5/21/2025 0057 by Geovanni Abebe RN  Body Position:   weight shifting   position changed independently   sitting up in bed  Taken 5/20/2025 2003  by Geovanni Abebe RN  Body Position:   position changed independently   supine  Intervention: Prevent Infection  Recent Flowsheet Documentation  Taken 5/21/2025 0455 by Geovanni Abebe RN  Infection Prevention:   single patient room provided   rest/sleep promoted   hand hygiene promoted  Taken 5/21/2025 0204 by Geovanni Abebe RN  Infection Prevention:   single patient room provided   rest/sleep promoted   hand hygiene promoted  Taken 5/21/2025 0057 by Geovanni Abebe RN  Infection Prevention:   single patient room provided   rest/sleep promoted   hand hygiene promoted  Taken 5/20/2025 2003 by Geovanni Abebe RN  Infection Prevention:   single patient room provided   rest/sleep promoted   hand hygiene promoted  Goal: Optimal Comfort and Wellbeing  Outcome: Progressing  Intervention: Provide Person-Centered Care  Recent Flowsheet Documentation  Taken 5/21/2025 0057 by Geovanni Abebe RN  Trust Relationship/Rapport:   care explained   choices provided   thoughts/feelings acknowledged  Taken 5/20/2025 2003 by Geovanni Abebe RN  Trust Relationship/Rapport:   care explained   choices provided   thoughts/feelings acknowledged  Goal: Readiness for Transition of Care  Outcome: Progressing     Problem: Skin Injury Risk Increased  Goal: Skin Health and Integrity  Outcome: Progressing  Intervention: Optimize Skin Protection  Recent Flowsheet Documentation  Taken 5/21/2025 0455 by Geovanni Abebe RN  Head of Bed (HOB) Positioning: HOB elevated  Taken 5/21/2025 0204 by Geovanni Abebe RN  Head of Bed (HOB) Positioning: HOB elevated  Taken 5/21/2025 0057 by Geovanni Abebe RN  Head of Bed (HOB) Positioning: HOB elevated  Taken 5/20/2025 2003 by Geovanni Abebe RN  Head of Bed (HOB) Positioning: HOB elevated     Problem: Fall Injury Risk  Goal: Absence of Fall and Fall-Related Injury  Outcome: Progressing  Intervention: Identify and Manage Contributors  Recent Flowsheet Documentation  Taken 5/21/2025 0455 by Geovanni Abebe  RN  Medication Review/Management:   medications reviewed   high-risk medications identified  Taken 5/21/2025 0204 by Geovanni Abebe RN  Medication Review/Management:   medications reviewed   high-risk medications identified  Taken 5/21/2025 0057 by Geovanni Abebe RN  Medication Review/Management:   medications reviewed   high-risk medications identified  Taken 5/20/2025 2003 by Geovanni Abebe RN  Medication Review/Management:   medications reviewed   high-risk medications identified  Intervention: Promote Injury-Free Environment  Recent Flowsheet Documentation  Taken 5/21/2025 0455 by Geovanni Abebe RN  Safety Promotion/Fall Prevention:   safety round/check completed   room organization consistent   nonskid shoes/slippers when out of bed   fall prevention program maintained   clutter free environment maintained  Taken 5/21/2025 0204 by Geovanni Abebe RN  Safety Promotion/Fall Prevention:   safety round/check completed   room organization consistent   nonskid shoes/slippers when out of bed   fall prevention program maintained   clutter free environment maintained  Taken 5/21/2025 0057 by Geovanni Abebe RN  Safety Promotion/Fall Prevention:   safety round/check completed   room organization consistent   nonskid shoes/slippers when out of bed   fall prevention program maintained   clutter free environment maintained  Taken 5/20/2025 2003 by Geovanni Abebe RN  Safety Promotion/Fall Prevention:   safety round/check completed   room organization consistent   nonskid shoes/slippers when out of bed   fall prevention program maintained   clutter free environment maintained     Problem: Comorbidity Management  Goal: Maintenance of COPD Symptom Control  Outcome: Progressing  Intervention: Maintain COPD (Chronic Obstructive Pulmonary Disease) Symptom Control  Recent Flowsheet Documentation  Taken 5/21/2025 0455 by Geovanni Abebe RN  Medication Review/Management:   medications reviewed   high-risk medications  identified  Taken 5/21/2025 0204 by Geovanni Abebe RN  Medication Review/Management:   medications reviewed   high-risk medications identified  Taken 5/21/2025 0057 by Geovanni Abebe RN  Medication Review/Management:   medications reviewed   high-risk medications identified  Taken 5/20/2025 2003 by Geovanni Abebe RN  Medication Review/Management:   medications reviewed   high-risk medications identified  Goal: Blood Pressure in Desired Range  Outcome: Progressing  Intervention: Maintain Blood Pressure Management  Recent Flowsheet Documentation  Taken 5/21/2025 0455 by Geovanni Abebe RN  Medication Review/Management:   medications reviewed   high-risk medications identified  Taken 5/21/2025 0204 by Geovanni Abebe RN  Medication Review/Management:   medications reviewed   high-risk medications identified  Taken 5/21/2025 0057 by Geovanni Abebe RN  Medication Review/Management:   medications reviewed   high-risk medications identified  Taken 5/20/2025 2003 by Geovanni Abebe RN  Medication Review/Management:   medications reviewed   high-risk medications identified     Problem: Malnutrition  Goal: Improved Nutritional Intake  Outcome: Progressing

## 2025-05-21 NOTE — PROGRESS NOTES
Name: Penelope Onofre ADMIT: 2025   : 1955  PCP: Bonny Judge MD    MRN: [id number] LOS: 8 days   AGE/SEX: 70 y.o. female  ROOM: [facility]     Subjective   Subjective   No acute events overnight.  Patient feeling anxious this morning about new cancer diagnosis.  This is very understandable.  She denies any chest pain.  Still having some intermittent shortness of breath.  She is still trying to decide what she wants to do with regards to chemo.    Objective   Objective     Vital Signs  Temp:  [98.1 °F (36.7 °C)-98.5 °F (36.9 °C)] 98.2 °F (36.8 °C)  Heart Rate:  [] 117  Resp:  [18-20] 20  BP: (108-123)/(72-80) 123/75  SpO2:  [90 %-98 %] 90 %  on  Flow (L/min) (Oxygen Therapy):  [1-2] 1;   Device (Oxygen Therapy): nasal cannula  Body mass index is 18.99 kg/m².    Physical Exam  General: Alert, no acute distress.  Sitting up in bed.  Chronically ill-appearing.  Anxious appearing.  ENT: No conjunctival injection or scleral icterus. Moist mucous membranes.   Neuro: Eyes open and moving in all directions, generalized weakness, face symmetric, no focal deficits.   Lungs: Diminished aeration, no wheezing or crackles.  On 1 L nasal cannula.  Heart: RRR, no murmurs. No edema.  Abdomen: Soft, non-tender, non-distended. Normal bowel sounds. No hepatosplenomegaly.   Ext: Warm and well-perfused.  Mild tenderness to palpation of the left chest wall.  Skin: Soft tissue mass on right chest wall.    Physical examination on 2025 is unchanged from yesterday's exam    Results Review     I reviewed the patient's new clinical results:  Results from last 7 days   Lab Units 25  0605 25  0515 25  0527 25  0604   WBC 10*3/mm3 6.14 6.38 6.23 7.39   HEMOGLOBIN g/dL 13.6 13.9 13.9 13.4   PLATELETS 10*3/mm3 175 164 136* 145     Results from last 7 days   Lab Units 25  0605 25  0515 25  0525  0604   SODIUM mmol/L 138 137 136 137   POTASSIUM mmol/L 3.9 4.7 5.2 4.9  "  CHLORIDE mmol/L 104 106 105 104   CO2 mmol/L 19.8* 17.0* 20.0* 19.0*   BUN mg/dL 12 11 8 7*   CREATININE mg/dL 1.10* 1.17* 1.09* 1.10*   GLUCOSE mg/dL 116* 68 75 100*   EGFR mL/min/1.73 53.8* 50.0* 54.4* 53.8*           Results from last 7 days   Lab Units 05/21/25  0605 05/20/25  0515 05/19/25  0527 05/18/25  0604   CALCIUM mg/dL 8.6 9.0 9.3 8.9           No results found for: \"HGBA1C\", \"POCGLU\"      No radiology results for the last day        I have personally reviewed all medications:  Scheduled Medications  amLODIPine, 5 mg, Oral, Q24H  atorvastatin, 20 mg, Oral, Nightly  enoxaparin sodium, 40 mg, Subcutaneous, Q24H  guaiFENesin, 1,200 mg, Oral, Q12H  ipratropium-albuterol, 3 mL, Nebulization, 4x Daily - RT  Lidocaine, 1 patch, Transdermal, Q24H  Menthol-Zinc Oxide, 1 Application, Topical, BID  pantoprazole, 40 mg, Intravenous, Q12H  sodium chloride, 10 mL, Intravenous, Q12H    Infusions   Diet  Diet: Regular/House, Renal; Low Potassium; Texture: Regular (IDDSI 7); Fluid Consistency: Thin (IDDSI 0)      Intake/Output Summary (Last 24 hours) at 5/21/2025 0930  Last data filed at 5/21/2025 0500  Gross per 24 hour   Intake 440 ml   Output 700 ml   Net -260 ml       Assessment/Plan     Active Hospital Problems    Diagnosis  POA    **Generalized weakness [R53.1]  Yes    Severe protein-calorie malnutrition [E43]  Yes    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     70 y.o. female with Generalized " weakness.    Right sided pneumonia  Pleural effusion  Acute hypoxic respiratory failure  - CT chest with evidence of right hilar mass and bulky mediastinal and hilar lymphadenopathy  - Blood cultures no growth to date  - Pleural fluid culture no growth to date  - Pulmonology consulted and following  - Thoracentesis 5/14  - S/p course of Zosyn  - Pleural fluid cytology negative for malignancy  - LN biopsy with IR 5/15, pathology has returned for metastatic small cell lung cancer  - CT angiogram obtained 5/18 showing large right hilar mediastinal mass with moderate to large right pleural effusion, will defer decision regarding repeat thoracentesis to pulmonology  - Oncology consulted    Oncology is recommending brain MRI for further staging.  If patient elects to proceed with chemotherapy, they will do the first round while she is admitted.  The patient is very anxious and having a hard time making this decision which is understandable.  I asked her if she has any friends or family that could come be at the hospital with her.  I think a support system would be really helpful for her right now.  She is going to think about it.  I I have reached out to nursing as well to see if they can help her contact a friend or family member to come.  Have ordered a one-time dose of Ativan to be given as needed for the MRI, as patient has claustrophobia.  I encouraged her to at least try to get the MRI done so that oncology can have the best information possible to guide treatment plan.    Chest pain  - Troponin mildly elevated but flat  - EKG with no acute ischemic changes  - CT angiogram chest negative for PE  - Recent echocardiogram with no obvious wall motion abnormalities  - Added lidocaine patch   - Cardiology consulted, no further workup needed    Hyperkalemia  CKD  Metabolic acidosis  -Creatinine stable today and back at baseline  - Potassium normal today  -Bicarbonate improved to 19.8, anion gap normal  - Repeat BMP with  morning labs    Weakness  - Most likely secondary to the above  - PT/OT following  - Likely SNF at discharge    Hypertension  - Blood pressure improved, continue amlodipine  - Titrate meds as needed based on BP trends    GERD  - IV PPI    Lovenox 40 mg SC daily for DVT prophylaxis.  Full code.  Discussed with patient, nursing staff, and CCP.  Anticipate discharge to SNU facility timing yet to be determined.    Nataliia Palafox MD  [facility]  05/21/25  09:30 EDT

## 2025-05-21 NOTE — PROGRESS NOTES
[facility]     Mar/chart reviewed  Followup SCLC, hypoxemic, copd  Anxious about diagnosis  No increased shortness of breath    Vital Sign Min/Max for last 24 hours  Temp  Min: 97.7 °F (36.5 °C)  Max: 98.5 °F (36.9 °C)   BP  Min: 98/68  Max: 123/75   Pulse  Min: 95  Max: 118   Resp  Min: 18  Max: 20   SpO2  Min: 90 %  Max: 100 %   Flow (L/min) (Oxygen Therapy)  Min: 1  Max: 2   No data recorded     Nad, axox3,   perrl, eomi, no icterus,  mmm, no jvd, trachea midline, neck supple,  chest decreased ae bilaterally, no crackles, no wheezes,   rrr,   soft, nt, nd +bs,  no c/c/ e  Skin warm, dry no rashes    Labs: 5/21: reviewed:  Bun 12  Cr 1.1  Bicarb 19  Wbc 6  Hgb 13.6  Plts 175    A/P:  1. AHRF - oxygen as needed  2. Right pleural effusino -- s/p thora c/w exudate ,but cytology neg, suspect still malingant, note yield on malignant effusion on single thoracentesis tends to be low.  3. Lung mass with hilar adenopathy -- SCLC -- awaiting chemo per oncology  4. Possible pna -- I think we can stop antiboitics

## 2025-05-21 NOTE — CONSULTS
General Surgery consult    Summary:    Mrs. Penelope Onofre is a 70 y.o. year old lady with a locally advanced small cell lung cancer, consulted for port placement.  Discussed risks (including bleeding, infection, damage to surrounding structures including pneumothorax), benefits, and alternatives with the patient who agreed and wished to proceed.  Plan for OR tomorrow.  N.p.o. after midnight.    Chief Complaint:    Need for IV access    History of Present Illness:    Mrs. Penelope Onofre is a 70 y.o. year old lady diagnosed this admission with non-small cell lung cancer.  We are consulted for port placement.  No prior indwelling IV devices.    Past Medical History:   Past Medical History:   Diagnosis Date    Chronic back pain     Claudication     GERD (gastroesophageal reflux disease)       Past Surgical History:    Past Surgical History:   Procedure Laterality Date    BREAST CYST EXCISION Right     TONSILLECTOMY      US GUIDED LYMPH NODE BIOPSY  5/15/2025     Family History:    Family History   Problem Relation Age of Onset    Alzheimer's disease Mother     Cancer Father      Social History:    Social History     Socioeconomic History    Marital status:    Tobacco Use    Smoking status: Former     Current packs/day: 1.00     Average packs/day: 1 pack/day for 40.0 years (40.0 ttl pk-yrs)     Types: Cigarettes    Smokeless tobacco: Never    Tobacco comments:     quit 2 weeks ago   Vaping Use    Vaping status: Never Used   Substance and Sexual Activity    Alcohol use: No    Drug use: No    Sexual activity: Defer     Allergies:   Allergies   Allergen Reactions    Codeine Unknown - Low Severity    Sulfa Antibiotics Unknown - Low Severity       Medications:     Current Facility-Administered Medications:     acetaminophen (TYLENOL) tablet 650 mg, 650 mg, Oral, Q4H PRN **OR** acetaminophen (TYLENOL) 160 MG/5ML oral solution 650 mg, 650 mg, Oral, Q4H PRN **OR** acetaminophen (TYLENOL) suppository 650 mg, 650 mg,  Rectal, Q4H PRN, Oren Copeland MD    acetaminophen (TYLENOL) tablet 500 mg, 500 mg, Oral, Q6H PRN, Oren Copeland MD    allopurinol (ZYLOPRIM) tablet 300 mg, 300 mg, Oral, Daily, Yannick Koroma MD PhD    amLODIPine (NORVASC) tablet 5 mg, 5 mg, Oral, Q24H, Nataliia Palafox MD, 5 mg at 05/21/25 0915    atorvastatin (LIPITOR) tablet 20 mg, 20 mg, Oral, Nightly, Oren Copeland MD, 20 mg at 05/20/25 2004    calcium carbonate (TUMS) chewable tablet 500 mg (200 mg elemental), 2 tablet, Oral, BID PRN, Oren Copeland MD    Calcium Replacement - Follow Nurse / BPA Driven Protocol, , Not Applicable, PRN, Oren Copeland MD    enoxaparin sodium (LOVENOX) syringe 40 mg, 40 mg, Subcutaneous, Q24H, Oren Copeland MD, 40 mg at 05/20/25 1352    guaiFENesin (MUCINEX) 12 hr tablet 1,200 mg, 1,200 mg, Oral, Q12H, Oren Copeland MD, 1,200 mg at 05/21/25 0915    HYDROcodone-acetaminophen (NORCO) 5-325 MG per tablet 1 tablet, 1 tablet, Oral, Q4H PRN, Oren Copeland MD, 1 tablet at 05/19/25 2005    hydrOXYzine (ATARAX) tablet 25 mg, 25 mg, Oral, TID PRN, Nataliia Palafox MD, 25 mg at 05/19/25 1128    ipratropium-albuterol (DUO-NEB) nebulizer solution 3 mL, 3 mL, Nebulization, 4x Daily - RT, Oren Copeland MD, 3 mL at 05/21/25 1136    ipratropium-albuterol (DUO-NEB) nebulizer solution 3 mL, 3 mL, Nebulization, TID PRN, Nataliia Palafox MD    Lidocaine 4 % 1 patch, 1 patch, Transdermal, Q24H, Nataliia Palafox MD, 1 patch at 05/21/25 0915    LORazepam (ATIVAN) injection 1 mg, 1 mg, Intravenous, Once PRN, Agustin Orellana DO    LORazepam (ATIVAN) tablet 0.5 mg, 0.5 mg, Oral, Once PRN, Nataliia Palafox MD    Magnesium Standard Dose Replacement - Follow Nurse / BPA Driven Protocol, , Not Applicable, PRN, Oren Copeland MD    Menthol-Zinc Oxide 1 Application, 1 Application, Topical, BID, Oren Copeland MD, 1 Application at 05/21/25 0918    nitroglycerin (NITROSTAT) SL tablet 0.4 mg, 0.4 mg, Sublingual, Q5 Min  Min MOBLEY Samer H, MD    ondansetron ODT (ZOFRAN-ODT) disintegrating tablet 4 mg, 4 mg, Oral, Q6H PRN **OR** ondansetron (ZOFRAN) injection 4 mg, 4 mg, Intravenous, Q6H PRN, Oren Copeland MD, 4 mg at 05/17/25 1527    pantoprazole (PROTONIX) injection 40 mg, 40 mg, Intravenous, Q12H, Nataliia Palafox MD, 40 mg at 05/21/25 0916    Phosphorus Replacement - Follow Nurse / BPA Driven Protocol, , Not Applicable, PRMin MOTT Samer H, MD    Potassium Replacement - Follow Nurse / BPA Driven Protocol, , Not Applicable, Min MOBLEY Samer H, MD    prochlorperazine (COMPAZINE) injection 2.5 mg, 2.5 mg, Intravenous, Q6H PRN, Nataliia Palafox MD, 2.5 mg at 05/17/25 2217    sodium chloride 0.9 % flush 10 mL, 10 mL, Intravenous, Q12H, Oren Copeland MD, 10 mL at 05/21/25 0918    sodium chloride 0.9 % flush 10 mL, 10 mL, Intravenous, PRNMin Samer H, MD    sodium chloride 0.9 % infusion 40 mL, 40 mL, Intravenous, PRN, Oren Copeland MD    Radiology/Endoscopy:    Imaging reviewed    Labs:    WBC 6 Hgb 13.6 platelets 175 Cr 1.1    Review of Systems:   Influenza-like illness: no fever, no  cough, no  sore throat, no  body aches, no loss of sense of taste or smell, no known exposure to person with Covid-19.  Constitutional: Negative for fevers or chills  HENT: Negative for hearing loss or runny nose  Eyes: Negative for vision changes or scleral icterus  Respiratory: Negative for cough or shortness of breath  Cardiovascular: Negative for chest pain or heart palpitations  Gastrointestinal: Negative for abdominal pain, nausea, vomiting, constipation, melena, or hematochezia  Genitourinary: Negative for hematuria or dysuria  Musculoskeletal: Negative for joint swelling or gait instability  Neurologic: Negative for tremors or seizures  Psychiatric: Negative for suicidal ideations or depression  All other systems reviewed and negative    Physical Exam:   Constitutional: Well-developed well-nourished, no acute  distress  Eyes:  Conjunctivae normal, sclerae nonicteric  ENMT: Hearing grossly normal, oral mucosa moist  Neck: Supple, trachea midline  Respiratory: Clear to auscultation, normal inspiratory effort  Cardiovascular: Regular rate, no peripheral edema, no jugular venous distention  Gastrointestinal: Soft, nontender  Skin:  Warm, dry, no rash on visualized skin surfaces  Musculoskeletal: Symmetric strength, normal gait  Psychiatric: Alert and oriented ×3, normal affect     MENDEL ROY M.D.  General and Endoscopic Surgery  [facility]    [address] [place] [address], [address]  [place], [place], [address]  P: [phone]  F: [phone]

## 2025-05-21 NOTE — PLAN OF CARE
Goal Outcome Evaluation:   Plan for NPO at midnight for port placement in the morning. CT head complete this shift. Stable, friend at bedside.

## 2025-05-22 ENCOUNTER — APPOINTMENT (OUTPATIENT)
Dept: GENERAL RADIOLOGY | Facility: HOSPITAL | Age: 70
End: 2025-05-22
Payer: MEDICARE

## 2025-05-22 ENCOUNTER — ANESTHESIA EVENT (OUTPATIENT)
Dept: PERIOP | Facility: HOSPITAL | Age: 70
End: 2025-05-22
Payer: MEDICARE

## 2025-05-22 ENCOUNTER — ANESTHESIA (OUTPATIENT)
Dept: PERIOP | Facility: HOSPITAL | Age: 70
End: 2025-05-22
Payer: MEDICARE

## 2025-05-22 LAB
ANION GAP SERPL CALCULATED.3IONS-SCNC: 12 MMOL/L (ref 5–15)
APTT PPP: 28.5 SECONDS (ref 22.7–35.4)
BASOPHILS # BLD AUTO: 0.02 10*3/MM3 (ref 0–0.2)
BASOPHILS NFR BLD AUTO: 0.4 % (ref 0–1.5)
BUN SERPL-MCNC: 10 MG/DL (ref 8–23)
BUN/CREAT SERPL: 10.4 (ref 7–25)
CALCIUM SPEC-SCNC: 8.3 MG/DL (ref 8.6–10.5)
CHLORIDE SERPL-SCNC: 106 MMOL/L (ref 98–107)
CO2 SERPL-SCNC: 21 MMOL/L (ref 22–29)
CREAT SERPL-MCNC: 0.96 MG/DL (ref 0.57–1)
DEPRECATED RDW RBC AUTO: 49 FL (ref 37–54)
EGFRCR SERPLBLD CKD-EPI 2021: 63.4 ML/MIN/1.73
EOSINOPHIL # BLD AUTO: 0.12 10*3/MM3 (ref 0–0.4)
EOSINOPHIL NFR BLD AUTO: 2.4 % (ref 0.3–6.2)
ERYTHROCYTE [DISTWIDTH] IN BLOOD BY AUTOMATED COUNT: 13.1 % (ref 12.3–15.4)
GLUCOSE SERPL-MCNC: 98 MG/DL (ref 65–99)
H CAPSUL AG SPEC QL: NORMAL NG/ML
HCT VFR BLD AUTO: 38.3 % (ref 34–46.6)
HGB BLD-MCNC: 12.9 G/DL (ref 12–15.9)
HISTOPLASMA CAPSULATUM AG [PRESENCE] IN SERUM: NEGATIVE
IMM GRANULOCYTES # BLD AUTO: 0.02 10*3/MM3 (ref 0–0.05)
IMM GRANULOCYTES NFR BLD AUTO: 0.4 % (ref 0–0.5)
INR PPP: 1.06 (ref 0.9–1.1)
LYMPHOCYTES # BLD AUTO: 1.22 10*3/MM3 (ref 0.7–3.1)
LYMPHOCYTES NFR BLD AUTO: 24.7 % (ref 19.6–45.3)
MCH RBC QN AUTO: 34.4 PG (ref 26.6–33)
MCHC RBC AUTO-ENTMCNC: 33.7 G/DL (ref 31.5–35.7)
MCV RBC AUTO: 102.1 FL (ref 79–97)
MONOCYTES # BLD AUTO: 0.62 10*3/MM3 (ref 0.1–0.9)
MONOCYTES NFR BLD AUTO: 12.6 % (ref 5–12)
NEUTROPHILS NFR BLD AUTO: 2.94 10*3/MM3 (ref 1.7–7)
NEUTROPHILS NFR BLD AUTO: 59.5 % (ref 42.7–76)
NRBC BLD AUTO-RTO: 0 /100 WBC (ref 0–0.2)
PLATELET # BLD AUTO: 141 10*3/MM3 (ref 140–450)
PMV BLD AUTO: 10 FL (ref 6–12)
POTASSIUM SERPL-SCNC: 4 MMOL/L (ref 3.5–5.2)
PROTHROMBIN TIME: 13.7 SECONDS (ref 11.7–14.2)
RBC # BLD AUTO: 3.75 10*6/MM3 (ref 3.77–5.28)
SODIUM SERPL-SCNC: 139 MMOL/L (ref 136–145)
SPECIMEN SOURCE: NORMAL
WBC NRBC COR # BLD AUTO: 4.94 10*3/MM3 (ref 3.4–10.8)

## 2025-05-22 PROCEDURE — 25010000002 ONDANSETRON PER 1 MG

## 2025-05-22 PROCEDURE — 85730 THROMBOPLASTIN TIME PARTIAL: CPT | Performed by: INTERNAL MEDICINE

## 2025-05-22 PROCEDURE — 94761 N-INVAS EAR/PLS OXIMETRY MLT: CPT

## 2025-05-22 PROCEDURE — 25010000002 LIDOCAINE 2% SOLUTION

## 2025-05-22 PROCEDURE — 25010000002 PROPOFOL 10 MG/ML EMULSION

## 2025-05-22 PROCEDURE — 94664 DEMO&/EVAL PT USE INHALER: CPT

## 2025-05-22 PROCEDURE — 94799 UNLISTED PULMONARY SVC/PX: CPT

## 2025-05-22 PROCEDURE — 36561 INSERT TUNNELED CV CATH: CPT | Performed by: STUDENT IN AN ORGANIZED HEALTH CARE EDUCATION/TRAINING PROGRAM

## 2025-05-22 PROCEDURE — 25010000002 MIDAZOLAM PER 1 MG: Performed by: ANESTHESIOLOGY

## 2025-05-22 PROCEDURE — 76000 FLUOROSCOPY <1 HR PHYS/QHP: CPT

## 2025-05-22 PROCEDURE — 02HV33Z INSERTION OF INFUSION DEVICE INTO SUPERIOR VENA CAVA, PERCUTANEOUS APPROACH: ICD-10-PCS | Performed by: STUDENT IN AN ORGANIZED HEALTH CARE EDUCATION/TRAINING PROGRAM

## 2025-05-22 PROCEDURE — C1788 PORT, INDWELLING, IMP: HCPCS | Performed by: STUDENT IN AN ORGANIZED HEALTH CARE EDUCATION/TRAINING PROGRAM

## 2025-05-22 PROCEDURE — 80048 BASIC METABOLIC PNL TOTAL CA: CPT | Performed by: INTERNAL MEDICINE

## 2025-05-22 PROCEDURE — 25010000002 FENTANYL CITRATE (PF) 50 MCG/ML SOLUTION

## 2025-05-22 PROCEDURE — 25010000002 HEPARIN (PORCINE) PER 1000 UNITS: Performed by: STUDENT IN AN ORGANIZED HEALTH CARE EDUCATION/TRAINING PROGRAM

## 2025-05-22 PROCEDURE — 25010000002 CEFAZOLIN PER 500 MG: Performed by: STUDENT IN AN ORGANIZED HEALTH CARE EDUCATION/TRAINING PROGRAM

## 2025-05-22 PROCEDURE — 25010000002 DEXAMETHASONE SODIUM PHOSPHATE 20 MG/5ML SOLUTION

## 2025-05-22 PROCEDURE — 0JH60WZ INSERTION OF TOTALLY IMPLANTABLE VASCULAR ACCESS DEVICE INTO CHEST SUBCUTANEOUS TISSUE AND FASCIA, OPEN APPROACH: ICD-10-PCS | Performed by: STUDENT IN AN ORGANIZED HEALTH CARE EDUCATION/TRAINING PROGRAM

## 2025-05-22 PROCEDURE — 85610 PROTHROMBIN TIME: CPT | Performed by: INTERNAL MEDICINE

## 2025-05-22 PROCEDURE — 25010000002 ENOXAPARIN PER 10 MG: Performed by: STUDENT IN AN ORGANIZED HEALTH CARE EDUCATION/TRAINING PROGRAM

## 2025-05-22 PROCEDURE — 25810000003 LACTATED RINGERS PER 1000 ML: Performed by: ANESTHESIOLOGY

## 2025-05-22 PROCEDURE — 99232 SBSQ HOSP IP/OBS MODERATE 35: CPT | Performed by: INTERNAL MEDICINE

## 2025-05-22 PROCEDURE — 85025 COMPLETE CBC W/AUTO DIFF WBC: CPT | Performed by: INTERNAL MEDICINE

## 2025-05-22 PROCEDURE — 25810000003 LACTATED RINGERS PER 1000 ML

## 2025-05-22 DEVICE — POWERPORT CLEARVUE ISP IMPLANTABLE PORT WITH ATTACHABLE 8F POLYURETHANE OPEN-ENDED SINGLE-LUMEN VENOUS CATHETER PROCEDURAL KIT
Type: IMPLANTABLE DEVICE | Site: CHEST | Status: FUNCTIONAL
Brand: POWERPORT CLEARVUE

## 2025-05-22 RX ORDER — FENTANYL CITRATE 50 UG/ML
INJECTION, SOLUTION INTRAMUSCULAR; INTRAVENOUS AS NEEDED
Status: DISCONTINUED | OUTPATIENT
Start: 2025-05-22 | End: 2025-05-22 | Stop reason: SURG

## 2025-05-22 RX ORDER — MIDAZOLAM HYDROCHLORIDE 1 MG/ML
0.5 INJECTION, SOLUTION INTRAMUSCULAR; INTRAVENOUS
Status: DISCONTINUED | OUTPATIENT
Start: 2025-05-22 | End: 2025-05-22 | Stop reason: HOSPADM

## 2025-05-22 RX ORDER — HYDROMORPHONE HYDROCHLORIDE 1 MG/ML
0.5 INJECTION, SOLUTION INTRAMUSCULAR; INTRAVENOUS; SUBCUTANEOUS
Status: DISCONTINUED | OUTPATIENT
Start: 2025-05-22 | End: 2025-05-22

## 2025-05-22 RX ORDER — FLUMAZENIL 0.1 MG/ML
0.2 INJECTION INTRAVENOUS AS NEEDED
Status: DISCONTINUED | OUTPATIENT
Start: 2025-05-22 | End: 2025-05-22

## 2025-05-22 RX ORDER — LABETALOL HYDROCHLORIDE 5 MG/ML
5 INJECTION, SOLUTION INTRAVENOUS
Status: DISCONTINUED | OUTPATIENT
Start: 2025-05-22 | End: 2025-05-22

## 2025-05-22 RX ORDER — MAGNESIUM HYDROXIDE 1200 MG/15ML
LIQUID ORAL AS NEEDED
Status: DISCONTINUED | OUTPATIENT
Start: 2025-05-22 | End: 2025-05-22 | Stop reason: HOSPADM

## 2025-05-22 RX ORDER — FENTANYL CITRATE 50 UG/ML
50 INJECTION, SOLUTION INTRAMUSCULAR; INTRAVENOUS
Status: DISCONTINUED | OUTPATIENT
Start: 2025-05-22 | End: 2025-05-22

## 2025-05-22 RX ORDER — ONDANSETRON 2 MG/ML
INJECTION INTRAMUSCULAR; INTRAVENOUS AS NEEDED
Status: DISCONTINUED | OUTPATIENT
Start: 2025-05-22 | End: 2025-05-22 | Stop reason: SURG

## 2025-05-22 RX ORDER — SODIUM CHLORIDE, SODIUM LACTATE, POTASSIUM CHLORIDE, CALCIUM CHLORIDE 600; 310; 30; 20 MG/100ML; MG/100ML; MG/100ML; MG/100ML
INJECTION, SOLUTION INTRAVENOUS CONTINUOUS PRN
Status: DISCONTINUED | OUTPATIENT
Start: 2025-05-22 | End: 2025-05-22 | Stop reason: SURG

## 2025-05-22 RX ORDER — SODIUM CHLORIDE, SODIUM LACTATE, POTASSIUM CHLORIDE, CALCIUM CHLORIDE 600; 310; 30; 20 MG/100ML; MG/100ML; MG/100ML; MG/100ML
9 INJECTION, SOLUTION INTRAVENOUS CONTINUOUS
Status: ACTIVE | OUTPATIENT
Start: 2025-05-22 | End: 2025-05-23

## 2025-05-22 RX ORDER — DEXAMETHASONE SODIUM PHOSPHATE 4 MG/ML
INJECTION, SOLUTION INTRA-ARTICULAR; INTRALESIONAL; INTRAMUSCULAR; INTRAVENOUS; SOFT TISSUE AS NEEDED
Status: DISCONTINUED | OUTPATIENT
Start: 2025-05-22 | End: 2025-05-22 | Stop reason: SURG

## 2025-05-22 RX ORDER — NALOXONE HCL 0.4 MG/ML
0.2 VIAL (ML) INJECTION AS NEEDED
Status: DISCONTINUED | OUTPATIENT
Start: 2025-05-22 | End: 2025-05-22

## 2025-05-22 RX ORDER — DROPERIDOL 2.5 MG/ML
0.62 INJECTION, SOLUTION INTRAMUSCULAR; INTRAVENOUS
Status: DISCONTINUED | OUTPATIENT
Start: 2025-05-22 | End: 2025-05-22

## 2025-05-22 RX ORDER — ONDANSETRON 2 MG/ML
4 INJECTION INTRAMUSCULAR; INTRAVENOUS ONCE AS NEEDED
Status: DISCONTINUED | OUTPATIENT
Start: 2025-05-22 | End: 2025-05-22

## 2025-05-22 RX ORDER — IPRATROPIUM BROMIDE AND ALBUTEROL SULFATE 2.5; .5 MG/3ML; MG/3ML
3 SOLUTION RESPIRATORY (INHALATION) ONCE AS NEEDED
Status: DISCONTINUED | OUTPATIENT
Start: 2025-05-22 | End: 2025-05-22

## 2025-05-22 RX ORDER — DIPHENHYDRAMINE HYDROCHLORIDE 50 MG/ML
12.5 INJECTION, SOLUTION INTRAMUSCULAR; INTRAVENOUS
Status: DISCONTINUED | OUTPATIENT
Start: 2025-05-22 | End: 2025-05-22

## 2025-05-22 RX ORDER — EPHEDRINE SULFATE 50 MG/ML
5 INJECTION, SOLUTION INTRAVENOUS ONCE AS NEEDED
Status: DISCONTINUED | OUTPATIENT
Start: 2025-05-22 | End: 2025-05-22

## 2025-05-22 RX ORDER — PROPOFOL 10 MG/ML
VIAL (ML) INTRAVENOUS CONTINUOUS PRN
Status: DISCONTINUED | OUTPATIENT
Start: 2025-05-22 | End: 2025-05-22 | Stop reason: SURG

## 2025-05-22 RX ORDER — HYDRALAZINE HYDROCHLORIDE 20 MG/ML
5 INJECTION INTRAMUSCULAR; INTRAVENOUS
Status: DISCONTINUED | OUTPATIENT
Start: 2025-05-22 | End: 2025-05-22

## 2025-05-22 RX ORDER — SODIUM CHLORIDE 0.9 % (FLUSH) 0.9 %
3 SYRINGE (ML) INJECTION EVERY 12 HOURS SCHEDULED
Status: DISCONTINUED | OUTPATIENT
Start: 2025-05-22 | End: 2025-05-22 | Stop reason: HOSPADM

## 2025-05-22 RX ORDER — LIDOCAINE HYDROCHLORIDE 20 MG/ML
INJECTION, SOLUTION INFILTRATION; PERINEURAL AS NEEDED
Status: DISCONTINUED | OUTPATIENT
Start: 2025-05-22 | End: 2025-05-22 | Stop reason: SURG

## 2025-05-22 RX ORDER — LIDOCAINE HYDROCHLORIDE 10 MG/ML
0.5 INJECTION, SOLUTION INFILTRATION; PERINEURAL ONCE AS NEEDED
Status: DISCONTINUED | OUTPATIENT
Start: 2025-05-22 | End: 2025-05-22 | Stop reason: HOSPADM

## 2025-05-22 RX ORDER — PROMETHAZINE HYDROCHLORIDE 25 MG/1
25 SUPPOSITORY RECTAL ONCE AS NEEDED
Status: DISCONTINUED | OUTPATIENT
Start: 2025-05-22 | End: 2025-05-22

## 2025-05-22 RX ORDER — HYDROCODONE BITARTRATE AND ACETAMINOPHEN 5; 325 MG/1; MG/1
1 TABLET ORAL ONCE AS NEEDED
Status: DISCONTINUED | OUTPATIENT
Start: 2025-05-22 | End: 2025-05-22

## 2025-05-22 RX ORDER — ATROPINE SULFATE 0.4 MG/ML
0.4 INJECTION, SOLUTION INTRAMUSCULAR; INTRAVENOUS; SUBCUTANEOUS ONCE AS NEEDED
Status: DISCONTINUED | OUTPATIENT
Start: 2025-05-22 | End: 2025-05-22

## 2025-05-22 RX ORDER — OXYCODONE AND ACETAMINOPHEN 7.5; 325 MG/1; MG/1
1 TABLET ORAL EVERY 4 HOURS PRN
Status: DISCONTINUED | OUTPATIENT
Start: 2025-05-22 | End: 2025-05-22

## 2025-05-22 RX ORDER — PROMETHAZINE HYDROCHLORIDE 25 MG/1
25 TABLET ORAL ONCE AS NEEDED
Status: DISCONTINUED | OUTPATIENT
Start: 2025-05-22 | End: 2025-05-22

## 2025-05-22 RX ORDER — SODIUM CHLORIDE 0.9 % (FLUSH) 0.9 %
3-10 SYRINGE (ML) INJECTION AS NEEDED
Status: DISCONTINUED | OUTPATIENT
Start: 2025-05-22 | End: 2025-05-22 | Stop reason: HOSPADM

## 2025-05-22 RX ORDER — FENTANYL CITRATE 50 UG/ML
50 INJECTION, SOLUTION INTRAMUSCULAR; INTRAVENOUS ONCE AS NEEDED
Status: DISCONTINUED | OUTPATIENT
Start: 2025-05-22 | End: 2025-05-22 | Stop reason: HOSPADM

## 2025-05-22 RX ORDER — BUPIVACAINE HYDROCHLORIDE AND EPINEPHRINE 5; 5 MG/ML; UG/ML
INJECTION, SOLUTION EPIDURAL; INTRACAUDAL; PERINEURAL AS NEEDED
Status: DISCONTINUED | OUTPATIENT
Start: 2025-05-22 | End: 2025-05-22 | Stop reason: HOSPADM

## 2025-05-22 RX ADMIN — ATORVASTATIN CALCIUM 20 MG: 20 TABLET, FILM COATED ORAL at 20:37

## 2025-05-22 RX ADMIN — AMLODIPINE BESYLATE 5 MG: 5 TABLET ORAL at 15:04

## 2025-05-22 RX ADMIN — MIDAZOLAM 0.5 MG: 1 INJECTION INTRAMUSCULAR; INTRAVENOUS at 09:19

## 2025-05-22 RX ADMIN — PANTOPRAZOLE SODIUM 40 MG: 40 TABLET, DELAYED RELEASE ORAL at 17:55

## 2025-05-22 RX ADMIN — IPRATROPIUM BROMIDE AND ALBUTEROL SULFATE 3 ML: .5; 3 SOLUTION RESPIRATORY (INHALATION) at 22:12

## 2025-05-22 RX ADMIN — ENOXAPARIN SODIUM 40 MG: 100 INJECTION SUBCUTANEOUS at 15:04

## 2025-05-22 RX ADMIN — CEFAZOLIN 2000 MG: 2 INJECTION, POWDER, FOR SOLUTION INTRAMUSCULAR; INTRAVENOUS at 09:55

## 2025-05-22 RX ADMIN — SODIUM CHLORIDE, SODIUM LACTATE, POTASSIUM CHLORIDE, AND CALCIUM CHLORIDE 9 ML/HR: 600; 310; 30; 20 INJECTION, SOLUTION INTRAVENOUS at 08:45

## 2025-05-22 RX ADMIN — DEXAMETHASONE SODIUM PHOSPHATE 8 MG: 4 INJECTION, SOLUTION INTRAMUSCULAR; INTRAVENOUS at 10:17

## 2025-05-22 RX ADMIN — ONDANSETRON 4 MG: 2 INJECTION, SOLUTION INTRAMUSCULAR; INTRAVENOUS at 10:17

## 2025-05-22 RX ADMIN — IPRATROPIUM BROMIDE AND ALBUTEROL SULFATE 3 ML: .5; 3 SOLUTION RESPIRATORY (INHALATION) at 06:42

## 2025-05-22 RX ADMIN — PROPOFOL 70 MCG/KG/MIN: 10 INJECTION, EMULSION INTRAVENOUS at 10:17

## 2025-05-22 RX ADMIN — MENTHOL, ZINC OXIDE 1 APPLICATION: .44; 20.6 OINTMENT TOPICAL at 20:38

## 2025-05-22 RX ADMIN — GUAIFENESIN 1200 MG: 600 TABLET, EXTENDED RELEASE ORAL at 20:37

## 2025-05-22 RX ADMIN — FENTANYL CITRATE 25 MCG: 50 INJECTION, SOLUTION INTRAMUSCULAR; INTRAVENOUS at 10:24

## 2025-05-22 RX ADMIN — SODIUM CHLORIDE, SODIUM LACTATE, POTASSIUM CHLORIDE, AND CALCIUM CHLORIDE: 600; 310; 30; 20 INJECTION, SOLUTION INTRAVENOUS at 10:17

## 2025-05-22 RX ADMIN — LIDOCAINE HYDROCHLORIDE 40 MG: 20 INJECTION, SOLUTION INFILTRATION; PERINEURAL at 10:17

## 2025-05-22 RX ADMIN — Medication 10 ML: at 20:38

## 2025-05-22 RX ADMIN — FENTANYL CITRATE 25 MCG: 50 INJECTION, SOLUTION INTRAMUSCULAR; INTRAVENOUS at 10:17

## 2025-05-22 NOTE — PLAN OF CARE
Problem: Adult Inpatient Plan of Care  Goal: Plan of Care Review  Outcome: Progressing  Flowsheets (Taken 5/22/2025 1835)  Progress: improving  Plan of Care Reviewed With: patient  Goal: Patient-Specific Goal (Individualized)  Outcome: Progressing  Goal: Absence of Hospital-Acquired Illness or Injury  Outcome: Progressing  Intervention: Identify and Manage Fall Risk  Recent Flowsheet Documentation  Taken 5/22/2025 1800 by Tano Osorio RN  Safety Promotion/Fall Prevention:   safety round/check completed   assistive device/personal items within reach  Taken 5/22/2025 1600 by Tano Osorio RN  Safety Promotion/Fall Prevention:   safety round/check completed   assistive device/personal items within reach  Taken 5/22/2025 1400 by Tano Osorio RN  Safety Promotion/Fall Prevention:   safety round/check completed   assistive device/personal items within reach  Taken 5/22/2025 1245 by Tano Osorio RN  Safety Promotion/Fall Prevention:   safety round/check completed   assistive device/personal items within reach  Taken 5/22/2025 0800 by Tano Osorio RN  Safety Promotion/Fall Prevention:   safety round/check completed   assistive device/personal items within reach  Intervention: Prevent Skin Injury  Recent Flowsheet Documentation  Taken 5/22/2025 1245 by Tano Osorio RN  Body Position: position changed independently  Intervention: Prevent Infection  Recent Flowsheet Documentation  Taken 5/22/2025 1800 by Tano Osorio RN  Infection Prevention:   personal protective equipment utilized   environmental surveillance performed  Taken 5/22/2025 1600 by Tano Osorio RN  Infection Prevention:   personal protective equipment utilized   environmental surveillance performed  Taken 5/22/2025 1400 by Tano Osorio RN  Infection Prevention:   personal protective equipment utilized   environmental surveillance performed  Taken 5/22/2025 1245 by Art aKur  TRISTAN Freedman  Infection Prevention:   personal protective equipment utilized   environmental surveillance performed  Taken 5/22/2025 0800 by Tano Osorio, RN  Infection Prevention:   personal protective equipment utilized   environmental surveillance performed  Goal: Optimal Comfort and Wellbeing  Outcome: Progressing  Goal: Readiness for Transition of Care  Outcome: Progressing     Problem: Fatigue  Goal: Improved Activity Tolerance  Outcome: Progressing  Intervention: Promote Improved Energy  Recent Flowsheet Documentation  Taken 5/22/2025 1245 by Tano Osorio, RN  Activity Management: back to bed   Goal Outcome Evaluation:  Plan of Care Reviewed With: patient        Progress: improving

## 2025-05-22 NOTE — PROGRESS NOTES
Pharmacy Chemotherapy Education - Carboplatin/Etoposide    Penelope Onofre is a 70 y.o. female admitted with progressive weakness and shortness of breath for several weeks-months. Patient to start chemotherapy with carboplatin/etoposide for small cell lung cancer with plan to give cycle 1 while inpatient. Met with patient to discuss schedule and expected effects of chemotherapy.    Reviewed with patient common and clinically significant effects including neutropenia, thrombocytopenia, fatigue, nausea/vomiting, taste changes, loss of appetite, hair loss, neuropathy, nephropathy, mucositis, and electrolyte changes. Infection prevention precautions were reviewed including hand washing, food safety and the avoidance of crowds/use of mask. Bleeding precautions including the use of soft bristle toothbrush, electric razor and precautions against falls were discussed. Importance of appropriate hygiene and self-care for nausea, anorexia, and mucositis reviewed.    Patient was counseled on when to notify a provider including in the event of the following:   - Signs and symptoms of infection, including temperature >100.4   - Signs and symptoms of serious bleeding including blood in the urine or stool   - Changes in urinary output   - Frequent nausea/vomitting or diarrhea or severe abdominal pain   - Development of mouth sores or ulcerations   - Numbness or tingling in the fingers or toes    Provided patient with handout regarding medications, and reviewed general plan for chemotherapy administration (D1-3 Q21 days). Patient engaged in counseling throughout and asked appropriate questions as needed to confirm understanding. Patient without any further questions at this time; patient verbalized understanding.    Thank you for the opportunity to provide education on chemotherapy; please do not hesitate if further assistance is needed.    Nataliia Armenta, Pharm.D., BCPS  [phone]

## 2025-05-22 NOTE — PROGRESS NOTES
Name: Penelope Onofre ADMIT: 2025   : 1955  PCP: Bonny Judge MD    MRN: [id number] LOS: 9 days   AGE/SEX: 70 y.o. female  ROOM: [facility] Main OR/MAIN OR     Subjective   Subjective   No acute events overnight.  Patient feeling okay today.  She was seen in PACU after having her port placed.    Objective   Objective     Vital Signs  Temp:  [97.3 °F (36.3 °C)-98 °F (36.7 °C)] 97.6 °F (36.4 °C)  Heart Rate:  [] 99  Resp:  [18-20] 18  BP: ()/(62-83) 137/83  SpO2:  [91 %-100 %] 91 %  on  Flow (L/min) (Oxygen Therapy):  [1-2] 2;   Device (Oxygen Therapy): nasal cannula  Body mass index is 18.99 kg/m².    Physical Exam  General: Awake but drowsy.  Chronically ill-appearing.    ENT: No conjunctival injection or scleral icterus. Moist mucous membranes.   Neuro: Eyes open and moving in all directions, generalized weakness, face symmetric, no focal deficits.   Lungs: Nonlabored respirations, nasal cannula in place, left-sided port incision clean/dry/intact  Heart: RRR. No edema.  Abdomen: Soft, non-tender, non-distended.  Ext: Warm and well-perfused.    Skin: Soft tissue mass on right chest wall.    Results Review     I reviewed the patient's new clinical results:  Results from last 7 days   Lab Units 25  0625  0625  05   WBC 10*3/mm3 4.94 6.14 6.38 6.23   HEMOGLOBIN g/dL 12.9 13.6 13.9 13.9   PLATELETS 10*3/mm3 141 175 164 136*     Results from last 7 days   Lab Units 25  0611 25  0605 25  0515 25  0527   SODIUM mmol/L 139 138 137 136   POTASSIUM mmol/L 4.0 3.9 4.7 5.2   CHLORIDE mmol/L 106 104 106 105   CO2 mmol/L 21.0* 19.8* 17.0* 20.0*   BUN mg/dL 10 12 11 8   CREATININE mg/dL 0.96 1.10* 1.17* 1.09*   GLUCOSE mg/dL 98 116* 68 75   EGFR mL/min/1.73 63.4 53.8* 50.0* 54.4*           Results from last 7 days   Lab Units 25  0611 25  0605 25  0515 25  0527   CALCIUM mg/dL 8.3* 8.6 9.0 9.3           No results  "found for: \"HGBA1C\", \"POCGLU\"      CT Head With & Without Contrast  Result Date: 5/22/2025   Volume loss, otherwise negative.        This report was finalized on 5/22/2025 12:39 AM by Dr. Ty Mock M.D on Workstation: [id number]            I have personally reviewed all medications:  Scheduled Medications  [Transfer Hold] allopurinol, 300 mg, Oral, Daily  amLODIPine, 5 mg, Oral, Q24H  [Transfer Hold] atorvastatin, 20 mg, Oral, Nightly  [Transfer Hold] enoxaparin sodium, 40 mg, Subcutaneous, Q24H  [Transfer Hold] guaiFENesin, 1,200 mg, Oral, Q12H  [Transfer Hold] ipratropium-albuterol, 3 mL, Nebulization, 4x Daily - RT  [Transfer Hold] Lidocaine, 1 patch, Transdermal, Q24H  [Transfer Hold] Menthol-Zinc Oxide, 1 Application, Topical, BID  [Transfer Hold] pantoprazole, 40 mg, Oral, BID AC  [Transfer Hold] sodium chloride, 10 mL, Intravenous, Q12H    Infusions  lactated ringers, 9 mL/hr, Last Rate: 9 mL/hr (05/22/25 0845)    Diet  NPO Diet NPO Type: Strict NPO      Intake/Output Summary (Last 24 hours) at 5/22/2025 1116  Last data filed at 5/22/2025 1053  Gross per 24 hour   Intake 1220 ml   Output 300 ml   Net 920 ml       Assessment/Plan     Active Hospital Problems    Diagnosis  POA    **Generalized weakness [R53.1]  Yes    Severe protein-calorie malnutrition [E43]  Yes    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     70 y.o. female with Generalized weakness.    Metastatic small cell lung " cancer  Right sided pneumonia  Pleural effusion  Acute hypoxic respiratory failure  - CT chest with evidence of right hilar mass and bulky mediastinal and hilar lymphadenopathy  - Blood cultures no growth to date  - Pleural fluid culture no growth to date  - Pulmonology consulted and following  - Thoracentesis 5/14  - S/p course of Zosyn  - Pleural fluid cytology negative for malignancy  - LN biopsy with IR 5/15, pathology has returned for metastatic small cell lung cancer  - CT angiogram obtained 5/18 showing large right hilar mediastinal mass with moderate to large right pleural effusion, will defer decision regarding repeat thoracentesis to pulmonology  - Oncology consulted  -Patient declined MRI brain and opted for CT head which is negative for acute abnormalities.  Oncology did discuss with her that this would not be as sensitive as an MRI.  - General Surgery consulted, port placed today  - Plan for chemotherapy initiation tomorrow.  Patient will get first cycle as an inpatient.    Chest pain  - Troponin mildly elevated but flat  - EKG with no acute ischemic changes  - CT angiogram chest negative for PE  - Recent echocardiogram with no obvious wall motion abnormalities  - Added lidocaine patch   - Cardiology consulted, no further workup needed    Hyperkalemia  CKD  Metabolic acidosis  -Creatinine stable today and back at baseline  - Potassium normal today  -Bicarbonate improved to 21, anion gap normal  - Repeat BMP with morning labs    Weakness  - Most likely secondary to the above  - PT/OT following  - Likely SNF at discharge    Hypertension  - Blood pressure improved, continue amlodipine  - Titrate meds as needed based on BP trends    GERD  - IV PPI    Lovenox 40 mg SC daily for DVT prophylaxis.  Full code.  Discussed with patient, nursing staff, and CCP.  Anticipate discharge to SNU facility timing yet to be determined.    Nataliia Palafox MD  [facility]  05/22/25  11:16 EDT

## 2025-05-22 NOTE — PLAN OF CARE
Goal Outcome Evaluation:      No change. Patient awaiting transfer to oncology floor for first round of chemo.           Problem: Fatigue  Goal: Improved Activity Tolerance  Outcome: Progressing     Problem: Adult Inpatient Plan of Care  Goal: Plan of Care Review  Outcome: Progressing  Goal: Patient-Specific Goal (Individualized)  Outcome: Progressing  Goal: Absence of Hospital-Acquired Illness or Injury  Outcome: Progressing  Intervention: Identify and Manage Fall Risk  Recent Flowsheet Documentation  Taken 5/22/2025 0416 by Geovanni Abebe RN  Safety Promotion/Fall Prevention:   safety round/check completed   room organization consistent   nonskid shoes/slippers when out of bed   fall prevention program maintained   clutter free environment maintained  Taken 5/22/2025 0228 by Geovanni Abebe RN  Safety Promotion/Fall Prevention:   safety round/check completed   room organization consistent   nonskid shoes/slippers when out of bed   clutter free environment maintained   fall prevention program maintained  Taken 5/22/2025 0012 by Geovanni Abebe RN  Safety Promotion/Fall Prevention:   safety round/check completed   room organization consistent   nonskid shoes/slippers when out of bed   fall prevention program maintained   clutter free environment maintained  Taken 5/21/2025 2249 by Geovanni Abebe RN  Safety Promotion/Fall Prevention:   safety round/check completed   room organization consistent   nonskid shoes/slippers when out of bed   fall prevention program maintained   clutter free environment maintained  Taken 5/21/2025 2012 by Geovanni Abebe RN  Safety Promotion/Fall Prevention:   safety round/check completed   room organization consistent   nonskid shoes/slippers when out of bed   fall prevention program maintained   clutter free environment maintained  Intervention: Prevent Skin Injury  Recent Flowsheet Documentation  Taken 5/22/2025 0416 by Geovanni Abebe RN  Body Position:   weight shifting   position  changed independently  Taken 5/22/2025 0228 by Geovanni Abebe RN  Body Position:   weight shifting   position changed independently  Taken 5/22/2025 0012 by Geovanni Abebe RN  Body Position:   weight shifting   sitting up in bed  Taken 5/21/2025 2249 by Geovanni Abebe RN  Body Position:   supine   weight shifting  Taken 5/21/2025 2012 by Geovanni Abebe RN  Body Position:   position changed independently   sitting up in bed  Intervention: Prevent Infection  Recent Flowsheet Documentation  Taken 5/22/2025 0416 by Geovanni Abebe RN  Infection Prevention:   rest/sleep promoted   single patient room provided   hand hygiene promoted  Taken 5/22/2025 0228 by Geovanni Abebe RN  Infection Prevention:   single patient room provided   rest/sleep promoted   hand hygiene promoted  Taken 5/22/2025 0012 by Geovanni Abebe RN  Infection Prevention:   single patient room provided   rest/sleep promoted   hand hygiene promoted  Taken 5/21/2025 2249 by Geovanni Abebe RN  Infection Prevention:   single patient room provided   rest/sleep promoted   hand hygiene promoted  Taken 5/21/2025 2012 by Geovanni Abebe RN  Infection Prevention:   single patient room provided   rest/sleep promoted   hand hygiene promoted  Goal: Optimal Comfort and Wellbeing  Outcome: Progressing  Intervention: Provide Person-Centered Care  Recent Flowsheet Documentation  Taken 5/22/2025 0012 by Geovanni Abebe RN  Trust Relationship/Rapport:   care explained   choices provided   thoughts/feelings acknowledged  Taken 5/21/2025 2012 by Geovanni Abebe RN  Trust Relationship/Rapport:   care explained   choices provided   thoughts/feelings acknowledged  Goal: Readiness for Transition of Care  Outcome: Progressing     Problem: Skin Injury Risk Increased  Goal: Skin Health and Integrity  Outcome: Progressing  Intervention: Optimize Skin Protection  Recent Flowsheet Documentation  Taken 5/22/2025 0416 by Geovanni Abebe RN  Head of Bed (HOB) Positioning: HOB  elevated  Taken 5/22/2025 0228 by Geovanni Abebe RN  Head of Bed (HOB) Positioning: HOB elevated  Taken 5/22/2025 0012 by Geovanni Abebe RN  Head of Bed (HOB) Positioning: HOB elevated  Taken 5/21/2025 2249 by Geovanni Abebe RN  Head of Bed (HOB) Positioning: HOB elevated  Taken 5/21/2025 2012 by Geovanni Abebe RN  Head of Bed (HOB) Positioning: HOB at 30-45 degrees     Problem: Fall Injury Risk  Goal: Absence of Fall and Fall-Related Injury  Outcome: Progressing  Intervention: Identify and Manage Contributors  Recent Flowsheet Documentation  Taken 5/22/2025 0416 by Geovanni Abebe RN  Medication Review/Management:   medications reviewed   high-risk medications identified  Taken 5/22/2025 0228 by Geovanni Abebe RN  Medication Review/Management:   medications reviewed   high-risk medications identified  Taken 5/22/2025 0012 by Geovanni Abebe RN  Medication Review/Management:   medications reviewed   high-risk medications identified  Taken 5/21/2025 2249 by Geovanni Abebe RN  Medication Review/Management:   medications reviewed   high-risk medications identified  Taken 5/21/2025 2012 by Geovanni Abebe RN  Medication Review/Management:   medications reviewed   high-risk medications identified  Intervention: Promote Injury-Free Environment  Recent Flowsheet Documentation  Taken 5/22/2025 0416 by Geovanni Abebe RN  Safety Promotion/Fall Prevention:   safety round/check completed   room organization consistent   nonskid shoes/slippers when out of bed   fall prevention program maintained   clutter free environment maintained  Taken 5/22/2025 0228 by Geovanni Abebe RN  Safety Promotion/Fall Prevention:   safety round/check completed   room organization consistent   nonskid shoes/slippers when out of bed   clutter free environment maintained   fall prevention program maintained  Taken 5/22/2025 0012 by Geovanni Abebe RN  Safety Promotion/Fall Prevention:   safety round/check completed   room organization  consistent   nonskid shoes/slippers when out of bed   fall prevention program maintained   clutter free environment maintained  Taken 5/21/2025 2249 by Geovanni Abebe RN  Safety Promotion/Fall Prevention:   safety round/check completed   room organization consistent   nonskid shoes/slippers when out of bed   fall prevention program maintained   clutter free environment maintained  Taken 5/21/2025 2012 by Geovanni Abebe RN  Safety Promotion/Fall Prevention:   safety round/check completed   room organization consistent   nonskid shoes/slippers when out of bed   fall prevention program maintained   clutter free environment maintained     Problem: Comorbidity Management  Goal: Maintenance of COPD Symptom Control  Outcome: Progressing  Intervention: Maintain COPD (Chronic Obstructive Pulmonary Disease) Symptom Control  Recent Flowsheet Documentation  Taken 5/22/2025 0416 by Geovanni Abebe RN  Medication Review/Management:   medications reviewed   high-risk medications identified  Taken 5/22/2025 0228 by Geovanni Abebe RN  Medication Review/Management:   medications reviewed   high-risk medications identified  Taken 5/22/2025 0012 by Geovanni Abebe RN  Medication Review/Management:   medications reviewed   high-risk medications identified  Taken 5/21/2025 2249 by Geovanni Abebe RN  Medication Review/Management:   medications reviewed   high-risk medications identified  Taken 5/21/2025 2012 by Geovanni Abebe RN  Medication Review/Management:   medications reviewed   high-risk medications identified  Goal: Blood Pressure in Desired Range  Outcome: Progressing  Intervention: Maintain Blood Pressure Management  Recent Flowsheet Documentation  Taken 5/22/2025 0416 by Geovanni Abebe RN  Medication Review/Management:   medications reviewed   high-risk medications identified  Taken 5/22/2025 0228 by Geovanni Abebe RN  Medication Review/Management:   medications reviewed   high-risk medications identified  Taken 5/22/2025  0012 by Geovanni Abebe, RN  Medication Review/Management:   medications reviewed   high-risk medications identified  Taken 5/21/2025 2249 by Geovanni Abebe, RN  Medication Review/Management:   medications reviewed   high-risk medications identified  Taken 5/21/2025 2012 by Geovanni Abebe, RN  Medication Review/Management:   medications reviewed   high-risk medications identified     Problem: Malnutrition  Goal: Improved Nutritional Intake  Outcome: Progressing

## 2025-05-22 NOTE — OP NOTE
PREOPERATIVE DIAGNOSIS:  Lung cancer    POSTOPERATIVE DIAGNOSIS AND FINDINGS:  same    PROCEDURE:  Left internal jugular Powerport placement with fluoroscopic guidance    DATE OF PROCEDURE:   5/22/2025    SURGEON:  Bria Roy MD    ASSISTANT:  none    ANESTHESIA:  MAC    EBL:  Minimal    SPECIMEN:  none    DESCRIPTION:  All risks, benefits, and alternatives were explained and informed consent was obtained. The patient was taken to the operating room, transferred onto the operating room table in the supine position, underwent monitored anesthesia, and was prepped and draped in the usual sterile manner.  Half percent marcaine with epinephrine was administered.  The left internal jugular vein was accessed with an 18-gauge needle under ultrasound guidance, and a guidewire was inserted under fluoroscopic guidance into the superior vena cava.  A subcutaneous pocket was then created with electrocautery on the left chest wall. The port was placed in the pocket and secured in two points with 2-0 prolene. The tubing was then tunneled from the subcutaneous pocket to the location of the wire in the neck. The vein dilator and sheath were introduced, and the dilator and guidewire were removed.  The port tubing was inserted to the cavoatrial junction, and position of tip was confirmed with fluoroscopic guidance. The port was connected to the tubing and the cap was secured. Venous blood was easily aspirated from the port, and the port was flushed with heparinized saline. There was good hemostasis noted. The skin was then closed with 3-0 Vicryl deep dermal and 4-0 Vicryl subcuticular sutures. Dermabond was placed over the wound. The patient tolerated the procedure well, and was transferred to the recovery area in stable condition. Recovery room CXR was ordered to confirm placement.    BRIA ROY M.D.  General and Endoscopic Surgery  [facility]    [address], [address]  [place], [place], [address]  P:  [phone]  F: [phone]

## 2025-05-22 NOTE — ANESTHESIA POSTPROCEDURE EVALUATION
Patient: Penelope Onofre    Procedure Summary       Date: 05/22/25 Room / Location: [facility] OR  / [facility] MAIN OR    Anesthesia Start: 1006 Anesthesia Stop: 1053    Procedure: INSERTION VENOUS ACCESS DEVICE Diagnosis:       Generalized weakness      (Generalized weakness [R53.1])    Surgeons: Bria Starr MD Provider: Carly Martel MD    Anesthesia Type: MAC ASA Status: 3            Anesthesia Type: MAC    Vitals  Vitals Value Taken Time   /75 05/22/25 11:45   Temp 36.4 °C (97.5 °F) 05/22/25 11:30   Pulse 91 05/22/25 11:59   Resp 16 05/22/25 11:30   SpO2 64 % 05/22/25 11:32   Vitals shown include unfiled device data.        Post Anesthesia Care and Evaluation    Patient location during evaluation: PACU  Patient participation: complete - patient participated  Level of consciousness: awake and alert  Pain management: adequate    Airway patency: patent  Anesthetic complications: No anesthetic complications  PONV Status: none  Cardiovascular status: acceptable and hemodynamically stable  Respiratory status: acceptable, nonlabored ventilation and spontaneous ventilation  Hydration status: acceptable    Comments: O2 sat in error, was really 95

## 2025-05-22 NOTE — CASE MANAGEMENT/SOCIAL WORK
Continued Stay Note  [facility]     Patient Name: Penelope Onofre  MRN: [id number]  Today's Date: 5/22/2025    Admit Date: 5/12/2025    Plan: [facility] for SNF accepted and  pre-cert expires 5/23/25. Will need new pre-cert submitted once medically ready for discharge.   Discharge Plan       Row Name 05/22/25 1020       Plan    Plan [facility] for SNF accepted and  pre-cert expires 5/23/25. Will need new pre-cert submitted once medically ready for discharge.    Patient/Family in Agreement with Plan yes    Plan Comments Call placed to [facility] to update regarding plan of care and treatment plan. Patient plans to transfer to [facility] to begin palliative Carboplatin on day 1, and -16 on day 1-3 with 1st cycle given as inpatient followed by short acting G-CSF for marrow support. Chemotherapy supposed to be for every 3 weeks for 4-6 cycles. Immunotherapy will be added as outpatient. Dr. Koroma aware that patient can't receive chemotherapy while in rehab. Discharge plans unchanged. Plans discharge to [facility] for SNF. Current pre-cert expires 5/23/25. Will need new pre-cert submitted once medically ready for discharge. Continue to follow.....PR[facility]                   Discharge Codes    No documentation.                 Expected Discharge Date and Time       Expected Discharge Date Expected Discharge Time    May 23, 2025               Brittany Mcnair RN

## 2025-05-22 NOTE — ANESTHESIA PREPROCEDURE EVALUATION
Anesthesia Evaluation     Patient summary reviewed and Nursing notes reviewed   NPO Solid Status: > 8 hours  NPO Liquid Status: > 2 hours           Airway   Mallampati: I  TM distance: >3 FB  Neck ROM: full  No difficulty expected  Dental    (+) edentulous    Pulmonary - normal exam    breath sounds clear to auscultation  (+) pleural effusion, a smoker Former, lung cancer,    ROS comment: Small cell CA right lung with right pleural effusion  Cardiovascular - normal exam    ECG reviewed  Rhythm: regular  Rate: normal      ROS comment: Normal LV fxn and valves by ECHO 5/25    Neuro/Psych  (+) dizziness/light headedness  GI/Hepatic/Renal/Endo    (+) GERD, renal disease-    Musculoskeletal     (+) back pain, chronic pain  Abdominal  - normal exam   Substance History      OB/GYN          Other      history of cancer active      Other Comment: Right lung CA      Phys Exam Other: Mass right chest              Anesthesia Plan    ASA 3     MAC     (Propofol MAC preferred by patient)  intravenous induction     Anesthetic plan, risks, benefits, and alternatives have been provided, discussed and informed consent has been obtained with: patient.      CODE STATUS:    Code Status (Patient has no pulse and is not breathing): CPR (Attempt to Resuscitate)  Medical Interventions (Patient has pulse or is breathing): Full Support

## 2025-05-22 NOTE — SIGNIFICANT NOTE
05/22/25 1100   OTHER   Discipline occupational therapist   Rehab Time/Intention   Session Not Performed other (see comments)  (Pt off the floor this AM for port placement. Plan to attempt OT 5/23.)   Therapy Assessment/Plan (PT)   Criteria for Skilled Interventions Met (PT) yes   Recommendation   OT - Next Appointment 05/23/25

## 2025-05-22 NOTE — PROGRESS NOTES
Nutrition Services    Patient Name: Penelope Onofre  YOB: 1955  MRN: [id number]  Admission date: 5/12/2025    PROGRESS NOTE      Encounter Information: Follow up        PO Diet: Diet: Regular/House; Fluid Consistency: Thin (IDDSI 0)   PO Supplements: Boost Breeze, Novasource Renal , BID   PO Intake:  %       Current nutrition support: Oral diet, ONS   Nutrition support review: Eating well. Port placed today, plans for chemo initiation tomorrow.        Weight: Weight: 63.5 kg (140 lb) (05/13/25 1518)       Medications: reviewed   Labs: reviewed        GI Function:  last bowel movement: 5/19       Nutrition Intervention Updates: Continue ONS as ordered   Regular diet        Results from last 7 days   Lab Units 05/22/25  0611 05/21/25  0605 05/20/25  0515   SODIUM mmol/L 139 138 137   POTASSIUM mmol/L 4.0 3.9 4.7   CHLORIDE mmol/L 106 104 106   CO2 mmol/L 21.0* 19.8* 17.0*   BUN mg/dL 10 12 11   CREATININE mg/dL 0.96 1.10* 1.17*   CALCIUM mg/dL 8.3* 8.6 9.0   GLUCOSE mg/dL 98 116* 68     Results from last 7 days   Lab Units 05/22/25  0611   HEMOGLOBIN g/dL 12.9   HEMATOCRIT % 38.3     COVID19   Date Value Ref Range Status   05/13/2025 Not Detected Not Detected - Ref. Range Final     Lab Results   Component Value Date    HGBA1C 5.50 05/13/2025       RD to follow up per protocol.    Electronically signed by:  Daysi Norwood RD  05/22/25 14:00 EDT

## 2025-05-22 NOTE — ADDENDUM NOTE
Addendum  created 05/22/25 1521 by Carly Martel MD    Attestation recorded in Intraprocedure, Intraprocedure Attestations filed

## 2025-05-22 NOTE — PROGRESS NOTES
LOS: 9 days   Patient Care Team:  Bonny Judge MD as PCP - General (Internal Medicine)    Chief Complaint: Small cell lung cancer, extensive stage.    Interval History:  5/21/2025 is reported by nursing staff patient does not want to have brain MRI examination due to claustrophobia despite we offered premedication for.  She however does wanted to have further discussion of palliative chemotherapy.  Patient looks weak but clinically stable.     5/22/2025 patient had a portacatheter placement this morning.  No other specific complaints.    A comprehensive 14 point review of systems was performed and was negative except as mentioned.    Vital Signs:  Temp:  [97.3 °F (36.3 °C)-98 °F (36.7 °C)] 97.5 °F (36.4 °C)  Heart Rate:  [] 95  Resp:  [16-20] 16  BP: ()/(62-93) 123/93    Intake/Output Summary (Last 24 hours) at 5/22/2025 1426  Last data filed at 5/22/2025 1130  Gross per 24 hour   Intake 740 ml   Output 300 ml   Net 440 ml       Physical Exam:  GENERAL:  Well-developed, cachectic female, in no acute distress.    SKIN:  Warm, dry without rashes, purpura or petechiae.  Left upper chest portacatheter placed.  No bleeding.  HEENT:  Normocephalic.   LYMPHATICS: Right upper chest wall/supraclavicular area has a soft mass (patient reports she has this for 20some years).  No other cervical, supraclavicular or axillary adenopathy.  CHEST: Normal respiratory effort.  Lungs clear to auscultation. Good airflow.  CARDIAC:  Regular rate and rhythm. Normal S1,S2.  ABDOMEN:  Soft, no tender.  Bowel sounds normal.  EXTREMITIES:  No lower extremity edema.      Results Review:   CBC:      Lab 05/22/25  0611 05/21/25  0605 05/20/25  0515 05/19/25  0527 05/18/25  0604   WBC 4.94 6.14 6.38 6.23 7.39   HEMOGLOBIN 12.9 13.6 13.9 13.9 13.4   HEMATOCRIT 38.3 38.9 41.5 41.4 39.3   PLATELETS 141 175 164 136* 145   NEUTROS ABS 2.94 3.87 3.93 4.09 5.25   IMMATURE GRANS (ABS) 0.02 0.03 0.03 0.02 0.03   LYMPHS ABS 1.22 1.33 1.51  1.26 1.25   MONOS ABS 0.62 0.71 0.64 0.62 0.77   EOS ABS 0.12 0.17 0.21 0.19 0.07   .1* 99.2* 103.0* 104.0* 103.1*     CMP:        Lab 05/22/25  0611 05/21/25  0605 05/20/25  0515 05/19/25  0527 05/18/25  0604   SODIUM 139 138 137 136 137   POTASSIUM 4.0 3.9 4.7 5.2 4.9   CHLORIDE 106 104 106 105 104   CO2 21.0* 19.8* 17.0* 20.0* 19.0*   ANION GAP 12.0 14.2 14.0 11.0 14.0   BUN 10 12 11 8 7*   CREATININE 0.96 1.10* 1.17* 1.09* 1.10*   EGFR 63.4 53.8* 50.0* 54.4* 53.8*   GLUCOSE 98 116* 68 75 100*   CALCIUM 8.3* 8.6 9.0 9.3 8.9     Lab Results   Component Value Date    URICACID 5.3 05/20/2025     Results from last 7 days   Lab Units 05/22/25  0611   INR  1.06   APTT seconds 28.5                   I reviewed the patient's new clinical results.          ASSESSMENT:  1.  Small cell lung cancer- extensive stage.    Locally advanced small cell lung cancer with a right middle lobe lung mass and bulky right hilar adenopathy and mediastinal adenopathy as well as right supraclavicular adenopathy. Biopsy confirmed with high grade Ki67 > 95%.   I discussed with the patient on 5/20/2025 that I recommended to obtain brain MRI with and without contrast for further evaluation because that is the most sensitive study for looking for metastatic brain lesions. Asymptomatic the patient still could have metastatic disease considering this is aggressive small cell lung cancer. However the patient reports she has high anxiety and she is hesitant to have that study.   I also discussed with the patient the management options with aggressive chemotherapy with carboplatin plus -16 every 3 weeks and in conjunction with immunotherapy. Nevertheless I recommended to start the first cycle of chemotherapy as inpatient and immunotherapy would not be given per policy. I explained to her. We also discussed route without active treatment and in that case she would be pursued for hospice care. Without treatment I think her life expectancy  probably is somewhere between 3-5 months and with treatment she could be about 12 months. The patient needs more time to think about all of these treatment logistics and also social support. Apparently she has no family members living in [place]. She has no children and also she is found not having good social support from neighbors or friends.   5/21/2025 patient does not want to have brain MRI examination due to claustrophobia.  So we recommended she having CT of the head with and without contrast for evaluation although this is not the best study and the patient is aware of this.  Patient is agreeable.   Discussed with the patient on 5/21/2025, she does want to proceed with palliative chemotherapy.  We recommended first cycle chemotherapy to be given as inpatient so she would have time to recover at the nursing home.  Otherwise she likely will not improve clinically and probably will never have a chance to proceed with the chemotherapy.  Patient voiced understanding and agreeable with that strategy.    Head CT scan with without contrast 5/21/2025 reported no evidence for intracranial lesions.          *.  Prophylaxis of tumor lysis syndrome.  Patient was started on allopurinol 5/21/2025.    *.  IV access.  5/22/2025 patient had a portacatheter placement by Dr. Starr.     PLAN:  Patient will be transferred to oncology floor today.  Appreciate help from Dr. Starr for PowerPort placement.   Patient will have chemotherapy teaching today.   Continue allopurinol 300 mg daily for tumor lysis prophylaxis.   Plan to start palliative chemotherapy tomorrow 5/23/2025 as inpatient, with carboplatin AUC 5 on day 1, and -16 at 100 mg/m² on day 1-3 followed by short acting G-CSF for marrow support.  Chemotherapy planned for every 3 weeks for 4-6 cycles.  Immunotherapy atezolizumab will be added as outpatient and that could be given for up to 1 year.   Other management per primary team.   Patient likely will be discharged  to rehab facility after chemotherapy, timing to be determined.   For future chemotherapy will be done as outpatient.  Will also get permission for adding atezolizumab in conjunction with chemotherapy regimen every 3 weeks.   Monitor labs CBC CMP in the morning.    Spoke with the patient at bedside.  She voiced understanding and agreeable for starting chemotherapy tomorrow.    I spoke with nursing staff today.    Yannick Koroma MD PhD  05/22/25  14:26 EDT

## 2025-05-22 NOTE — PROGRESS NOTES
"General Surgery Progress Note    Summary: Mrs. Penelope Onofre is a 70 y.o. year old lady with a new diagnosis of non-small cell lung cancer, consulted for port placement.  Plan for port placement in the OR today.    Chief Complaint: Shortness of breath    Interval Events: No acute events overnight.  Plan for the OR today.    Vitals: /67 (BP Location: Left arm, Patient Position: Lying)   Pulse 99   Temp 97.7 °F (36.5 °C) (Oral)   Resp 18   Ht 182.9 cm (72\")   Wt 63.5 kg (140 lb)   SpO2 97%   BMI 18.99 kg/m²     GENERAL: alert, well appearing, and in no distress  HEENT: normocephalic, atraumatic, moist mucous membranes, clear sclerae   CHEST: no increased work of breathing, symmetric air entry  CARDIAC: regular rate and rhythm    EXTREMITIES: no cyanosis, clubbing, or edema   SKIN: Warm and moist, no rashes    Labs:  Results from last 7 days   Lab Units 05/22/25  0611 05/21/25  0605 05/20/25  0515   WBC 10*3/mm3 4.94 6.14 6.38   HEMOGLOBIN g/dL 12.9 13.6 13.9   HEMATOCRIT % 38.3 38.9 41.5   PLATELETS 10*3/mm3 141 175 164     Results from last 7 days   Lab Units 05/22/25  0611 05/21/25  0605 05/20/25  0515   SODIUM mmol/L 139 138 137   POTASSIUM mmol/L 4.0 3.9 4.7   CHLORIDE mmol/L 106 104 106   CO2 mmol/L 21.0* 19.8* 17.0*   BUN mg/dL 10 12 11   CREATININE mg/dL 0.96 1.10* 1.17*   CALCIUM mg/dL 8.3* 8.6 9.0   GLUCOSE mg/dL 98 116* 68     Results from last 7 days   Lab Units 05/22/25  0611   INR  1.06   APTT seconds 28.5       MENDEL ROY MD  General and Endoscopic Surgery  [facility]    [address] [place] [address], [address]  [place], [place], [address]  P: [phone]  F: [phone]     "

## 2025-05-23 DIAGNOSIS — C34.90 SMALL CELL LUNG CANCER IN ADULT: Primary | ICD-10-CM

## 2025-05-23 LAB
ANION GAP SERPL CALCULATED.3IONS-SCNC: 8.8 MMOL/L (ref 5–15)
BASOPHILS # BLD AUTO: 0.01 10*3/MM3 (ref 0–0.2)
BASOPHILS NFR BLD AUTO: 0.2 % (ref 0–1.5)
BUN SERPL-MCNC: 13 MG/DL (ref 8–23)
BUN/CREAT SERPL: 14.6 (ref 7–25)
CALCIUM SPEC-SCNC: 8.9 MG/DL (ref 8.6–10.5)
CHLORIDE SERPL-SCNC: 107 MMOL/L (ref 98–107)
CO2 SERPL-SCNC: 22.2 MMOL/L (ref 22–29)
CREAT SERPL-MCNC: 0.89 MG/DL (ref 0.57–1)
DEPRECATED RDW RBC AUTO: 48.8 FL (ref 37–54)
EGFRCR SERPLBLD CKD-EPI 2021: 69.4 ML/MIN/1.73
EOSINOPHIL # BLD AUTO: 0.01 10*3/MM3 (ref 0–0.4)
EOSINOPHIL NFR BLD AUTO: 0.2 % (ref 0.3–6.2)
ERYTHROCYTE [DISTWIDTH] IN BLOOD BY AUTOMATED COUNT: 13.1 % (ref 12.3–15.4)
GLUCOSE SERPL-MCNC: 103 MG/DL (ref 65–99)
HCT VFR BLD AUTO: 36.8 % (ref 34–46.6)
HGB BLD-MCNC: 12.4 G/DL (ref 12–15.9)
IMM GRANULOCYTES # BLD AUTO: 0.01 10*3/MM3 (ref 0–0.05)
IMM GRANULOCYTES NFR BLD AUTO: 0.2 % (ref 0–0.5)
LYMPHOCYTES # BLD AUTO: 0.88 10*3/MM3 (ref 0.7–3.1)
LYMPHOCYTES NFR BLD AUTO: 18.4 % (ref 19.6–45.3)
MCH RBC QN AUTO: 33.8 PG (ref 26.6–33)
MCHC RBC AUTO-ENTMCNC: 33.7 G/DL (ref 31.5–35.7)
MCV RBC AUTO: 100.3 FL (ref 79–97)
MONOCYTES # BLD AUTO: 0.62 10*3/MM3 (ref 0.1–0.9)
MONOCYTES NFR BLD AUTO: 13 % (ref 5–12)
NEUTROPHILS NFR BLD AUTO: 3.25 10*3/MM3 (ref 1.7–7)
NEUTROPHILS NFR BLD AUTO: 68 % (ref 42.7–76)
NRBC BLD AUTO-RTO: 0 /100 WBC (ref 0–0.2)
PLATELET # BLD AUTO: 154 10*3/MM3 (ref 140–450)
PMV BLD AUTO: 10.5 FL (ref 6–12)
POTASSIUM SERPL-SCNC: 4.4 MMOL/L (ref 3.5–5.2)
RBC # BLD AUTO: 3.67 10*6/MM3 (ref 3.77–5.28)
SODIUM SERPL-SCNC: 138 MMOL/L (ref 136–145)
WBC NRBC COR # BLD AUTO: 4.78 10*3/MM3 (ref 3.4–10.8)

## 2025-05-23 PROCEDURE — 94761 N-INVAS EAR/PLS OXIMETRY MLT: CPT

## 2025-05-23 PROCEDURE — 94760 N-INVAS EAR/PLS OXIMETRY 1: CPT

## 2025-05-23 PROCEDURE — 85025 COMPLETE CBC W/AUTO DIFF WBC: CPT | Performed by: STUDENT IN AN ORGANIZED HEALTH CARE EDUCATION/TRAINING PROGRAM

## 2025-05-23 PROCEDURE — 3E04305 INTRODUCTION OF OTHER ANTINEOPLASTIC INTO CENTRAL VEIN, PERCUTANEOUS APPROACH: ICD-10-PCS | Performed by: INTERNAL MEDICINE

## 2025-05-23 PROCEDURE — 25010000002 ETOPOSIDE 500 MG/25ML SOLUTION 25 ML VIAL: Performed by: INTERNAL MEDICINE

## 2025-05-23 PROCEDURE — 99233 SBSQ HOSP IP/OBS HIGH 50: CPT | Performed by: INTERNAL MEDICINE

## 2025-05-23 PROCEDURE — 25010000002 ENOXAPARIN PER 10 MG: Performed by: STUDENT IN AN ORGANIZED HEALTH CARE EDUCATION/TRAINING PROGRAM

## 2025-05-23 PROCEDURE — 25010000002 FOSAPREPITANT PER 1 MG: Performed by: INTERNAL MEDICINE

## 2025-05-23 PROCEDURE — 97535 SELF CARE MNGMENT TRAINING: CPT

## 2025-05-23 PROCEDURE — 80048 BASIC METABOLIC PNL TOTAL CA: CPT | Performed by: STUDENT IN AN ORGANIZED HEALTH CARE EDUCATION/TRAINING PROGRAM

## 2025-05-23 PROCEDURE — 25810000003 SODIUM CHLORIDE 0.9 % SOLUTION: Performed by: INTERNAL MEDICINE

## 2025-05-23 PROCEDURE — 94799 UNLISTED PULMONARY SVC/PX: CPT

## 2025-05-23 PROCEDURE — 94664 DEMO&/EVAL PT USE INHALER: CPT

## 2025-05-23 PROCEDURE — 97530 THERAPEUTIC ACTIVITIES: CPT

## 2025-05-23 PROCEDURE — 25810000003 SODIUM CHLORIDE 0.9 % SOLUTION 500 ML FLEX CONT: Performed by: INTERNAL MEDICINE

## 2025-05-23 PROCEDURE — 25810000003 SODIUM CHLORIDE 0.9 % SOLUTION 250 ML FLEX CONT: Performed by: INTERNAL MEDICINE

## 2025-05-23 PROCEDURE — 25010000002 CARBOPLATIN PER 50 MG: Performed by: INTERNAL MEDICINE

## 2025-05-23 PROCEDURE — 25010000002 ONDANSETRON PER 1 MG: Performed by: INTERNAL MEDICINE

## 2025-05-23 PROCEDURE — 25010000002 DEXAMETHASONE SODIUM PHOSPHATE 100 MG/10ML SOLUTION 10 ML VIAL: Performed by: INTERNAL MEDICINE

## 2025-05-23 RX ORDER — HEPARIN SODIUM (PORCINE) LOCK FLUSH IV SOLN 100 UNIT/ML 100 UNIT/ML
5 SOLUTION INTRAVENOUS AS NEEDED
Status: DISCONTINUED | OUTPATIENT
Start: 2025-05-23 | End: 2025-05-30 | Stop reason: HOSPADM

## 2025-05-23 RX ORDER — SODIUM CHLORIDE 9 MG/ML
40 INJECTION, SOLUTION INTRAVENOUS AS NEEDED
Status: DISCONTINUED | OUTPATIENT
Start: 2025-05-23 | End: 2025-05-30 | Stop reason: HOSPADM

## 2025-05-23 RX ORDER — SODIUM CHLORIDE 0.9 % (FLUSH) 0.9 %
10 SYRINGE (ML) INJECTION AS NEEDED
Status: DISCONTINUED | OUTPATIENT
Start: 2025-05-23 | End: 2025-05-30 | Stop reason: HOSPADM

## 2025-05-23 RX ORDER — SODIUM CHLORIDE 9 MG/ML
20 INJECTION, SOLUTION INTRAVENOUS ONCE
Status: COMPLETED | OUTPATIENT
Start: 2025-05-23 | End: 2025-05-23

## 2025-05-23 RX ORDER — FAMOTIDINE 10 MG/ML
20 INJECTION, SOLUTION INTRAVENOUS AS NEEDED
Status: DISCONTINUED | OUTPATIENT
Start: 2025-05-23 | End: 2025-05-30 | Stop reason: HOSPADM

## 2025-05-23 RX ORDER — SODIUM CHLORIDE 0.9 % (FLUSH) 0.9 %
20 SYRINGE (ML) INJECTION AS NEEDED
Status: DISCONTINUED | OUTPATIENT
Start: 2025-05-23 | End: 2025-05-30 | Stop reason: HOSPADM

## 2025-05-23 RX ORDER — OLANZAPINE 5 MG/1
5 TABLET, FILM COATED ORAL NIGHTLY
Status: COMPLETED | OUTPATIENT
Start: 2025-05-23 | End: 2025-05-26

## 2025-05-23 RX ORDER — HYDROCORTISONE SODIUM SUCCINATE 100 MG/2ML
100 INJECTION INTRAMUSCULAR; INTRAVENOUS AS NEEDED
Status: DISCONTINUED | OUTPATIENT
Start: 2025-05-23 | End: 2025-05-30 | Stop reason: HOSPADM

## 2025-05-23 RX ORDER — SODIUM CHLORIDE 0.9 % (FLUSH) 0.9 %
10 SYRINGE (ML) INJECTION EVERY 12 HOURS SCHEDULED
Status: DISCONTINUED | OUTPATIENT
Start: 2025-05-23 | End: 2025-05-30 | Stop reason: HOSPADM

## 2025-05-23 RX ORDER — DIPHENHYDRAMINE HYDROCHLORIDE 50 MG/ML
50 INJECTION, SOLUTION INTRAMUSCULAR; INTRAVENOUS AS NEEDED
Status: COMPLETED | OUTPATIENT
Start: 2025-05-23 | End: 2025-05-24

## 2025-05-23 RX ADMIN — OLANZAPINE 5 MG: 5 TABLET, FILM COATED ORAL at 21:11

## 2025-05-23 RX ADMIN — Medication 10 ML: at 15:11

## 2025-05-23 RX ADMIN — ATORVASTATIN CALCIUM 20 MG: 20 TABLET, FILM COATED ORAL at 21:11

## 2025-05-23 RX ADMIN — ALLOPURINOL 300 MG: 300 TABLET ORAL at 09:52

## 2025-05-23 RX ADMIN — IPRATROPIUM BROMIDE AND ALBUTEROL SULFATE 3 ML: .5; 3 SOLUTION RESPIRATORY (INHALATION) at 07:40

## 2025-05-23 RX ADMIN — FOSAPREPITANT 150 MG: 150 INJECTION, POWDER, LYOPHILIZED, FOR SOLUTION INTRAVENOUS at 14:06

## 2025-05-23 RX ADMIN — Medication 10 ML: at 21:11

## 2025-05-23 RX ADMIN — ENOXAPARIN SODIUM 40 MG: 100 INJECTION SUBCUTANEOUS at 15:10

## 2025-05-23 RX ADMIN — IPRATROPIUM BROMIDE AND ALBUTEROL SULFATE 3 ML: .5; 3 SOLUTION RESPIRATORY (INHALATION) at 11:28

## 2025-05-23 RX ADMIN — PANTOPRAZOLE SODIUM 40 MG: 40 TABLET, DELAYED RELEASE ORAL at 18:22

## 2025-05-23 RX ADMIN — AMLODIPINE BESYLATE 5 MG: 5 TABLET ORAL at 09:52

## 2025-05-23 RX ADMIN — GUAIFENESIN 1200 MG: 600 TABLET, EXTENDED RELEASE ORAL at 09:52

## 2025-05-23 RX ADMIN — MENTHOL, ZINC OXIDE 1 APPLICATION: .44; 20.6 OINTMENT TOPICAL at 21:11

## 2025-05-23 RX ADMIN — IPRATROPIUM BROMIDE AND ALBUTEROL SULFATE 3 ML: .5; 3 SOLUTION RESPIRATORY (INHALATION) at 15:54

## 2025-05-23 RX ADMIN — MENTHOL, ZINC OXIDE 1 APPLICATION: .44; 20.6 OINTMENT TOPICAL at 15:10

## 2025-05-23 RX ADMIN — GUAIFENESIN 1200 MG: 600 TABLET, EXTENDED RELEASE ORAL at 21:11

## 2025-05-23 RX ADMIN — PANTOPRAZOLE SODIUM 40 MG: 40 TABLET, DELAYED RELEASE ORAL at 09:52

## 2025-05-23 RX ADMIN — CARBOPLATIN 400 MG: 10 INJECTION, SOLUTION INTRAVENOUS at 15:38

## 2025-05-23 RX ADMIN — Medication 10 ML: at 15:10

## 2025-05-23 RX ADMIN — SODIUM CHLORIDE 20 ML/HR: 9 INJECTION, SOLUTION INTRAVENOUS at 13:39

## 2025-05-23 RX ADMIN — SODIUM CHLORIDE 180 MG: 9 INJECTION, SOLUTION INTRAVENOUS at 16:23

## 2025-05-23 RX ADMIN — DEXAMETHASONE SODIUM PHOSPHATE: 10 INJECTION, SOLUTION INTRAMUSCULAR; INTRAVENOUS at 14:53

## 2025-05-23 NOTE — NURSING NOTE
"Nursing Chemotherapy Verification    Chemotherapy Regimen:   Treatment Plans       Name Type Plan Dates Plan Provider         Active    OP LUNG Atezolizumab / CARBOplatin AUC=5 / Etoposide (SCLC) ONCOLOGY TREATMENT 5/20/2025 - Present Yannick Koroma MD PhD                       Current height and weight: 182.9 cm (72\") 60.1 kg (132 lb 7.9 oz)  Calculated BSA from current height and weight (Erma): 1.79    Relevant Labs  Results from last 7 days   Lab Units 05/23/25  0448 05/22/25  0611 05/21/25  0605   WBC 10*3/mm3 4.78 4.94 6.14   HEMOGLOBIN g/dL 12.4 12.9 13.6   HEMATOCRIT % 36.8 38.3 38.9   PLATELETS 10*3/mm3 154 141 175     Lab Results   Component Value Date    NEUTROABS 3.25 05/23/2025       Results from last 7 days   Lab Units 05/23/25  0448 05/22/25  0611 05/21/25  0605   CREATININE mg/dL 0.89 0.96 1.10*       Serum creatinine: 0.89 mg/dL 05/23/25 0448  Estimated creatinine clearance: 55 mL/min    No results found for: \"HAV\", \"HEPAIGM\", \"HEPBIGM\", \"HEPBCAB\", \"HBEAG\", \"HEPCAB\"    Verification attestation:  I have personally reviewed the planned regimen and its administration and dosing. I understand the potential side effects.The patient has been instructed on the regimen, potential side effects, and self care measures; the consent form has been completed. I have confirmed that the appropriate premedication, prehydration, post medication and/or emergency medications are ordered in addition to the chemotherapy.    I have independently verified that the height and weight is current and calculated the BSA. I have verified the doses with the planned regimen and have clarified any deviations with the physician (Dr. Koroma). I have confirmed the route of administration and patient IV access.    Nurse: Eduarda Cai RN  2nd verification Nurse: Radha Brenner RN    "

## 2025-05-23 NOTE — PROGRESS NOTES
LOS: 10 days   Patient Care Team:  Bonny Judge MD as PCP - General (Internal Medicine)    Chief Complaint: Small cell lung cancer, extensive stage.    Interval History:  5/21/2025 is reported by nursing staff patient does not want to have brain MRI examination due to claustrophobia despite we offered premedication for.  She however does wanted to have further discussion of palliative chemotherapy.  Patient looks weak but clinically stable.     5/22/2025 patient had a portacatheter placement this morning.  No other specific complaints.    5/23/2025 patient is afebrile.  Tmax 97.5.  BP stable.  Will start the patient on chemotherapy cycle 1 day 1 carbo plus -16.    A comprehensive 14 point review of systems was performed and was negative except as mentioned.    Vital Signs:  Temp:  [97.3 °F (36.3 °C)-97.6 °F (36.4 °C)] 97.5 °F (36.4 °C)  Heart Rate:  [] 95  Resp:  [16-20] 18  BP: (109-137)/(62-93) 113/82    Intake/Output Summary (Last 24 hours) at 5/23/2025 0857  Last data filed at 5/23/2025 0310  Gross per 24 hour   Intake 880 ml   Output 1000 ml   Net -120 ml       Physical Exam:  GENERAL:  Well-developed, cachectic female, sitting in chair, in no acute distress.    SKIN:  Warm, dry without rashes, purpura or petechiae.  Left upper chest portacatheter placed.  No bleeding.  HEENT:  Normocephalic.   LYMPHATICS: Right upper chest wall area has a soft mass (patient reports she has this for 20some years).  No other cervical, supraclavicular or axillary adenopathy.  CHEST: Normal respiratory effort.  Lungs clear to auscultation. Good airflow.  CARDIAC:  Regular rate and rhythm. Normal S1,S2.  ABDOMEN:  Soft, no tender.  Bowel sounds normal.  EXTREMITIES:  No lower extremity edema.      Results Review:   CBC:      Lab 05/23/25  0448 05/22/25  0611 05/21/25  0605 05/20/25  0515 05/19/25  0527   WBC 4.78 4.94 6.14 6.38 6.23   HEMOGLOBIN 12.4 12.9 13.6 13.9 13.9   HEMATOCRIT 36.8 38.3 38.9 41.5 41.4    PLATELETS 154 141 175 164 136*   NEUTROS ABS 3.25 2.94 3.87 3.93 4.09   IMMATURE GRANS (ABS) 0.01 0.02 0.03 0.03 0.02   LYMPHS ABS 0.88 1.22 1.33 1.51 1.26   MONOS ABS 0.62 0.62 0.71 0.64 0.62   EOS ABS 0.01 0.12 0.17 0.21 0.19   .3* 102.1* 99.2* 103.0* 104.0*     CMP:        Lab 05/23/25  0448 05/22/25  0611 05/21/25  0605 05/20/25  0515 05/19/25  0527   SODIUM 138 139 138 137 136   POTASSIUM 4.4 4.0 3.9 4.7 5.2   CHLORIDE 107 106 104 106 105   CO2 22.2 21.0* 19.8* 17.0* 20.0*   ANION GAP 8.8 12.0 14.2 14.0 11.0   BUN 13 10 12 11 8   CREATININE 0.89 0.96 1.10* 1.17* 1.09*   EGFR 69.4 63.4 53.8* 50.0* 54.4*   GLUCOSE 103* 98 116* 68 75   CALCIUM 8.9 8.3* 8.6 9.0 9.3     Lab Results   Component Value Date    URICACID 5.3 05/20/2025     Results from last 7 days   Lab Units 05/22/25  0611   INR  1.06   APTT seconds 28.5                   I reviewed the patient's new clinical results.          ASSESSMENT:  1.  Small cell lung cancer- extensive stage.    Locally advanced small cell lung cancer with a right middle lobe lung mass and bulky right hilar adenopathy and mediastinal adenopathy as well as right supraclavicular adenopathy. Biopsy confirmed with high grade Ki67 > 95%.   I discussed with the patient on 5/20/2025 that I recommended to obtain brain MRI with and without contrast for further evaluation because that is the most sensitive study for looking for metastatic brain lesions. Asymptomatic the patient still could have metastatic disease considering this is aggressive small cell lung cancer. However the patient reports she has high anxiety and she is hesitant to have that study.   I also discussed with the patient the management options with aggressive chemotherapy with carboplatin plus -16 every 3 weeks and in conjunction with immunotherapy. Nevertheless I recommended to start the first cycle of chemotherapy as inpatient and immunotherapy would not be given per policy. I explained to her. We also  discussed route without active treatment and in that case she would be pursued for hospice care. Without treatment I think her life expectancy probably is somewhere between 3-5 months and with treatment she could be about 12 months. The patient needs more time to think about all of these treatment logistics and also social support. Apparently she has no family members living in [place]. She has no children and also she is found not having good social support from neighbors or friends.   5/21/2025 patient does not want to have brain MRI examination due to claustrophobia.  So we recommended she having CT of the head with and without contrast for evaluation although this is not the best study and the patient is aware of this.  Patient is agreeable.   Discussed with the patient on 5/21/2025, she does want to proceed with palliative chemotherapy.  We recommended first cycle chemotherapy to be given as inpatient so she would have time to recover at the nursing home.  Otherwise she likely will not improve clinically and probably will never have a chance to proceed with the chemotherapy.  Patient voiced understanding and agreeable with that strategy.    Head CT scan with without contrast 5/21/2025 reported no evidence for intracranial lesions.   Today 5/23/2025 patient will be started on cycle #1 palliative chemotherapy with carboplatin AUC 5 and -16 at 100 mg/m².      *.  Nausea associate with chemotherapy.  Expecting this will happen.  Patient will be started on Zyprexa 5 mg nightly for 4 days starting the day of chemotherapy.  Patient also will be given Zofran as needed.    *.  Prophylaxis of tumor lysis syndrome.  Patient was started on allopurinol 5/21/2025.    *.  IV access.  5/22/2025 patient had a portacatheter placement by Dr. Starr.         PLAN:  Proceed ahead to start palliative chemotherapy today 5/23/2025 as inpatient, with carboplatin AUC 5 on day 1, and -16 at 100 mg/m² on day 1-3 followed by short  acting G-CSF for marrow support.  Chemotherapy planned for every 3 weeks for 4-6 cycles.  Immunotherapy atezolizumab will be added as outpatient and that could be given for up to 1 year.   Start Zyprexa 5 mg nightly for 4 days per protocol above chemotherapy.   Start Zofran IV 4 mg as needed for anticipated nausea vomiting caused by chemotherapy  Continue allopurinol 300 mg daily for tumor lysis prophylaxis.   Monitor labs CBC BMP and magnesium in the morning.  Will check uric acid daily.  Other management per primary team.   Patient likely will be discharged to rehab facility after chemotherapy, timing to be determined.   For future chemotherapy will be done as outpatient.  Will also get permission for adding atezolizumab in conjunction with chemotherapy regimen every 3 weeks.       Spoke with the patient at bedside.  She voiced understanding and agreeable for starting chemotherapy tomorrow.    I spoke with nursing staff today.     Yannick Koroma MD PhD  05/23/25  08:57 EDT

## 2025-05-23 NOTE — PROGRESS NOTES
DAILY PROGRESS NOTE  [facility]    Patient Identification:  Name: Penelope Onofre  Age: 70 y.o.  Sex: female  :  1955  MRN: [id number]         Primary Care Physician: Bonny Judge MD    Subjective:  Interval History: Patient sitting up in bed conversational and pleasant.  I try not to interrupt too much as she did have a  at bedside talking things over and praying with her.  She is not voicing anything currently new.  Appears to understand care plan.    Objective: Conversational and pleasant.  Elderly and frail.  No family present at bedside    Scheduled Meds:allopurinol, 300 mg, Oral, Daily  amLODIPine, 5 mg, Oral, Q24H  atorvastatin, 20 mg, Oral, Nightly  CARBOplatin (PARAPLATIN) 400 mg in sodium chloride 0.9 % 290 mL chemo IVPB, 400 mg, Intravenous, Once  enoxaparin sodium, 40 mg, Subcutaneous, Q24H  etoposide (TOPOSAR) 180 mg in sodium chloride 0.9 % 509 mL chemo IVPB, 100 mg/m2 (Treatment Plan Recorded), Intravenous, Once  fosaprepitant (EMEND) IVPB, 150 mg, Intravenous, Once  guaiFENesin, 1,200 mg, Oral, Q12H  ipratropium-albuterol, 3 mL, Nebulization, 4x Daily - RT  Lidocaine, 1 patch, Transdermal, Q24H  Menthol-Zinc Oxide, 1 Application, Topical, BID  OLANZapine, 5 mg, Oral, Nightly  ondansetron (ZOFRAN) 16 mg, dexAMETHasone sodium phosphate 12 mg in sodium chloride 0.9 % 50 mL IVPB, , Intravenous, Once  pantoprazole, 40 mg, Oral, BID AC  sodium chloride, 10 mL, Intravenous, Q12H  sodium chloride, 10 mL, Intravenous, Q12H      Continuous Infusions:     Vital signs in last 24 hours:  Temp:  [97.3 °F (36.3 °C)-98.6 °F (37 °C)] 98.6 °F (37 °C)  Heart Rate:  [] 74  Resp:  [16-18] 18  BP: (107-129)/(62-82) 107/62    Intake/Output:    Intake/Output Summary (Last 24 hours) at 2025 1414  Last data filed at 2025 1406  Gross per 24 hour   Intake 480 ml   Output 1000 ml   Net -520 ml       Exam:  /62 (BP Location: Right arm, Patient Position: Lying)   Pulse  "74   Temp 98.6 °F (37 °C) (Oral)   Resp 18   Ht 182.9 cm (72\")   Wt 60.1 kg (132 lb 7.9 oz)   SpO2 95%   BMI 17.97 kg/m²     General Appearance:    Alert, cooperative, NT, AAOx3                          Head:    Normocephalic, without obvious abnormality, atraumatic                         Throat:   Oral mucosa pink and moist                           Neck:   Supple, no JVD                         Lungs:    Clear to auscultation bilaterally, respirations unlabored                 Chest Wall:    No tenderness or deformity                          Heart:    Regular rate and rhythm, S1 and S2 normal                  Abdomen:     Soft, nontender, bowel sounds active                 Extremities: Moving all, no cyanosis or edema                        Pulses:   Pulses palpable in all extremities                            Skin:   Skin is warm and dry                  Neurologic:   CNII-XII intact     Data Review:  Labs in chart were reviewed.    Assessment:  Active Hospital Problems    Diagnosis  POA    **Generalized weakness [R53.1]  Yes    Severe protein-calorie malnutrition [E43]  Yes    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       Plan:    Metastatic small cell lung cancer/postobstructive right-sided pneumonia/pleural effusion with acute hypoxic respiratory failure/chest pain   - Thoracentesis 5/14/2025 -culture negative.  Cytology negative.  Exudate per pulmonary though likely malignant despite " negative cytology   - Status post IV Zosyn   - Right lymph node positive for metastatic disease per 5/15/2025 biopsy   - Oncology initiating palliative chemo with port placed per Dr. Starr 5/22/2025   - Chest pain: EKG without ischemia and troponins flat with CTA negative for PE.  Echo recent with no wall motion abnormalities    CKD/hyperkalemia/acidosis all resolved   - Creatinine 0.89 with a GFR of 69.4 and stable electrolytes    HTN-stable/soft on amlodipine    Generalized weakness to be anticipated given all the above   - PT/OT    GERD    Lovenox for PPx    Further labs surveillance per Oncology      Agusto Dowell MD  5/23/2025  14:14 EDT

## 2025-05-23 NOTE — PLAN OF CARE
Goal Outcome Evaluation:   Patient A&Ox4.  Left port accessed - flushes well with good blood return.  Worked with PT & OT.  Tolerated carbo/etoposide well.  Oncology social worker consulted.  VSS.

## 2025-05-23 NOTE — PLAN OF CARE
Goal Outcome Evaluation:  Plan of Care Reviewed With: patient        Progress: no change  Outcome Evaluation: Pt transferred to oncology floor, POD 1 port placement, plans for chemotherapy per oncology notes for lung CA. Pt does agree to get out of bed today. Pt did have RN present who provided significant encouragement to get up in the chair and dc use of purewick today and start mobilizing to BSC. Pt poorly tolerates mobility and OOB activity. Max encouragement to sit up in the chair but pt is agreeable.    Anticipated Discharge Disposition (OT): skilled nursing facility

## 2025-05-23 NOTE — PROGRESS NOTES
[facility]     Mar/chart reviewed  Follow up SCLC, COPD  No increased cough or shortness of breath    Vital Sign Min/Max for last 24 hours  Temp  Min: 97.3 °F (36.3 °C)  Max: 97.6 °F (36.4 °C)   BP  Min: 109/68  Max: 137/83   Pulse  Min: 86  Max: 107   Resp  Min: 16  Max: 20   SpO2  Min: 91 %  Max: 99 %   Flow (L/min) (Oxygen Therapy)  Min: 2  Max: 2   Weight  Min: 60.1 kg (132 lb 7.9 oz)  Max: 60.1 kg (132 lb 7.9 oz)     Appears ill axox3,   perrl, eomi, no icterus,  mmm, no jvd, trachea midline, neck supple,  chest decreased ae bilaterally, no crackles, no wheezes,   rrr,   soft, nt, nd +bs,  no c/c/e  Skin warm, dry no rashes    Labs: 5/23: reviewed:  Glucose 103  Bun 13  Cr 0.89  Bicarb 22  Wbc 4.7  Hgb 12.4  Plts 154    A/P:  1. AHRF - oxygen as needed  2. Right pleural effusino -- s/p thora c/w exudate ,but cytology neg, suspect still malingant, note yield on malignant effusion on single thoracentesis tends to be low.  3. Lung mass with hilar adenopathy -- SCLC -- awaiting chemo per oncology  4. Possible pna --s/p antibiotics    Pulm stable. Will need to eval need for 02 prior to d/c.  Would recommend outpatient pulm follow up

## 2025-05-23 NOTE — THERAPY TREATMENT NOTE
Patient Name: Penelope Onofre  : 1955    MRN: [id number]                              Today's Date: 2025       Admit Date: 2025    Visit Dx:     ICD-10-CM ICD-9-CM   1. Generalized weakness  R53.1 780.79   2. Pneumonia of right lower lobe due to infectious organism  J18.9 486   3. Small cell carcinoma of middle lobe of right lung  C34.2 162.4     Patient Active Problem List   Diagnosis    Arm pain, left    Generalized weakness    Dizziness    Pneumonia    Pleural effusion    At high risk for pressure injury of skin    Decubitus ulcer of sacral region, stage 1    Acute kidney failure    Dehydration    Elevated troponin    Elevated brain natriuretic peptide (BNP) level    Hypokalemia    Hyperglycemia    Small cell carcinoma of middle lobe of right lung    Severe protein-calorie malnutrition     Past Medical History:   Diagnosis Date    Chronic back pain     Claudication     GERD (gastroesophageal reflux disease)      Past Surgical History:   Procedure Laterality Date    BREAST CYST EXCISION Right     TONSILLECTOMY      US GUIDED LYMPH NODE BIOPSY  5/15/2025    VENOUS ACCESS DEVICE (PORT) INSERTION N/A 2025    Procedure: INSERTION VENOUS ACCESS DEVICE;  Surgeon: Bria Starr MD;  Location: [facility];  Service: General;  Laterality: N/A;      General Information       Row Name 25 1445          Physical Therapy Time and Intention    Document Type therapy note (daily note)  -DJ     Mode of Treatment individual therapy;physical therapy  -DJ       Row Name 25 1445          General Information    Patient Profile Reviewed yes  -DJ     Existing Precautions/Restrictions fall;oxygen therapy device and L/min  -DJ       Row Name 25 1445          Cognition    Orientation Status (Cognition) oriented x 3  -DJ       Row Name 25 1445          Safety Issues/Impairments Affecting Functional Mobility    Comment, Safety Issues/Impairments (Mobility) gt belt, nosnkid socks  -DJ                User Key  (r) = Recorded By, (t) = Taken By, (c) = Cosigned By      Initials Name Provider Type    Renetta Ceron, FARZANEH Physical Therapist                   Mobility       Row Name 05/23/25 1446          Bed Mobility    Bed Mobility supine-sit;sit-supine  -DJ     Supine-Sit McLean (Bed Mobility) contact guard;verbal cues  -DJ     Sit-Supine McLean (Bed Mobility) contact guard;verbal cues  -DJ     Assistive Device (Bed Mobility) bed rails;head of bed elevated  -DJ     Comment, (Bed Mobility) vc for sequencing, increased time  -DJ       Row Name 05/23/25 1446          Transfers    Comment, (Transfers) sit/stand from EOB  -DJ       Row Name 05/23/25 1446          Bed-Chair Transfer    Bed-Chair McLean (Transfers) not tested  -DJ       Row Name 05/23/25 1446          Sit-Stand Transfer    Sit-Stand McLean (Transfers) minimum assist (75% patient effort);1 person assist;2 person assist  -DJ     Assistive Device (Sit-Stand Transfers) walker, front-wheeled  -DJ       Row Name 05/23/25 1446          Gait/Stairs (Locomotion)    McLean Level (Gait) minimum assist (75% patient effort);2 person assist;verbal cues;1 person to manage equipment  -DJ     Assistive Device (Gait) walker, front-wheeled  -DJ     Deviations/Abnormal Patterns (Gait) festinating/shuffling;stride length decreased;gait speed decreased  -DJ     Bilateral Gait Deviations forward flexed posture  -DJ     McLean Level (Stairs) not tested  -DJ     Comment, (Gait/Stairs) Pt amb 10' with r wx and min A of 2 - unsteady balance donnie with turning, flexed posture, poor endurance.  -DJ               User Key  (r) = Recorded By, (t) = Taken By, (c) = Cosigned By      Initials Name Provider Type    Renetta Ceron PT Physical Therapist                   Obj/Interventions       Row Name 05/23/25 1449          Motor Skills    Motor Skills functional endurance  -DJ     Functional Endurance poor  -DJ       Row Name 05/23/25 1447           Balance    Balance Assessment standing static balance;standing dynamic balance  -DJ     Static Standing Balance contact guard  -DJ     Dynamic Standing Balance contact guard;minimal assist;2-person assist;verbal cues  -DJ     Position/Device Used, Standing Balance walker, front-wheeled;supported  -DJ     Balance Interventions sitting;standing;sit to stand;supported;weight shifting activity  -DJ     Comment, Balance unsteady donnie with turning  -DJ               User Key  (r) = Recorded By, (t) = Taken By, (c) = Cosigned By      Initials Name Provider Type    Renetta Ceron, PT Physical Therapist                   Goals/Plan    No documentation.                  Clinical Impression       Row Name 05/23/25 1451          Pain    Pretreatment Pain Rating 0/10 - no pain  -DJ       Row Name 05/23/25 1451          Plan of Care Review    Plan of Care Reviewed With patient  -DVAIE     Progress no change  -DJ     Outcome Evaluation Pt declined Pt services this morning as she had just finished working with Ot and was fatigued. This pm, pt found resting in bed in NAD, anxious about receiving chemo treatment and about being able to breathe. She was very reluctant to participate in therapy but with encouragement she agreed. She req CGA and vc to sit EOB. She impulsively stood from EOB with min A of 1-2 using r wx. Pt amb 10' with r wx and min A of 2 - unsteady balance donnie with turning, flexed posture, poor endurance. She disregarded lines when turning and was in a rush to return to bed. She returned supine in bed with CGA and she was able to reposition herself in bed.  arrived as pt returning to bed, therefore, no ther ex performed today. Pt progressing slowly with mobility and is limited by poor activity tolerance as well as anxiety. Cont PT to address functional deficits and prepare for d/c as appropriate.  -DJ       Row Name 05/23/25 1451          Therapy Assessment/Plan (PT)    Criteria for Skilled Interventions Met (PT)  skilled treatment is necessary  -DJ       Row Name 05/23/25 1451          Vital Signs    O2 Delivery Pre Treatment nasal cannula  -DJ     O2 Delivery Intra Treatment nasal cannula  -DJ     O2 Delivery Post Treatment nasal cannula  -DJ     Pre Patient Position Supine  -DJ     Intra Patient Position Standing  -DJ     Post Patient Position Supine  -DJ       Row Name 05/23/25 1451          Positioning and Restraints    Pre-Treatment Position in bed  -DJ     Post Treatment Position bed  -DJ     In Bed notified nsg;supine;call light within reach;encouraged to call for assist;exit alarm on;with other staff   preesent for visit  -DJ               User Key  (r) = Recorded By, (t) = Taken By, (c) = Cosigned By      Initials Name Provider Type    Renetta Ceron, PT Physical Therapist                   Outcome Measures       Row Name 05/23/25 1459 05/23/25 0949       How much help from another person do you currently need...    Turning from your back to your side while in flat bed without using bedrails? 3  -DJ 3  -JW    Moving from lying on back to sitting on the side of a flat bed without bedrails? 3  -DJ 3  -JW    Moving to and from a bed to a chair (including a wheelchair)? 2  -DJ 3  -JW    Standing up from a chair using your arms (e.g., wheelchair, bedside chair)? 3  -DJ 3  -JW    Climbing 3-5 steps with a railing? 2  -DJ 3  -JW    To walk in hospital room? 2  -DJ 3  -JW    AM-PAC 6 Clicks Score (PT) 15  -DJ 18  -JW    Highest Level of Mobility Goal Move to Chair/Commode-4  -DJ Walk 10 Steps or More-6  -JW      Row Name 05/23/25 1459 05/23/25 1239       Functional Assessment    Outcome Measure Options AM-PAC 6 Clicks Basic Mobility (PT)  -DJ AM-PAC 6 Clicks Daily Activity (OT)  -              User Key  (r) = Recorded By, (t) = Taken By, (c) = Cosigned By      Initials Name Provider Type    Renetta Ceron, PT Physical Therapist    Nataliia Lerma, OTR/L, CSRS Occupational Therapist    Eduarda Don,  RN Registered Nurse                                 Physical Therapy Education       Title: PT OT SLP Therapies (In Progress)       Topic: Physical Therapy (In Progress)       Point: Mobility training (In Progress)       Learning Progress Summary            Patient Acceptance, E, NR by DJ at 5/23/2025 1500    Acceptance, E, NR by AR at 5/19/2025 1418    Acceptance, E, VU by MW at 5/18/2025 0437    Acceptance, E, NR by AR at 5/16/2025 1542    Acceptance, E, NR by DJ at 5/14/2025 1400                      Point: Home exercise program (In Progress)       Learning Progress Summary            Patient Acceptance, E, NR by DJ at 5/23/2025 1500    Acceptance, E, NR by AR at 5/19/2025 1418    Acceptance, E, VU by MW at 5/18/2025 0437    Acceptance, E, NR by AR at 5/16/2025 1542                      Point: Body mechanics (In Progress)       Learning Progress Summary            Patient Acceptance, E, NR by DJ at 5/23/2025 1500    Acceptance, E, NR by AR at 5/19/2025 1418    Acceptance, E, NR by AR at 5/16/2025 1542    Acceptance, E, NR by DJ at 5/14/2025 1400                      Point: Precautions (In Progress)       Learning Progress Summary            Patient Acceptance, E, NR by DJ at 5/23/2025 1500    Acceptance, E, NR by AR at 5/19/2025 1418    Acceptance, E, NR by AR at 5/16/2025 1542    Acceptance, E, NR by DJ at 5/14/2025 1400                                      User Key       Initials Effective Dates Name Provider Type Discipline    AR 06/16/21 -  Eli Henry, PT Physical Therapist PT     10/25/19 -  Renetta Ely PT Physical Therapist PT     02/18/25 -  Sabina Chappell, TRISTAN Registered Nurse Nurse                  PT Recommendation and Plan  Planned Therapy Interventions (PT): balance training, bed mobility training, gait training, home exercise program, strengthening, postural re-education, patient/family education, transfer training  Progress: no change  Outcome Evaluation: Pt declined Pt services this  morning as she had just finished working with Ot and was fatigued. This pm, pt found resting in bed in NAD, anxious about receiving chemo treatment and about being able to breathe. She was very reluctant to participate in therapy but with encouragement she agreed. She req CGA and vc to sit EOB. She impulsively stood from EOB with min A of 1-2 using r wx. Pt amb 10' with r wx and min A of 2 - unsteady balance donnie with turning, flexed posture, poor endurance. She disregarded lines when turning and was in a rush to return to bed. She returned supine in bed with CGA and she was able to reposition herself in bed.  arrived as pt returning to bed, therefore, no ther ex performed today. Pt progressing slowly with mobility and is limited by poor activity tolerance as well as anxiety. Cont PT to address functional deficits and prepare for d/c as appropriate.     Time Calculation:   PT Evaluation Complexity  History, PT Evaluation Complexity: 3 or more personal factors and/or comorbidities  Examination of Body Systems (PT Eval Complexity): total of 4 or more elements  Clinical Presentation (PT Evaluation Complexity): evolving  Clinical Decision Making (PT Evaluation Complexity): moderate complexity  Overall Complexity (PT Evaluation Complexity): moderate complexity     PT Charges       Row Name 05/23/25 1501             Time Calculation    Start Time 1401  -DJ      Stop Time 1424  -DJ      Time Calculation (min) 23 min  -DJ      PT Non-Billable Time (min) 10 min  -DJ      PT Received On 05/23/25  -DJ      PT - Next Appointment 05/27/25  -DJ                User Key  (r) = Recorded By, (t) = Taken By, (c) = Cosigned By      Initials Name Provider Type    Renetta Ceron PT Physical Therapist                  Therapy Charges for Today       Code Description Service Date Service Provider Modifiers Qty    28853076965 HC PT THERAPEUTIC ACT EA 15 MIN 5/23/2025 Renetta Ely PT GP 2    97532088460 HC PT THER SUPP EA 15 MIN  5/23/2025 Renetta Ely, PT GP 2            PT G-Codes  Outcome Measure Options: AM-PAC 6 Clicks Basic Mobility (PT)  AM-PAC 6 Clicks Score (PT): 15  AM-PAC 6 Clicks Score (OT): 16  PT Discharge Summary  Anticipated Discharge Disposition (PT): skilled nursing facility    Renetta Ely, PT  5/23/2025

## 2025-05-23 NOTE — PLAN OF CARE
Goal Outcome Evaluation:  Plan of Care Reviewed With: patient        Progress: no change  Outcome Evaluation: Pt declined Pt services this morning as she had just finished working with Ot and was fatigued. This pm, pt found resting in bed in NAD, anxious about receiving chemo treatment and about being able to breathe. She was very reluctant to participate in therapy but with encouragement she agreed. She req CGA and vc to sit EOB. She impulsively stood from EOB with min A of 1-2 using r wx. Pt amb 10' with r wx and min A of 2 - unsteady balance donnie with turning, flexed posture, poor endurance. She disregarded lines when turning and was in a rush to return to bed. She returned supine in bed with CGA and she was able to reposition herself in bed.  arrived as pt returning to bed, therefore, no ther ex performed today. Pt progressing slowly with mobility and is limited by poor activity tolerance as well as anxiety. Cont PT to address functional deficits and prepare for d/c as appropriate.    Anticipated Discharge Disposition (PT): skilled nursing facility

## 2025-05-23 NOTE — NURSING NOTE
Chemotherapy Teaching Visit - Carboplatin & Etoposide  Penelope Onofre is a 70 y.o. female admitted with generalized weakness and pneumonia.  She has been diagnosed with small cell lung cancer.  Patient to start chemotherapy with carboplatin/etoposide for small cell lung cancer with plan to give cycle 1 while inpatient and then add Atezolizumab with her second cycle outpatient. Met with patient alone in room to discuss new diagnosis, treatment with chemotherapy/immunotherapy, schedule and expected effects of treatment.    Reviewed with patient common and clinically significant effects including neutropenia, thrombocytopenia, fatigue, nausea/vomiting, taste changes, loss of appetite, hair loss, neuropathy, nephropathy, mucositis, and electrolyte changes. Infection prevention precautions were reviewed including hand washing, food safety and the avoidance of crowds/use of mask. Bleeding precautions including the use of soft bristle toothbrush, electric razor and precautions against falls were discussed. Importance of appropriate hygiene and self-care for nausea, anorexia, and mucositis reviewed; services regarding hair loss including [facility] and the  [facility] were offered.    Patient was counseled on when to notify a provider including in the event of the following:   - Signs and symptoms of infection, including temperature >100.4   - Signs and symptoms of serious bleeding including blood in the urine or stool   - Changes in urinary output   - Frequent nausea/vomitting or diarrhea or severe abdominal pain   - Development of mouth sores or ulcerations   - Numbness or tingling in the fingers or toes    Provided patient with handout regarding medications, and reviewed general plan for chemotherapy administration (D1-3 Q21 days). Patient engaged in counseling throughout and asked appropriate questions as needed to confirm understanding. Patient without any further questions at this time; verbalized  understanding.  Consent form signed and placed in EMR.    Patient identified concerns related to transportation and support during treatment.  She has a nephew in [place], [place] however he may or may not be willing to provide assistance.  Her car is not running currently.  She also was interested in available housecleaning services as she describes her living space as needing some major upkeep.  Referral placed to oncology social work.

## 2025-05-23 NOTE — THERAPY TREATMENT NOTE
Patient Name: Penelope Onofre  : 1955    MRN: [id number]                              Today's Date: 2025       Admit Date: 2025    Visit Dx:     ICD-10-CM ICD-9-CM   1. Generalized weakness  R53.1 780.79   2. Pneumonia of right lower lobe due to infectious organism  J18.9 486   3. Small cell carcinoma of middle lobe of right lung  C34.2 162.4     Patient Active Problem List   Diagnosis    Arm pain, left    Generalized weakness    Dizziness    Pneumonia    Pleural effusion    At high risk for pressure injury of skin    Decubitus ulcer of sacral region, stage 1    Acute kidney failure    Dehydration    Elevated troponin    Elevated brain natriuretic peptide (BNP) level    Hypokalemia    Hyperglycemia    Small cell carcinoma of middle lobe of right lung    Severe protein-calorie malnutrition     Past Medical History:   Diagnosis Date    Chronic back pain     Claudication     GERD (gastroesophageal reflux disease)      Past Surgical History:   Procedure Laterality Date    BREAST CYST EXCISION Right     TONSILLECTOMY      US GUIDED LYMPH NODE BIOPSY  5/15/2025    VENOUS ACCESS DEVICE (PORT) INSERTION N/A 2025    Procedure: INSERTION VENOUS ACCESS DEVICE;  Surgeon: Bria Starr MD;  Location: [facility];  Service: General;  Laterality: N/A;      General Information       Row Name 25 1232          OT Time and Intention    Document Type therapy note (daily note)  -SM     Mode of Treatment occupational therapy;individual therapy  -SM     Patient Effort fair  -SM       Row Name 25 1232          General Information    Patient Profile Reviewed yes  -SM     Existing Precautions/Restrictions fall;oxygen therapy device and L/min  2L  -SM       Row Name 25 1232          Cognition    Orientation Status (Cognition) oriented x 3  -SM       Row Name 25 1232          Safety Issues/Impairments Affecting Functional Mobility    Safety Issues Affecting Function (Mobility) awareness of  need for assistance;insight into deficits/self-awareness  -     Impairments Affecting Function (Mobility) balance;endurance/activity tolerance;strength;shortness of breath  -               User Key  (r) = Recorded By, (t) = Taken By, (c) = Cosigned By      Initials Name Provider Type     Nataliia Mckeon OTR/L, CSRS Occupational Therapist                     Mobility/ADL's       Row Name 05/23/25 1233          Bed Mobility    Supine-Sit [place] (Bed Mobility) standby assist  -       Row Name 05/23/25 [facility]          Transfers    Transfers toilet transfer  -       Row Name 05/23/25 [facility]          Sit-Stand Transfer    Sit-Stand [place] (Transfers) minimum assist (75% patient effort)  -     Assistive Device (Sit-Stand Transfers) walker, front-wheeled  -       Row Name 05/23/25 [facility]          Toilet Transfer    [place] Level (Toilet Transfer) minimum assist (75% patient effort);1 person assist  -     Assistive Device (Toilet Transfer) commode, bedside without drop arms;walker, front-wheeled  -       Row Name 05/23/25 1233          Functional Mobility    Functional Mobility- Ind. Level minimum assist (75% patient effort)  -     Functional Mobility- Device walker, front-wheeled  -     Functional Mobility-Distance (Feet) --  3+3  -       Row Name 05/23/25 [facility]          Lower Body Dressing Assessment/Training    [place] Level (Lower Body Dressing) don;socks;maximum assist (25% patient effort)  -     Position (Lower Body Dressing) long sitting  -       Row Name 05/23/25 1233          Toileting Assessment/Training    [place] Level (Toileting) maximum assist (25% patient effort)  -               User Key  (r) = Recorded By, (t) = Taken By, (c) = Cosigned By      Initials Name Provider Type     Nataliia Mckeon, OTR/L, CSRS Occupational Therapist                   Obj/Interventions       Row Name 05/23/25 1234          Motor Skills    Functional Endurance poor  -        Row Name 05/23/25 1234          Balance    Static Sitting Balance standby assist  -     Position, Sitting Balance sitting edge of bed  -     Static Standing Balance contact guard  -SM     Dynamic Standing Balance minimal assist  -     Position/Device Used, Standing Balance supported;walker, rolling  -               User Key  (r) = Recorded By, (t) = Taken By, (c) = Cosigned By      Initials Name Provider Type     Nataliia Mckeon, OTR/L, CSRS Occupational Therapist                   Goals/Plan    No documentation.                  Clinical Impression       Row Name 05/23/25 1236          Pain Assessment    Pretreatment Pain Rating 0/10 - no pain  -     Posttreatment Pain Rating 0/10 - no pain  -[facility] Name 05/23/25 1236          Plan of Care Review    Plan of Care Reviewed With patient  -     Progress no change  -     Outcome Evaluation Pt transferred to oncology floor, POD 1 port placement, plans for chemotherapy per oncology notes for lung CA. Pt does agree to get out of bed today. Pt did have RN present who provided significant encouragement to get up in the chair and dc use of purewick today and start mobilizing to BSC. Pt poorly tolerates mobility and OOB activity. Max encouragement to sit up in the chair but pt is agreeable.  -       Row Name 05/23/25 1236          Therapy Plan Review/Discharge Plan (OT)    Anticipated Discharge Disposition (OT) skilled nursing facility  -       Row Name 05/23/25 1236          Vital Signs    Pre SpO2 (%) --  notified RN unable to get SPO2 reading with trial of multiple sensors, portable pulse ox. Tele monitor not reading.  -       Row Name 05/23/25 1236          Positioning and Restraints    Pre-Treatment Position in bed  -     Post Treatment Position chair  -SM     In Chair reclined;call light within reach;encouraged to call for assist;exit alarm on;notified nsg  -               User Key  (r) = Recorded By, (t) = Taken By, (c) = Cosigned By       Initials Name Provider Type    Nataliia Lerma OTR/L, WILLY Occupational Therapist                   Outcome Measures       Row Name 05/23/25 1239          How much help from another is currently needed...    Putting on and taking off regular lower body clothing? 2  -SM     Bathing (including washing, rinsing, and drying) 2  -SM     Toileting (which includes using toilet bed pan or urinal) 2  -SM     Putting on and taking off regular upper body clothing 3  -SM     Taking care of personal grooming (such as brushing teeth) 3  -SM     Eating meals 4  -SM     AM-PAC 6 Clicks Score (OT) 16  -SM       Row Name 05/23/25 0949          How much help from another person do you currently need...    Turning from your back to your side while in flat bed without using bedrails? 3  -JW     Moving from lying on back to sitting on the side of a flat bed without bedrails? 3  -JW     Moving to and from a bed to a chair (including a wheelchair)? 3  -JW     Standing up from a chair using your arms (e.g., wheelchair, bedside chair)? 3  -JW     Climbing 3-5 steps with a railing? 3  -JW     To walk in hospital room? 3  -JW     AM-PAC 6 Clicks Score (PT) 18  -JW     Highest Level of Mobility Goal Walk 10 Steps or More-6  -JW       Row Name 05/23/25 1239          Functional Assessment    Outcome Measure Options AM-PAC 6 Clicks Daily Activity (OT)  -               User Key  (r) = Recorded By, (t) = Taken By, (c) = Cosigned By      Initials Name Provider Type    Nataliia Lerma OTR/L, WILLY Occupational Therapist    Eduarda Don RN Registered Nurse                      OT Recommendation and Plan  Therapy Frequency (OT): 5 times/wk  Plan of Care Review  Plan of Care Reviewed With: patient  Progress: no change  Outcome Evaluation: Pt transferred to oncology floor, POD 1 port placement, plans for chemotherapy per oncology notes for lung CA. Pt does agree to get out of bed today. Pt did have RN present who provided  significant encouragement to get up in the chair and dc use of purewick today and start mobilizing to BSC. Pt poorly tolerates mobility and OOB activity. Max encouragement to sit up in the chair but pt is agreeable.     Time Calculation:   Evaluation Complexity (OT)  Review Occupational Profile/Medical/Therapy History Complexity: expanded/moderate complexity  Assessment, Occupational Performance/Identification of Deficit Complexity: 3-5 performance deficits  Clinical Decision Making Complexity (OT): detailed assessment/moderate complexity  Overall Complexity of Evaluation (OT): moderate complexity     Time Calculation- OT       Row Name 05/23/25 1239             Time Calculation- OT    OT Start Time 0843  -SM      OT Stop Time 0907  -SM      OT Time Calculation (min) 24 min  -SM      OT Received On 05/23/25  -      OT - Next Appointment 05/27/25  -SM         Timed Charges    46532 - OT Self Care/Mgmt Minutes 24  -SM         Total Minutes    Timed Charges Total Minutes 24  -SM       Total Minutes 24  -SM                User Key  (r) = Recorded By, (t) = Taken By, (c) = Cosigned By      Initials Name Provider Type    Nataliia Lerma OTR/L, CSRS Occupational Therapist                  Therapy Charges for Today       Code Description Service Date Service Provider Modifiers Qty    83259011874 HC OT SELF CARE/MGMT/TRAIN EA 15 MIN 5/23/2025 Nataliia Mckeon OTR/L, CSRS GO 2                 MITALI Russell/L, CSRS  5/23/2025

## 2025-05-24 LAB
ANION GAP SERPL CALCULATED.3IONS-SCNC: 10 MMOL/L (ref 5–15)
BASOPHILS # BLD AUTO: 0 10*3/MM3 (ref 0–0.2)
BASOPHILS NFR BLD AUTO: 0 % (ref 0–1.5)
BUN SERPL-MCNC: 12 MG/DL (ref 8–23)
BUN/CREAT SERPL: 12 (ref 7–25)
CALCIUM SPEC-SCNC: 8.4 MG/DL (ref 8.6–10.5)
CHLORIDE SERPL-SCNC: 106 MMOL/L (ref 98–107)
CO2 SERPL-SCNC: 21 MMOL/L (ref 22–29)
CREAT SERPL-MCNC: 1 MG/DL (ref 0.57–1)
DEPRECATED RDW RBC AUTO: 49.3 FL (ref 37–54)
EGFRCR SERPLBLD CKD-EPI 2021: 60.4 ML/MIN/1.73
EOSINOPHIL # BLD AUTO: 0 10*3/MM3 (ref 0–0.4)
EOSINOPHIL NFR BLD AUTO: 0 % (ref 0.3–6.2)
ERYTHROCYTE [DISTWIDTH] IN BLOOD BY AUTOMATED COUNT: 13 % (ref 12.3–15.4)
GLUCOSE SERPL-MCNC: 136 MG/DL (ref 65–99)
HCT VFR BLD AUTO: 36.8 % (ref 34–46.6)
HGB BLD-MCNC: 12.1 G/DL (ref 12–15.9)
IMM GRANULOCYTES # BLD AUTO: 0.01 10*3/MM3 (ref 0–0.05)
IMM GRANULOCYTES NFR BLD AUTO: 0.3 % (ref 0–0.5)
LYMPHOCYTES # BLD AUTO: 0.37 10*3/MM3 (ref 0.7–3.1)
LYMPHOCYTES NFR BLD AUTO: 11.8 % (ref 19.6–45.3)
MAGNESIUM SERPL-MCNC: 1.7 MG/DL (ref 1.6–2.4)
MCH RBC QN AUTO: 33.8 PG (ref 26.6–33)
MCHC RBC AUTO-ENTMCNC: 32.9 G/DL (ref 31.5–35.7)
MCV RBC AUTO: 102.8 FL (ref 79–97)
MONOCYTES # BLD AUTO: 0.14 10*3/MM3 (ref 0.1–0.9)
MONOCYTES NFR BLD AUTO: 4.5 % (ref 5–12)
NEUTROPHILS NFR BLD AUTO: 2.61 10*3/MM3 (ref 1.7–7)
NEUTROPHILS NFR BLD AUTO: 83.4 % (ref 42.7–76)
NRBC BLD AUTO-RTO: 0 /100 WBC (ref 0–0.2)
PLATELET # BLD AUTO: 147 10*3/MM3 (ref 140–450)
PMV BLD AUTO: 10.4 FL (ref 6–12)
POTASSIUM SERPL-SCNC: 4.3 MMOL/L (ref 3.5–5.2)
RBC # BLD AUTO: 3.58 10*6/MM3 (ref 3.77–5.28)
SODIUM SERPL-SCNC: 137 MMOL/L (ref 136–145)
URATE SERPL-MCNC: 4.1 MG/DL (ref 2.4–5.7)
WBC NRBC COR # BLD AUTO: 3.13 10*3/MM3 (ref 3.4–10.8)

## 2025-05-24 PROCEDURE — 25810000003 SODIUM CHLORIDE 0.9 % SOLUTION 500 ML FLEX CONT: Performed by: INTERNAL MEDICINE

## 2025-05-24 PROCEDURE — 25010000002 ENOXAPARIN PER 10 MG: Performed by: STUDENT IN AN ORGANIZED HEALTH CARE EDUCATION/TRAINING PROGRAM

## 2025-05-24 PROCEDURE — 94761 N-INVAS EAR/PLS OXIMETRY MLT: CPT

## 2025-05-24 PROCEDURE — 94799 UNLISTED PULMONARY SVC/PX: CPT

## 2025-05-24 PROCEDURE — 83735 ASSAY OF MAGNESIUM: CPT | Performed by: INTERNAL MEDICINE

## 2025-05-24 PROCEDURE — 85025 COMPLETE CBC W/AUTO DIFF WBC: CPT | Performed by: STUDENT IN AN ORGANIZED HEALTH CARE EDUCATION/TRAINING PROGRAM

## 2025-05-24 PROCEDURE — 94664 DEMO&/EVAL PT USE INHALER: CPT

## 2025-05-24 PROCEDURE — 25010000002 DIPHENHYDRAMINE PER 50 MG: Performed by: INTERNAL MEDICINE

## 2025-05-24 PROCEDURE — 25810000003 SODIUM CHLORIDE 0.9 % SOLUTION: Performed by: INTERNAL MEDICINE

## 2025-05-24 PROCEDURE — 25010000002 ETOPOSIDE 500 MG/25ML SOLUTION 25 ML VIAL: Performed by: INTERNAL MEDICINE

## 2025-05-24 PROCEDURE — 25010000002 DEXAMETHASONE SODIUM PHOSPHATE 100 MG/10ML SOLUTION 10 ML VIAL: Performed by: INTERNAL MEDICINE

## 2025-05-24 PROCEDURE — 99233 SBSQ HOSP IP/OBS HIGH 50: CPT | Performed by: INTERNAL MEDICINE

## 2025-05-24 PROCEDURE — 80048 BASIC METABOLIC PNL TOTAL CA: CPT | Performed by: STUDENT IN AN ORGANIZED HEALTH CARE EDUCATION/TRAINING PROGRAM

## 2025-05-24 PROCEDURE — 84550 ASSAY OF BLOOD/URIC ACID: CPT | Performed by: INTERNAL MEDICINE

## 2025-05-24 RX ORDER — SODIUM CHLORIDE 9 MG/ML
20 INJECTION, SOLUTION INTRAVENOUS ONCE
Status: COMPLETED | OUTPATIENT
Start: 2025-05-24 | End: 2025-05-24

## 2025-05-24 RX ORDER — OXYMETAZOLINE HYDROCHLORIDE 0.05 G/100ML
2 SPRAY NASAL 2 TIMES DAILY PRN
Status: DISPENSED | OUTPATIENT
Start: 2025-05-24 | End: 2025-05-27

## 2025-05-24 RX ORDER — FLUTICASONE PROPIONATE 50 MCG
2 SPRAY, SUSPENSION (ML) NASAL DAILY
Status: DISCONTINUED | OUTPATIENT
Start: 2025-05-24 | End: 2025-05-30 | Stop reason: HOSPADM

## 2025-05-24 RX ADMIN — ALLOPURINOL 300 MG: 300 TABLET ORAL at 08:12

## 2025-05-24 RX ADMIN — Medication 10 ML: at 08:21

## 2025-05-24 RX ADMIN — PANTOPRAZOLE SODIUM 40 MG: 40 TABLET, DELAYED RELEASE ORAL at 16:39

## 2025-05-24 RX ADMIN — PANTOPRAZOLE SODIUM 40 MG: 40 TABLET, DELAYED RELEASE ORAL at 08:12

## 2025-05-24 RX ADMIN — GUAIFENESIN 1200 MG: 600 TABLET, EXTENDED RELEASE ORAL at 08:12

## 2025-05-24 RX ADMIN — IPRATROPIUM BROMIDE AND ALBUTEROL SULFATE 3 ML: .5; 3 SOLUTION RESPIRATORY (INHALATION) at 15:10

## 2025-05-24 RX ADMIN — IPRATROPIUM BROMIDE AND ALBUTEROL SULFATE 3 ML: .5; 3 SOLUTION RESPIRATORY (INHALATION) at 11:37

## 2025-05-24 RX ADMIN — DEXAMETHASONE SODIUM PHOSPHATE 12 MG: 10 INJECTION, SOLUTION INTRAMUSCULAR; INTRAVENOUS at 15:41

## 2025-05-24 RX ADMIN — OLANZAPINE 5 MG: 5 TABLET, FILM COATED ORAL at 21:43

## 2025-05-24 RX ADMIN — SODIUM CHLORIDE 20 ML/HR: 9 INJECTION, SOLUTION INTRAVENOUS at 15:26

## 2025-05-24 RX ADMIN — DIPHENHYDRAMINE HYDROCHLORIDE 50 MG: 50 INJECTION, SOLUTION INTRAMUSCULAR; INTRAVENOUS at 05:07

## 2025-05-24 RX ADMIN — IPRATROPIUM BROMIDE AND ALBUTEROL SULFATE 3 ML: .5; 3 SOLUTION RESPIRATORY (INHALATION) at 20:46

## 2025-05-24 RX ADMIN — MENTHOL, ZINC OXIDE 1 APPLICATION: .44; 20.6 OINTMENT TOPICAL at 21:43

## 2025-05-24 RX ADMIN — Medication 10 ML: at 21:44

## 2025-05-24 RX ADMIN — GUAIFENESIN 1200 MG: 600 TABLET, EXTENDED RELEASE ORAL at 21:43

## 2025-05-24 RX ADMIN — ATORVASTATIN CALCIUM 20 MG: 20 TABLET, FILM COATED ORAL at 21:43

## 2025-05-24 RX ADMIN — ENOXAPARIN SODIUM 40 MG: 100 INJECTION SUBCUTANEOUS at 15:26

## 2025-05-24 RX ADMIN — AMLODIPINE BESYLATE 5 MG: 5 TABLET ORAL at 08:12

## 2025-05-24 RX ADMIN — SODIUM CHLORIDE 180 MG: 9 INJECTION, SOLUTION INTRAVENOUS at 16:15

## 2025-05-24 RX ADMIN — MENTHOL, ZINC OXIDE 1 APPLICATION: .44; 20.6 OINTMENT TOPICAL at 08:21

## 2025-05-24 NOTE — PROGRESS NOTES
DAILY PROGRESS NOTE  [facility]    Patient Identification:  Name: Penelope Onofre  Age: 70 y.o.  Sex: female  :  1955  MRN: [id number]         Primary Care Physician: Bonny Judge MD    Subjective:  Interval History: Tolerating all.  Feels better after talking with oncologist regarding prognosis.  Admits to some clear rhinorrhea this morning but states it stopped.  We discussed intranasal therapy and initially she resisted but I counseled her the technique on proper administration and she can utilize Afrin as needed.  Denies any new confusion fever chest pain or troubles breathing    Objective: Elderly and very frail but very pleasant conversational and appreciative.  We actually talked more about [facility] basketball and sports     Scheduled Meds:allopurinol, 300 mg, Oral, Daily  amLODIPine, 5 mg, Oral, Q24H  atorvastatin, 20 mg, Oral, Nightly  dexAMETHasone, 12 mg, Intravenous, Once  enoxaparin sodium, 40 mg, Subcutaneous, Q24H  etoposide (TOPOSAR) 180 mg in sodium chloride 0.9 % 509 mL chemo IVPB, 100 mg/m2 (Treatment Plan Recorded), Intravenous, Once  fluticasone, 2 spray, Each Nare, Daily  guaiFENesin, 1,200 mg, Oral, Q12H  ipratropium-albuterol, 3 mL, Nebulization, 4x Daily - RT  Lidocaine, 1 patch, Transdermal, Q24H  Menthol-Zinc Oxide, 1 Application, Topical, BID  OLANZapine, 5 mg, Oral, Nightly  pantoprazole, 40 mg, Oral, BID AC  sodium chloride, 10 mL, Intravenous, Q12H  sodium chloride, 10 mL, Intravenous, Q12H  sodium chloride, 20 mL/hr, Intravenous, Once      Continuous Infusions:     Vital signs in last 24 hours:  Temp:  [97.3 °F (36.3 °C)-97.9 °F (36.6 °C)] 97.9 °F (36.6 °C)  Heart Rate:  [] 101  Resp:  [17-18] 18  BP: (102-132)/(56-76) 105/60    Intake/Output:    Intake/Output Summary (Last 24 hours) at 2025 1311  Last data filed at 2025 1406  Gross per 24 hour   Intake 100 ml   Output --   Net 100 ml       Exam:  /60 (BP Location:  "Right arm, Patient Position: Sitting)   Pulse 101   Temp 97.9 °F (36.6 °C) (Oral)   Resp 18   Ht 182.9 cm (72\")   Wt 60.1 kg (132 lb 7.9 oz)   SpO2 90%   BMI 17.97 kg/m²     General Appearance:    Alert, cooperative, nontoxic, AAOx3                         Throat: No current nasal drainage noted; oral mucosa pink and moist                           Neck:   Supple, no JVD                         Lungs:    Clear to auscultation bilaterally, respirations unlabored                          Heart:    Regular rate and rhythm, S1 and S2 normal                  Abdomen:     Soft, nontender, bowel sounds active                 Extremities: Moving all, no volume overload/pitting edema                        pulses:   Pulses palpable in all extremities                  Neurologic:   CNII-XII intact     Data Review:  Labs in chart were reviewed.    Assessment:  Active Hospital Problems    Diagnosis  POA    **Generalized weakness [R53.1]  Yes    Severe protein-calorie malnutrition [E43]  Yes    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       Plan:    Metastatic small cell lung cancer/postobstructive right-sided pneumonia/pleural effusion with acute hypoxic respiratory failure/chest pain              - Thoracentesis 5/14/2025 -culture negative.  Cytology negative.  Exudate per pulmonary though likely malignant despite negative cytology              - Status post IV Zosyn              - Right lymph node " positive for metastatic disease per 5/15/2025 biopsy              - Oncology initiated palliative chemo 5/23/2025 with port placed per Dr. Starr 5/22/2025.  Mild leukocytopenia noted/afebrile              - Chest pain: EKG without ischemia and troponins flat with CTA negative for PE and recent echo with no wall motion abnormalities     CKD/hyperkalemia/acidosis all resolved              - Creatinine/electrolytes both stable     HTN-stable/soft on amlodipine (parameters adjusted     Generalized weakness to be anticipated given all the above              - PT/OT     GERD-PPI     Lovenox for PPx         Agusto Dowell MD  5/24/2025  13:11 EDT

## 2025-05-24 NOTE — PLAN OF CARE
Problem: Fatigue  Goal: Improved Activity Tolerance  Outcome: Progressing  Intervention: Promote Improved Energy  Recent Flowsheet Documentation  Taken 5/23/2025 2008 by Carly Kennedy RN  Activity Management: activity encouraged  Sleep/Rest Enhancement:   awakenings minimized   consistent schedule promoted     Problem: Adult Inpatient Plan of Care  Goal: Plan of Care Review  Outcome: Progressing  Goal: Patient-Specific Goal (Individualized)  Outcome: Progressing  Goal: Absence of Hospital-Acquired Illness or Injury  Outcome: Progressing  Intervention: Identify and Manage Fall Risk  Recent Flowsheet Documentation  Taken 5/23/2025 2008 by Carly Kennedy RN  Safety Promotion/Fall Prevention:   assistive device/personal items within reach   clutter free environment maintained   fall prevention program maintained   lighting adjusted   nonskid shoes/slippers when out of bed   room organization consistent   safety round/check completed  Intervention: Prevent Skin Injury  Recent Flowsheet Documentation  Taken 5/23/2025 2008 by Carly Kennedy RN  Body Position: position changed independently  Skin Protection:   incontinence pads utilized   protective footwear used  Intervention: Prevent and Manage VTE (Venous Thromboembolism) Risk  Recent Flowsheet Documentation  Taken 5/23/2025 2008 by Carly Kennedy RN  VTE Prevention/Management:   SCDs (sequential compression devices) off   patient refused intervention   other (see comments)  Intervention: Prevent Infection  Recent Flowsheet Documentation  Taken 5/23/2025 2008 by Carly Kennedy RN  Infection Prevention:   environmental surveillance performed   hand hygiene promoted   rest/sleep promoted   single patient room provided  Goal: Optimal Comfort and Wellbeing  Outcome: Progressing  Intervention: Monitor Pain and Promote Comfort  Recent Flowsheet Documentation  Taken 5/23/2025 2008 by Carly Kennedy RN  Pain Management Interventions: care clustered  Intervention: Provide  Person-Centered Care  Recent Flowsheet Documentation  Taken 5/23/2025 2008 by Carly Kennedy RN  Trust Relationship/Rapport:   care explained   choices provided   emotional support provided   empathic listening provided   questions answered   questions encouraged   reassurance provided   thoughts/feelings acknowledged  Goal: Readiness for Transition of Care  Outcome: Progressing     Problem: Skin Injury Risk Increased  Goal: Skin Health and Integrity  Outcome: Progressing  Intervention: Optimize Skin Protection  Recent Flowsheet Documentation  Taken 5/23/2025 2008 by Carly Kennedy, RN  Activity Management: activity encouraged  Pressure Reduction Techniques: frequent weight shift encouraged  Head of Bed (HOB) Positioning: HOB at 30-45 degrees  Pressure Reduction Devices: specialty bed utilized  Skin Protection:   incontinence pads utilized   protective footwear used     Problem: Fall Injury Risk  Goal: Absence of Fall and Fall-Related Injury  Outcome: Progressing  Intervention: Identify and Manage Contributors  Recent Flowsheet Documentation  Taken 5/23/2025 2008 by Carly Kennedy RN  Medication Review/Management: medications reviewed  Self-Care Promotion:   independence encouraged   BADL personal objects within reach  Intervention: Promote Injury-Free Environment  Recent Flowsheet Documentation  Taken 5/23/2025 2008 by Carly Kennedy, TRISTAN  Safety Promotion/Fall Prevention:   assistive device/personal items within reach   clutter free environment maintained   fall prevention program maintained   lighting adjusted   nonskid shoes/slippers when out of bed   room organization consistent   safety round/check completed     Problem: Comorbidity Management  Goal: Maintenance of COPD Symptom Control  Outcome: Progressing  Intervention: Maintain COPD (Chronic Obstructive Pulmonary Disease) Symptom Control  Recent Flowsheet Documentation  Taken 5/23/2025 2008 by Carly Kennedy, RN  Medication Review/Management: medications  reviewed  Goal: Blood Pressure in Desired Range  Outcome: Progressing  Intervention: Maintain Blood Pressure Management  Recent Flowsheet Documentation  Taken 5/23/2025 2008 by Carly Kennedy RN  Medication Review/Management: medications reviewed     Problem: Malnutrition  Goal: Improved Nutritional Intake  Outcome: Progressing   Goal Outcome Evaluation:

## 2025-05-24 NOTE — PROGRESS NOTES
LOS: 11 days   Patient Care Team:  Bonny Judge MD as PCP - General (Internal Medicine)    Chief Complaint: Small cell lung cancer, extensive stage.    Interval History:  5/21/2025 is reported by nursing staff patient does not want to have brain MRI examination due to claustrophobia despite we offered premedication for.  She however does wanted to have further discussion of palliative chemotherapy.  Patient looks weak but clinically stable.     5/22/2025 patient had a portacatheter placement this morning.  No other specific complaints.    5/23/2025 patient is afebrile.  Tmax 97.5.  BP stable.  Will start the patient on chemotherapy cycle 1 day 1 carbo plus -16.     5/24/2025 patient is afebrile.  Reports tolerating chemotherapy with no nausea vomiting.      A comprehensive 14 point review of systems was performed and was negative except as mentioned.    Vital Signs:  Temp:  [97.3 °F (36.3 °C)-98.6 °F (37 °C)] 97.3 °F (36.3 °C)  Heart Rate:  [74-93] 93  Resp:  [18] 18  BP: (102-117)/(56-76) 117/76    Intake/Output Summary (Last 24 hours) at 5/24/2025 0746  Last data filed at 5/23/2025 1406  Gross per 24 hour   Intake 100 ml   Output --   Net 100 ml       Physical Exam:  GENERAL:  Well-developed, cachectic female, laying in bed, in no acute distress.    SKIN:  Warm, dry without rashes, purpura or petechiae.  Left upper chest portacatheter placed.  No bleeding.   LYMPHATICS: Right upper chest wall area has a soft mass (patient reports she has this for 20some years).  No other cervical, supraclavicular or axillary adenopathy.  CHEST: Normal respiratory effort.  Lungs clear to auscultation. Good airflow.  CARDIAC:  Regular rate and rhythm. Normal S1,S2.  ABDOMEN:  Soft, no tender.  Bowel sounds normal.  EXTREMITIES:  No lower extremity edema.      Results Review:   CBC:      Lab 05/23/25  0448 05/22/25  0611 05/21/25  0605 05/20/25  0515 05/19/25  0527   WBC 4.78 4.94 6.14 6.38 6.23   HEMOGLOBIN 12.4 12.9 13.6  13.9 13.9   HEMATOCRIT 36.8 38.3 38.9 41.5 41.4   PLATELETS 154 141 175 164 136*   NEUTROS ABS 3.25 2.94 3.87 3.93 4.09   IMMATURE GRANS (ABS) 0.01 0.02 0.03 0.03 0.02   LYMPHS ABS 0.88 1.22 1.33 1.51 1.26   MONOS ABS 0.62 0.62 0.71 0.64 0.62   EOS ABS 0.01 0.12 0.17 0.21 0.19   .3* 102.1* 99.2* 103.0* 104.0*     CMP:        Lab 05/23/25  0448 05/22/25  0611 05/21/25  0605 05/20/25  0515 05/19/25  0527   SODIUM 138 139 138 137 136   POTASSIUM 4.4 4.0 3.9 4.7 5.2   CHLORIDE 107 106 104 106 105   CO2 22.2 21.0* 19.8* 17.0* 20.0*   ANION GAP 8.8 12.0 14.2 14.0 11.0   BUN 13 10 12 11 8   CREATININE 0.89 0.96 1.10* 1.17* 1.09*   EGFR 69.4 63.4 53.8* 50.0* 54.4*   GLUCOSE 103* 98 116* 68 75   CALCIUM 8.9 8.3* 8.6 9.0 9.3     Lab Results   Component Value Date    URICACID 5.3 05/20/2025     Results from last 7 days   Lab Units 05/22/25  0611   INR  1.06   APTT seconds 28.5                   I reviewed the patient's new clinical results.          ASSESSMENT:  1.  Small cell lung cancer- extensive stage.    Locally advanced small cell lung cancer with a right middle lobe lung mass and bulky right hilar adenopathy and mediastinal adenopathy as well as right supraclavicular adenopathy. Biopsy confirmed with high grade Ki67 > 95%.   I discussed with the patient on 5/20/2025 that I recommended to obtain brain MRI with and without contrast for further evaluation because that is the most sensitive study for looking for metastatic brain lesions. Asymptomatic the patient still could have metastatic disease considering this is aggressive small cell lung cancer. However the patient reports she has high anxiety and she is hesitant to have that study.   I also discussed with the patient the management options with aggressive chemotherapy with carboplatin plus -16 every 3 weeks and in conjunction with immunotherapy. Nevertheless I recommended to start the first cycle of chemotherapy as inpatient and immunotherapy would not be  given per policy. I explained to her. We also discussed route without active treatment and in that case she would be pursued for hospice care. Without treatment I think her life expectancy probably is somewhere between 3-5 months and with treatment she could be about 12 months. The patient needs more time to think about all of these treatment logistics and also social support. Apparently she has no family members living in [place]. She has no children and also she is found not having good social support from neighbors or friends.   5/21/2025 patient does not want to have brain MRI examination due to claustrophobia.  So we recommended she having CT of the head with and without contrast for evaluation although this is not the best study and the patient is aware of this.  Patient is agreeable.   Discussed with the patient on 5/21/2025, she does want to proceed with palliative chemotherapy.  We recommended first cycle chemotherapy to be given as inpatient so she would have time to recover at the nursing home.  Otherwise she likely will not improve clinically and probably will never have a chance to proceed with the chemotherapy.  Patient voiced understanding and agreeable with that strategy.    Head CT scan with without contrast 5/21/2025 reported no evidence for intracranial lesions.   On 5/23/2025 patient was started on cycle #1 palliative chemotherapy with carboplatin AUC 5 and -16 at 100 mg/m².      *.  Nausea associate with chemotherapy.  Expecting this will happen.  Patient will be started on Zyprexa 5 mg nightly for 4 days starting the day of chemotherapy.  Patient also will be given Zofran as needed.    *.  Prophylaxis of tumor lysis syndrome.  Patient was started on allopurinol 5/21/2025.    *.  IV access.  5/22/2025 patient had a portacatheter placement by Dr. Starr.     *.  Leukocytopenia due to chemotherapy.   5/23/2025 normal WBC 4780 neutrophils 3250 on day of initiation cycle 1 day 1  chemotherapy.  5/24/2025 developed leukocytopenia with WBC 3130, ANC 2610.  Continue to monitor.      PLAN:  Continue chemotherapy cycle 1 day 2 today for -16.  Palliative chemotherapy regimen started 5/23/2025 as inpatient, with carboplatin AUC 5 on day 1, and -16 at 100 mg/m² on day 1-3 followed by short acting G-CSF for marrow support.  Chemotherapy planned for every 3 weeks for 4-6 cycles.  Immunotherapy atezolizumab will be added as outpatient and that could be given for up to 1 year.   Continue allopurinol 300 mg daily for tumor lysis prophylaxis.   Continue Zyprexa 5 mg nightly for 4 days per protocol for chemotherapy.   Continue Zofran IV 4 mg as needed for anticipated nausea vomiting caused by chemotherapy  Monitor labs CBC BMP, uric acid and magnesium daily in the morning.    Other management per primary team.   Patient likely will be discharged to rehab facility after chemotherapy, timing to be determined.   Future chemotherapy will be done as outpatient.  Will also get permission for adding atezolizumab in conjunction with chemotherapy regimen every 3 weeks.       Spoke with the patient at bedside.       I spoke with nursing staff today.     Yannick Koroma MD PhD  05/24/25  07:46 EDT

## 2025-05-24 NOTE — PLAN OF CARE
Goal Outcome Evaluation:           Progress: improving     Patient tolerated day 2 of chemo     Problem: Adult Inpatient Plan of Care  Goal: Absence of Hospital-Acquired Illness or Injury  Intervention: Prevent Infection    Problem: Adult Inpatient Plan of Care  Goal: Optimal Comfort and Wellbeing  Outcome: Progressing  Intervention: Provide Person-Centered Care  Problem: Fall Injury Risk  Goal: Absence of Fall and Fall-Related Injury  Outcome: Progressing  Intervention: Identify and Manage Contributors    Recent Flowsheet Documentation  Taken 5/24/2025 1410 by Kemi Desai RN  Medication Review/Management: medications reviewed  Taken 5/24/2025 1210 by Kemi Desai RN  Medication Review/Management: medications reviewed  Taken 5/24/2025 1017 by Kemi Desai RN  Medication Review/Management: medications reviewed  Taken 5/24/2025 0810 by Kemi Desai RN  Medication Review/Management: medications reviewed  Intervention: Promote Injury-Free Environment    Taken 5/24/2025 1410 by Kemi Desai RN  Safety Promotion/Fall Prevention:   activity supervised   safety round/check completed  Taken 5/24/2025 1210 by Kemi Desai RN  Safety Promotion/Fall Prevention:   activity supervised   safety round/check completed  Taken 5/24/2025 1017 by Kemi Desai RN  Safety Promotion/Fall Prevention:   activity supervised   safety round/check completed  Taken 5/24/2025 0810 by Kemi Desai RN  Safety Promotion/Fall Prevention:   activity supervised   safety round/check completed     Recent Flowsheet Documentation  Taken 5/24/2025 0810 by Kemi Desai RN  Trust Relationship/Rapport:   care explained   choices provided   questions answered   questions encouraged     Recent Flowsheet Documentation  Taken 5/24/2025 1410 by Kemi Desai RN  Infection Prevention: hand hygiene promoted  Taken 5/24/2025 1210 by Kemi Desai RN  Infection Prevention: hand hygiene promoted  Taken 5/24/2025 1017 by Lloyd  Kemi, RN  Infection Prevention: hand hygiene promoted  Taken 5/24/2025 0810 by Kemi Desai RN  Infection Prevention: hand hygiene promoted

## 2025-05-24 NOTE — NURSING NOTE
"Nursing Chemotherapy Verification    Chemotherapy Regimen:   Treatment Plans       Name Type Plan Dates Plan Provider         Active    OP LUNG Atezolizumab / CARBOplatin AUC=5 / Etoposide (SCLC) ONCOLOGY TREATMENT 5/20/2025 - Present Yannick Koroma MD PhD                       Current height and weight: 182.9 cm (72\") 60.1 kg (132 lb 7.9 oz)  Calculated BSA from current height and weight (Erma): 1.79    Relevant Labs  Results from last 7 days   Lab Units 05/24/25  0754 05/23/25  0448 05/22/25  0611   WBC 10*3/mm3 3.13* 4.78 4.94   HEMOGLOBIN g/dL 12.1 12.4 12.9   HEMATOCRIT % 36.8 36.8 38.3   PLATELETS 10*3/mm3 147 154 141     Lab Results   Component Value Date    NEUTROABS 2.61 05/24/2025       Results from last 7 days   Lab Units 05/24/25  0754 05/23/25  0448 05/22/25  0611   CREATININE mg/dL 1.00 0.89 0.96       Serum creatinine: 1 mg/dL 05/24/25 0754  Estimated creatinine clearance: 49 mL/min    No results found for: \"HAV\", \"HEPAIGM\", \"HEPBIGM\", \"HEPBCAB\", \"HBEAG\", \"HEPCAB\"    Verification attestation:  I have personally reviewed the planned regimen and its administration and dosing. I understand the potential side effects.The patient has been instructed on the regimen, potential side effects, and self care measures; the consent form has been completed. I have confirmed that the appropriate premedication, prehydration, post medication and/or emergency medications are ordered in addition to the chemotherapy.    I have independently verified that the height and weight is current and calculated the BSA. I have verified the doses with the planned regimen and have clarified any deviations with the physician Dr. Koroma I have confirmed the route of administration and patient IV access.    Nurse: Kemi Desai RN  2nd verification Nurse: Cherelle HUDSON    "

## 2025-05-24 NOTE — PROGRESS NOTES
Pulmonary Update Note  - From chart review, patient appears to be doing well and no changes. Not much to add from a pulmonary perspective today. Will plan to see tomorrow.

## 2025-05-25 PROBLEM — R33.9 URINE RETENTION: Status: ACTIVE | Noted: 2025-05-25

## 2025-05-25 PROBLEM — D64.81 ANEMIA ASSOCIATED WITH CHEMOTHERAPY: Status: ACTIVE | Noted: 2025-05-25

## 2025-05-25 PROBLEM — C80.1 ANXIETY ASSOCIATED WITH CANCER DIAGNOSIS: Status: ACTIVE | Noted: 2025-05-25

## 2025-05-25 PROBLEM — T45.1X5A ANEMIA ASSOCIATED WITH CHEMOTHERAPY: Status: ACTIVE | Noted: 2025-05-25

## 2025-05-25 PROBLEM — F41.1 ANXIETY ASSOCIATED WITH CANCER DIAGNOSIS: Status: ACTIVE | Noted: 2025-05-25

## 2025-05-25 LAB
ANION GAP SERPL CALCULATED.3IONS-SCNC: 12 MMOL/L (ref 5–15)
BASOPHILS # BLD AUTO: 0.01 10*3/MM3 (ref 0–0.2)
BASOPHILS NFR BLD AUTO: 0.1 % (ref 0–1.5)
BUN SERPL-MCNC: 17 MG/DL (ref 8–23)
BUN/CREAT SERPL: 16 (ref 7–25)
CALCIUM SPEC-SCNC: 8.4 MG/DL (ref 8.6–10.5)
CHLORIDE SERPL-SCNC: 107 MMOL/L (ref 98–107)
CO2 SERPL-SCNC: 18 MMOL/L (ref 22–29)
CREAT SERPL-MCNC: 1.06 MG/DL (ref 0.57–1)
DEPRECATED RDW RBC AUTO: 48.6 FL (ref 37–54)
EGFRCR SERPLBLD CKD-EPI 2021: 56.3 ML/MIN/1.73
EOSINOPHIL # BLD AUTO: 0 10*3/MM3 (ref 0–0.4)
EOSINOPHIL NFR BLD AUTO: 0 % (ref 0.3–6.2)
ERYTHROCYTE [DISTWIDTH] IN BLOOD BY AUTOMATED COUNT: 13 % (ref 12.3–15.4)
FERRITIN SERPL-MCNC: 629 NG/ML (ref 13–150)
GLUCOSE SERPL-MCNC: 145 MG/DL (ref 65–99)
HCT VFR BLD AUTO: 33.7 % (ref 34–46.6)
HGB BLD-MCNC: 11.6 G/DL (ref 12–15.9)
IMM GRANULOCYTES # BLD AUTO: 0.08 10*3/MM3 (ref 0–0.05)
IMM GRANULOCYTES NFR BLD AUTO: 1.1 % (ref 0–0.5)
IRON 24H UR-MRATE: 124 MCG/DL (ref 37–145)
IRON SATN MFR SERPL: 82 % (ref 20–50)
LYMPHOCYTES # BLD AUTO: 0.37 10*3/MM3 (ref 0.7–3.1)
LYMPHOCYTES NFR BLD AUTO: 5.1 % (ref 19.6–45.3)
MAGNESIUM SERPL-MCNC: 1.7 MG/DL (ref 1.6–2.4)
MCH RBC QN AUTO: 35 PG (ref 26.6–33)
MCHC RBC AUTO-ENTMCNC: 34.4 G/DL (ref 31.5–35.7)
MCV RBC AUTO: 101.8 FL (ref 79–97)
MONOCYTES # BLD AUTO: 0.39 10*3/MM3 (ref 0.1–0.9)
MONOCYTES NFR BLD AUTO: 5.4 % (ref 5–12)
NEUTROPHILS NFR BLD AUTO: 6.37 10*3/MM3 (ref 1.7–7)
NEUTROPHILS NFR BLD AUTO: 88.3 % (ref 42.7–76)
NRBC BLD AUTO-RTO: 0 /100 WBC (ref 0–0.2)
PLATELET # BLD AUTO: 161 10*3/MM3 (ref 140–450)
PMV BLD AUTO: 10.4 FL (ref 6–12)
POTASSIUM SERPL-SCNC: 4.3 MMOL/L (ref 3.5–5.2)
RBC # BLD AUTO: 3.31 10*6/MM3 (ref 3.77–5.28)
SODIUM SERPL-SCNC: 137 MMOL/L (ref 136–145)
TIBC SERPL-MCNC: 152 MCG/DL (ref 298–536)
TRANSFERRIN SERPL-MCNC: 102 MG/DL (ref 200–360)
URATE SERPL-MCNC: 3.9 MG/DL (ref 2.4–5.7)
WBC NRBC COR # BLD AUTO: 7.22 10*3/MM3 (ref 3.4–10.8)

## 2025-05-25 PROCEDURE — 84466 ASSAY OF TRANSFERRIN: CPT | Performed by: INTERNAL MEDICINE

## 2025-05-25 PROCEDURE — 80048 BASIC METABOLIC PNL TOTAL CA: CPT | Performed by: STUDENT IN AN ORGANIZED HEALTH CARE EDUCATION/TRAINING PROGRAM

## 2025-05-25 PROCEDURE — 94761 N-INVAS EAR/PLS OXIMETRY MLT: CPT

## 2025-05-25 PROCEDURE — 83735 ASSAY OF MAGNESIUM: CPT | Performed by: INTERNAL MEDICINE

## 2025-05-25 PROCEDURE — 99233 SBSQ HOSP IP/OBS HIGH 50: CPT | Performed by: INTERNAL MEDICINE

## 2025-05-25 PROCEDURE — 82728 ASSAY OF FERRITIN: CPT | Performed by: INTERNAL MEDICINE

## 2025-05-25 PROCEDURE — 85025 COMPLETE CBC W/AUTO DIFF WBC: CPT | Performed by: STUDENT IN AN ORGANIZED HEALTH CARE EDUCATION/TRAINING PROGRAM

## 2025-05-25 PROCEDURE — 94799 UNLISTED PULMONARY SVC/PX: CPT

## 2025-05-25 PROCEDURE — 94664 DEMO&/EVAL PT USE INHALER: CPT

## 2025-05-25 PROCEDURE — 25010000002 ETOPOSIDE 500 MG/25ML SOLUTION 25 ML VIAL: Performed by: INTERNAL MEDICINE

## 2025-05-25 PROCEDURE — 83540 ASSAY OF IRON: CPT | Performed by: INTERNAL MEDICINE

## 2025-05-25 PROCEDURE — 25810000003 SODIUM CHLORIDE 0.9 % SOLUTION 500 ML FLEX CONT: Performed by: INTERNAL MEDICINE

## 2025-05-25 PROCEDURE — 84550 ASSAY OF BLOOD/URIC ACID: CPT | Performed by: INTERNAL MEDICINE

## 2025-05-25 PROCEDURE — 25810000003 SODIUM CHLORIDE 0.9 % SOLUTION: Performed by: INTERNAL MEDICINE

## 2025-05-25 PROCEDURE — 25010000002 ENOXAPARIN PER 10 MG: Performed by: STUDENT IN AN ORGANIZED HEALTH CARE EDUCATION/TRAINING PROGRAM

## 2025-05-25 PROCEDURE — 25010000002 DEXAMETHASONE SODIUM PHOSPHATE 100 MG/10ML SOLUTION 10 ML VIAL: Performed by: INTERNAL MEDICINE

## 2025-05-25 RX ORDER — ALPRAZOLAM 0.5 MG
0.5 TABLET ORAL 3 TIMES DAILY PRN
Status: DISPENSED | OUTPATIENT
Start: 2025-05-25 | End: 2025-05-30

## 2025-05-25 RX ORDER — MULTIVITAMIN WITH IRON
1000 TABLET ORAL DAILY
Status: DISCONTINUED | OUTPATIENT
Start: 2025-05-25 | End: 2025-05-30 | Stop reason: HOSPADM

## 2025-05-25 RX ORDER — SODIUM CHLORIDE 9 MG/ML
20 INJECTION, SOLUTION INTRAVENOUS ONCE
Status: COMPLETED | OUTPATIENT
Start: 2025-05-25 | End: 2025-05-25

## 2025-05-25 RX ADMIN — Medication 10 ML: at 07:48

## 2025-05-25 RX ADMIN — PANTOPRAZOLE SODIUM 40 MG: 40 TABLET, DELAYED RELEASE ORAL at 07:46

## 2025-05-25 RX ADMIN — SODIUM CHLORIDE 180 MG: 9 INJECTION, SOLUTION INTRAVENOUS at 16:13

## 2025-05-25 RX ADMIN — Medication 10 ML: at 21:48

## 2025-05-25 RX ADMIN — GUAIFENESIN 1200 MG: 600 TABLET, EXTENDED RELEASE ORAL at 07:46

## 2025-05-25 RX ADMIN — PANTOPRAZOLE SODIUM 40 MG: 40 TABLET, DELAYED RELEASE ORAL at 05:00

## 2025-05-25 RX ADMIN — ATORVASTATIN CALCIUM 20 MG: 20 TABLET, FILM COATED ORAL at 21:05

## 2025-05-25 RX ADMIN — AMLODIPINE BESYLATE 5 MG: 5 TABLET ORAL at 07:46

## 2025-05-25 RX ADMIN — MENTHOL, ZINC OXIDE 1 APPLICATION: .44; 20.6 OINTMENT TOPICAL at 07:48

## 2025-05-25 RX ADMIN — IPRATROPIUM BROMIDE AND ALBUTEROL SULFATE 3 ML: .5; 3 SOLUTION RESPIRATORY (INHALATION) at 10:20

## 2025-05-25 RX ADMIN — ALLOPURINOL 300 MG: 300 TABLET ORAL at 07:46

## 2025-05-25 RX ADMIN — MENTHOL, ZINC OXIDE 1 APPLICATION: .44; 20.6 OINTMENT TOPICAL at 21:48

## 2025-05-25 RX ADMIN — PANTOPRAZOLE SODIUM 40 MG: 40 TABLET, DELAYED RELEASE ORAL at 16:40

## 2025-05-25 RX ADMIN — SODIUM CHLORIDE 20 ML/HR: 9 INJECTION, SOLUTION INTRAVENOUS at 15:35

## 2025-05-25 RX ADMIN — Medication 1000 MCG: at 10:55

## 2025-05-25 RX ADMIN — IPRATROPIUM BROMIDE AND ALBUTEROL SULFATE 3 ML: .5; 3 SOLUTION RESPIRATORY (INHALATION) at 06:35

## 2025-05-25 RX ADMIN — DEXAMETHASONE SODIUM PHOSPHATE 12 MG: 10 INJECTION, SOLUTION INTRAMUSCULAR; INTRAVENOUS at 15:36

## 2025-05-25 RX ADMIN — OLANZAPINE 5 MG: 5 TABLET, FILM COATED ORAL at 21:04

## 2025-05-25 RX ADMIN — ALPRAZOLAM 0.5 MG: 0.5 TABLET ORAL at 21:48

## 2025-05-25 RX ADMIN — IPRATROPIUM BROMIDE AND ALBUTEROL SULFATE 3 ML: .5; 3 SOLUTION RESPIRATORY (INHALATION) at 22:21

## 2025-05-25 RX ADMIN — GUAIFENESIN 1200 MG: 600 TABLET, EXTENDED RELEASE ORAL at 21:04

## 2025-05-25 RX ADMIN — ENOXAPARIN SODIUM 40 MG: 100 INJECTION SUBCUTANEOUS at 15:15

## 2025-05-25 NOTE — PROGRESS NOTES
[facility]  [phone]  Dr. Warner Dominguez    Subjective:  LOS: 12    No acute events overnight.  Respiratory status seems to be stable at about 2 to 3 L nasal cannula.    Objective:  Vital Signs past 24hrs    Temp range: Temp (24hrs), Av.9 °F (36.6 °C), Min:97.5 °F (36.4 °C), Max:98.4 °F (36.9 °C)    BP range: BP: (109-128)/(59-75) 111/67  Pulse range: Heart Rate:  [] 95  Resp rate range: Resp:  [16-18] 18  60.1 kg (132 lb 7.9 oz); Body mass index is 17.97 kg/m².    Device (Oxygen Therapy): nasal cannula;humidifiedFlow (L/min) (Oxygen Therapy):  [3] 3    Oxygen range:SpO2:  [93 %-98 %] 96 %            Intake/Output Summary (Last 24 hours) at 2025 1846  Last data filed at 2025 0742  Gross per 24 hour   Intake --   Output 375 ml   Net -375 ml       Physical Exam  Constitutional:       Appearance: Normal appearance.   HENT:      Head: Normocephalic and atraumatic.      Nose: Nose normal.      Mouth/Throat:      Mouth: Mucous membranes are moist.      Pharynx: Oropharynx is clear.   Eyes:      Extraocular Movements: Extraocular movements intact.      Conjunctiva/sclera: Conjunctivae normal.   Cardiovascular:      Rate and Rhythm: Normal rate and regular rhythm.      Pulses: Normal pulses.      Heart sounds: Normal heart sounds. No murmur heard.  Pulmonary:      Effort: Pulmonary effort is normal. No respiratory distress.      Breath sounds: No stridor. No wheezing or rales.   Abdominal:      General: Abdomen is flat. Bowel sounds are normal.      Palpations: Abdomen is soft.   Skin:     General: Skin is warm and dry.   Neurological:      General: No focal deficit present.      Mental Status: She is alert and oriented to person, place, and time. Mental status is at baseline.   Psychiatric:         Mood and Affect: Mood normal.         Behavior: Behavior normal.            Result Review:  I have reviewed the results from last note by LPC physician and agree with these findings:  [x]   Laboratory accordion  [x]  Microbiology  [x]  Radiology  [x]  EKG/Telemetry   [x]  Cardiology/Vascular   [x]  Pathology  [x]  Old records  []  Other:    Medication Review:  I have reviewed the current MAR.  Antibiotics  Terbinafine-Hydryprop Chitosan kit - 250 MG & 1%     Scheduled Medications  allopurinol, 300 mg, Oral, Daily  amLODIPine, 5 mg, Oral, Q24H  atorvastatin, 20 mg, Oral, Nightly  enoxaparin sodium, 40 mg, Subcutaneous, Q24H  [START ON 5/26/2025] filgrastim (NEUPOGEN) injection, 300 mcg, Subcutaneous, Q PM  fluticasone, 2 spray, Each Nare, Daily  guaiFENesin, 1,200 mg, Oral, Q12H  ipratropium-albuterol, 3 mL, Nebulization, 4x Daily - RT  Lidocaine, 1 patch, Transdermal, Q24H  Menthol-Zinc Oxide, 1 Application, Topical, BID  OLANZapine, 5 mg, Oral, Nightly  pantoprazole, 40 mg, Oral, BID AC  sodium chloride, 10 mL, Intravenous, Q12H  sodium chloride, 10 mL, Intravenous, Q12H  vitamin B-12, 1,000 mcg, Oral, Daily      ICU Drips     PRN Medications    acetaminophen **OR** acetaminophen **OR** acetaminophen    acetaminophen    ALPRAZolam    calcium carbonate    famotidine    heparin    HYDROcodone-acetaminophen    Hydrocortisone Sod Suc (PF)    hydrOXYzine    ipratropium-albuterol    nitroglycerin    ondansetron ODT **OR** ondansetron    oxymetazoline    prochlorperazine    sodium chloride    sodium chloride    sodium chloride    sodium chloride    sodium chloride    Lines, Drains & Airways       Active LDAs       Name Placement date Placement time Site Days    Peripheral IV 05/18/25 1846 20 G Anterior;Left Forearm 05/18/25  1846  Forearm  7    External Urinary Catheter --  --  --  --    Single Lumen Implantable Port 05/22/25 Left Chest 05/22/25  1032  Chest  3                    Diet Orders (active) (From admission, onward)       Start     Ordered    05/22/25 1223  Diet: Regular/House; Fluid Consistency: Thin (IDDSI 0)  Diet Effective Now         05/22/25 1222    05/20/25 0800  Dietary Nutrition Supplements  Novasource Renal (Nepro)  Daily With Breakfast & Dinner       05/19/25 1809 05/20/25 0800  Dietary Nutrition Supplements Boost Breeze (Ensure Clear)  Daily With Breakfast & Lunch       05/19/25 1809                      Assessment:  Acute hypoxic respiratory failure  Exudative right pleural effusion  Extensive stage small cell lung cancer  Nausea  Anemia  Anxiety  Urinary retention  Stage I sacral decubitus ulcer  Essential hypertension  Generalized weakness  GERD    THESE ARE NEW MEDICAL PROBLEMS TO ME.    Plan of Treatment  - s/p course of zosyn for pneumonia  - Right pleural effusion cytology negative, but still likely malignant  - Right lymph node positive for metastatic disease per 5/15/2025 biopsy   - Oncology initiated palliative chemo 5/23/2025   - Wean O2 as tolerated, goal O2 sats > 92%  - Continue DuoNebs for comfort    Her respiratory status is stable.  Not much to add from a pulmonary standpoint at this time.  Please call us if there are any questions or concerns.  Pulmonary will sign off.  Upon discharge, recommend checking walking oximetry to see if she qualifies for supplemental oxygen.  I suspect she will need about 2 L of oxygen continuously.  Dispo currently pending placement to nursing facility.      Warner Dominguez MD  [facility], [facility]  Pulmonary and Critical Care Medicine

## 2025-05-25 NOTE — NURSING NOTE
"Nursing Chemotherapy Verification    Chemotherapy Regimen:   Treatment Plans       Name Type Plan Dates Plan Provider         Active    OP LUNG Atezolizumab / CARBOplatin AUC=5 / Etoposide (SCLC) ONCOLOGY TREATMENT 5/20/2025 - Present Yannick Koroma MD PhD                       Current height and weight: 182.9 cm (72\") 60.1 kg (132 lb 7.9 oz)  Calculated BSA from current height and weight (Erma): 1.79    Relevant Labs  Results from last 7 days   Lab Units 05/25/25  0455 05/24/25  0754 05/23/25  0448   WBC 10*3/mm3 7.22 3.13* 4.78   HEMOGLOBIN g/dL 11.6* 12.1 12.4   HEMATOCRIT % 33.7* 36.8 36.8   PLATELETS 10*3/mm3 161 147 154     Lab Results   Component Value Date    NEUTROABS 6.37 05/25/2025       Results from last 7 days   Lab Units 05/25/25 0455 05/24/25  0754 05/23/25  0448   CREATININE mg/dL 1.06* 1.00 0.89       Serum creatinine: 1.06 mg/dL (H) 05/25/25 0455  Estimated creatinine clearance: 46.2 mL/min (A)    No results found for: \"HAV\", \"HEPAIGM\", \"HEPBIGM\", \"HEPBCAB\", \"HBEAG\", \"HEPCAB\"    Verification attestation:  I have personally reviewed the planned regimen and its administration and dosing. I understand the potential side effects.The patient has been instructed on the regimen, potential side effects, and self care measures; the consent form has been completed. I have confirmed that the appropriate premedication, prehydration, post medication and/or emergency medications are ordered in addition to the chemotherapy.    I have independently verified that the height and weight is current and calculated the BSA. I have verified the doses with the planned regimen and have clarified any deviations with the physician Dr. Koroma. I have confirmed the route of administration and patient IV access.    Nurse: Kemi Desai RN  2nd verification Nurse: Ad Melchor RN    "

## 2025-05-25 NOTE — PROGRESS NOTES
"    DAILY PROGRESS NOTE  [facility]    Patient Identification:  Name: Penelope Onofre  Age: 70 y.o.  Sex: female  :  1955  MRN: [id number]         Primary Care Physician: Bonny Judge MD    Subjective:  Interval History: Tolerating all.  No bleeding complaints.  No new chest pains or new troubles breathing.  Denies any fever or confusion    Objective: Wonderfully pleasant and conversational.  Nontoxic.  No family present.  Has a good outlook considering all diagnosis    Scheduled Meds:allopurinol, 300 mg, Oral, Daily  amLODIPine, 5 mg, Oral, Q24H  atorvastatin, 20 mg, Oral, Nightly  dexAMETHasone, 12 mg, Intravenous, Once  enoxaparin sodium, 40 mg, Subcutaneous, Q24H  etoposide (TOPOSAR) 180 mg in sodium chloride 0.9 % 509 mL chemo IVPB, 100 mg/m2 (Treatment Plan Recorded), Intravenous, Once  fluticasone, 2 spray, Each Nare, Daily  guaiFENesin, 1,200 mg, Oral, Q12H  ipratropium-albuterol, 3 mL, Nebulization, 4x Daily - RT  Lidocaine, 1 patch, Transdermal, Q24H  Menthol-Zinc Oxide, 1 Application, Topical, BID  OLANZapine, 5 mg, Oral, Nightly  pantoprazole, 40 mg, Oral, BID AC  sodium chloride, 10 mL, Intravenous, Q12H  sodium chloride, 10 mL, Intravenous, Q12H  sodium chloride, 20 mL/hr, Intravenous, Once      Continuous Infusions:     Vital signs in last 24 hours:  Temp:  [97.3 °F (36.3 °C)-98.1 °F (36.7 °C)] 97.9 °F (36.6 °C)  Heart Rate:  [] 109  Resp:  [16-18] 17  BP: (102-128)/(59-75) 128/75    Intake/Output:    Intake/Output Summary (Last 24 hours) at 2025 0910  Last data filed at 2025 0742  Gross per 24 hour   Intake --   Output 925 ml   Net -925 ml       Exam:  /75 (BP Location: Left arm, Patient Position: Lying)   Pulse 109   Temp 97.9 °F (36.6 °C) (Oral)   Resp 17   Ht 182.9 cm (72\")   Wt 60.1 kg (132 lb 7.9 oz)   SpO2 93%   BMI 17.97 kg/m²     General Appearance:    Alert, cooperative, NT, AAOx3                         Throat:   Oral mucosa pink and " moist                           Neck:   No JVD                         Lungs:    Clear to auscultation bilaterally, respirations unlabored with conversation.  O2 via NC                 Chest Wall:    No tenderness or deformity.  Right upper chest wall soft mass                          Heart:    Regular rate and rhythm, S1 and S2 normal                  Abdomen:     Soft, nontender, bowel sounds active                 Extremities: Generalized weakness though moving all, no pitting edema                        Pulses:   Pulses palpable in all extremities                  Neurologic:   CNII-XII intact     Data Review:  Labs in chart were reviewed.    Assessment:  Active Hospital Problems    Diagnosis  POA    **Generalized weakness [R53.1]  Yes    Severe protein-calorie malnutrition [E43]  Yes    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       Plan:    Metastatic small cell lung cancer/postobstructive right-sided pneumonia/pleural effusion with acute hypoxic respiratory failure/chest pain              - Thoracentesis 5/14/2025 -culture negative.  Cytology negative.  Exudate per pulmonary though likely malignant despite negative cytology              - Status post IV Zosyn              - Right lymph node positive for metastatic disease per 5/15/2025 biopsy              - Oncology initiated palliative chemo 5/23/2025 with port placed per Dr. Starr 5/22/2025.  Mild leukocytopenia noted and resolved/remains  afebrile              - Chest pain: EKG without ischemia and troponins flat with CTA negative for PE and recent echo with no wall motion abnormalities     CKD/hyperkalemia/acidosis all resolved              - Creatinine/electrolytes both stable     HTN-stable/soft on amlodipine (parameters adjusted)     Generalized weakness to be anticipated given all the above              - PT/OT     GERD-PPI    Urinary retention requiring In-N-Out cath x 2.  Pros and cons of Bae catheter placed with patient and she would rather remain on In-N-Out cath as needed for now.  If she continues to urinate otherwise I would expand parameters to hold In-N-Out catheterization unless volume greater than 650cc     Lovenox for PPx       Agusto Dowell MD  5/25/2025  09:44 EDT

## 2025-05-25 NOTE — PLAN OF CARE
Goal Outcome Evaluation:           Progress: improving            Problem: Adult Inpatient Plan of Care  Goal: Plan of Care Review  Outcome: Progressing  Goal: Patient-Specific Goal (Individualized)  Outcome: Progressing  Goal: Absence of Hospital-Acquired Illness or Injury  Outcome: Progressing  Intervention: Identify and Manage Fall Risk    Recent Flowsheet Documentation  Taken 5/25/2025 1805 by Kemi Desai RN  Safety Promotion/Fall Prevention:   activity supervised   safety round/check completed  Taken 5/25/2025 1607 by Kemi Desai RN  Safety Promotion/Fall Prevention:   activity supervised   safety round/check completed  Taken 5/25/2025 1410 by Kemi Desai RN  Safety Promotion/Fall Prevention:   activity supervised   safety round/check completed  Taken 5/25/2025 1215 by Kemi Desai RN  Safety Promotion/Fall Prevention:   activity supervised   safety round/check completed  Taken 5/25/2025 1005 by Kemi Desai RN  Safety Promotion/Fall Prevention:   activity supervised   safety round/check completed  Taken 5/25/2025 0804 by Kemi Desai RN  Safety Promotion/Fall Prevention:   activity supervised   safety round/check completed  Intervention: Prevent and Manage VTE (Venous Thromboembolism) Risk    Recent Flowsheet Documentation  Taken 5/25/2025 0805 by Kemi Desai RN  VTE Prevention/Management: SCDs (sequential compression devices) on  Intervention: Prevent Infection    Recent Flowsheet Documentation  Taken 5/25/2025 1805 by Kemi Desai RN  Infection Prevention: hand hygiene promoted  Taken 5/25/2025 1607 by Kemi Desai RN  Infection Prevention: hand hygiene promoted  Taken 5/25/2025 1410 by Kemi Desai RN  Infection Prevention: hand hygiene promoted  Taken 5/25/2025 1215 by Kemi Desai RN  Infection Prevention: hand hygiene promoted  Taken 5/25/2025 1005 by Kemi Desai RN  Infection Prevention: hand hygiene promoted  Taken 5/25/2025 0804 by Keim Desai  RN  Infection Prevention: hand hygiene promoted  Goal: Optimal Comfort and Wellbeing  Outcome: Progressing  Intervention: Provide Person-Centered Care    Recent Flowsheet Documentation  Taken 5/25/2025 0805 by Kemi Desai RN  Trust Relationship/Rapport:   care explained   choices provided   questions encouraged   questions answered  Goal: Readiness for Transition of Care  Outcome: Progressing        Problem: Fall Injury Risk  Goal: Absence of Fall and Fall-Related Injury  Intervention: Promote Injury-Free Environment    Recent Flowsheet Documentation  Taken 5/25/2025 1805 by Kemi Desai RN  Safety Promotion/Fall Prevention:   activity supervised   safety round/check completed  Taken 5/25/2025 1607 by Kemi Desai RN  Safety Promotion/Fall Prevention:   activity supervised   safety round/check completed  Taken 5/25/2025 1410 by Kemi Desai RN  Safety Promotion/Fall Prevention:   activity supervised   safety round/check completed  Taken 5/25/2025 1215 by Kemi Desai RN  Safety Promotion/Fall Prevention:   activity supervised   safety round/check completed  Taken 5/25/2025 1005 by Kemi Desai RN  Safety Promotion/Fall Prevention:   activity supervised   safety round/check completed  Taken 5/25/2025 0804 by Kemi Desai RN  Safety Promotion/Fall Prevention:   activity supervised   safety round/check completed

## 2025-05-25 NOTE — PLAN OF CARE
Problem: Fatigue  Goal: Improved Activity Tolerance  Outcome: Progressing  Intervention: Promote Improved Energy  Recent Flowsheet Documentation  Taken 5/24/2025 2109 by aCrly Kennedy RN  Activity Management: activity encouraged  Sleep/Rest Enhancement:   awakenings minimized   natural light exposure provided     Problem: Adult Inpatient Plan of Care  Goal: Plan of Care Review  Outcome: Progressing  Goal: Patient-Specific Goal (Individualized)  Outcome: Progressing  Goal: Absence of Hospital-Acquired Illness or Injury  Outcome: Progressing  Intervention: Identify and Manage Fall Risk  Recent Flowsheet Documentation  Taken 5/24/2025 2109 by Carly Kennedy RN  Safety Promotion/Fall Prevention:   assistive device/personal items within reach   clutter free environment maintained   fall prevention program maintained   lighting adjusted   nonskid shoes/slippers when out of bed   room organization consistent   safety round/check completed  Intervention: Prevent Skin Injury  Recent Flowsheet Documentation  Taken 5/24/2025 2109 by Carly Kennedy RN  Body Position: position changed independently  Skin Protection: incontinence pads utilized  Intervention: Prevent and Manage VTE (Venous Thromboembolism) Risk  Recent Flowsheet Documentation  Taken 5/24/2025 2109 by Carly Kennedy RN  VTE Prevention/Management:   SCDs (sequential compression devices) off   patient refused intervention  Intervention: Prevent Infection  Recent Flowsheet Documentation  Taken 5/24/2025 2109 by Carly Kennedy RN  Infection Prevention:   environmental surveillance performed   hand hygiene promoted   rest/sleep promoted   single patient room provided  Goal: Optimal Comfort and Wellbeing  Outcome: Progressing  Intervention: Monitor Pain and Promote Comfort  Recent Flowsheet Documentation  Taken 5/24/2025 2109 by Carly Kennedy RN  Pain Management Interventions: care clustered  Intervention: Provide Person-Centered Care  Recent Flowsheet  Documentation  Taken 5/24/2025 2109 by Carly Kennedy RN  Trust Relationship/Rapport:   care explained   choices provided   emotional support provided   empathic listening provided   questions answered   questions encouraged  Goal: Readiness for Transition of Care  Outcome: Progressing     Problem: Skin Injury Risk Increased  Goal: Skin Health and Integrity  Outcome: Progressing  Intervention: Optimize Skin Protection  Recent Flowsheet Documentation  Taken 5/24/2025 2109 by Carly Kennedy RN  Activity Management: activity encouraged  Pressure Reduction Techniques: frequent weight shift encouraged  Head of Bed (HOB) Positioning: HOB at 30-45 degrees  Pressure Reduction Devices: specialty bed utilized  Skin Protection: incontinence pads utilized     Problem: Fall Injury Risk  Goal: Absence of Fall and Fall-Related Injury  Outcome: Progressing  Intervention: Identify and Manage Contributors  Recent Flowsheet Documentation  Taken 5/24/2025 2109 by Carly Kennedy RN  Medication Review/Management: medications reviewed  Self-Care Promotion:   independence encouraged   BADL personal objects within reach  Intervention: Promote Injury-Free Environment  Recent Flowsheet Documentation  Taken 5/24/2025 2109 by Carly Kennedy RN  Safety Promotion/Fall Prevention:   assistive device/personal items within reach   clutter free environment maintained   fall prevention program maintained   lighting adjusted   nonskid shoes/slippers when out of bed   room organization consistent   safety round/check completed     Problem: Comorbidity Management  Goal: Maintenance of COPD Symptom Control  Outcome: Progressing  Intervention: Maintain COPD (Chronic Obstructive Pulmonary Disease) Symptom Control  Recent Flowsheet Documentation  Taken 5/24/2025 2109 by Carly Kennedy RN  Medication Review/Management: medications reviewed  Goal: Blood Pressure in Desired Range  Outcome: Progressing  Intervention: Maintain Blood Pressure Management  Recent  Flowsheet Documentation  Taken 5/24/2025 2109 by Carly Kennedy, RN  Medication Review/Management: medications reviewed     Problem: Malnutrition  Goal: Improved Nutritional Intake  Outcome: Progressing   Goal Outcome Evaluation:

## 2025-05-25 NOTE — PROGRESS NOTES
LOS: 12 days   Patient Care Team:  Bonny Judge MD as PCP - General (Internal Medicine)    Chief Complaint: Small cell lung cancer, extensive stage.    Interval History:  5/21/2025 is reported by nursing staff patient does not want to have brain MRI examination due to claustrophobia despite we offered premedication for.  She however does wanted to have further discussion of palliative chemotherapy.  Patient looks weak but clinically stable.     5/22/2025 patient had a portacatheter placement this morning.  No other specific complaints.    5/23/2025 patient is afebrile.  Tmax 97.5.  BP stable.  Will start the patient on chemotherapy cycle 1 day 1 carbo plus -16.     5/24/2025 patient is afebrile.  Reports tolerating chemotherapy with no nausea vomiting.      5/25/2025 patient reports unable to urinate overnight, added twice catheterization and this confirmed by nursing staff.  Patient is afebrile.  Tolerating chemotherapy otherwise.      A comprehensive 14 point review of systems was performed and was negative except as mentioned.    Vital Signs:  Temp:  [97.3 °F (36.3 °C)-98.1 °F (36.7 °C)] 97.9 °F (36.6 °C)  Heart Rate:  [] 109  Resp:  [16-18] 17  BP: (102-128)/(59-75) 128/75    Intake/Output Summary (Last 24 hours) at 5/25/2025 0900  Last data filed at 5/25/2025 0742  Gross per 24 hour   Intake --   Output 375 ml   Net -375 ml       Physical Exam:  GENERAL:  Well-developed, cachectic female, laying in bed, in no acute distress.    SKIN:  Warm, dry without rashes, purpura or petechiae.  Left upper chest portacatheter placed.  No bleeding.   LYMPHATICS: Right upper chest wall area has a soft mass (patient reports she has this for 20some years).  No other cervical, supraclavicular or axillary adenopathy.  CHEST: Normal respiratory effort.  Lungs clear to auscultation. Good airflow.  CARDIAC:  Regular rate and rhythm. Normal S1,S2.  ABDOMEN:  distended lower abdomen, no tender.  Bowel sounds  "normal.  EXTREMITIES:  No lower extremity edema.      Results Review:   CBC:      Lab 05/25/25  0455 05/24/25  0754 05/23/25  0448 05/22/25  0611 05/21/25  0605   WBC 7.22 3.13* 4.78 4.94 6.14   HEMOGLOBIN 11.6* 12.1 12.4 12.9 13.6   HEMATOCRIT 33.7* 36.8 36.8 38.3 38.9   PLATELETS 161 147 154 141 175   NEUTROS ABS 6.37 2.61 3.25 2.94 3.87   IMMATURE GRANS (ABS) 0.08* 0.01 0.01 0.02 0.03   LYMPHS ABS 0.37* 0.37* 0.88 1.22 1.33   MONOS ABS 0.39 0.14 0.62 0.62 0.71   EOS ABS 0.00 0.00 0.01 0.12 0.17   .8* 102.8* 100.3* 102.1* 99.2*     CMP:        Lab 05/25/25  0455 05/24/25  0754 05/23/25 0448 05/22/25  0611 05/21/25  0605   SODIUM 137 137 138 139 138   POTASSIUM 4.3 4.3 4.4 4.0 3.9   CHLORIDE 107 106 107 106 104   CO2 18.0* 21.0* 22.2 21.0* 19.8*   ANION GAP 12.0 10.0 8.8 12.0 14.2   BUN 17 12 13 10 12   CREATININE 1.06* 1.00 0.89 0.96 1.10*   EGFR 56.3* 60.4 69.4 63.4 53.8*   GLUCOSE 145* 136* 103* 98 116*   CALCIUM 8.4* 8.4* 8.9 8.3* 8.6   MAGNESIUM 1.7 1.7  --   --   --      Lab Results   Component Value Date    URICACID 3.9 05/25/2025     Results from last 7 days   Lab Units 05/22/25  0611   INR  1.06   APTT seconds 28.5         Results from last 7 days   Lab Units 05/25/25 0455   MAGNESIUM mg/dL 1.7   No results found for: \"IRON\", \"LABIRON\", \"TRANSFERRIN\", \"TIBC\", \"FERRITIN\"  Lab Results   Component Value Date    [id number] 394 05/13/2025     Lab Results   Component Value Date    FOLATE 12.40 05/13/2025             I reviewed the patient's new clinical results.          ASSESSMENT:  1.  Small cell lung cancer- extensive stage.    Locally advanced small cell lung cancer with a right middle lobe lung mass and bulky right hilar adenopathy and mediastinal adenopathy as well as right supraclavicular adenopathy. Biopsy confirmed with high grade Ki67 > 95%.   I discussed with the patient on 5/20/2025 that I recommended to obtain brain MRI with and without contrast for further evaluation because that is the " most sensitive study for looking for metastatic brain lesions. Asymptomatic the patient still could have metastatic disease considering this is aggressive small cell lung cancer. However the patient reports she has high anxiety and she is hesitant to have that study.   I also discussed with the patient the management options with aggressive chemotherapy with carboplatin plus -16 every 3 weeks and in conjunction with immunotherapy. Nevertheless I recommended to start the first cycle of chemotherapy as inpatient and immunotherapy would not be given per policy. I explained to her. We also discussed route without active treatment and in that case she would be pursued for hospice care. Without treatment I think her life expectancy probably is somewhere between 3-5 months and with treatment she could be about 12 months. The patient needs more time to think about all of these treatment logistics and also social support. Apparently she has no family members living in [place]. She has no children and also she is found not having good social support from neighbors or friends.   5/21/2025 patient does not want to have brain MRI examination due to claustrophobia.  So we recommended she having CT of the head with and without contrast for evaluation although this is not the best study and the patient is aware of this.  Patient is agreeable.   Discussed with the patient on 5/21/2025, she does want to proceed with palliative chemotherapy.  We recommended first cycle chemotherapy to be given as inpatient so she would have time to recover at the nursing home.  Otherwise she likely will not improve clinically and probably will never have a chance to proceed with the chemotherapy.  Patient voiced understanding and agreeable with that strategy.    Head CT scan with without contrast 5/21/2025 reported no evidence for intracranial lesions.   On 5/23/2025 patient was started on cycle #1 palliative chemotherapy with carboplatin AUC 5  and -16 at 100 mg/m².      *.  Nausea associate with chemotherapy.  Expecting this will happen.  Patient will be started on Zyprexa 5 mg nightly for 4 days starting the day of chemotherapy.  Patient also will be given Zofran as needed.  5/25/2025 patient reports no nausea vomiting associate with chemotherapy.    *.  Prophylaxis of tumor lysis syndrome.  Patient was started on allopurinol 5/21/2025.    *.  IV access.  5/22/2025 patient had a portacatheter placement by Dr. Starr.     *.  Leukocytopenia due to chemotherapy.   5/23/2025 normal WBC 4780 neutrophils 3250 on day of initiation cycle 1 day 1 chemotherapy.  5/24/2025 developed leukocytopenia with WBC 3130, ANC 2610.  Continue to monitor.  5/25/2025 WBC 7220 including neutrophils 6370.  This is likely reactive due to steroids.  Continue to monitor.    *.  Mild anemia due to chemotherapy.  5/23/2025 normal hemoglobin 12.4 on day of starting chemotherapy.   5/25/2025 hemoglobin 11.7.  Will check iron studies.  Recent lab study on 5/13/2025 reported low normal B12 level 394 and the folate 12.4.  Will start the patient on B12 to help with erythropoiesis.    *.  Urinary retention.  5/25/2025 patient reports unable to urinate by herself.  Nursing staff reports patient had twice In-N-Out catheterization.  I recommended indwelling Bae catheter for now.    *.  Anxiety.   5/25/2025 patient reports anxiety associated with her diagnosis of lung cancer and undergoing chemotherapy.  She is requesting medication.  Recommend low-dose Xanax 0.5 mg every 8 hours as needed.    PLAN:  Continue chemotherapy cycle 1 day 3 today for -16.  Palliative chemotherapy regimen cycle 1 started 5/23/2025 as inpatient, with carboplatin AUC 5 on day 1, and -16 at 100 mg/m² on day 1-3 followed by short acting G-CSF for marrow support.  Chemotherapy planned for every 3 weeks for 4-6 cycles.  Immunotherapy atezolizumab will be added as outpatient and that could be given for up to 1 year.    Check ferritin and iron profile.   Start oral vitamin B12 at 1000 mcg daily to help with erythropoiesis.  Start Xanax 0.5 mg 3 times a day as needed for anxiety.   Place indwelling Bae catheter for urinary retention.   Will start short acting G-CSF tomorrow for marrow support due to expected severe neutropenia.  Continue allopurinol 300 mg daily for tumor lysis prophylaxis.   Continue Zyprexa 5 mg nightly for 4 days per protocol for chemotherapy.   Continue Zofran IV 4 mg as needed for anticipated nausea vomiting caused by chemotherapy  Monitor labs CBC BMP, uric acid and magnesium daily in the morning.    Other management per primary team.   Patient likely will be discharged to rehab facility after chemotherapy, timing to be determined.   Future chemotherapy will be done as outpatient.  Will also get permission for adding atezolizumab in conjunction with chemotherapy regimen every 3 weeks.       Spoke with the patient at bedside.       I spoke with nursing staff today.     Yannick Koroma MD PhD  05/25/25  09:00 EDT

## 2025-05-25 NOTE — PLAN OF CARE
Problem: Fatigue  Goal: Improved Activity Tolerance  Outcome: Progressing  Intervention: Promote Improved Energy  Recent Flowsheet Documentation  Taken 5/25/2025 1936 by Carly Kennedy RN  Activity Management: activity encouraged  Sleep/Rest Enhancement: awakenings minimized     Problem: Adult Inpatient Plan of Care  Goal: Plan of Care Review  Outcome: Progressing  Goal: Patient-Specific Goal (Individualized)  Outcome: Progressing  Goal: Absence of Hospital-Acquired Illness or Injury  Outcome: Progressing  Intervention: Identify and Manage Fall Risk  Recent Flowsheet Documentation  Taken 5/25/2025 1936 by Carly Kennedy RN  Safety Promotion/Fall Prevention:   assistive device/personal items within reach   clutter free environment maintained   fall prevention program maintained   lighting adjusted   nonskid shoes/slippers when out of bed   room organization consistent   safety round/check completed  Intervention: Prevent Skin Injury  Recent Flowsheet Documentation  Taken 5/25/2025 1936 by Carly Kennedy RN  Body Position: position changed independently  Skin Protection:   incontinence pads utilized   protective footwear used  Intervention: Prevent and Manage VTE (Venous Thromboembolism) Risk  Recent Flowsheet Documentation  Taken 5/25/2025 1936 by Carly Kennedy RN  VTE Prevention/Management:   bilateral   SCDs (sequential compression devices) off   patient refused intervention  Intervention: Prevent Infection  Recent Flowsheet Documentation  Taken 5/25/2025 1936 by Carly Kennedy RN  Infection Prevention:   environmental surveillance performed   hand hygiene promoted   rest/sleep promoted   single patient room provided  Goal: Optimal Comfort and Wellbeing  Outcome: Progressing  Intervention: Monitor Pain and Promote Comfort  Recent Flowsheet Documentation  Taken 5/25/2025 1936 by Carly Kennedy RN  Pain Management Interventions: care clustered  Intervention: Provide Person-Centered Care  Recent Flowsheet  Documentation  Taken 5/25/2025 1936 by Carly Kennedy RN  Trust Relationship/Rapport:   care explained   choices provided  Goal: Readiness for Transition of Care  Outcome: Progressing     Problem: Skin Injury Risk Increased  Goal: Skin Health and Integrity  Outcome: Progressing  Intervention: Optimize Skin Protection  Recent Flowsheet Documentation  Taken 5/25/2025 1936 by Carly Kennedy, RN  Activity Management: activity encouraged  Pressure Reduction Techniques: frequent weight shift encouraged  Head of Bed (HOB) Positioning: HOB at 30-45 degrees  Pressure Reduction Devices: specialty bed utilized  Skin Protection:   incontinence pads utilized   protective footwear used     Problem: Fall Injury Risk  Goal: Absence of Fall and Fall-Related Injury  Outcome: Progressing  Intervention: Identify and Manage Contributors  Recent Flowsheet Documentation  Taken 5/25/2025 1936 by Carly Kennedy RN  Medication Review/Management: medications reviewed  Self-Care Promotion:   independence encouraged   BADL personal objects within reach  Intervention: Promote Injury-Free Environment  Recent Flowsheet Documentation  Taken 5/25/2025 1936 by Carly Kennedy RN  Safety Promotion/Fall Prevention:   assistive device/personal items within reach   clutter free environment maintained   fall prevention program maintained   lighting adjusted   nonskid shoes/slippers when out of bed   room organization consistent   safety round/check completed     Problem: Comorbidity Management  Goal: Maintenance of COPD Symptom Control  Outcome: Progressing  Intervention: Maintain COPD (Chronic Obstructive Pulmonary Disease) Symptom Control  Recent Flowsheet Documentation  Taken 5/25/2025 1936 by Carly Kennedy RN  Breathing Techniques/Airway Clearance: deep/controlled cough encouraged  Medication Review/Management: medications reviewed  Goal: Blood Pressure in Desired Range  Outcome: Progressing  Intervention: Maintain Blood Pressure Management  Recent  Flowsheet Documentation  Taken 5/25/2025 1936 by Carly Kennedy, RN  Medication Review/Management: medications reviewed     Problem: Malnutrition  Goal: Improved Nutritional Intake  Outcome: Progressing   Goal Outcome Evaluation:

## 2025-05-26 LAB
ANION GAP SERPL CALCULATED.3IONS-SCNC: 9.9 MMOL/L (ref 5–15)
BASOPHILS # BLD MANUAL: 0 10*3/MM3 (ref 0–0.2)
BASOPHILS NFR BLD MANUAL: 0 % (ref 0–1.5)
BUN SERPL-MCNC: 18 MG/DL (ref 8–23)
BUN/CREAT SERPL: 17.5 (ref 7–25)
CALCIUM SPEC-SCNC: 8.6 MG/DL (ref 8.6–10.5)
CHLORIDE SERPL-SCNC: 106 MMOL/L (ref 98–107)
CO2 SERPL-SCNC: 20.1 MMOL/L (ref 22–29)
CREAT SERPL-MCNC: 1.03 MG/DL (ref 0.57–1)
DEPRECATED RDW RBC AUTO: 52.4 FL (ref 37–54)
EGFRCR SERPLBLD CKD-EPI 2021: 58.3 ML/MIN/1.73
EOSINOPHIL # BLD MANUAL: 0 10*3/MM3 (ref 0–0.4)
EOSINOPHIL NFR BLD MANUAL: 0 % (ref 0.3–6.2)
ERYTHROCYTE [DISTWIDTH] IN BLOOD BY AUTOMATED COUNT: 13.1 % (ref 12.3–15.4)
GLUCOSE SERPL-MCNC: 117 MG/DL (ref 65–99)
HCT VFR BLD AUTO: 35.8 % (ref 34–46.6)
HGB BLD-MCNC: 11.5 G/DL (ref 12–15.9)
LYMPHOCYTES # BLD MANUAL: 0.39 10*3/MM3 (ref 0.7–3.1)
LYMPHOCYTES NFR BLD MANUAL: 2 % (ref 5–12)
MAGNESIUM SERPL-MCNC: 1.9 MG/DL (ref 1.6–2.4)
MCH RBC QN AUTO: 34.4 PG (ref 26.6–33)
MCHC RBC AUTO-ENTMCNC: 32.1 G/DL (ref 31.5–35.7)
MCV RBC AUTO: 107.2 FL (ref 79–97)
MONOCYTES # BLD: 0.13 10*3/MM3 (ref 0.1–0.9)
NEUTROPHILS # BLD AUTO: 5.9 10*3/MM3 (ref 1.7–7)
NEUTROPHILS NFR BLD MANUAL: 91.9 % (ref 42.7–76)
NRBC BLD AUTO-RTO: 0 /100 WBC (ref 0–0.2)
PLAT MORPH BLD: NORMAL
PLATELET # BLD AUTO: 151 10*3/MM3 (ref 140–450)
PMV BLD AUTO: 10 FL (ref 6–12)
POTASSIUM SERPL-SCNC: 4.7 MMOL/L (ref 3.5–5.2)
RBC # BLD AUTO: 3.34 10*6/MM3 (ref 3.77–5.28)
RBC MORPH BLD: NORMAL
SODIUM SERPL-SCNC: 136 MMOL/L (ref 136–145)
URATE SERPL-MCNC: 3.3 MG/DL (ref 2.4–5.7)
VARIANT LYMPHS NFR BLD MANUAL: 6.1 % (ref 19.6–45.3)
WBC MORPH BLD: NORMAL
WBC NRBC COR # BLD AUTO: 6.42 10*3/MM3 (ref 3.4–10.8)

## 2025-05-26 PROCEDURE — 25010000002 ENOXAPARIN PER 10 MG: Performed by: STUDENT IN AN ORGANIZED HEALTH CARE EDUCATION/TRAINING PROGRAM

## 2025-05-26 PROCEDURE — 85025 COMPLETE CBC W/AUTO DIFF WBC: CPT | Performed by: STUDENT IN AN ORGANIZED HEALTH CARE EDUCATION/TRAINING PROGRAM

## 2025-05-26 PROCEDURE — 85007 BL SMEAR W/DIFF WBC COUNT: CPT | Performed by: STUDENT IN AN ORGANIZED HEALTH CARE EDUCATION/TRAINING PROGRAM

## 2025-05-26 PROCEDURE — 80048 BASIC METABOLIC PNL TOTAL CA: CPT | Performed by: STUDENT IN AN ORGANIZED HEALTH CARE EDUCATION/TRAINING PROGRAM

## 2025-05-26 PROCEDURE — 99232 SBSQ HOSP IP/OBS MODERATE 35: CPT | Performed by: INTERNAL MEDICINE

## 2025-05-26 PROCEDURE — 84550 ASSAY OF BLOOD/URIC ACID: CPT | Performed by: INTERNAL MEDICINE

## 2025-05-26 PROCEDURE — 25010000002 FILGRASTIM 300 MCG/0.5ML SOLUTION PREFILLED SYRINGE: Performed by: INTERNAL MEDICINE

## 2025-05-26 PROCEDURE — 94799 UNLISTED PULMONARY SVC/PX: CPT

## 2025-05-26 PROCEDURE — 83735 ASSAY OF MAGNESIUM: CPT | Performed by: INTERNAL MEDICINE

## 2025-05-26 RX ORDER — BUDESONIDE 1 MG/2ML
1 INHALANT ORAL
Status: DISCONTINUED | OUTPATIENT
Start: 2025-05-26 | End: 2025-05-30 | Stop reason: HOSPADM

## 2025-05-26 RX ORDER — ARFORMOTEROL TARTRATE 15 UG/2ML
15 SOLUTION RESPIRATORY (INHALATION)
Status: DISCONTINUED | OUTPATIENT
Start: 2025-05-26 | End: 2025-05-30 | Stop reason: HOSPADM

## 2025-05-26 RX ADMIN — Medication 10 ML: at 08:21

## 2025-05-26 RX ADMIN — FILGRASTIM 300 MCG: 300 INJECTION, SOLUTION INTRAVENOUS; SUBCUTANEOUS at 17:21

## 2025-05-26 RX ADMIN — IPRATROPIUM BROMIDE AND ALBUTEROL SULFATE 3 ML: .5; 3 SOLUTION RESPIRATORY (INHALATION) at 19:07

## 2025-05-26 RX ADMIN — GUAIFENESIN 1200 MG: 600 TABLET, EXTENDED RELEASE ORAL at 20:09

## 2025-05-26 RX ADMIN — IPRATROPIUM BROMIDE AND ALBUTEROL SULFATE 3 ML: .5; 3 SOLUTION RESPIRATORY (INHALATION) at 11:39

## 2025-05-26 RX ADMIN — Medication 1000 MCG: at 08:21

## 2025-05-26 RX ADMIN — PANTOPRAZOLE SODIUM 40 MG: 40 TABLET, DELAYED RELEASE ORAL at 08:21

## 2025-05-26 RX ADMIN — IPRATROPIUM BROMIDE AND ALBUTEROL SULFATE 3 ML: .5; 3 SOLUTION RESPIRATORY (INHALATION) at 08:09

## 2025-05-26 RX ADMIN — ARFORMOTEROL TARTRATE 15 MCG: 15 SOLUTION RESPIRATORY (INHALATION) at 11:41

## 2025-05-26 RX ADMIN — ARFORMOTEROL TARTRATE 15 MCG: 15 SOLUTION RESPIRATORY (INHALATION) at 19:12

## 2025-05-26 RX ADMIN — GUAIFENESIN 1200 MG: 600 TABLET, EXTENDED RELEASE ORAL at 08:21

## 2025-05-26 RX ADMIN — ATORVASTATIN CALCIUM 20 MG: 20 TABLET, FILM COATED ORAL at 20:09

## 2025-05-26 RX ADMIN — IPRATROPIUM BROMIDE AND ALBUTEROL SULFATE 3 ML: .5; 3 SOLUTION RESPIRATORY (INHALATION) at 15:18

## 2025-05-26 RX ADMIN — ALLOPURINOL 300 MG: 300 TABLET ORAL at 08:21

## 2025-05-26 RX ADMIN — MENTHOL, ZINC OXIDE 1 APPLICATION: .44; 20.6 OINTMENT TOPICAL at 08:26

## 2025-05-26 RX ADMIN — BUDESONIDE 1 MG: 1 INHALANT ORAL at 11:40

## 2025-05-26 RX ADMIN — OLANZAPINE 5 MG: 5 TABLET, FILM COATED ORAL at 20:09

## 2025-05-26 RX ADMIN — AMLODIPINE BESYLATE 5 MG: 5 TABLET ORAL at 08:21

## 2025-05-26 RX ADMIN — ALPRAZOLAM 0.5 MG: 0.5 TABLET ORAL at 20:09

## 2025-05-26 RX ADMIN — ENOXAPARIN SODIUM 40 MG: 100 INJECTION SUBCUTANEOUS at 14:50

## 2025-05-26 RX ADMIN — BUDESONIDE 1 MG: 1 INHALANT ORAL at 19:13

## 2025-05-26 RX ADMIN — Medication 10 ML: at 21:27

## 2025-05-26 RX ADMIN — MENTHOL, ZINC OXIDE 1 APPLICATION: .44; 20.6 OINTMENT TOPICAL at 21:27

## 2025-05-26 RX ADMIN — ALPRAZOLAM 0.5 MG: 0.5 TABLET ORAL at 08:25

## 2025-05-26 RX ADMIN — PANTOPRAZOLE SODIUM 40 MG: 40 TABLET, DELAYED RELEASE ORAL at 17:20

## 2025-05-26 NOTE — NURSING NOTE
-16/Carbo initiated 5/23; patient tolerated well. Day 3 -16 tolerated; cycle one complete.   Patient has decreased UOP since starting chemo; developed urinary retention requiring straight catheterization.   Patient voided an unmeasured occurrence overnight with residual bladder scan of ~350 mL.    Xanax given X1 overnight for anxiety per patient request.  Possible dc on 5/27 to SNF.  All questions and concerns addressed; no needs expressed at this time.   Patient has remained free from fall and injury throughout shift; use of non-skid footwear when OOB; call light within reach.  POC ongoing.

## 2025-05-26 NOTE — PLAN OF CARE
Goal Outcome Evaluation:  Plan of Care Reviewed With: patient        Progress: no change     Pt alert and oriented, calm and cooperative, talkative, on 3L NC. Took pills whole, one at a time, with apple sauce. Tolerating diet, ensures given. Pt repositioned Q2. Bae in for urinary retention, urine is clear yellow. PRN Xanax given once for anxiety. Plan of care ongoing.

## 2025-05-26 NOTE — SIGNIFICANT NOTE
05/25/25 2130   Stool Output   Stool Amount Large     Patient did not stool on this occurrence; mischarted. [email or web address]

## 2025-05-26 NOTE — SIGNIFICANT NOTE
Patient did not stool on this occurrence; mischarted   05/25/25 3260   Stool Output   Bowel Incontinence Yes

## 2025-05-26 NOTE — PROGRESS NOTES
Subjective     CHIEF COMPLAINT:     Small cell lung cancer, extensive stage    INTERVAL HISTORY:     Patient reports that she tolerated the chemotherapy without nausea or vomiting.  She has some shortness of breath.  No chest pain at the time of my evaluation.    REVIEW OF SYSTEMS:  Pertinent ROS as in the interval history. Otherwise, negative.    SCHEDULED MEDS:  allopurinol, 300 mg, Oral, Daily  amLODIPine, 5 mg, Oral, Q24H  arformoterol, 15 mcg, Nebulization, BID - RT  atorvastatin, 20 mg, Oral, Nightly  budesonide, 1 mg, Nebulization, BID - RT  enoxaparin sodium, 40 mg, Subcutaneous, Q24H  filgrastim (NEUPOGEN) injection, 300 mcg, Subcutaneous, Q PM  fluticasone, 2 spray, Each Nare, Daily  guaiFENesin, 1,200 mg, Oral, Q12H  ipratropium-albuterol, 3 mL, Nebulization, 4x Daily - RT  Lidocaine, 1 patch, Transdermal, Q24H  Menthol-Zinc Oxide, 1 Application, Topical, BID  OLANZapine, 5 mg, Oral, Nightly  pantoprazole, 40 mg, Oral, BID AC  sodium chloride, 10 mL, Intravenous, Q12H  sodium chloride, 10 mL, Intravenous, Q12H  vitamin B-12, 1,000 mcg, Oral, Daily      Objective   VITAL SIGNS:  Temp:  [97.7 °F (36.5 °C)-98.4 °F (36.9 °C)] 97.7 °F (36.5 °C)  Heart Rate:  [85-98] 93  Resp:  [16-20] 18  BP: (104-125)/(66-86) 117/72     PHYSICAL EXAMINATION:  GENERAL:  The patient appears in fair general condition, not in acute distress.  SKIN: No skin rash.   EYES:  No Jaundice. No Pallor.   CHEST: Normal respiratory effort.  Equal breath sounds bilaterally.  CARDIAC:  Normal S1 & S2. No murmur.   ABDOMEN:  Soft. No tenderness. No Hepatomegaly. No Splenomegaly.   EXTREMITIES:  No edema.    RESULT REVIEW:   Results from last 7 days   Lab Units 05/26/25  0549 05/25/25  0455 05/24/25  0754 05/23/25  0448 05/22/25  0611   WBC 10*3/mm3 6.42 7.22 3.13* 4.78 4.94   NEUTROS ABS 10*3/mm3 5.90 6.37 2.61 3.25 2.94   LYMPHS ABS 10*3/mm3 0.39*  --   --   --   --    HEMOGLOBIN g/dL 11.5* 11.6* 12.1 12.4 12.9   HEMATOCRIT % 35.8 33.7* 36.8  36.8 38.3   PLATELETS 10*3/mm3 151 161 147 154 141     Results from last 7 days   Lab Units 05/26/25  0549 05/25/25  0455 05/24/25  0754 05/23/25  0448 05/22/25  0611   SODIUM mmol/L 136 137 137 138 139   POTASSIUM mmol/L 4.7 4.3 4.3 4.4 4.0   CHLORIDE mmol/L 106 107 106 107 106   CO2 mmol/L 20.1* 18.0* 21.0* 22.2 21.0*   BUN mg/dL 18 17 12 13 10   CREATININE mg/dL 1.03* 1.06* 1.00 0.89 0.96   CALCIUM mg/dL 8.6 8.4* 8.4* 8.9 8.3*   MAGNESIUM mg/dL 1.9 1.7 1.7  --   --      Results from last 7 days   Lab Units 05/22/25  0611   INR  1.06   APTT seconds 28.5         Lab 05/25/25  0455   IRON 124   IRON SATURATION (TSAT) 82*   TIBC 152*   TRANSFERRIN 102*   FERRITIN 629.00*      Assessment & Plan   *Small cell lung cancer, extensive stage.  She has been to have recommended last bulky right hilar mass lymphadenopathy.  There is right supraclavicular adenopathy.  Biopsy revealed tumor with high Ki-67 of >95%.  CT brain with no metastasis.  Dr. Koroma discussed with the patient systemic chemotherapy with carboplatin etoposide x 3 days with addition of chemotherapy.  Patient received carboplatin and etoposide between 5/23/2025 and 5/25/2025.  She tolerated the treatment well.  She is at increased risk for neutropenia and neutropenic infection.    *Nausea secondary to chemotherapy.  Patient is on Zyprexa 5 mg nightly x 4 nights.    *Tumor lysis syndrome prophylaxis.  Patient's allopurinol which was started on 5/21/2025.    *Anemia.  5/26/2025: Hemoglobin 11.5.  Vitamin B12 low normal at 394.  Folate 12.4.  Patient was placed on vitamin B12 1000 mcg daily.    *Urinary retention.  Patient had Bae catheter placement.    *Anxiety.  Patient was placed on Xanax 0.5 mg every 8 hours as needed.    PLAN:    1.  Start growth factor support with Neupogen 300 mcg daily x 5 planned.    2.  Plan to add Tecentriq with cycle #2.  3.  Continue allopurinol 300 mg daily.  4.  Patient is likely going to be discharged to rehab.        Anisha WALTON  MD Lance  05/26/25

## 2025-05-26 NOTE — PROGRESS NOTES
"    DAILY PROGRESS NOTE  [facility]    Patient Identification:  Name: Penelope Onofre  Age: 70 y.o.  Sex: female  :  1955  MRN: [id number]         Primary Care Physician: Bonny Judge MD    Subjective:  Interval History: Not voicing anything new.  Conversational and pleasant.  No chest pain or fever or confusion.  Admits to some shortness of breath we discussed space-occupying lesions such as cancer compounded by COPD though no acute exacerbation.  Discussed different regimen and I think best to start additional nebulized meds and patient in agreement    Objective: Wonderfully pleasant and conversational    Scheduled Meds:allopurinol, 300 mg, Oral, Daily  amLODIPine, 5 mg, Oral, Q24H  arformoterol, 15 mcg, Nebulization, BID - RT  atorvastatin, 20 mg, Oral, Nightly  budesonide, 1 mg, Nebulization, BID - RT  enoxaparin sodium, 40 mg, Subcutaneous, Q24H  filgrastim (NEUPOGEN) injection, 300 mcg, Subcutaneous, Q PM  fluticasone, 2 spray, Each Nare, Daily  guaiFENesin, 1,200 mg, Oral, Q12H  ipratropium-albuterol, 3 mL, Nebulization, 4x Daily - RT  Lidocaine, 1 patch, Transdermal, Q24H  Menthol-Zinc Oxide, 1 Application, Topical, BID  OLANZapine, 5 mg, Oral, Nightly  pantoprazole, 40 mg, Oral, BID AC  sodium chloride, 10 mL, Intravenous, Q12H  sodium chloride, 10 mL, Intravenous, Q12H  vitamin B-12, 1,000 mcg, Oral, Daily      Continuous Infusions:     Vital signs in last 24 hours:  Temp:  [97.7 °F (36.5 °C)-97.9 °F (36.6 °C)] 97.7 °F (36.5 °C)  Heart Rate:  [85-98] 93  Resp:  [16-20] 16  BP: (104-125)/(66-86) 117/72    Intake/Output:    Intake/Output Summary (Last 24 hours) at 2025 1143  Last data filed at 2025 0900  Gross per 24 hour   Intake 490 ml   Output 1600 ml   Net -1110 ml       Exam:  /72 (BP Location: Left arm, Patient Position: Lying)   Pulse 93   Temp 97.7 °F (36.5 °C) (Oral)   Resp 16   Ht 182.9 cm (72\")   Wt 60.1 kg (132 lb 7.9 oz)   SpO2 94%   BMI 17.97 " kg/m²     General Appearance:    Alert, cooperative,  AAOx3                         Throat:   Oral mucosa pink and moist                           Neck:   No JVD                         Lungs:    Clear to auscultation bilaterally, respirations unlabored                 Chest Wall:  Right chest wall soft mass                          Heart:    Regular rate and rhythm, S1 and S2 normal                  Abdomen:     Soft, nontender, bowel sounds active                 Extremities: GW though moving all                          Data Review:  Labs in chart were reviewed.    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       Plan:    Metastatic small cell lung cancer/postobstructive right-sided pneumonia/pleural effusion with acute hypoxic respiratory failure/chest pain              - Thoracentesis 5/14/2025 -culture negative.  Cytology negative.  Exudate per pulmonary though likely malignant despite negative cytology              - Status post IV Zosyn              - Right lymph node positive for metastatic disease per 5/15/2025 biopsy              - Oncology initiated palliative chemo 5/23/2025  with port placed per Dr. Starr 5/22/2025.  Mild leukocytopenia noted and resolved/remains afebrile              - Chest pain: EKG without ischemia and troponins flat with CTA negative for PE and recent echo with no wall motion abnormalities   - COPD without acute exacerbation on scheduled DuoNebs.  I am going to initiate Brovana Pulmicort nebulized medications and see how she does     CKD/hyperkalemia/acidosis all resolved              - Creatinine/electrolytes both stable     HTN-stable/soft on amlodipine (parameters adjusted)     Generalized weakness to be anticipated given all the above              - PT/OT     GERD-PPI     Urinary retention now with Bae     Lovenox for PPx    Agusto Dowell MD  5/26/2025  11:43 EDT

## 2025-05-27 LAB
ANION GAP SERPL CALCULATED.3IONS-SCNC: 7 MMOL/L (ref 5–15)
BASOPHILS # BLD MANUAL: 0 10*3/MM3 (ref 0–0.2)
BASOPHILS NFR BLD MANUAL: 0 % (ref 0–1.5)
BUN SERPL-MCNC: 22 MG/DL (ref 8–23)
BUN/CREAT SERPL: 19.6 (ref 7–25)
CALCIUM SPEC-SCNC: 8.6 MG/DL (ref 8.6–10.5)
CHLORIDE SERPL-SCNC: 106 MMOL/L (ref 98–107)
CO2 SERPL-SCNC: 22 MMOL/L (ref 22–29)
CREAT SERPL-MCNC: 1.12 MG/DL (ref 0.57–1)
DEPRECATED RDW RBC AUTO: 47.6 FL (ref 37–54)
EGFRCR SERPLBLD CKD-EPI 2021: 52.7 ML/MIN/1.73
EOSINOPHIL # BLD MANUAL: 0 10*3/MM3 (ref 0–0.4)
EOSINOPHIL NFR BLD MANUAL: 0 % (ref 0.3–6.2)
ERYTHROCYTE [DISTWIDTH] IN BLOOD BY AUTOMATED COUNT: 12.8 % (ref 12.3–15.4)
GLUCOSE SERPL-MCNC: 82 MG/DL (ref 65–99)
HCT VFR BLD AUTO: 36.9 % (ref 34–46.6)
HGB BLD-MCNC: 12.5 G/DL (ref 12–15.9)
LYMPHOCYTES # BLD MANUAL: 1.52 10*3/MM3 (ref 0.7–3.1)
LYMPHOCYTES NFR BLD MANUAL: 0 % (ref 5–12)
MAGNESIUM SERPL-MCNC: 1.9 MG/DL (ref 1.6–2.4)
MCH RBC QN AUTO: 34.6 PG (ref 26.6–33)
MCHC RBC AUTO-ENTMCNC: 33.9 G/DL (ref 31.5–35.7)
MCV RBC AUTO: 102.2 FL (ref 79–97)
MONOCYTES # BLD: 0 10*3/MM3 (ref 0.1–0.9)
NEUTROPHILS # BLD AUTO: 17.46 10*3/MM3 (ref 1.7–7)
NEUTROPHILS NFR BLD MANUAL: 92 % (ref 42.7–76)
NRBC BLD AUTO-RTO: 0 /100 WBC (ref 0–0.2)
PLAT MORPH BLD: NORMAL
PLATELET # BLD AUTO: 141 10*3/MM3 (ref 140–450)
PMV BLD AUTO: 10.2 FL (ref 6–12)
POTASSIUM SERPL-SCNC: 4.6 MMOL/L (ref 3.5–5.2)
RBC # BLD AUTO: 3.61 10*6/MM3 (ref 3.77–5.28)
RBC MORPH BLD: NORMAL
SODIUM SERPL-SCNC: 135 MMOL/L (ref 136–145)
URATE SERPL-MCNC: 3.6 MG/DL (ref 2.4–5.7)
VARIANT LYMPHS NFR BLD MANUAL: 8 % (ref 19.6–45.3)
WBC MORPH BLD: NORMAL
WBC NRBC COR # BLD AUTO: 18.98 10*3/MM3 (ref 3.4–10.8)

## 2025-05-27 PROCEDURE — 94799 UNLISTED PULMONARY SVC/PX: CPT

## 2025-05-27 PROCEDURE — 97164 PT RE-EVAL EST PLAN CARE: CPT

## 2025-05-27 PROCEDURE — 25010000002 ENOXAPARIN PER 10 MG: Performed by: STUDENT IN AN ORGANIZED HEALTH CARE EDUCATION/TRAINING PROGRAM

## 2025-05-27 PROCEDURE — 97530 THERAPEUTIC ACTIVITIES: CPT

## 2025-05-27 PROCEDURE — 25010000002 FILGRASTIM 300 MCG/0.5ML SOLUTION PREFILLED SYRINGE: Performed by: INTERNAL MEDICINE

## 2025-05-27 PROCEDURE — 84550 ASSAY OF BLOOD/URIC ACID: CPT | Performed by: INTERNAL MEDICINE

## 2025-05-27 PROCEDURE — 85007 BL SMEAR W/DIFF WBC COUNT: CPT | Performed by: STUDENT IN AN ORGANIZED HEALTH CARE EDUCATION/TRAINING PROGRAM

## 2025-05-27 PROCEDURE — 83735 ASSAY OF MAGNESIUM: CPT | Performed by: INTERNAL MEDICINE

## 2025-05-27 PROCEDURE — 94761 N-INVAS EAR/PLS OXIMETRY MLT: CPT

## 2025-05-27 PROCEDURE — 99232 SBSQ HOSP IP/OBS MODERATE 35: CPT | Performed by: INTERNAL MEDICINE

## 2025-05-27 PROCEDURE — 80048 BASIC METABOLIC PNL TOTAL CA: CPT | Performed by: STUDENT IN AN ORGANIZED HEALTH CARE EDUCATION/TRAINING PROGRAM

## 2025-05-27 PROCEDURE — 94664 DEMO&/EVAL PT USE INHALER: CPT

## 2025-05-27 PROCEDURE — 85025 COMPLETE CBC W/AUTO DIFF WBC: CPT | Performed by: STUDENT IN AN ORGANIZED HEALTH CARE EDUCATION/TRAINING PROGRAM

## 2025-05-27 RX ADMIN — ARFORMOTEROL TARTRATE 15 MCG: 15 SOLUTION RESPIRATORY (INHALATION) at 07:54

## 2025-05-27 RX ADMIN — MENTHOL, ZINC OXIDE 1 APPLICATION: .44; 20.6 OINTMENT TOPICAL at 08:37

## 2025-05-27 RX ADMIN — Medication 10 ML: at 20:14

## 2025-05-27 RX ADMIN — BUDESONIDE 1 MG: 1 INHALANT ORAL at 07:56

## 2025-05-27 RX ADMIN — PANTOPRAZOLE SODIUM 40 MG: 40 TABLET, DELAYED RELEASE ORAL at 08:37

## 2025-05-27 RX ADMIN — MENTHOL, ZINC OXIDE 1 APPLICATION: .44; 20.6 OINTMENT TOPICAL at 20:14

## 2025-05-27 RX ADMIN — Medication 10 ML: at 08:38

## 2025-05-27 RX ADMIN — IPRATROPIUM BROMIDE AND ALBUTEROL SULFATE 3 ML: .5; 3 SOLUTION RESPIRATORY (INHALATION) at 15:20

## 2025-05-27 RX ADMIN — ALLOPURINOL 300 MG: 300 TABLET ORAL at 08:37

## 2025-05-27 RX ADMIN — IPRATROPIUM BROMIDE AND ALBUTEROL SULFATE 3 ML: .5; 3 SOLUTION RESPIRATORY (INHALATION) at 11:37

## 2025-05-27 RX ADMIN — PANTOPRAZOLE SODIUM 40 MG: 40 TABLET, DELAYED RELEASE ORAL at 16:37

## 2025-05-27 RX ADMIN — GUAIFENESIN 1200 MG: 600 TABLET, EXTENDED RELEASE ORAL at 20:14

## 2025-05-27 RX ADMIN — ALPRAZOLAM 0.5 MG: 0.5 TABLET ORAL at 20:14

## 2025-05-27 RX ADMIN — Medication 1000 MCG: at 08:37

## 2025-05-27 RX ADMIN — ARFORMOTEROL TARTRATE 15 MCG: 15 SOLUTION RESPIRATORY (INHALATION) at 19:18

## 2025-05-27 RX ADMIN — IPRATROPIUM BROMIDE AND ALBUTEROL SULFATE 3 ML: .5; 3 SOLUTION RESPIRATORY (INHALATION) at 19:15

## 2025-05-27 RX ADMIN — IPRATROPIUM BROMIDE AND ALBUTEROL SULFATE 3 ML: .5; 3 SOLUTION RESPIRATORY (INHALATION) at 07:53

## 2025-05-27 RX ADMIN — BUDESONIDE 1 MG: 1 INHALANT ORAL at 19:20

## 2025-05-27 RX ADMIN — ENOXAPARIN SODIUM 40 MG: 100 INJECTION SUBCUTANEOUS at 14:11

## 2025-05-27 RX ADMIN — AMLODIPINE BESYLATE 5 MG: 5 TABLET ORAL at 08:37

## 2025-05-27 RX ADMIN — FILGRASTIM 300 MCG: 300 INJECTION, SOLUTION INTRAVENOUS; SUBCUTANEOUS at 14:13

## 2025-05-27 RX ADMIN — GUAIFENESIN 1200 MG: 600 TABLET, EXTENDED RELEASE ORAL at 08:37

## 2025-05-27 RX ADMIN — ATORVASTATIN CALCIUM 20 MG: 20 TABLET, FILM COATED ORAL at 20:14

## 2025-05-27 RX ADMIN — ALPRAZOLAM 0.5 MG: 0.5 TABLET ORAL at 08:39

## 2025-05-27 NOTE — NURSING NOTE
Goal Outcome Evaluation:   Plan of Care Reviewed With: Patient   Progress: No change  Pt Aox4; mostly calm and cooperative with times of anxiety; remains on 3L NC.     Takes pills cut in halves with apple sauce; prefers vanilla Boost.  Preventative wound care to perineal and coccyx with calmoseptine.  Tolerated C1D1 Carbo/VP16; completed cycle 05/25.  Neupogen initiated 05/26 daily x 5 days.  Oncology social worker consulted.  Possible dc on 5/27 to SNF.  Xanax given X1 for anxiety overnight.  Indwelling james placed 05/26 for retention; clear yellow urine.  Pt repositioned with pillow support.  PRN Xanax given once for anxiety.  All questions and concerns addressed; no needs expressed at this time.   Patient has remained free from fall and injury throughout shift; use of non-skid footwear when OOB; call light within reach.  POC ongoing.

## 2025-05-27 NOTE — PLAN OF CARE
Goal Outcome Evaluation:  Plan of Care Reviewed With: patient        Progress: no change  Outcome Evaluation: Pt resting in bed, just returned from recliner chair with nsg staff but agreeable to attempt to amb. She req CGA and increased time to sit EOB with vc for sequencing. She is SOB with any mobility. She stood from EOB x 2 trials but failed on her first attempt. She stood with mod A on 2nd attempt using r wx. Unsteady balance prohibited attempt to amb. Pt very unsteady with standing and unable to amb; she took 3-4 small shuffled sidesteps with min - mod A; LOB x 2. She MIP 10x with mod A and r wx on 3L O2 again with LOB and unsteadiness. Pt fatigues very quickly. She returned supine with CGA/vc She appears frail and struggles with endurance, SOA. Slow progress toward goals. Cont PT to address functional deficits and prepare for d/c to rehab as approprriate    Anticipated Discharge Disposition (PT): skilled nursing facility

## 2025-05-27 NOTE — PROGRESS NOTES
"    DAILY PROGRESS NOTE  [facility]    Patient Identification:  Name: Penelope Onofre  Age: 70 y.o.  Sex: female  :  1955  MRN: [id number]         Primary Care Physician: Bonny Judge MD    Subjective:  Interval History: Feeling a bit more fatigued today.  Sleep is also disjointed.  Denies any new chest pain confusion or fever.  She does feel that she is breathing better at baseline with the addition of Pulmicort and Brovana.  Tolerating Bae    Objective: Wonderfully pleasant/conversational    Scheduled Meds:allopurinol, 300 mg, Oral, Daily  amLODIPine, 5 mg, Oral, Q24H  arformoterol, 15 mcg, Nebulization, BID - RT  atorvastatin, 20 mg, Oral, Nightly  budesonide, 1 mg, Nebulization, BID - RT  enoxaparin sodium, 40 mg, Subcutaneous, Q24H  filgrastim (NEUPOGEN) injection, 300 mcg, Subcutaneous, Q PM  fluticasone, 2 spray, Each Nare, Daily  guaiFENesin, 1,200 mg, Oral, Q12H  ipratropium-albuterol, 3 mL, Nebulization, 4x Daily - RT  Lidocaine, 1 patch, Transdermal, Q24H  Menthol-Zinc Oxide, 1 Application, Topical, BID  pantoprazole, 40 mg, Oral, BID AC  sodium chloride, 10 mL, Intravenous, Q12H  sodium chloride, 10 mL, Intravenous, Q12H  vitamin B-12, 1,000 mcg, Oral, Daily      Continuous Infusions:     Vital signs in last 24 hours:  Temp:  [97.1 °F (36.2 °C)-98.1 °F (36.7 °C)] 98.1 °F (36.7 °C)  Heart Rate:  [] 105  Resp:  [16-20] 16  BP: ()/(58-72) 113/65    Intake/Output:    Intake/Output Summary (Last 24 hours) at 2025 1159  Last data filed at 2025 0835  Gross per 24 hour   Intake 840 ml   Output 1300 ml   Net -460 ml       Exam:  /65 (BP Location: Right arm, Patient Position: Lying)   Pulse 105   Temp 98.1 °F (36.7 °C) (Oral)   Resp 16   Ht 182.9 cm (72\")   Wt 60.1 kg (132 lb 7.9 oz)   SpO2 97%   BMI 17.97 kg/m²     General Appearance:    Alert, cooperative, AAOx3                         Throat:   Oral mucosa pink and moist                           " Neck:   No JVD                         Lungs:    Clear to auscultation bilaterally, respirations unlabored                          Heart:    Regular rate and rhythm, S1 and S2 normal                  Abdomen:     Soft, nontender, bowel sounds active                 Extremities: Moving all, no edema                        Pulses:   Pulses palpable in all extremities                             Data Review:  Labs in chart were reviewed.    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       Plan:    Metastatic small cell lung cancer/postobstructive right-sided pneumonia/pleural effusion with acute hypoxic respiratory failure/chest pain              - Thoracentesis 5/14/2025 -culture negative.  Cytology negative.  Exudate per pulmonary though likely malignant despite negative cytology              - Status post IV Zosyn              - Right lymph node positive for metastatic disease per 5/15/2025 biopsy              - Oncology initiated palliative chemo 5/23/2025 with port placed per Dr. Starr 5/22/2025.                - Chest pain: EKG without ischemia and troponins flat with CTA negative for  PE and recent echo with no wall motion abnormalities              - COPD without acute exacerbation on scheduled DuoNebs.  Good symptomatic improvement with addition of Pulmicort and Brovana   - Leukocytosis secondary to Neupogen/afebrile   - Hemoglobin and platelets remain stable    CKD/hyperkalemia/acidosis all resolved              - Creatinine/electrolytes both stable     HTN-stable/soft on amlodipine (parameters adjusted)     Generalized weakness to be anticipated given all the above              - PT/OT     GERD-PPI     Urinary retention now with Bae     Lovenox for PPx    Agusto Dowell MD  5/27/2025  11:59 EDT

## 2025-05-27 NOTE — PROGRESS NOTES
Subjective     CHIEF COMPLAINT:     Small cell lung cancer, extensive stage    INTERVAL HISTORY:     Patient reports feeling weak today.  No nausea or vomiting.    REVIEW OF SYSTEMS:  Pertinent ROS as in the interval history. Otherwise, negative.    SCHEDULED MEDS:  allopurinol, 300 mg, Oral, Daily  amLODIPine, 5 mg, Oral, Q24H  arformoterol, 15 mcg, Nebulization, BID - RT  atorvastatin, 20 mg, Oral, Nightly  budesonide, 1 mg, Nebulization, BID - RT  enoxaparin sodium, 40 mg, Subcutaneous, Q24H  filgrastim (NEUPOGEN) injection, 300 mcg, Subcutaneous, Q PM  fluticasone, 2 spray, Each Nare, Daily  guaiFENesin, 1,200 mg, Oral, Q12H  ipratropium-albuterol, 3 mL, Nebulization, 4x Daily - RT  Lidocaine, 1 patch, Transdermal, Q24H  Menthol-Zinc Oxide, 1 Application, Topical, BID  pantoprazole, 40 mg, Oral, BID AC  sodium chloride, 10 mL, Intravenous, Q12H  sodium chloride, 10 mL, Intravenous, Q12H  vitamin B-12, 1,000 mcg, Oral, Daily      Objective   VITAL SIGNS:  Temp:  [97.1 °F (36.2 °C)-97.9 °F (36.6 °C)] 97.7 °F (36.5 °C)  Heart Rate:  [] 111  Resp:  [16-20] 18  BP: ()/(58-72) 110/72     PHYSICAL EXAMINATION:    GENERAL:  The patient appears in fair general condition, not in acute distress.  SKIN: No skin rash.   EYES:  No Jaundice. No Pallor.   CHEST: Normal respiratory effort.  Equal breath sounds bilaterally.  Port in the left upper chest is benign.  She has a benign-appearing soft tissue mass in the right upper chest.  CARDIAC:  Normal S1 & S2. No murmur.   ABDOMEN:  Soft. No tenderness. No Hepatomegaly. No Splenomegaly.   EXTREMITIES:  No edema.    RESULT REVIEW:   Results from last 7 days   Lab Units 05/27/25  0611 05/26/25  0549 05/25/25  0455 05/24/25  0754 05/23/25  0448   WBC 10*3/mm3 18.98* 6.42 7.22 3.13* 4.78   NEUTROS ABS 10*3/mm3 17.46* 5.90 6.37 2.61 3.25   LYMPHS ABS 10*3/mm3 1.52 0.39*  --   --   --    HEMOGLOBIN g/dL 12.5 11.5* 11.6* 12.1 12.4   HEMATOCRIT % 36.9 35.8 33.7* 36.8 36.8    PLATELETS 10*3/mm3 141 151 161 147 154     Results from last 7 days   Lab Units 05/27/25  0611 05/26/25  0549 05/25/25  0455 05/24/25  0754 05/23/25  0448   SODIUM mmol/L 135* 136 137 137 138   POTASSIUM mmol/L 4.6 4.7 4.3 4.3 4.4   CHLORIDE mmol/L 106 106 107 106 107   CO2 mmol/L 22.0 20.1* 18.0* 21.0* 22.2   BUN mg/dL 22 18 17 12 13   CREATININE mg/dL 1.12* 1.03* 1.06* 1.00 0.89   CALCIUM mg/dL 8.6 8.6 8.4* 8.4* 8.9   MAGNESIUM mg/dL 1.9 1.9 1.7 1.7  --      Results from last 7 days   Lab Units 05/22/25  0611   INR  1.06   APTT seconds 28.5         Lab 05/25/25  0455   IRON 124   IRON SATURATION (TSAT) 82*   TIBC 152*   TRANSFERRIN 102*   FERRITIN 629.00*      Component      Latest Ref Rng 5/24/2025 5/25/2025 5/26/2025 5/27/2025   Uric Acid      2.4 - 5.7 mg/dL 4.1  3.9  3.3  3.6      Assessment & Plan   *Small cell lung cancer, extensive stage.  She has been to have recommended last bulky right hilar mass lymphadenopathy.  There is right supraclavicular adenopathy.  Biopsy revealed tumor with high Ki-67 of >95%.  CT brain with no metastasis.  Dr. Koroma discussed with the patient systemic chemotherapy with carboplatin etoposide x 3 days with addition of chemotherapy.  Patient received carboplatin and etoposide between 5/23/2025 and 5/25/2025.  She tolerated the treatment well.  She is at increased risk for neutropenia and neutropenic infection.  She was started on Neupogen 300 mcg SQ daily on 5/26/2025.    *Nausea secondary to chemotherapy.  Patient is on Zyprexa 5 mg nightly x 4 nights.  She is not having nausea at this time.    *Tumor lysis syndrome prophylaxis.  Patient's allopurinol which was started on 5/21/2025.  5/27/2025: Uric acid 3.6.    *Anemia.  5/26/2025: Hemoglobin 11.5.  Vitamin B12 low normal at 394.  Folate 12.4.  Patient was placed on vitamin B12 1000 mcg daily.    *Urinary retention.  Patient had Bae catheter placement.    *Anxiety.  Patient was placed on Xanax 0.5 mg every 8 hours as  needed.    PLAN:    1.  Continue Neupogen 300 mcg SQ daily.  2.  To allow the patient to go to rehab, plan to give a total of 4 doses of Neupogen with the last dose to be given on 5/29/2025.  She can be discharged to rehab afterwards.  3.  Patient will not receive chemotherapy while in rehab.  4.  Continue allopurinol 300 mg daily for now.    Discussed with Dr. Koroma.      Anisha Lucas MD  05/27/25

## 2025-05-27 NOTE — PLAN OF CARE
Problem: Fatigue  Goal: Improved Activity Tolerance  Outcome: Progressing  Intervention: Promote Improved Energy  Recent Flowsheet Documentation  Taken 5/26/2025 2000 by Carly Kennedy RN  Activity Management: activity encouraged  Sleep/Rest Enhancement:   awakenings minimized   consistent schedule promoted     Problem: Adult Inpatient Plan of Care  Goal: Plan of Care Review  Outcome: Progressing  Goal: Patient-Specific Goal (Individualized)  Outcome: Progressing  Goal: Absence of Hospital-Acquired Illness or Injury  Outcome: Progressing  Intervention: Identify and Manage Fall Risk  Recent Flowsheet Documentation  Taken 5/26/2025 2000 by Carly Kennedy RN  Safety Promotion/Fall Prevention:   assistive device/personal items within reach   clutter free environment maintained   fall prevention program maintained   lighting adjusted   nonskid shoes/slippers when out of bed   room organization consistent   safety round/check completed  Intervention: Prevent Skin Injury  Recent Flowsheet Documentation  Taken 5/26/2025 2000 by Carly Kennedy RN  Body Position:   position changed independently   weight shifting  Intervention: Prevent and Manage VTE (Venous Thromboembolism) Risk  Recent Flowsheet Documentation  Taken 5/26/2025 2000 by Carly Kennedy RN  VTE Prevention/Management: (lovenox)   SCDs (sequential compression devices) off   patient refused intervention  Intervention: Prevent Infection  Recent Flowsheet Documentation  Taken 5/26/2025 2000 by Carly Kennedy RN  Infection Prevention:   environmental surveillance performed   hand hygiene promoted   rest/sleep promoted   single patient room provided  Goal: Optimal Comfort and Wellbeing  Outcome: Progressing  Intervention: Monitor Pain and Promote Comfort  Recent Flowsheet Documentation  Taken 5/26/2025 2000 by Carly Kennedy RN  Pain Management Interventions:   care clustered   pain management plan reviewed with patient/caregiver   pillow support provided   position  adjusted   relaxation techniques promoted  Intervention: Provide Person-Centered Care  Recent Flowsheet Documentation  Taken 5/26/2025 2000 by Carly Kennedy, RN  Trust Relationship/Rapport:   choices provided   care explained   emotional support provided   empathic listening provided   questions answered   questions encouraged   reassurance provided   thoughts/feelings acknowledged  Goal: Readiness for Transition of Care  Outcome: Progressing     Problem: Skin Injury Risk Increased  Goal: Skin Health and Integrity  Outcome: Progressing  Intervention: Optimize Skin Protection  Recent Flowsheet Documentation  Taken 5/26/2025 2000 by Carly Kennedy, RN  Activity Management: activity encouraged  Pressure Reduction Techniques:   frequent weight shift encouraged   weight shift assistance provided  Head of Bed (HOB) Positioning: HOB at 30-45 degrees  Pressure Reduction Devices: specialty bed utilized     Problem: Fall Injury Risk  Goal: Absence of Fall and Fall-Related Injury  Outcome: Progressing  Intervention: Identify and Manage Contributors  Recent Flowsheet Documentation  Taken 5/26/2025 2000 by Carly Kennedy, RN  Medication Review/Management: medications reviewed  Self-Care Promotion:   independence encouraged   BADL personal objects within reach  Intervention: Promote Injury-Free Environment  Recent Flowsheet Documentation  Taken 5/26/2025 2000 by Carly Kennedy, TRISTAN  Safety Promotion/Fall Prevention:   assistive device/personal items within reach   clutter free environment maintained   fall prevention program maintained   lighting adjusted   nonskid shoes/slippers when out of bed   room organization consistent   safety round/check completed     Problem: Comorbidity Management  Goal: Maintenance of COPD Symptom Control  Outcome: Progressing  Intervention: Maintain COPD (Chronic Obstructive Pulmonary Disease) Symptom Control  Recent Flowsheet Documentation  Taken 5/26/2025 2000 by Carly Kennedy, RN  Breathing  Techniques/Airway Clearance: deep/controlled cough encouraged  Medication Review/Management: medications reviewed  Goal: Blood Pressure in Desired Range  Outcome: Progressing  Intervention: Maintain Blood Pressure Management  Recent Flowsheet Documentation  Taken 5/26/2025 2000 by Carly Kennedy RN  Medication Review/Management: medications reviewed     Problem: Malnutrition  Goal: Improved Nutritional Intake  Outcome: Progressing   Goal Outcome Evaluation:

## 2025-05-27 NOTE — CASE MANAGEMENT/SOCIAL WORK
Continued Stay Note  [facility]     Patient Name: Penelope Onofre  MRN: [id number]  Today's Date: 5/27/2025    Admit Date: 5/12/2025    Plan: [facility] pending bed availability and precert.   Discharge Plan       Row Name 05/27/25 1415       Plan    Plan [facility] pending bed availability and precert.    Patient/Family in Agreement with Plan yes    Plan Comments CCP s/w Dr. Lucas; Dr. Lucas thinks pt will be ready on Thursday after Neupogen completed; Dr. Lucas aware pt cannot have chemo while in SNF; Dr. Lucas agreeable.  CCP notified [facility] that pt could be ready by Thursday; Silvana is checking on bed availability at [facility] and will start precert today if able. [facility] notified CCP pt needs a new PT note prior to submitting pt for precert. Plan will [facility] pending bed availability and precert. CCP will continue to follow for any DC needs. RLutes RN/CCP                   Discharge Codes    No documentation.                 Expected Discharge Date and Time       Expected Discharge Date Expected Discharge Time    May 30, 2025               Ana Durant, RN

## 2025-05-27 NOTE — PLAN OF CARE
Goal Outcome Evaluation:  Plan of Care Reviewed With: patient        Progress: no change     Pt alert and oriented, calm and cooperative, talkative, on 3L NC. Took pills whole, one at a time, with apple sauce. Tolerating diet, ensures given. Pt repositioned Q2. James leaking at insertion site, RN removed james; Dr. Dowell notified. Bladder scan obtained this afternoon, pt encouraged to void. Post void residual 27 ml. PRN Xanax given once for anxiety. Plan of care ongoing.

## 2025-05-27 NOTE — THERAPY PROGRESS REPORT/RE-CERT
Patient Name: Penelope Onofre  : 1955    MRN: [id number]                              Today's Date: 2025       Admit Date: 2025    Visit Dx:     ICD-10-CM ICD-9-CM   1. Generalized weakness  R53.1 780.79   2. Pneumonia of right lower lobe due to infectious organism  J18.9 486   3. Small cell carcinoma of middle lobe of right lung  C34.2 162.4     Patient Active Problem List   Diagnosis    Arm pain, left    Generalized weakness    Dizziness    Pneumonia    Pleural effusion    At high risk for pressure injury of skin    Decubitus ulcer of sacral region, stage 1    Acute kidney failure    Dehydration    Elevated troponin    Elevated brain natriuretic peptide (BNP) level    Hypokalemia    Hyperglycemia    Small cell carcinoma of middle lobe of right lung    Severe protein-calorie malnutrition    Anxiety associated with cancer diagnosis    Anemia associated with chemotherapy    Urine retention     Past Medical History:   Diagnosis Date    Chronic back pain     Claudication     GERD (gastroesophageal reflux disease)      Past Surgical History:   Procedure Laterality Date    BREAST CYST EXCISION Right     TONSILLECTOMY      US GUIDED LYMPH NODE BIOPSY  5/15/2025    VENOUS ACCESS DEVICE (PORT) INSERTION N/A 2025    Procedure: INSERTION VENOUS ACCESS DEVICE;  Surgeon: Bria Starr MD;  Location: [facility];  Service: General;  Laterality: N/A;      General Information       Row Name 25 1512          Physical Therapy Time and Intention    Document Type progress note/recertification  -DJ     Mode of Treatment individual therapy;physical therapy  -DJ       Row Name 25 1512          General Information    Patient Profile Reviewed yes  -DJ     Existing Precautions/Restrictions fall;oxygen therapy device and L/min  3L  -DJ       Row Name 25 1512          Cognition    Orientation Status (Cognition) oriented x 3  -DJ       Row Name 25 1512          Safety Issues/Impairments  Affecting Functional Mobility    Comment, Safety Issues/Impairments (Mobility) gt belt, nonskid socks, O2  -DJ               User Key  (r) = Recorded By, (t) = Taken By, (c) = Cosigned By      Initials Name Provider Type    Renetta Ceron, PT Physical Therapist                   Mobility       Row Name 05/27/25 1513          Bed Mobility    Bed Mobility supine-sit;sit-supine  -DJ     Supine-Sit [place] (Bed Mobility) contact guard;verbal cues  -DJ     Sit-Supine [place] (Bed Mobility) contact guard;verbal cues  -DJ     Assistive Device (Bed Mobility) bed rails;head of bed elevated  -DJ     Comment, (Bed Mobility) increased time, fatigues quickly with any mobility  -DJ       Row Name 05/27/25 1513          Transfers    Comment, (Transfers) sit/stand from EOB x 2 attmepts  -DJ       Row Name 05/27/25 1513          Bed-Chair Transfer    Bed-Chair [place] (Transfers) unable to assess  -DJ       Row Name 05/27/25 1513          Sit-Stand Transfer    Sit-Stand [place] (Transfers) minimum assist (75% patient effort);1 person assist;verbal cues;moderate assist (50% patient effort)  -DJ     Assistive Device (Sit-Stand Transfers) walker, front-wheeled  -DJ     Comment, (Sit-Stand Transfer) failed at first attempt to stand, mod A for 2nd attempt  -DJ       Row Name 05/27/25 1513          Gait/Stairs (Locomotion)    [place] Level (Gait) minimum assist (75% patient effort);2 person assist;verbal cues;moderate assist (50% patient effort)  -DJ     Assistive Device (Gait) walker, front-wheeled  -DJ     Distance in Feet (Gait) 2  sidesteps, MIP  -DJ     Deviations/Abnormal Patterns (Gait) festinating/shuffling;stride length decreased  -DJ     Bilateral Gait Deviations forward flexed posture  -DJ     [place] Level (Stairs) not tested  -DJ     Comment, (Gait/Stairs) Pt very unsteady with standing and unable to amb; she took 3-4 small shuffled sidesteps with min - mod A; LOB x 2. She MIP 10x with mod A and  r wx again with LOB and unsteadiness. Pt fatigues very quickly  -DJ               User Key  (r) = Recorded By, (t) = Taken By, (c) = Cosigned By      Initials Name Provider Type    Renetta Ceron PT Physical Therapist                   Obj/Interventions       Row Name 05/27/25 1515          Motor Skills    Motor Skills functional endurance  -DJ     Functional Endurance poor  -DJ       Row Name 05/27/25 1515          Balance    Balance Assessment standing static balance;standing dynamic balance  -DJ     Static Standing Balance minimal assist;verbal cues  -DJ     Dynamic Standing Balance minimal assist;moderate assist;verbal cues  -DJ     Position/Device Used, Standing Balance walker, front-wheeled;supported  -DJ     Balance Interventions sitting;standing;sit to stand;supported;weight shifting activity  -DJ     Comment, Balance unsteady - LOB x 2 with mod A to correct  -DJ               User Key  (r) = Recorded By, (t) = Taken By, (c) = Cosigned By      Initials Name Provider Type    Renetta Ceron PT Physical Therapist                   Goals/Plan       Row Name 05/27/25 1521          Bed Mobility Goal 1 (PT)    Time Frame (Bed Mobility Goal 1, PT) 1 week  -DJ     Progress/Outcomes (Bed Mobility Goal 1, PT) continuing progress toward goal;goal ongoing  -DJ       Row Name 05/27/25 1521          Transfer Goal 1 (PT)    Time Frame (Transfer Goal 1, PT) 1 week  -DJ     Progress/Outcome (Transfer Goal 1, PT) progress slower than expected;goal ongoing  -DJ       Row Name 05/27/25 1521          Gait Training Goal 1 (PT)    Time Frame (Gait Training Goal 1, PT) 1 week  -DJ     Progress/Outcome (Gait Training Goal 1, PT) progress slower than expected;goal ongoing  -DJ       Row Name 05/27/25 1521          Patient Education Goal (PT)    Time Frame (Patient Education Goal, PT) 5 days  -DJ     Progress/Outcome (Patient Education Goal, PT) progress slower than expected;goal ongoing  -DJ               User Key  (r) = Recorded  By, (t) = Taken By, (c) = Cosigned By      Initials Name Provider Type    Renetta Ceron, PT Physical Therapist                   Clinical Impression       Row Name 05/27/25 1516          Pain    Pretreatment Pain Rating 0/10 - no pain  -DJ     Pre/Posttreatment Pain Comment c/c is overwhelming SOB  -DJ       Row Name 05/27/25 1516          Plan of Care Review    Plan of Care Reviewed With patient  -DJ     Outcome Evaluation Pt resting in bed, just returned from recliner chair with [facility] staff but agreeable to attempt to amb. She req CGA and increased time to sit EOB with vc for sequencing. She is SOB with any mobility. She stood from EOB x 2 trials but failed on her first attempt. She stood with mod A on 2nd attempt using r wx. Unsteady balance prohibited attempt to amb. Pt very unsteady with standing and unable to amb; she took 3-4 small shuffled sidesteps with min - mod A; LOB x 2. She MIP 10x with mod A and r wx on 3L O2 again with LOB and unsteadiness. Pt fatigues very quickly. She returned supine with CGA/vc She appears frail and struggles with endurance, SOA. Slow progress toward goals. Cont PT to address functional deficits and prepare for d/c to rehab as approprriate  -DJ       Row Name 05/27/25 1516          Therapy Assessment/Plan (PT)    Patient/Family Therapy Goals Statement (PT) rehab  -DJ     Rehab Potential (PT) fair  -DJ     Criteria for Skilled Interventions Met (PT) skilled treatment is necessary  -DJ     Therapy Frequency (PT) 5 times/wk  -DJ       Row Name 05/27/25 1516          Vital Signs    Pre SpO2 (%) 99  -DJ     O2 Delivery Pre Treatment nasal cannula  3L  -DJ     O2 Delivery Intra Treatment nasal cannula  3L  -DJ     Post SpO2 (%) 92  -DJ     O2 Delivery Post Treatment nasal cannula  3L  -DJ     Pre Patient Position Supine  -DJ     Intra Patient Position Standing  -DJ     Post Patient Position Supine  -DJ       Row Name 05/27/25 1516          Positioning and Restraints    Pre-Treatment  Position in bed  -DJ     Post Treatment Position bed  -DJ     In Bed notified nsg;supine;call light within reach;encouraged to call for assist;exit alarm on  -DJ               User Key  (r) = Recorded By, (t) = Taken By, (c) = Cosigned By      Initials Name Provider Type    Renetta Ceron, PT Physical Therapist                   Outcome Measures       Row Name 05/27/25 1523 05/27/25 0835       How much help from another person do you currently need...    Turning from your back to your side while in flat bed without using bedrails? 4  -DJ 3  -AR    Moving from lying on back to sitting on the side of a flat bed without bedrails? 3  -DJ 3  -AR    Moving to and from a bed to a chair (including a wheelchair)? 2  -DJ 2  -AR    Standing up from a chair using your arms (e.g., wheelchair, bedside chair)? 2  -DJ 3  -AR    Climbing 3-5 steps with a railing? 2  -DJ 2  -AR    To walk in hospital room? 2  -DJ 2  -AR    AM-PAC 6 Clicks Score (PT) 15  -DJ 15  -AR    Highest Level of Mobility Goal Move to Chair/Commode-4  -DJ Move to Chair/Commode-4  -AR      Row Name 05/27/25 1523          Functional Assessment    Outcome Measure Options AM-PAC 6 Clicks Basic Mobility (PT)  -DJ               User Key  (r) = Recorded By, (t) = Taken By, (c) = Cosigned By      Initials Name Provider Type    Renetta Ceron, PT Physical Therapist    Indigo Koenig, RN Registered Nurse                                 Physical Therapy Education       Title: PT OT SLP Therapies (Done)       Topic: Physical Therapy (Done)       Point: Mobility training (Done)       Learning Progress Summary            Patient Acceptance, E, VU,NR by DAVIE at 5/27/2025 1523    Acceptance, E, VU,DU by BLAKE at 5/26/2025 2139    Acceptance, E, VU by BLAKE at 5/25/2025 1948    Acceptance, E, VU by BLAKE at 5/24/2025 2155    Acceptance, E, NR by DAVIE at 5/23/2025 1500    Acceptance, E, NR by AR at 5/19/2025 1418    Acceptance, E, VU by LAKEISHA at 5/18/2025 0437    Acceptance, E, NR by AR  at 5/16/2025 1542    Acceptance, E, NR by DJ at 5/14/2025 1400                      Point: Home exercise program (Done)       Learning Progress Summary            Patient Acceptance, E, VU,NR by DJ at 5/27/2025 1523    Acceptance, E, VU,DU by LC at 5/26/2025 2139    Acceptance, E, VU by LC at 5/25/2025 1948    Acceptance, E, VU by LC at 5/24/2025 2155    Acceptance, E, NR by DJ at 5/23/2025 1500    Acceptance, E, NR by AR at 5/19/2025 1418    Acceptance, E, VU by MW at 5/18/2025 0437    Acceptance, E, NR by AR at 5/16/2025 1542                      Point: Body mechanics (Done)       Learning Progress Summary            Patient Acceptance, E, VU,NR by DJ at 5/27/2025 1523    Acceptance, E, VU,DU by BLAKE at 5/26/2025 2139    Acceptance, E, VU by BLAKE at 5/25/2025 1948    Acceptance, E, VU by LC at 5/24/2025 2155    Acceptance, E, NR by DJ at 5/23/2025 1500    Acceptance, E, NR by AR at 5/19/2025 1418    Acceptance, E, NR by AR at 5/16/2025 1542    Acceptance, E, NR by DJ at 5/14/2025 1400                      Point: Precautions (Done)       Learning Progress Summary            Patient Acceptance, E, VU,NR by DJ at 5/27/2025 1523    Acceptance, E, VU,DU by BLAKE at 5/26/2025 2139    Acceptance, E, VU by BLAKE at 5/25/2025 1948    Acceptance, E, VU by LC at 5/24/2025 2155    Acceptance, E, NR by DJ at 5/23/2025 1500    Acceptance, E, NR by AR at 5/19/2025 1418    Acceptance, E, NR by AR at 5/16/2025 1542    Acceptance, E, NR by DJ at 5/14/2025 1400                                      User Key       Initials Effective Dates Name Provider Type Discipline    AR 06/16/21 -  Eli Henry, PT Physical Therapist PT    DAVIE 10/25/19 -  Renetta Ely, PT Physical Therapist PT    MW 02/18/25 -  Sabina Chappell, RN Registered Nurse Nurse     01/09/25 -  Carly Kennedy RN Registered Nurse Nurse                  PT Recommendation and Plan  Planned Therapy Interventions (PT): balance training, bed mobility training, gait training, home  exercise program, strengthening, postural re-education, patient/family education, transfer training  Progress: no change  Outcome Evaluation: Pt resting in bed, just returned from recliner chair with ns staff but agreeable to attempt to amb. She req CGA and increased time to sit EOB with vc for sequencing. She is SOB with any mobility. She stood from EOB x 2 trials but failed on her first attempt. She stood with mod A on 2nd attempt using r wx. Unsteady balance prohibited attempt to amb. Pt very unsteady with standing and unable to amb; she took 3-4 small shuffled sidesteps with min - mod A; LOB x 2. She MIP 10x with mod A and r wx on 3L O2 again with LOB and unsteadiness. Pt fatigues very quickly. She returned supine with CGA/vc She appears frail and struggles with endurance, SOA. Slow progress toward goals. Cont PT to address functional deficits and prepare for d/c to rehab as approprriate     Time Calculation:   PT Evaluation Complexity  History, PT Evaluation Complexity: 3 or more personal factors and/or comorbidities  Examination of Body Systems (PT Eval Complexity): total of 4 or more elements  Clinical Presentation (PT Evaluation Complexity): evolving  Clinical Decision Making (PT Evaluation Complexity): moderate complexity  Overall Complexity (PT Evaluation Complexity): moderate complexity     PT Charges       Row Name 05/27/25 1524             Time Calculation    Start Time 1455  -DJ      Stop Time 1510  -DJ      Time Calculation (min) 15 min  -DJ      PT Non-Billable Time (min) 10 min  -DJ      PT Received On 05/27/25  -DJ      PT - Next Appointment 05/28/25  -DJ      PT Goal Re-Cert Due Date 06/03/25  -DJ                User Key  (r) = Recorded By, (t) = Taken By, (c) = Cosigned By      Initials Name Provider Type    Renetta Ceron, PT Physical Therapist                  Therapy Charges for Today       Code Description Service Date Service Provider Modifiers Qty    77855940914  PT RE-EVAL ESTABLISHED  PLAN 2 5/27/2025 Renetta Ely, PT GP 1    [id number] HC PT THERAPEUTIC ACT EA 15 MIN 5/27/2025 Renetta Ely, PT GP 1            PT G-Codes  Outcome Measure Options: AM-PAC 6 Clicks Basic Mobility (PT)  AM-PAC 6 Clicks Score (PT): 15  AM-PAC 6 Clicks Score (OT): 16  PT Discharge Summary  Anticipated Discharge Disposition (PT): skilled nursing facility    Renetta Ely PT  5/27/2025

## 2025-05-28 ENCOUNTER — DOCUMENTATION (OUTPATIENT)
Dept: OTHER | Facility: HOSPITAL | Age: 70
End: 2025-05-28
Payer: MEDICARE

## 2025-05-28 LAB
ANION GAP SERPL CALCULATED.3IONS-SCNC: 7.9 MMOL/L (ref 5–15)
BUN SERPL-MCNC: 22 MG/DL (ref 8–23)
BUN/CREAT SERPL: 21 (ref 7–25)
CALCIUM SPEC-SCNC: 8.4 MG/DL (ref 8.6–10.5)
CHLORIDE SERPL-SCNC: 107 MMOL/L (ref 98–107)
CO2 SERPL-SCNC: 23.1 MMOL/L (ref 22–29)
CREAT SERPL-MCNC: 1.05 MG/DL (ref 0.57–1)
DEPRECATED RDW RBC AUTO: 49.6 FL (ref 37–54)
EGFRCR SERPLBLD CKD-EPI 2021: 56.9 ML/MIN/1.73
EOSINOPHIL # BLD MANUAL: 0.16 10*3/MM3 (ref 0–0.4)
EOSINOPHIL NFR BLD MANUAL: 1 % (ref 0.3–6.2)
ERYTHROCYTE [DISTWIDTH] IN BLOOD BY AUTOMATED COUNT: 13 % (ref 12.3–15.4)
GLUCOSE SERPL-MCNC: 90 MG/DL (ref 65–99)
HCT VFR BLD AUTO: 33.6 % (ref 34–46.6)
HGB BLD-MCNC: 11.4 G/DL (ref 12–15.9)
LYMPHOCYTES # BLD MANUAL: 0.98 10*3/MM3 (ref 0.7–3.1)
LYMPHOCYTES NFR BLD MANUAL: 3 % (ref 5–12)
MAGNESIUM SERPL-MCNC: 1.9 MG/DL (ref 1.6–2.4)
MCH RBC QN AUTO: 35.1 PG (ref 26.6–33)
MCHC RBC AUTO-ENTMCNC: 33.9 G/DL (ref 31.5–35.7)
MCV RBC AUTO: 103.4 FL (ref 79–97)
METAMYELOCYTES NFR BLD MANUAL: 1 % (ref 0–0)
MONOCYTES # BLD: 0.49 10*3/MM3 (ref 0.1–0.9)
MYCOBACTERIUM SPEC CULT: NORMAL
MYELOCYTES NFR BLD MANUAL: 4 % (ref 0–0)
NEUTROPHILS # BLD AUTO: 13.9 10*3/MM3 (ref 1.7–7)
NEUTROPHILS NFR BLD MANUAL: 85 % (ref 42.7–76)
NIGHT BLUE STAIN TISS: NORMAL
NRBC BLD AUTO-RTO: 0 /100 WBC (ref 0–0.2)
PLAT MORPH BLD: NORMAL
PLATELET # BLD AUTO: 113 10*3/MM3 (ref 140–450)
PMV BLD AUTO: 10.3 FL (ref 6–12)
POTASSIUM SERPL-SCNC: 4.9 MMOL/L (ref 3.5–5.2)
RBC # BLD AUTO: 3.25 10*6/MM3 (ref 3.77–5.28)
RBC MORPH BLD: NORMAL
SODIUM SERPL-SCNC: 138 MMOL/L (ref 136–145)
URATE SERPL-MCNC: 3.2 MG/DL (ref 2.4–5.7)
VARIANT LYMPHS NFR BLD MANUAL: 6 % (ref 19.6–45.3)
WBC MORPH BLD: NORMAL
WBC NRBC COR # BLD AUTO: 16.35 10*3/MM3 (ref 3.4–10.8)

## 2025-05-28 PROCEDURE — 99232 SBSQ HOSP IP/OBS MODERATE 35: CPT | Performed by: INTERNAL MEDICINE

## 2025-05-28 PROCEDURE — 94664 DEMO&/EVAL PT USE INHALER: CPT

## 2025-05-28 PROCEDURE — 94799 UNLISTED PULMONARY SVC/PX: CPT

## 2025-05-28 PROCEDURE — 94760 N-INVAS EAR/PLS OXIMETRY 1: CPT

## 2025-05-28 PROCEDURE — 83735 ASSAY OF MAGNESIUM: CPT | Performed by: INTERNAL MEDICINE

## 2025-05-28 PROCEDURE — 94761 N-INVAS EAR/PLS OXIMETRY MLT: CPT

## 2025-05-28 PROCEDURE — 85025 COMPLETE CBC W/AUTO DIFF WBC: CPT | Performed by: STUDENT IN AN ORGANIZED HEALTH CARE EDUCATION/TRAINING PROGRAM

## 2025-05-28 PROCEDURE — 85007 BL SMEAR W/DIFF WBC COUNT: CPT | Performed by: STUDENT IN AN ORGANIZED HEALTH CARE EDUCATION/TRAINING PROGRAM

## 2025-05-28 PROCEDURE — 80048 BASIC METABOLIC PNL TOTAL CA: CPT | Performed by: STUDENT IN AN ORGANIZED HEALTH CARE EDUCATION/TRAINING PROGRAM

## 2025-05-28 PROCEDURE — 25010000002 ENOXAPARIN PER 10 MG: Performed by: STUDENT IN AN ORGANIZED HEALTH CARE EDUCATION/TRAINING PROGRAM

## 2025-05-28 PROCEDURE — 97535 SELF CARE MNGMENT TRAINING: CPT

## 2025-05-28 PROCEDURE — 84550 ASSAY OF BLOOD/URIC ACID: CPT | Performed by: INTERNAL MEDICINE

## 2025-05-28 PROCEDURE — 25010000002 FILGRASTIM 300 MCG/0.5ML SOLUTION PREFILLED SYRINGE: Performed by: INTERNAL MEDICINE

## 2025-05-28 RX ADMIN — BUDESONIDE 1 MG: 1 INHALANT ORAL at 20:30

## 2025-05-28 RX ADMIN — IPRATROPIUM BROMIDE AND ALBUTEROL SULFATE 3 ML: .5; 3 SOLUTION RESPIRATORY (INHALATION) at 14:42

## 2025-05-28 RX ADMIN — BUDESONIDE 1 MG: 1 INHALANT ORAL at 06:30

## 2025-05-28 RX ADMIN — IPRATROPIUM BROMIDE AND ALBUTEROL SULFATE 3 ML: .5; 3 SOLUTION RESPIRATORY (INHALATION) at 20:28

## 2025-05-28 RX ADMIN — PANTOPRAZOLE SODIUM 40 MG: 40 TABLET, DELAYED RELEASE ORAL at 08:41

## 2025-05-28 RX ADMIN — GUAIFENESIN 1200 MG: 600 TABLET, EXTENDED RELEASE ORAL at 08:41

## 2025-05-28 RX ADMIN — PANTOPRAZOLE SODIUM 40 MG: 40 TABLET, DELAYED RELEASE ORAL at 16:54

## 2025-05-28 RX ADMIN — AMLODIPINE BESYLATE 5 MG: 5 TABLET ORAL at 08:41

## 2025-05-28 RX ADMIN — IPRATROPIUM BROMIDE AND ALBUTEROL SULFATE 3 ML: .5; 3 SOLUTION RESPIRATORY (INHALATION) at 10:49

## 2025-05-28 RX ADMIN — GUAIFENESIN 1200 MG: 600 TABLET, EXTENDED RELEASE ORAL at 21:31

## 2025-05-28 RX ADMIN — Medication 10 ML: at 08:43

## 2025-05-28 RX ADMIN — ENOXAPARIN SODIUM 40 MG: 100 INJECTION SUBCUTANEOUS at 15:10

## 2025-05-28 RX ADMIN — MENTHOL, ZINC OXIDE 1 APPLICATION: .44; 20.6 OINTMENT TOPICAL at 08:43

## 2025-05-28 RX ADMIN — ALLOPURINOL 300 MG: 300 TABLET ORAL at 08:41

## 2025-05-28 RX ADMIN — ALPRAZOLAM 0.5 MG: 0.5 TABLET ORAL at 08:47

## 2025-05-28 RX ADMIN — IPRATROPIUM BROMIDE AND ALBUTEROL SULFATE 3 ML: .5; 3 SOLUTION RESPIRATORY (INHALATION) at 06:26

## 2025-05-28 RX ADMIN — ARFORMOTEROL TARTRATE 15 MCG: 15 SOLUTION RESPIRATORY (INHALATION) at 06:29

## 2025-05-28 RX ADMIN — ARFORMOTEROL TARTRATE 15 MCG: 15 SOLUTION RESPIRATORY (INHALATION) at 20:29

## 2025-05-28 RX ADMIN — Medication 10 ML: at 21:32

## 2025-05-28 RX ADMIN — FILGRASTIM 300 MCG: 300 INJECTION, SOLUTION INTRAVENOUS; SUBCUTANEOUS at 15:10

## 2025-05-28 RX ADMIN — ATORVASTATIN CALCIUM 20 MG: 20 TABLET, FILM COATED ORAL at 21:31

## 2025-05-28 RX ADMIN — MENTHOL, ZINC OXIDE 1 APPLICATION: .44; 20.6 OINTMENT TOPICAL at 21:31

## 2025-05-28 RX ADMIN — Medication 10 ML: at 21:31

## 2025-05-28 RX ADMIN — Medication 1000 MCG: at 08:41

## 2025-05-28 NOTE — PROGRESS NOTES
Subjective     CHIEF COMPLAINT:     Small cell lung cancer, extensive stage    INTERVAL HISTORY:     Patient reports feeling weak today.  No fever or chills.  No nausea/vomiting.    REVIEW OF SYSTEMS:  Pertinent ROS as in the interval history. Otherwise, negative.    SCHEDULED MEDS:  allopurinol, 300 mg, Oral, Daily  amLODIPine, 5 mg, Oral, Q24H  arformoterol, 15 mcg, Nebulization, BID - RT  atorvastatin, 20 mg, Oral, Nightly  budesonide, 1 mg, Nebulization, BID - RT  enoxaparin sodium, 40 mg, Subcutaneous, Q24H  filgrastim (NEUPOGEN) injection, 300 mcg, Subcutaneous, Q PM  fluticasone, 2 spray, Each Nare, Daily  guaiFENesin, 1,200 mg, Oral, Q12H  ipratropium-albuterol, 3 mL, Nebulization, 4x Daily - RT  Lidocaine, 1 patch, Transdermal, Q24H  Menthol-Zinc Oxide, 1 Application, Topical, BID  pantoprazole, 40 mg, Oral, BID AC  sodium chloride, 10 mL, Intravenous, Q12H  sodium chloride, 10 mL, Intravenous, Q12H  vitamin B-12, 1,000 mcg, Oral, Daily      Objective   VITAL SIGNS:  Temp:  [97.3 °F (36.3 °C)-98.3 °F (36.8 °C)] 98.2 °F (36.8 °C)  Heart Rate:  [101-113] 110  Resp:  [16-18] 18  BP: ()/(59-84) 108/84     PHYSICAL EXAMINATION:      GENERAL:  The patient appears in fair general condition, not in acute distress.  SKIN: No skin rash.   EYES:  No Jaundice. No Pallor.   CHEST: Normal respiratory effort. Port in the left upper chest is benign.  She has a benign-appearing soft tissue mass in the right upper chest.  Lungs clear bilaterally.  CARDIAC:  Normal S1 & S2. No murmur.   ABDOMEN:  Soft. No tenderness. No Hepatomegaly. No Splenomegaly.   EXTREMITIES:  No edema.    RESULT REVIEW:   Results from last 7 days   Lab Units 05/28/25  0509 05/27/25  0611 05/26/25  0549 05/25/25  0455 05/24/25  0754   WBC 10*3/mm3 16.35* 18.98* 6.42 7.22 3.13*   NEUTROS ABS 10*3/mm3 13.90* 17.46* 5.90 6.37 2.61   LYMPHS ABS 10*3/mm3 0.98 1.52 0.39*  --   --    HEMOGLOBIN g/dL 11.4* 12.5 11.5* 11.6* 12.1   HEMATOCRIT % 33.6* 36.9  35.8 33.7* 36.8   PLATELETS 10*3/mm3 113* 141 151 161 147     Results from last 7 days   Lab Units 05/28/25  0509 05/27/25  0611 05/26/25  0549 05/25/25  0455 05/24/25  0754   SODIUM mmol/L 138 135* 136 137 137   POTASSIUM mmol/L 4.9 4.6 4.7 4.3 4.3   CHLORIDE mmol/L 107 106 106 107 106   CO2 mmol/L 23.1 22.0 20.1* 18.0* 21.0*   BUN mg/dL 22.0 22 18 17 12   CREATININE mg/dL 1.05* 1.12* 1.03* 1.06* 1.00   CALCIUM mg/dL 8.4* 8.6 8.6 8.4* 8.4*   MAGNESIUM mg/dL 1.9 1.9 1.9 1.7 1.7     Results from last 7 days   Lab Units 05/22/25  0611   INR  1.06   APTT seconds 28.5         Lab 05/25/25  0455   IRON 124   IRON SATURATION (TSAT) 82*   TIBC 152*   TRANSFERRIN 102*   FERRITIN 629.00*      Component      Latest Ref Rng 5/25/2025 5/26/2025 5/27/2025 5/28/2025   Uric Acid      2.4 - 5.7 mg/dL 3.9  3.3  3.6  3.2      Assessment & Plan   *Small cell lung cancer, extensive stage.  She has been to have recommended last bulky right hilar mass lymphadenopathy.  There is right supraclavicular adenopathy.  Biopsy revealed tumor with high Ki-67 of >95%.  CT brain with no metastasis.  Dr. Koroma discussed with the patient systemic chemotherapy with carboplatin etoposide x 3 days with addition of chemotherapy.  Patient received carboplatin and etoposide between 5/23/2025 and 5/25/2025.  She tolerated the treatment well.  She is at increased risk for neutropenia and neutropenic infection.  She was started on Neupogen 300 mcg SQ daily on 5/26/2025.  Plan to give a total of 4 doses of Neupogen with the last dose to be given on 5/29/2025.    *Nausea secondary to chemotherapy.  Patient is on Zyprexa 5 mg nightly x 4 nights.  She is not having nausea at this time.    *Tumor lysis syndrome prophylaxis.  Patient's allopurinol which was started on 5/21/2025.  5/27/2025: Uric acid 3.6.    *Anemia.  5/26/2025: Hemoglobin 11.5.  Vitamin B12 low normal at 394.  Folate 12.4.  Patient was placed on vitamin B12 1000 mcg daily.    *Urinary  retention.  Patient had Bae catheter placement.    *Anxiety.  Patient was placed on Xanax 0.5 mg every 8 hours as needed.    PLAN:    1.  Continue Neupogen 300 mcg daily daily for total of 4 days to be completed on 5/29/2025.  2.  Patient can be discharged to rehab tomorrow, 5/29/2025.  3.  Patient will not receive chemotherapy treatment while in rehab.  4.  Continue allopurinol 300 mg daily.  5.  Repeat CBC in AM.      Anisha Lucas MD  05/28/25

## 2025-05-28 NOTE — CASE MANAGEMENT/SOCIAL WORK
Oncology Social Work    Diagnosis:  Small cell lung cancer, extensive stage.     Social Work referral submitted to address all patient needs.  OSW met with the patient at bedside; explained role of OSW and supportive oncology services.  The patient states that she resides alone in a first floor apartment, has a dog who is being kept by a nurses family member and states that she has a 2 brothers locally who she was estranged from:  Suman and Carlos Swansonadrian.  The patient states that her nephew who is listed as her next of kin contact lives in [place], [place].  The patient stated that she previously used no DME and she was informed if DME is needed upon d/c from rehab they will assist her in obtaining.  The patient stated that she was having difficulty paying her [employer] bill and was provided with the [employer] service number for assistance.  OSW discussed the following areas with the patient and she was agreeable to referrals for assistance:  1.  Medicaid Waiver for in home care, 2.  [employer] to assist with cleaning her apartment, 3.  [facility] 3 for transportation assistance, 4. Nurse navigator referral, 5. Dietitian referral, 6.  Referral to [facility] for anxiety/depression and 7. Follow up and discuss living will.  OSW will make the first 5 referrals and will follow up with the patient after completion of rehab to assist with the the last 2 referrals.  SEAN Valdez

## 2025-05-28 NOTE — CASE MANAGEMENT/SOCIAL WORK
Oncology Social Work  Referral made to [employer] to assist with helping the patient get her apartment cleaned.  OSW will follow up with the referral once patient gets discharged from rehab and if no agency is located to assist may reach out to other programs for financial assistance to cover the cost for the patient.  SEAN Valdez

## 2025-05-28 NOTE — THERAPY TREATMENT NOTE
Patient Name: Penelope Onofre  : 1955    MRN: [id number]                              Today's Date: 2025       Admit Date: 2025    Visit Dx:     ICD-10-CM ICD-9-CM   1. Generalized weakness  R53.1 780.79   2. Pneumonia of right lower lobe due to infectious organism  J18.9 486   3. Small cell carcinoma of middle lobe of right lung  C34.2 162.4     Patient Active Problem List   Diagnosis    Arm pain, left    Generalized weakness    Dizziness    Pneumonia    Pleural effusion    At high risk for pressure injury of skin    Decubitus ulcer of sacral region, stage 1    Acute kidney failure    Dehydration    Elevated troponin    Elevated brain natriuretic peptide (BNP) level    Hypokalemia    Hyperglycemia    Small cell carcinoma of middle lobe of right lung    Severe protein-calorie malnutrition    Anxiety associated with cancer diagnosis    Anemia associated with chemotherapy    Urine retention     Past Medical History:   Diagnosis Date    Chronic back pain     Claudication     GERD (gastroesophageal reflux disease)      Past Surgical History:   Procedure Laterality Date    BREAST CYST EXCISION Right     TONSILLECTOMY      US GUIDED LYMPH NODE BIOPSY  5/15/2025    VENOUS ACCESS DEVICE (PORT) INSERTION N/A 2025    Procedure: INSERTION VENOUS ACCESS DEVICE;  Surgeon: Bria Starr MD;  Location: [facility];  Service: General;  Laterality: N/A;      General Information       Row Name 25 1130          OT Time and Intention    Subjective Information complains of;weakness (P)   -TW     Document Type therapy note (daily note) (P)   -TW     Mode of Treatment individual therapy;occupational therapy (P)   -TW     Patient Effort good (P)   -TW     Symptoms Noted During/After Treatment fatigue (P)   -TW       Row Name 25 1138          General Information    Patient Profile Reviewed yes (P)   -TW     Prior Level of Function independent:;ADL's (P)   -TW     Existing Precautions/Restrictions  fall;oxygen therapy device and L/min (P)   -TW     Barriers to Rehab previous functional deficit (P)   -TW       Row Name 05/28/25 1130          Cognition    Orientation Status (Cognition) oriented x 3 (P)   -       Row Name 05/28/25 1130          Safety Issues/Impairments Affecting Functional Mobility    Safety Issues Affecting Function (Mobility) awareness of need for assistance;insight into deficits/self-awareness;judgment;safety precaution awareness;safety precautions follow-through/compliance (P)   -TW     Impairments Affecting Function (Mobility) endurance/activity tolerance;shortness of breath;strength (P)   -TW               User Key  (r) = Recorded By, (t) = Taken By, (c) = Cosigned By      Initials Name Provider Type    TW Krish Momin OT Student OT Student                     Mobility/ADL's       Row Name 05/28/25 1133          Bed Mobility    Bed Mobility bed mobility (all) activities (P)   -TW     All Activities, [place] (Bed Mobility) standby assist (P)   -TW     Supine-Sit [place] (Bed Mobility) standby assist (P)   -TW     Sit-Supine [place] (Bed Mobility) standby assist (P)   -TW     Assistive Device (Bed Mobility) bed rails;head of bed elevated (P)   -TW     Comment, (Bed Mobility) increased time, SOA present during functional activities (P)   -TW       Row Name 05/28/25 1133          Transfers    Transfers sit-stand transfer;stand-sit transfer (P)   -[facility] Name 05/28/25 1133          Sit-Stand Transfer    Sit-Stand [place] (Transfers) moderate assist (50% patient effort);verbal cues (P)   -TW     Assistive Device (Sit-Stand Transfers) walker, front-wheeled (P)   -       Row Name 05/28/25 1133          Stand-Sit Transfer    Stand-Sit [place] (Transfers) moderate assist (50% patient effort) (P)   -TW     Assistive Device (Stand-Sit Transfers) walker, front-wheeled (P)   -       Row Name 05/28/25 1133          Functional Mobility    Functional Mobility- Comment  functional mobility not assessed due to pts activity tolerance (P)   -TW       Row Name 05/28/25 1133          Activities of Daily Living    BADL Assessment/Intervention grooming (P)   -TW       Row Name 05/28/25 1133          Grooming Assessment/Training    [place] Level (Grooming) grooming skills;wash face, hands;hair care, combing/brushing;set up;standby assist (P)   -     Position (Grooming) edge of bed sitting (P)   -               User Key  (r) = Recorded By, (t) = Taken By, (c) = Cosigned By      Initials Name Provider Type    TW Krish Momin, OT Student OT Student                   Obj/Interventions       Row Name 05/28/25 1136          Sensory Assessment (Somatosensory)    Sensory Assessment Sensation WFL throughout all functional activities (P)   -TW       Row Name 05/28/25 1136          Vision Assessment/Intervention    Visual Impairment/Limitations WFL (P)   -     Vision Assessment Comment Vision WFL throughout all functional activities (P)   -TW       Row Name 05/28/25 1136          Range of Motion Comprehensive    General Range of Motion bilateral upper extremity ROM WNL (P)   -TW       Row Name 05/28/25 1136          Strength Comprehensive (MMT)    Comment, General Manual Muscle Testing (MMT) Assessment generalized weakness due to dx and decreased activity tolerance (P)   -TW       Row Name 05/28/25 1136          Balance    Comment, Balance all sitting balance activities were CGA throughout the session, all standing balance activities were min-mod A throughout the session (P)   -               User Key  (r) = Recorded By, (t) = Taken By, (c) = Cosigned By      Initials Name Provider Type    TW Krish Momin, OT Student OT Student                   Goals/Plan    No documentation.                  Clinical Impression       Row Name 05/28/25 1137          Pain Assessment    Pre/Posttreatment Pain Comment no numerical value given for pain (P)   -TW       Row Name 05/28/25 1137          Plan  of Care Review    Plan of Care Reviewed With patient (P)   -TW     Progress no change (P)   -TW     Outcome Evaluation The pt was seen for an OT treatment session on this service date. She was found supine upon arrival and was A&O x3. The pt participated in bed mobility with SBA to EOB. She engaged in face hygiene and hair brushing with set-up/SBA while seated. The pt demonstrated two STS with mod A each. The pt stated that she felt light headed after the second STS and was seated back at EOB. She was educated on PLB to improve energy conservation during ADL tasks. Due to increased fatigue, the pt had reached her activity tolerance and completed the session supine and was educated on the use of the call light to notify nsg of needs. The pt would benefit from increased education on energy conservation to improve overall functional performance in ADLs. SNF recommended upon d/c. OT will continue to monitor. (P)   -TW       Row Name 05/28/25 1137          Therapy Assessment/Plan (OT)    Rehab Potential (OT) good (P)   -TW     Criteria for Skilled Therapeutic Interventions Met (OT) yes;meets criteria;skilled treatment is necessary (P)   -TW       Row Name 05/28/25 1137          Therapy Plan Review/Discharge Plan (OT)    Anticipated Discharge Disposition (OT) skilled nursing facility (P)   -TW       Row Name 05/28/25 1137          Vital Signs    Pre SpO2 (%) 97 (P)   -TW     O2 Delivery Pre Treatment nasal cannula (P)   -TW     Intra SpO2 (%) 91 (P)   -TW     O2 Delivery Intra Treatment nasal cannula (P)   -TW     Post SpO2 (%) 95 (P)   -TW     O2 Delivery Post Treatment nasal cannula (P)   -TW     Pre Patient Position Supine (P)   -TW     Intra Patient Position Standing (P)   -TW     Post Patient Position Supine (P)   -TW       Row Name 05/28/25 1137          Positioning and Restraints    Pre-Treatment Position in bed (P)   -TW     Post Treatment Position bed (P)   -TW     In Bed notified nsg;supine;fowlers;call light  within reach;encouraged to call for assist;exit alarm on (P)   -TW               User Key  (r) = Recorded By, (t) = Taken By, (c) = Cosigned By      Initials Name Provider Type    Krish Rain OT Student OT Student                   Outcome Measures       Row Name 05/28/25 0845          How much help from another person do you currently need...    Turning from your back to your side while in flat bed without using bedrails? 4  -AR     Moving from lying on back to sitting on the side of a flat bed without bedrails? 3  -AR     Moving to and from a bed to a chair (including a wheelchair)? 2  -AR     Standing up from a chair using your arms (e.g., wheelchair, bedside chair)? 2  -AR     Climbing 3-5 steps with a railing? 2  -AR     To walk in hospital room? 2  -AR     AM-PAC 6 Clicks Score (PT) 15  -AR     Highest Level of Mobility Goal Move to Chair/Commode-4  -AR               User Key  (r) = Recorded By, (t) = Taken By, (c) = Cosigned By      Initials Name Provider Type    Indigo Koenig, RN Registered Nurse                    Occupational Therapy Education       Title: PT OT SLP Therapies (Done)       Topic: Occupational Therapy (Done)       Point: ADL training (Done)       Learning Progress Summary            Patient Acceptance, E,TB, VU by  at 5/28/2025 1139    Comment: Role of OT, pursed lip breathing, importance of energy conservation, use of call light to notify nsg                      Point: Home exercise program (Done)       Learning Progress Summary            Patient Acceptance, E,TB, VU by TW at 5/28/2025 1139    Comment: Role of OT, pursed lip breathing, importance of energy conservation, use of call light to notify nsg                      Point: Precautions (Done)       Learning Progress Summary            Patient Acceptance, E,TB, VU by  at 5/28/2025 1139    Comment: Role of OT, pursed lip breathing, importance of energy conservation, use of call light to notify nsg                       Point: Body mechanics (Done)       Learning Progress Summary            Patient Acceptance, E,TB, VU by TW at 5/28/2025 9899    Comment: Role of OT, pursed lip breathing, importance of energy conservation, use of call light to notify nsg                                      User Key       Initials Effective Dates Name Provider Type Discipline     04/30/25 -  Krish Momin OT Student OT Student OT                  OT Recommendation and Plan     Plan of Care Review  Plan of Care Reviewed With: (P) patient  Progress: (P) no change  Outcome Evaluation: (P) The pt was seen for an OT treatment session on this service date. She was found supine upon arrival and was A&O x3. The pt participated in bed mobility with SBA to EOB. She engaged in face hygiene and hair brushing with set-up/SBA while seated. The pt demonstrated two STS with mod A each. The pt stated that she felt light headed after the second STS and was seated back at EOB. She was educated on PLB to improve energy conservation during ADL tasks. Due to increased fatigue, the pt had reached her activity tolerance and completed the session supine and was educated on the use of the call light to notify nsg of needs. The pt would benefit from increased education on energy conservation to improve overall functional performance in ADLs. SNF recommended upon d/c. OT will continue to monitor.     Time Calculation:         Time Calculation- OT       Row Name 05/28/25 1140             Time Calculation- OT    OT Start Time 1100 (P)   -TW      OT Stop Time 1120 (P)   -TW      OT Time Calculation (min) 20 min (P)   -TW      OT Received On 05/28/25 (P)   -TW      OT - Next Appointment 05/29/25 (P)   -TW         Timed Charges    62486 - OT Self Care/Mgmt Minutes 20 (P)   -TW         Total Minutes    Timed Charges Total Minutes 20 (P)   -TW       Total Minutes 20 (P)   -TW                User Key  (r) = Recorded By, (t) = Taken By, (c) = Cosigned By      Initials Name Provider  Type    TW Krish Momin, OT Student OT Student                           Krish Momin, OT Student  5/28/2025

## 2025-05-28 NOTE — CASE MANAGEMENT/SOCIAL WORK
Oncology Social Work  Referral made to Oncology nurse navigator and dietitian to follow.  SEAN Valdez

## 2025-05-28 NOTE — PROGRESS NOTES
"    DAILY PROGRESS NOTE  [facility]    Patient Identification:  Name: Penelope Onofre  Age: 70 y.o.  Sex: female  :  1955  MRN: [id number]         Primary Care Physician: Bonny Judge MD    Subjective:  Interval History: Patient was a bit distraught this morning as she seemed overly worked up about the idea of radiation therapy.  I counseled at length with RN at bedside and she seemed more reassured.  Otherwise not voicing any new medical complaints.  Bae is out and patient is urinating on her own and will hold any further replacements or concern for close bedside bladder scans as she is not complaining of any  symptoms.  No bleeding    Objective:    Scheduled Meds:allopurinol, 300 mg, Oral, Daily  amLODIPine, 5 mg, Oral, Q24H  arformoterol, 15 mcg, Nebulization, BID - RT  atorvastatin, 20 mg, Oral, Nightly  budesonide, 1 mg, Nebulization, BID - RT  enoxaparin sodium, 40 mg, Subcutaneous, Q24H  filgrastim (NEUPOGEN) injection, 300 mcg, Subcutaneous, Q PM  fluticasone, 2 spray, Each Nare, Daily  guaiFENesin, 1,200 mg, Oral, Q12H  ipratropium-albuterol, 3 mL, Nebulization, 4x Daily - RT  Lidocaine, 1 patch, Transdermal, Q24H  Menthol-Zinc Oxide, 1 Application, Topical, BID  pantoprazole, 40 mg, Oral, BID AC  sodium chloride, 10 mL, Intravenous, Q12H  sodium chloride, 10 mL, Intravenous, Q12H  vitamin B-12, 1,000 mcg, Oral, Daily      Continuous Infusions:     Vital signs in last 24 hours:  Temp:  [97.3 °F (36.3 °C)-98.3 °F (36.8 °C)] 98.2 °F (36.8 °C)  Heart Rate:  [101-113] 110  Resp:  [16-18] 18  BP: ()/(59-84) 108/84    Intake/Output:    Intake/Output Summary (Last 24 hours) at 2025 0967  Last data filed at 2025 0845  Gross per 24 hour   Intake 600 ml   Output 200 ml   Net 400 ml       Exam:  /84 (BP Location: Left arm, Patient Position: Lying)   Pulse 110   Temp 98.2 °F (36.8 °C) (Oral)   Resp 18   Ht 182.9 cm (72\")   Wt 60.1 kg (132 lb 7.9 oz)   SpO2 96% "   BMI 17.97 kg/m²     General Appearance:    Alert, cooperative, AAOx3                         Throat:   Oral mucosa pink and moist                           Neck:   No JVD                         Lungs:    Clear to auscultation bilaterally, respirations unlabored                 Chest Wall:    No tenderness or deformity-soft chest wall mass                          Heart:    Regular rate and rhythm, S1 and S2 normal                  Abdomen:     Soft, nontender, bowel sounds active                 Extremities: Moving all, no cyanosis or edema                        Pulses:   Pulses palpable in all extremities                            Skin:   Skin is warm and dry                  Neurologic:   CNII-XII intact     Data Review:  Labs in chart were reviewed.    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       Plan:    Tolerating regimen per oncology and patient informs me now planning on having radiation oncology evaluate    Neupogen noted/WBC escalated/afebrile    Urinary retention with Bae removed and patient urinating    Slight drop in hemoglobin and platelet count  noted        Disposition: I was prepping for discharge this week per oncological recommendations.  Bed available at outlying facility/[facility]      Addendum: Notified by RN that no plans for any radiation therapy    Agusto Dowell MD  5/28/2025  09:55 EDT

## 2025-05-28 NOTE — PLAN OF CARE
Goal Outcome Evaluation:  Plan of Care Reviewed With: patient        Progress: no change     Pt alert and oriented, calm and cooperative, talkative, on 3L NC. Took pills whole, one at a time, with apple sauce. SUB Q Neupogen given. Tolerating diet, ensures given. Pt repositioned Q2. PRN Xanax given once for anxiety. Pt incontinent, brief in place. Bladder scan this AM- 30 ml.  Plan of care ongoing.

## 2025-05-28 NOTE — PLAN OF CARE
Goal Outcome Evaluation:  Plan of Care Reviewed With: (P) patient        Progress: (P) no change  Outcome Evaluation: (P) The pt was seen for an OT treatment session on this service date. She was found supine upon arrival and was A&O x3. The pt participated in bed mobility with SBA to EOB. She engaged in face hygiene and hair brushing with set-up/SBA while seated. The pt demonstrated two STS with mod A each. The pt stated that she felt light headed after the second STS and was seated back at EOB. She was educated on PLB to improve energy conservation during ADL tasks. Due to increased fatigue, the pt had reached her activity tolerance and completed the session supine and was educated on the use of the call light to notify nsg of needs. The pt would benefit from increased education on energy conservation to improve overall functional performance in ADLs. SNF recommended upon d/c. OT will continue to monitor.    Anticipated Discharge Disposition (OT): (P) skilled nursing facility

## 2025-05-28 NOTE — PLAN OF CARE
Problem: Fatigue  Goal: Improved Activity Tolerance  Outcome: Progressing  Intervention: Promote Improved Energy  Recent Flowsheet Documentation  Taken 5/27/2025 2015 by Carly Kennedy RN  Activity Management:   up to bedside commode   activity encouraged     Problem: Adult Inpatient Plan of Care  Goal: Plan of Care Review  Outcome: Progressing  Goal: Patient-Specific Goal (Individualized)  Outcome: Progressing  Goal: Absence of Hospital-Acquired Illness or Injury  Outcome: Progressing  Intervention: Identify and Manage Fall Risk  Recent Flowsheet Documentation  Taken 5/27/2025 2015 by Carly Kennedy RN  Safety Promotion/Fall Prevention:   assistive device/personal items within reach   clutter free environment maintained   fall prevention program maintained   lighting adjusted   nonskid shoes/slippers when out of bed   room organization consistent   safety round/check completed  Intervention: Prevent Skin Injury  Recent Flowsheet Documentation  Taken 5/27/2025 2015 by Carly Kennedy RN  Body Position: position changed independently  Skin Protection: drying agents applied  Intervention: Prevent and Manage VTE (Venous Thromboembolism) Risk  Recent Flowsheet Documentation  Taken 5/27/2025 2015 by Carly Kennedy RN  VTE Prevention/Management: SCDs (sequential compression devices) off  Intervention: Prevent Infection  Recent Flowsheet Documentation  Taken 5/27/2025 2015 by Carly Kennedy RN  Infection Prevention:   environmental surveillance performed   hand hygiene promoted   rest/sleep promoted   single patient room provided  Goal: Optimal Comfort and Wellbeing  Outcome: Progressing  Intervention: Monitor Pain and Promote Comfort  Recent Flowsheet Documentation  Taken 5/27/2025 2015 by Carly Kennedy RN  Pain Management Interventions: care clustered  Intervention: Provide Person-Centered Care  Recent Flowsheet Documentation  Taken 5/27/2025 2015 by Carly Kennedy RN  Trust Relationship/Rapport:   care explained    choices provided   emotional support provided   empathic listening provided   questions answered   questions encouraged   reassurance provided   thoughts/feelings acknowledged  Goal: Readiness for Transition of Care  Outcome: Progressing     Problem: Skin Injury Risk Increased  Goal: Skin Health and Integrity  Outcome: Progressing  Intervention: Optimize Skin Protection  Recent Flowsheet Documentation  Taken 5/27/2025 2015 by Carly Kennedy, RN  Activity Management:   up to bedside commode   activity encouraged  Pressure Reduction Techniques: frequent weight shift encouraged  Head of Bed (HOB) Positioning: HOB at 30-45 degrees  Pressure Reduction Devices: alternating pressure pump (SHANICE)  Skin Protection: drying agents applied     Problem: Fall Injury Risk  Goal: Absence of Fall and Fall-Related Injury  Outcome: Progressing  Intervention: Identify and Manage Contributors  Recent Flowsheet Documentation  Taken 5/27/2025 2015 by Carly Kennedy, RN  Medication Review/Management: medications reviewed  Self-Care Promotion:   independence encouraged   BADL personal objects within reach  Intervention: Promote Injury-Free Environment  Recent Flowsheet Documentation  Taken 5/27/2025 2015 by Carly Kennedy, RN  Safety Promotion/Fall Prevention:   assistive device/personal items within reach   clutter free environment maintained   fall prevention program maintained   lighting adjusted   nonskid shoes/slippers when out of bed   room organization consistent   safety round/check completed     Problem: Comorbidity Management  Goal: Maintenance of COPD Symptom Control  Outcome: Progressing  Intervention: Maintain COPD (Chronic Obstructive Pulmonary Disease) Symptom Control  Recent Flowsheet Documentation  Taken 5/27/2025 2015 by Carly Kennedy, RN  Medication Review/Management: medications reviewed  Goal: Blood Pressure in Desired Range  Outcome: Progressing  Intervention: Maintain Blood Pressure Management  Recent Flowsheet  Documentation  Taken 5/27/2025 2015 by Carly Kennedy, RN  Medication Review/Management: medications reviewed     Problem: Malnutrition  Goal: Improved Nutritional Intake  Outcome: Progressing   Goal Outcome Evaluation:

## 2025-05-28 NOTE — CASE MANAGEMENT/SOCIAL WORK
Oncology Social Work  Referral made to [facility] for Medicaid Waiver-in home care assistance. SEAN Valdez

## 2025-05-28 NOTE — SIGNIFICANT NOTE
PT note: Pt declined due to fatigue from working with OT today. Pt will be placed on shared list.. but will likely be checked on tomorrow.

## 2025-05-29 ENCOUNTER — PATIENT OUTREACH (OUTPATIENT)
Dept: OTHER | Facility: HOSPITAL | Age: 70
End: 2025-05-29
Payer: MEDICARE

## 2025-05-29 ENCOUNTER — DOCUMENTATION (OUTPATIENT)
Dept: OTHER | Facility: HOSPITAL | Age: 70
End: 2025-05-29
Payer: MEDICARE

## 2025-05-29 DIAGNOSIS — F41.1 ANXIETY ASSOCIATED WITH CANCER DIAGNOSIS: ICD-10-CM

## 2025-05-29 DIAGNOSIS — E43 SEVERE PROTEIN-CALORIE MALNUTRITION: ICD-10-CM

## 2025-05-29 DIAGNOSIS — C34.2 SMALL CELL CARCINOMA OF MIDDLE LOBE OF RIGHT LUNG: Primary | ICD-10-CM

## 2025-05-29 DIAGNOSIS — C80.1 ANXIETY ASSOCIATED WITH CANCER DIAGNOSIS: ICD-10-CM

## 2025-05-29 LAB
ANION GAP SERPL CALCULATED.3IONS-SCNC: 7.6 MMOL/L (ref 5–15)
ANISOCYTOSIS BLD QL: ABNORMAL
BASOPHILS # BLD MANUAL: 0 10*3/MM3 (ref 0–0.2)
BASOPHILS NFR BLD MANUAL: 0 % (ref 0–1.5)
BUN SERPL-MCNC: 21 MG/DL (ref 8–23)
BUN/CREAT SERPL: 23.6 (ref 7–25)
CALCIUM SPEC-SCNC: 8.4 MG/DL (ref 8.6–10.5)
CHLORIDE SERPL-SCNC: 104 MMOL/L (ref 98–107)
CO2 SERPL-SCNC: 23.4 MMOL/L (ref 22–29)
CREAT SERPL-MCNC: 0.89 MG/DL (ref 0.57–1)
DEPRECATED RDW RBC AUTO: 52.2 FL (ref 37–54)
EGFRCR SERPLBLD CKD-EPI 2021: 69.4 ML/MIN/1.73
EOSINOPHIL # BLD MANUAL: 0.1 10*3/MM3 (ref 0–0.4)
EOSINOPHIL NFR BLD MANUAL: 1 % (ref 0.3–6.2)
ERYTHROCYTE [DISTWIDTH] IN BLOOD BY AUTOMATED COUNT: 13.2 % (ref 12.3–15.4)
GLUCOSE SERPL-MCNC: 92 MG/DL (ref 65–99)
HCT VFR BLD AUTO: 33.6 % (ref 34–46.6)
HGB BLD-MCNC: 10.9 G/DL (ref 12–15.9)
LYMPHOCYTES # BLD MANUAL: 0.7 10*3/MM3 (ref 0.7–3.1)
LYMPHOCYTES NFR BLD MANUAL: 1 % (ref 5–12)
MACROCYTES BLD QL SMEAR: ABNORMAL
MCH RBC QN AUTO: 34.9 PG (ref 26.6–33)
MCHC RBC AUTO-ENTMCNC: 32.4 G/DL (ref 31.5–35.7)
MCV RBC AUTO: 107.7 FL (ref 79–97)
MONOCYTES # BLD: 0.1 10*3/MM3 (ref 0.1–0.9)
NEUTROPHILS # BLD AUTO: 9.1 10*3/MM3 (ref 1.7–7)
NEUTROPHILS NFR BLD MANUAL: 91 % (ref 42.7–76)
PLAT MORPH BLD: NORMAL
PLATELET # BLD AUTO: 93 10*3/MM3 (ref 140–450)
PMV BLD AUTO: 10.5 FL (ref 6–12)
POTASSIUM SERPL-SCNC: 4.7 MMOL/L (ref 3.5–5.2)
RBC # BLD AUTO: 3.12 10*6/MM3 (ref 3.77–5.28)
SODIUM SERPL-SCNC: 135 MMOL/L (ref 136–145)
VARIANT LYMPHS NFR BLD MANUAL: 7 % (ref 19.6–45.3)
WBC MORPH BLD: NORMAL
WBC NRBC COR # BLD AUTO: 10 10*3/MM3 (ref 3.4–10.8)

## 2025-05-29 PROCEDURE — 94799 UNLISTED PULMONARY SVC/PX: CPT

## 2025-05-29 PROCEDURE — 94664 DEMO&/EVAL PT USE INHALER: CPT

## 2025-05-29 PROCEDURE — 80048 BASIC METABOLIC PNL TOTAL CA: CPT | Performed by: STUDENT IN AN ORGANIZED HEALTH CARE EDUCATION/TRAINING PROGRAM

## 2025-05-29 PROCEDURE — 97530 THERAPEUTIC ACTIVITIES: CPT

## 2025-05-29 PROCEDURE — 85007 BL SMEAR W/DIFF WBC COUNT: CPT | Performed by: STUDENT IN AN ORGANIZED HEALTH CARE EDUCATION/TRAINING PROGRAM

## 2025-05-29 PROCEDURE — 25010000002 ENOXAPARIN PER 10 MG: Performed by: STUDENT IN AN ORGANIZED HEALTH CARE EDUCATION/TRAINING PROGRAM

## 2025-05-29 PROCEDURE — 85025 COMPLETE CBC W/AUTO DIFF WBC: CPT | Performed by: STUDENT IN AN ORGANIZED HEALTH CARE EDUCATION/TRAINING PROGRAM

## 2025-05-29 PROCEDURE — 25010000002 FILGRASTIM 300 MCG/0.5ML SOLUTION PREFILLED SYRINGE: Performed by: INTERNAL MEDICINE

## 2025-05-29 PROCEDURE — 94760 N-INVAS EAR/PLS OXIMETRY 1: CPT

## 2025-05-29 PROCEDURE — 94761 N-INVAS EAR/PLS OXIMETRY MLT: CPT

## 2025-05-29 PROCEDURE — 99232 SBSQ HOSP IP/OBS MODERATE 35: CPT | Performed by: INTERNAL MEDICINE

## 2025-05-29 RX ADMIN — Medication 10 ML: at 10:34

## 2025-05-29 RX ADMIN — Medication 10 ML: at 20:14

## 2025-05-29 RX ADMIN — PANTOPRAZOLE SODIUM 40 MG: 40 TABLET, DELAYED RELEASE ORAL at 06:32

## 2025-05-29 RX ADMIN — MENTHOL, ZINC OXIDE 1 APPLICATION: .44; 20.6 OINTMENT TOPICAL at 10:34

## 2025-05-29 RX ADMIN — MENTHOL, ZINC OXIDE 1 APPLICATION: .44; 20.6 OINTMENT TOPICAL at 20:14

## 2025-05-29 RX ADMIN — ATORVASTATIN CALCIUM 20 MG: 20 TABLET, FILM COATED ORAL at 20:13

## 2025-05-29 RX ADMIN — IPRATROPIUM BROMIDE AND ALBUTEROL SULFATE 3 ML: .5; 3 SOLUTION RESPIRATORY (INHALATION) at 20:44

## 2025-05-29 RX ADMIN — AMLODIPINE BESYLATE 5 MG: 5 TABLET ORAL at 10:35

## 2025-05-29 RX ADMIN — FILGRASTIM 300 MCG: 300 INJECTION, SOLUTION INTRAVENOUS; SUBCUTANEOUS at 15:57

## 2025-05-29 RX ADMIN — ALPRAZOLAM 0.5 MG: 0.5 TABLET ORAL at 10:37

## 2025-05-29 RX ADMIN — IPRATROPIUM BROMIDE AND ALBUTEROL SULFATE 3 ML: .5; 3 SOLUTION RESPIRATORY (INHALATION) at 11:33

## 2025-05-29 RX ADMIN — ARFORMOTEROL TARTRATE 15 MCG: 15 SOLUTION RESPIRATORY (INHALATION) at 20:44

## 2025-05-29 RX ADMIN — IPRATROPIUM BROMIDE AND ALBUTEROL SULFATE 3 ML: .5; 3 SOLUTION RESPIRATORY (INHALATION) at 06:55

## 2025-05-29 RX ADMIN — Medication 1000 MCG: at 10:34

## 2025-05-29 RX ADMIN — GUAIFENESIN 1200 MG: 600 TABLET, EXTENDED RELEASE ORAL at 20:13

## 2025-05-29 RX ADMIN — FLUTICASONE PROPIONATE 2 SPRAY: 50 SPRAY, METERED NASAL at 10:34

## 2025-05-29 RX ADMIN — GUAIFENESIN 1200 MG: 600 TABLET, EXTENDED RELEASE ORAL at 10:33

## 2025-05-29 RX ADMIN — IPRATROPIUM BROMIDE AND ALBUTEROL SULFATE 3 ML: .5; 3 SOLUTION RESPIRATORY (INHALATION) at 14:57

## 2025-05-29 RX ADMIN — ARFORMOTEROL TARTRATE 15 MCG: 15 SOLUTION RESPIRATORY (INHALATION) at 06:57

## 2025-05-29 RX ADMIN — ENOXAPARIN SODIUM 40 MG: 100 INJECTION SUBCUTANEOUS at 15:57

## 2025-05-29 RX ADMIN — BUDESONIDE 1 MG: 1 INHALANT ORAL at 06:56

## 2025-05-29 RX ADMIN — ALLOPURINOL 300 MG: 300 TABLET ORAL at 10:33

## 2025-05-29 RX ADMIN — PANTOPRAZOLE SODIUM 40 MG: 40 TABLET, DELAYED RELEASE ORAL at 20:16

## 2025-05-29 RX ADMIN — BUDESONIDE 1 MG: 1 INHALANT ORAL at 20:44

## 2025-05-29 NOTE — PROGRESS NOTES
"    DAILY PROGRESS NOTE  [facility]    Patient Identification:  Name: Penelope Onofre  Age: 70 y.o.  Sex: female  :  1955  MRN: [id number]         Primary Care Physician: Bonny Judge MD    Subjective:  Interval History: Not voicing anything new.  Anxieties have been an issue for her but she is better today.  Not voicing any new complaints -denies any worsening in breathing or chest pain    Objective: Thankful and appreciative.  Nontoxic    Scheduled Meds:allopurinol, 300 mg, Oral, Daily  amLODIPine, 5 mg, Oral, Q24H  arformoterol, 15 mcg, Nebulization, BID - RT  atorvastatin, 20 mg, Oral, Nightly  budesonide, 1 mg, Nebulization, BID - RT  enoxaparin sodium, 40 mg, Subcutaneous, Q24H  filgrastim (NEUPOGEN) injection, 300 mcg, Subcutaneous, Q PM  fluticasone, 2 spray, Each Nare, Daily  guaiFENesin, 1,200 mg, Oral, Q12H  ipratropium-albuterol, 3 mL, Nebulization, 4x Daily - RT  Lidocaine, 1 patch, Transdermal, Q24H  Menthol-Zinc Oxide, 1 Application, Topical, BID  pantoprazole, 40 mg, Oral, BID AC  sodium chloride, 10 mL, Intravenous, Q12H  sodium chloride, 10 mL, Intravenous, Q12H  vitamin B-12, 1,000 mcg, Oral, Daily      Continuous Infusions:     Vital signs in last 24 hours:  Temp:  [97.7 °F (36.5 °C)-98.8 °F (37.1 °C)] 98.8 °F (37.1 °C)  Heart Rate:  [] 101  Resp:  [16-20] 18  BP: (101-109)/(58-68) 108/68    Intake/Output:    Intake/Output Summary (Last 24 hours) at 2025 1018  Last data filed at 2025 1900  Gross per 24 hour   Intake 360 ml   Output --   Net 360 ml       Exam:  /68 (BP Location: Left arm, Patient Position: Lying)   Pulse 101   Temp 98.8 °F (37.1 °C) (Oral)   Resp 18   Ht 182.9 cm (72\")   Wt 60.1 kg (132 lb 7.9 oz)   SpO2 97%   BMI 17.97 kg/m²     General Appearance:    Alert, cooperative, no distress, AAOx3                           Neck:   Supple, no JVD                         Lungs:    Clear to auscultation bilaterally, respirations " unlabored                          Heart:    Regular rate and rhythm, S1 and S2 normal                  Abdomen:     Soft, nontender, bowel sounds active                 Extremities: Generalized weakness/moving all, no pitting edema                           Data Review:  Labs in chart were reviewed.    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       Plan:    Hgb 12.5-11 0.4-10.9    Leukocytosis resolved despite Neupogen    Platelets 141-113-93    Creatinine and electrolytes stable    Disposition plans discussed with the patient and she is more comfortable with the idea of moving tomorrow and I have no objection.  Discussed this with TRISTAN Dowell MD  5/29/2025  10:18 EDT

## 2025-05-29 NOTE — PROGRESS NOTES
Oncology Social Work  OSW took the patient a letter listing OSW contact information, information on [employer] and [facility] and information on [facility] as the patient was agreeable to a referral being made to them for HCB waiver- for Medicaid waiver.  SEAN Valdez

## 2025-05-29 NOTE — PROGRESS NOTES
Nutrition Services    Patient Name: Penelope Onofre  YOB: 1955  MRN: [id number]  Admission date: 5/12/2025    PROGRESS NOTE      Encounter Information: Follow up        PO Diet: Diet: Regular/House; Fluid Consistency: Thin (IDDSI 0)   PO Supplements: Boost Breeze, Novasource Renal , BID   PO Intake:  75%       Current nutrition support: Oral diet, ONS   Nutrition support review: Eating well. Having trouble opening supplements       Weight: Weight: 60.1 kg (132 lb 7.9 oz) (05/22/25 1929)       Medications: reviewed   Labs: reviewed        GI Function:  last bowel movement: 5/29       Nutrition Intervention Updates: Continue ONS as ordered, encouraged her to reuqest assistance to open supplements    Regular diet        Results from last 7 days   Lab Units 05/29/25  0710 05/28/25  0509 05/27/25  0611   SODIUM mmol/L 135* 138 135*   POTASSIUM mmol/L 4.7 4.9 4.6   CHLORIDE mmol/L 104 107 106   CO2 mmol/L 23.4 23.1 22.0   BUN mg/dL 21.0 22.0 22   CREATININE mg/dL 0.89 1.05* 1.12*   CALCIUM mg/dL 8.4* 8.4* 8.6   GLUCOSE mg/dL 92 90 82     Results from last 7 days   Lab Units 05/29/25  0710 05/28/25  0509 05/27/25  0611 05/26/25  0549   MAGNESIUM mg/dL  --  1.9 1.9 1.9   HEMOGLOBIN g/dL 10.9* 11.4* 12.5 11.5*   HEMATOCRIT % 33.6* 33.6* 36.9 35.8     COVID19   Date Value Ref Range Status   05/13/2025 Not Detected Not Detected - Ref. Range Final     Lab Results   Component Value Date    HGBA1C 5.50 05/13/2025       RD to follow up per protocol.    Electronically signed by:  Jeff Snowden RD  05/29/25 17:51 EDT

## 2025-05-29 NOTE — PROGRESS NOTES
Subjective     CHIEF COMPLAINT:     Small cell lung cancer, extensive stage    INTERVAL HISTORY:     Patient reports that she has worked with physical therapy.  She is not having chest pain.  She has some shortness of breath.    REVIEW OF SYSTEMS:  Pertinent ROS as in the interval history. Otherwise, negative.    SCHEDULED MEDS:  allopurinol, 300 mg, Oral, Daily  amLODIPine, 5 mg, Oral, Q24H  arformoterol, 15 mcg, Nebulization, BID - RT  atorvastatin, 20 mg, Oral, Nightly  budesonide, 1 mg, Nebulization, BID - RT  enoxaparin sodium, 40 mg, Subcutaneous, Q24H  filgrastim (NEUPOGEN) injection, 300 mcg, Subcutaneous, Q PM  fluticasone, 2 spray, Each Nare, Daily  guaiFENesin, 1,200 mg, Oral, Q12H  ipratropium-albuterol, 3 mL, Nebulization, 4x Daily - RT  Lidocaine, 1 patch, Transdermal, Q24H  Menthol-Zinc Oxide, 1 Application, Topical, BID  pantoprazole, 40 mg, Oral, BID AC  sodium chloride, 10 mL, Intravenous, Q12H  sodium chloride, 10 mL, Intravenous, Q12H  vitamin B-12, 1,000 mcg, Oral, Daily      Objective   VITAL SIGNS:  Temp:  [97.7 °F (36.5 °C)-98.8 °F (37.1 °C)] 98.8 °F (37.1 °C)  Heart Rate:  [] 101  Resp:  [16-20] 18  BP: (101-109)/(58-68) 108/68     PHYSICAL EXAMINATION:        GENERAL:  The patient appears in fair general condition, not in acute distress.  SKIN: No skin rash.   EYES:  No Jaundice. No Pallor.   CHEST: Normal respiratory effort. Port in the left upper chest is benign.  Lipoma in the right upper chest. Lungs clear bilaterally.  CARDIAC:  Normal S1 & S2. No murmur.   ABDOMEN:  Soft. No tenderness. No Hepatomegaly. No Splenomegaly.   EXTREMITIES:  No edema.    RESULT REVIEW:   Results from last 7 days   Lab Units 05/29/25  0710 05/28/25  0509 05/27/25  0611 05/26/25  0549 05/25/25  0455   WBC 10*3/mm3 10.00 16.35* 18.98* 6.42 7.22   NEUTROS ABS 10*3/mm3 9.10* 13.90* 17.46* 5.90 6.37   LYMPHS ABS 10*3/mm3 0.70 0.98 1.52 0.39*  --    HEMOGLOBIN g/dL 10.9* 11.4* 12.5 11.5* 11.6*   HEMATOCRIT %  33.6* 33.6* 36.9 35.8 33.7*   PLATELETS 10*3/mm3 93* 113* 141 151 161     Results from last 7 days   Lab Units 05/29/25  0710 05/28/25  0509 05/27/25  0611 05/26/25  0549 05/25/25  0455 05/24/25  0754   SODIUM mmol/L 135* 138 135* 136 137 137   POTASSIUM mmol/L 4.7 4.9 4.6 4.7 4.3 4.3   CHLORIDE mmol/L 104 107 106 106 107 106   CO2 mmol/L 23.4 23.1 22.0 20.1* 18.0* 21.0*   BUN mg/dL 21.0 22.0 22 18 17 12   CREATININE mg/dL 0.89 1.05* 1.12* 1.03* 1.06* 1.00   CALCIUM mg/dL 8.4* 8.4* 8.6 8.6 8.4* 8.4*   MAGNESIUM mg/dL  --  1.9 1.9 1.9 1.7 1.7         Lab 05/25/25  0455   IRON 124   IRON SATURATION (TSAT) 82*   TIBC 152*   TRANSFERRIN 102*   FERRITIN 629.00*      Component      Latest Ref Rng 5/25/2025 5/26/2025 5/27/2025 5/28/2025   Uric Acid      2.4 - 5.7 mg/dL 3.9  3.3  3.6  3.2      Assessment & Plan   *Small cell lung cancer, extensive stage.  She has been to have recommended last bulky right hilar mass lymphadenopathy.  There is right supraclavicular adenopathy.  Biopsy revealed tumor with high Ki-67 of >95%.  CT brain with no metastasis.  Dr. Koroma discussed with the patient systemic chemotherapy with carboplatin etoposide x 3 days with addition of chemotherapy.  Patient received carboplatin and etoposide between 5/23/2025 and 5/25/2025.  She tolerated the treatment well.  She is at increased risk for neutropenia and neutropenic infection.  She was started on Neupogen 300 mcg SQ daily on 5/26/2025.    *Nausea secondary to chemotherapy.  Patient is on Zyprexa 5 mg nightly x 4 nights.  She is not having nausea at this time.    *Tumor lysis syndrome prophylaxis.  Patient's allopurinol which was started on 5/21/2025.  5/27/2025: Uric acid 3.6.    *Anemia.  5/26/2025: Hemoglobin 11.5.  Vitamin B12 low normal at 394.  Folate 12.4.  Patient was placed on vitamin B12 1000 mcg daily.    *Urinary retention.  Patient had Bae catheter placement.    *Anxiety.  Patient was placed on Xanax 0.5 mg every 8 hours as  needed.    PLAN:    1.  Patient to receive dose #4 of Neupogen today.  2.  We will give dose #5 of Neupogen tomorrow AM.  Okay for discharge to rehab afterwards.  3.  Continue allopurinol 300 mg daily.  4.  The patient will not receive chemotherapy during the rehab stay.  5.  She will need to see Dr. Koroma in follow-up after discharge from rehab and to proceed with cycle #2 with Carboplatin Etoposide with addition of Tecentriq.  Plan is for 4-6 cycles followed by continuation of immunotherapy with Tecentriq as a single agent.      Anisha Lucas MD  05/29/25

## 2025-05-29 NOTE — PLAN OF CARE
Goal Outcome Evaluation:  Plan of Care Reviewed With: patient        Progress: no change  Outcome Evaluation: Pt resting in bed, upset about condition of her tray table but agreeable to participate in therapy. She is requesting for her brief to be changed as it is saturated - Nurse notified. She sat EOB with SBA and increased time. O2 sats at 90% after bed mobility. She stood from EOB with CGA - min A using r wx. Pt amb 15' to/from door with r wx and min A, LOB x 2 req mod A to recover. O2 sats 80% after amb without O2; fatigues quickly. Pt had LOB because she became impulsive when she got close to the chair and the bed and wanted to sit. She also let go of r wx and was reaching for bed. Pt sat EOB for rest and then stood again for bed to be changed. She returned supine with SBA. Pt progressing slowly with mobility and is limited by fatigue and SOB. Cont PT to progress as tolerated and prepare for d/c to SNF.    Anticipated Discharge Disposition (PT): skilled nursing facility

## 2025-05-29 NOTE — PLAN OF CARE
Goal Outcome Evaluation:  Plan of Care Reviewed With: patient        Progress: no change  Outcome Evaluation: Pt slept most of shift, incontintent of urine--up to bsc with assist during night for BM, humidified O2 decreased to from 3L to 2.5L nc, denies pain or nausea, SOA and tachy with exertion.

## 2025-05-29 NOTE — CASE MANAGEMENT/SOCIAL WORK
Continued Stay Note  [facility]     Patient Name: Penelope Onofre  MRN: [id number]  Today's Date: 5/29/2025    Admit Date: 5/12/2025    Plan: [facility] tomorrow. Pt will need transportation arranged.   Discharge Plan       Row Name 05/29/25 1221       Plan    Plan [facility] tomorrow. Pt will need transportation arranged.    Patient/Family in Agreement with Plan yes    [facility]. CCP notified Silvana/Leilani that pt will DC tomorrow; Silvana confirms [facility] has a bed available for pt tomorrow. CCP met with pt at bedside to discuss DC plan. Pt is agreeable to [facility] tomorrow. Pt will need transportation arranged. Plan will be [facility] tomorrow. Transport will need to be arranged by CCP. CCP will continue follow for any DC needs. RLutes RN/CCP                   Discharge Codes    No documentation.                 Expected Discharge Date and Time       Expected Discharge Date Expected Discharge Time    May 29, 2025               Ana Durant RN

## 2025-05-29 NOTE — PLAN OF CARE
Goal Outcome Evaluation:    A&OX4, calm and cooperative. PT reported weakness and SOB requested purwick due to inability to ambulate frequently. Xanax given this AM. PT worked with patient this afternoon see note- was only able to walk a short distance. Port site CDI, good blood return noted. Mepilex and Calmoseptine cream applied to coccyx area. 3L Humidified NC. VSS, WCTM.

## 2025-05-29 NOTE — THERAPY TREATMENT NOTE
Patient Name: Penelope Onofre  : 1955    MRN: [id number]                              Today's Date: 2025       Admit Date: 2025    Visit Dx:     ICD-10-CM ICD-9-CM   1. Generalized weakness  R53.1 780.79   2. Pneumonia of right lower lobe due to infectious organism  J18.9 486   3. Small cell carcinoma of middle lobe of right lung  C34.2 162.4     Patient Active Problem List   Diagnosis    Arm pain, left    Generalized weakness    Dizziness    Pneumonia    Pleural effusion    At high risk for pressure injury of skin    Decubitus ulcer of sacral region, stage 1    Acute kidney failure    Dehydration    Elevated troponin    Elevated brain natriuretic peptide (BNP) level    Hypokalemia    Hyperglycemia    Small cell carcinoma of middle lobe of right lung    Severe protein-calorie malnutrition    Anxiety associated with cancer diagnosis    Anemia associated with chemotherapy    Urine retention     Past Medical History:   Diagnosis Date    Chronic back pain     Claudication     GERD (gastroesophageal reflux disease)      Past Surgical History:   Procedure Laterality Date    BREAST CYST EXCISION Right     TONSILLECTOMY      US GUIDED LYMPH NODE BIOPSY  5/15/2025    VENOUS ACCESS DEVICE (PORT) INSERTION N/A 2025    Procedure: INSERTION VENOUS ACCESS DEVICE;  Surgeon: Bria Starr MD;  Location: [facility];  Service: General;  Laterality: N/A;      General Information       Row Name 25 1548          Physical Therapy Time and Intention    Document Type therapy note (daily note)  -DJ     Mode of Treatment individual therapy;physical therapy  -DJ       Row Name 25 1548          General Information    Patient Profile Reviewed yes  -DJ     Existing Precautions/Restrictions fall;oxygen therapy device and L/min  -DJ       Row Name 25 1548          Cognition    Orientation Status (Cognition) oriented x 3  -DJ       Row Name 25 1548          Safety Issues/Impairments Affecting  Functional Mobility    Impairments Affecting Function (Mobility) endurance/activity tolerance;shortness of breath;strength  -DJ     Comment, Safety Issues/Impairments (Mobility) gt belt, nonskid socks, O2  -DJ               User Key  (r) = Recorded By, (t) = Taken By, (c) = Cosigned By      Initials Name Provider Type    Renetta Ceron, PT Physical Therapist                   Mobility       Row Name 05/29/25 1548          Bed Mobility    Bed Mobility supine-sit;sit-supine  -DJ     Supine-Sit [place] (Bed Mobility) standby assist;verbal cues  -DJ     Sit-Supine [place] (Bed Mobility) standby assist;verbal cues  -DJ     Assistive Device (Bed Mobility) bed rails;head of bed elevated  -DJ     Comment, (Bed Mobility) increased time, fatigues quickly O2 90% after sitting  -DJ       Row Name 05/29/25 1548          Transfers    Comment, (Transfers) sit/stand from EOB x 2 trials  -DJ       Row Name 05/29/25 1548          Bed-Chair Transfer    Bed-Chair [place] (Transfers) not tested  -DJ       Row Name 05/29/25 1548          Sit-Stand Transfer    Sit-Stand [place] (Transfers) minimum assist (75% patient effort);verbal cues  -DJ     Assistive Device (Sit-Stand Transfers) walker, front-wheeled  -DJ       Row Name 05/29/25 1548          Gait/Stairs (Locomotion)    [place] Level (Gait) minimum assist (75% patient effort);verbal cues;moderate assist (50% patient effort)  -DJ     Assistive Device (Gait) walker, front-wheeled  -DJ     Distance in Feet (Gait) 15  -DJ     Deviations/Abnormal Patterns (Gait) festinating/shuffling;stride length decreased;gait speed decreased  -DJ     Bilateral Gait Deviations forward flexed posture  -DJ     [place] Level (Stairs) not tested  -DJ     Comment, (Gait/Stairs) Pt amb 15' to/from door with r wx and min A, LOB x 2 req mod A to recover. O2 sats 80% after amb without O2; fatigues quickly  -DJ               User Key  (r) = Recorded By, (t) = Taken By, (c) =  Cosigned By      Initials Name Provider Type    Renetta Ceron, PT Physical Therapist                   Obj/Interventions       Row Name 05/29/25 1551          Motor Skills    Motor Skills functional endurance  -DJ     Functional Endurance poor  -DJ       Row Name 05/29/25 1551          Balance    Balance Assessment standing static balance;standing dynamic balance  -DJ     Static Standing Balance minimal assist;verbal cues  -DJ     Dynamic Standing Balance minimal assist;moderate assist;verbal cues  -DJ     Position/Device Used, Standing Balance walker, front-wheeled;supported  -DJ     Balance Interventions sitting;standing;sit to stand;supported;weight shifting activity  -DJ     Comment, Balance unsteady  -DJ               User Key  (r) = Recorded By, (t) = Taken By, (c) = Cosigned By      Initials Name Provider Type    Renetta Ceron, PT Physical Therapist                   Goals/Plan    No documentation.                  Clinical Impression       Row Name 05/29/25 1552          Pain    Pretreatment Pain Rating 0/10 - no pain  -DJ     Pre/Posttreatment Pain Comment c/c is fatigue; pt does not understand why she is so SOB  -DJ       Row Name 05/29/25 1552          Plan of Care Review    Plan of Care Reviewed With patient  -DJ     Progress no change  -DJ     Outcome Evaluation Pt resting in bed, upset about condition of her tray table but agreeable to participate in therapy. She is requesting for her brief to be changed as it is saturated - Nurse notified. She sat EOB with SBA and increased time. O2 sats at 90% after bed mobility. She stood from EOB with CGA - min A using r wx. Pt amb 15' to/from door with r wx and min A, LOB x 2 req mod A to recover. O2 sats 80% after amb without O2; fatigues quickly. Pt had LOB because she became impulsive when she got close to the chair and the bed and wanted to sit. She also let go of r wx and was reaching for bed. Pt sat EOB for rest and then stood again for bed to be  changed. She returned supine with SBA. Pt progressing slowly with mobility and is limited by fatigue and SOB. Cont PT to progress as tolerated and prepare for d/c to SNF.  -DJ       Row Name 05/29/25 1552          Therapy Assessment/Plan (PT)    Criteria for Skilled Interventions Met (PT) skilled treatment is necessary  -DJ       Row Name 05/29/25 1552          Vital Signs    O2 Delivery Pre Treatment nasal cannula  -DJ     Intra SpO2 (%) 90  -DJ     O2 Delivery Intra Treatment room air  -DJ     Post SpO2 (%) 80  -DJ     O2 Delivery Post Treatment nasal cannula  -DJ     Pre Patient Position Supine  -DJ     Intra Patient Position Standing  -DJ     Post Patient Position Supine  -DJ       Row Name 05/29/25 1552          Positioning and Restraints    Pre-Treatment Position in bed  -DJ     Post Treatment Position bed  -DJ     In Bed notified nsg;supine;call light within reach;encouraged to call for assist  pumps and bed alarm left off as nurse was going to change her brief  -DJ               User Key  (r) = Recorded By, (t) = Taken By, (c) = Cosigned By      Initials Name Provider Type    Renetta Ceron, PT Physical Therapist                   Outcome Measures       Row Name 05/29/25 1499          How much help from another person do you currently need...    Turning from your back to your side while in flat bed without using bedrails? 4  -DJ     Moving from lying on back to sitting on the side of a flat bed without bedrails? 4  -DJ     Moving to and from a bed to a chair (including a wheelchair)? 2  -DJ     Standing up from a chair using your arms (e.g., wheelchair, bedside chair)? 3  -DJ     Climbing 3-5 steps with a railing? 2  -DJ     To walk in hospital room? 2  -DJ     AM-PAC 6 Clicks Score (PT) 17  -DJ     Highest Level of Mobility Goal Stand (1 or More Minutes)-5  -DJ       Row Name 05/29/25 6377          Functional Assessment    Outcome Measure Options AM-PAC 6 Clicks Basic Mobility (PT)  -DJ               User  Key  (r) = Recorded By, (t) = Taken By, (c) = Cosigned By      Initials Name Provider Type    Renetta Ceron, FARZANEH Physical Therapist                                 Physical Therapy Education       Title: PT OT SLP Therapies (In Progress)       Topic: Physical Therapy (In Progress)       Point: Mobility training (In Progress)       Learning Progress Summary            Patient Acceptance, E, NR by DAVIE at 5/29/2025 1558    Acceptance, E, VU by BLAKE at 5/27/2025 2232    Acceptance, E, VU,NR by DJ at 5/27/2025 1523    Acceptance, E, VU,DU by BLAKE at 5/26/2025 2139    Acceptance, E, VU by LC at 5/25/2025 1948    Acceptance, E, VU by BLAKE at 5/24/2025 2155    Acceptance, E, NR by DJ at 5/23/2025 1500    Acceptance, E, NR by AR at 5/19/2025 1418    Acceptance, E, VU by LAKEISHA at 5/18/2025 0437    Acceptance, E, NR by AR at 5/16/2025 1542    Acceptance, E, NR by DJ at 5/14/2025 1400                      Point: Home exercise program (Done)       Learning Progress Summary            Patient Acceptance, E, VU by BLAKE at 5/27/2025 2232    Acceptance, E, VU,NR by DAVIE at 5/27/2025 1523    Acceptance, E, VU,DU by BLAKE at 5/26/2025 2139    Acceptance, E, VU by BLAKE at 5/25/2025 1948    Acceptance, E, VU by BLAKE at 5/24/2025 2155    Acceptance, E, NR by DAVIE at 5/23/2025 1500    Acceptance, E, NR by AR at 5/19/2025 1418    Acceptance, E, VU by LAKEISHA at 5/18/2025 0437    Acceptance, E, NR by AR at 5/16/2025 1542                      Point: Body mechanics (In Progress)       Learning Progress Summary            Patient Acceptance, E, NR by DAVIE at 5/29/2025 1558    Acceptance, E, VU by BLAKE at 5/27/2025 2232    Acceptance, E, VU,NR by DAVIE at 5/27/2025 1523    Acceptance, E, VU,DU by BLAKE at 5/26/2025 2139    Acceptance, E, VU by LC at 5/25/2025 1948    Acceptance, E, VU by LC at 5/24/2025 2155    Acceptance, E, NR by DJ at 5/23/2025 1500    Acceptance, E, NR by AR at 5/19/2025 1418    Acceptance, E, NR by AR at 5/16/2025 1542    Acceptance, E, NR by DJ at 5/14/2025  1400                      Point: Precautions (In Progress)       Learning Progress Summary            Patient Acceptance, E, NR by DJ at 5/29/2025 1558    Acceptance, E, VU by BLAKE at 5/27/2025 2232    Acceptance, E, VU,NR by DJ at 5/27/2025 1523    Acceptance, E, VU,DU by BALKE at 5/26/2025 2139    Acceptance, E, VU by LC at 5/25/2025 1948    Acceptance, E, VU by LC at 5/24/2025 2155    Acceptance, E, NR by DJ at 5/23/2025 1500    Acceptance, E, NR by AR at 5/19/2025 1418    Acceptance, E, NR by AR at 5/16/2025 1542    Acceptance, E, NR by DJ at 5/14/2025 1400                                      User Key       Initials Effective Dates Name Provider Type Discipline    AR 06/16/21 -  Eli Henry, PT Physical Therapist PT    DJ 10/25/19 -  Renetta Ely, PT Physical Therapist PT     02/18/25 -  Sabina Chappell, RN Registered Nurse Nurse     01/09/25 -  Carly Kennedy, RN Registered Nurse Nurse                  PT Recommendation and Plan  Planned Therapy Interventions (PT): balance training, bed mobility training, gait training, home exercise program, strengthening, postural re-education, patient/family education, transfer training  Progress: no change  Outcome Evaluation: Pt resting in bed, upset about condition of her tray table but agreeable to participate in therapy. She is requesting for her brief to be changed as it is saturated - Nurse notified. She sat EOB with SBA and increased time. O2 sats at 90% after bed mobility. She stood from EOB with CGA - min A using r wx. Pt amb 15' to/from door with r wx and min A, LOB x 2 req mod A to recover. O2 sats 80% after amb without O2; fatigues quickly. Pt had LOB because she became impulsive when she got close to the chair and the bed and wanted to sit. She also let go of r wx and was reaching for bed. Pt sat EOB for rest and then stood again for bed to be changed. She returned supine with SBA. Pt progressing slowly with mobility and is limited by fatigue and SOB. Cont PT  to progress as tolerated and prepare for d/c to SNF.     Time Calculation:   PT Evaluation Complexity  History, PT Evaluation Complexity: 3 or more personal factors and/or comorbidities  Examination of Body Systems (PT Eval Complexity): total of 4 or more elements  Clinical Presentation (PT Evaluation Complexity): evolving  Clinical Decision Making (PT Evaluation Complexity): moderate complexity  Overall Complexity (PT Evaluation Complexity): moderate complexity     PT Charges       Row Name 05/29/25 1558             Time Calculation    Start Time 1523  -DJ      Stop Time 1547  -DJ      Time Calculation (min) 24 min  -DJ      PT Non-Billable Time (min) 10 min  -DJ      PT Received On 05/29/25  -DJ      PT - Next Appointment 05/30/25  -DJ                User Key  (r) = Recorded By, (t) = Taken By, (c) = Cosigned By      Initials Name Provider Type    Renetta Ceron, FARZANEH Physical Therapist                  Therapy Charges for Today       Code Description Service Date Service Provider Modifiers Qty    33788999886 HC PT THERAPEUTIC ACT EA 15 MIN 5/29/2025 Renetta Ely, PT GP 1            PT G-Codes  Outcome Measure Options: AM-PAC 6 Clicks Basic Mobility (PT)  AM-PAC 6 Clicks Score (PT): 17  AM-PAC 6 Clicks Score (OT): 16  PT Discharge Summary  Anticipated Discharge Disposition (PT): skilled nursing facility    Renetta Ely PT  5/29/2025

## 2025-05-29 NOTE — PROGRESS NOTES
Referral received from Carly Pillai.  Met patient in hospital room; introduced myself and explained navigational services.      We discussed her current admission and recent diagnosis of extensive stage small cell lung cancer. She underwent port placement on 5/22/25 and started Carboplatin/Etoposide 5/23/25 and rec'd this regime daily through 5/25 along with Neupogen support 5/26-5/29.  She is planning to transfer to rehab at [facility] later today or tomorrow.  The patient will resume outpatient chemotherapy under the care of Dr. Koroma following her rehab stay.    The patient denies pain although reports she only sleeps 2-3 hours at a time per night.  She has discussed this with her providers. The patient has a good understanding of her pathology and treatment options.  She was able to teach back her plan of care.    The patient is alert and oriented. She has been ambulating with assistance of one and a walker as needed since her admission and currently requires some assistance with ADLs. The patient reports a history of vertigo with falls in the past.  She lives in a first floor apartment with her dog.  She is currently on 3L of oxygen via [place].    The patient is a former 1 ppd smoker since age 14.  She reports several periods of quitting from 6-10 years at a time for at least 3 separate occasions. She last quit smoking 9 months ago.     The patient has [employer]placement. She denies any current BHE claim issues. We discussed financial navigation, cost estimates and possible financial assistance if needed in the future.     The patient has met with oncology social work to address financial, transportation and ongoing resource needs.  Social work placed referrals to [facility], Medicaid Waiver and is also assisting with ACP.     The patient reports an approximate 25 pound weight loss over the past few months. We discussed small frequent high calorie high protein meals if tolerated. Referral placed to  oncology nutrition.     The patient has a family history of pancreatic cancer (sister) and GI cancer (father).     The patient is processing through her small cell cancer diagnosis.  Provided active listening and emotional support, validating and normalizing patient's feelings. She reports a limited support system.  The patient states she has had anxiety all of her life and was recently placed on Xanax.  The patient states that she is interested in a referral to behavioral oncology; referral placed.    We also discussed [employer] and [facility] ([facility]) . Referral placed to FF.     We discussed integrative therapies and other [facility].  She received a navigation folder with the following information today: [facility] Cancer Support Network, Cancer and Restorative Exercise - CARE, [employer] exercise program, Living with a Diagnosis of Lung Cancer, Lung Cancer [place] Support Services, Cancer Resource [place], Message Therapy, Reiki Therapy, [facility], Cancer Nutrition, Smoking Cessation and Survivorship Clinic.      Navigation will continue to follow; provided my name and number and encouraged patient to call if any needs arise.

## 2025-05-30 ENCOUNTER — TELEPHONE (OUTPATIENT)
Dept: ONCOLOGY | Facility: CLINIC | Age: 70
End: 2025-05-30
Payer: MEDICARE

## 2025-05-30 VITALS
HEART RATE: 109 BPM | SYSTOLIC BLOOD PRESSURE: 136 MMHG | HEIGHT: 72 IN | TEMPERATURE: 98.4 F | WEIGHT: 132.5 LBS | DIASTOLIC BLOOD PRESSURE: 81 MMHG | RESPIRATION RATE: 18 BRPM | BODY MASS INDEX: 17.95 KG/M2 | OXYGEN SATURATION: 95 %

## 2025-05-30 PROBLEM — J15.4 PNEUMONIA DUE TO STREPTOCOCCUS: Status: ACTIVE | Noted: 2025-05-30

## 2025-05-30 LAB
ANION GAP SERPL CALCULATED.3IONS-SCNC: 8 MMOL/L (ref 5–15)
BASOPHILS # BLD MANUAL: 0 10*3/MM3 (ref 0–0.2)
BASOPHILS NFR BLD MANUAL: 0 % (ref 0–1.5)
BUN SERPL-MCNC: 21 MG/DL (ref 8–23)
BUN/CREAT SERPL: 22.3 (ref 7–25)
CALCIUM SPEC-SCNC: 8.5 MG/DL (ref 8.6–10.5)
CHLORIDE SERPL-SCNC: 103 MMOL/L (ref 98–107)
CO2 SERPL-SCNC: 23 MMOL/L (ref 22–29)
CREAT SERPL-MCNC: 0.94 MG/DL (ref 0.57–1)
DEPRECATED RDW RBC AUTO: 47.9 FL (ref 37–54)
EGFRCR SERPLBLD CKD-EPI 2021: 65 ML/MIN/1.73
EOSINOPHIL # BLD MANUAL: 0 10*3/MM3 (ref 0–0.4)
EOSINOPHIL NFR BLD MANUAL: 0 % (ref 0.3–6.2)
ERYTHROCYTE [DISTWIDTH] IN BLOOD BY AUTOMATED COUNT: 12.9 % (ref 12.3–15.4)
GLUCOSE SERPL-MCNC: 105 MG/DL (ref 65–99)
HCT VFR BLD AUTO: 31.2 % (ref 34–46.6)
HGB BLD-MCNC: 10.8 G/DL (ref 12–15.9)
LYMPHOCYTES # BLD MANUAL: 0.72 10*3/MM3 (ref 0.7–3.1)
LYMPHOCYTES NFR BLD MANUAL: 0 % (ref 5–12)
MCH RBC QN AUTO: 35.5 PG (ref 26.6–33)
MCHC RBC AUTO-ENTMCNC: 34.6 G/DL (ref 31.5–35.7)
MCV RBC AUTO: 102.6 FL (ref 79–97)
MONOCYTES # BLD: 0 10*3/MM3 (ref 0.1–0.9)
NEUTROPHILS # BLD AUTO: 2.43 10*3/MM3 (ref 1.7–7)
NEUTROPHILS NFR BLD MANUAL: 77.1 % (ref 42.7–76)
NRBC BLD AUTO-RTO: 0 /100 WBC (ref 0–0.2)
PLAT MORPH BLD: NORMAL
PLATELET # BLD AUTO: 72 10*3/MM3 (ref 140–450)
PMV BLD AUTO: 10.5 FL (ref 6–12)
POTASSIUM SERPL-SCNC: 4.7 MMOL/L (ref 3.5–5.2)
RBC # BLD AUTO: 3.04 10*6/MM3 (ref 3.77–5.28)
RBC MORPH BLD: NORMAL
SODIUM SERPL-SCNC: 134 MMOL/L (ref 136–145)
VARIANT LYMPHS NFR BLD MANUAL: 22.9 % (ref 19.6–45.3)
WBC MORPH BLD: NORMAL
WBC NRBC COR # BLD AUTO: 3.15 10*3/MM3 (ref 3.4–10.8)

## 2025-05-30 PROCEDURE — 94664 DEMO&/EVAL PT USE INHALER: CPT

## 2025-05-30 PROCEDURE — 25010000002 HEPARIN LOCK FLUSH PER 10 UNITS: Performed by: STUDENT IN AN ORGANIZED HEALTH CARE EDUCATION/TRAINING PROGRAM

## 2025-05-30 PROCEDURE — 80048 BASIC METABOLIC PNL TOTAL CA: CPT | Performed by: STUDENT IN AN ORGANIZED HEALTH CARE EDUCATION/TRAINING PROGRAM

## 2025-05-30 PROCEDURE — 94799 UNLISTED PULMONARY SVC/PX: CPT

## 2025-05-30 PROCEDURE — 85007 BL SMEAR W/DIFF WBC COUNT: CPT | Performed by: STUDENT IN AN ORGANIZED HEALTH CARE EDUCATION/TRAINING PROGRAM

## 2025-05-30 PROCEDURE — 25010000002 FILGRASTIM 300 MCG/0.5ML SOLUTION PREFILLED SYRINGE: Performed by: INTERNAL MEDICINE

## 2025-05-30 PROCEDURE — 85025 COMPLETE CBC W/AUTO DIFF WBC: CPT | Performed by: STUDENT IN AN ORGANIZED HEALTH CARE EDUCATION/TRAINING PROGRAM

## 2025-05-30 PROCEDURE — 99232 SBSQ HOSP IP/OBS MODERATE 35: CPT | Performed by: INTERNAL MEDICINE

## 2025-05-30 PROCEDURE — 94761 N-INVAS EAR/PLS OXIMETRY MLT: CPT

## 2025-05-30 RX ORDER — HYDROCODONE BITARTRATE AND ACETAMINOPHEN 5; 325 MG/1; MG/1
1 TABLET ORAL EVERY 4 HOURS PRN
Qty: 12 TABLET | Refills: 0 | Status: SHIPPED | OUTPATIENT
Start: 2025-05-30

## 2025-05-30 RX ORDER — PANTOPRAZOLE SODIUM 40 MG/1
40 TABLET, DELAYED RELEASE ORAL
Start: 2025-05-30

## 2025-05-30 RX ORDER — IPRATROPIUM BROMIDE AND ALBUTEROL SULFATE 2.5; .5 MG/3ML; MG/3ML
3 SOLUTION RESPIRATORY (INHALATION)
Start: 2025-05-30

## 2025-05-30 RX ORDER — HYDROXYZINE HYDROCHLORIDE 25 MG/1
25 TABLET, FILM COATED ORAL 3 TIMES DAILY PRN
Start: 2025-05-30

## 2025-05-30 RX ORDER — ALLOPURINOL 300 MG/1
300 TABLET ORAL DAILY
Start: 2025-05-30

## 2025-05-30 RX ORDER — AMLODIPINE BESYLATE 5 MG/1
5 TABLET ORAL
Start: 2025-05-30

## 2025-05-30 RX ORDER — FLUTICASONE PROPIONATE 50 MCG
2 SPRAY, SUSPENSION (ML) NASAL DAILY
Start: 2025-05-30

## 2025-05-30 RX ORDER — ARFORMOTEROL TARTRATE 15 UG/2ML
15 SOLUTION RESPIRATORY (INHALATION)
Start: 2025-05-30

## 2025-05-30 RX ORDER — CALCIUM CARBONATE 500 MG/1
2 TABLET, CHEWABLE ORAL 2 TIMES DAILY PRN
Start: 2025-05-30

## 2025-05-30 RX ORDER — GUAIFENESIN 600 MG/1
1200 TABLET, EXTENDED RELEASE ORAL EVERY 12 HOURS SCHEDULED
Start: 2025-05-30

## 2025-05-30 RX ORDER — ALPRAZOLAM 0.5 MG
0.5 TABLET ORAL 3 TIMES DAILY PRN
Qty: 12 TABLET | Refills: 0 | Status: SHIPPED | OUTPATIENT
Start: 2025-05-30

## 2025-05-30 RX ORDER — BUDESONIDE 1 MG/2ML
1 INHALANT ORAL
Start: 2025-05-30

## 2025-05-30 RX ORDER — ENOXAPARIN SODIUM 100 MG/ML
40 INJECTION SUBCUTANEOUS EVERY 24 HOURS
Start: 2025-05-30 | End: 2025-06-09

## 2025-05-30 RX ORDER — ONDANSETRON 8 MG/1
8 TABLET, FILM COATED ORAL EVERY 8 HOURS PRN
Start: 2025-05-30

## 2025-05-30 RX ORDER — LIDOCAINE 4 G/G
1 PATCH TOPICAL
Start: 2025-05-30

## 2025-05-30 RX ADMIN — ALLOPURINOL 300 MG: 300 TABLET ORAL at 09:21

## 2025-05-30 RX ADMIN — PANTOPRAZOLE SODIUM 40 MG: 40 TABLET, DELAYED RELEASE ORAL at 05:38

## 2025-05-30 RX ADMIN — IPRATROPIUM BROMIDE AND ALBUTEROL SULFATE 3 ML: .5; 3 SOLUTION RESPIRATORY (INHALATION) at 10:21

## 2025-05-30 RX ADMIN — BUDESONIDE 1 MG: 1 INHALANT ORAL at 06:37

## 2025-05-30 RX ADMIN — ARFORMOTEROL TARTRATE 15 MCG: 15 SOLUTION RESPIRATORY (INHALATION) at 06:33

## 2025-05-30 RX ADMIN — Medication 10 ML: at 09:22

## 2025-05-30 RX ADMIN — IPRATROPIUM BROMIDE AND ALBUTEROL SULFATE 3 ML: .5; 3 SOLUTION RESPIRATORY (INHALATION) at 14:29

## 2025-05-30 RX ADMIN — HEPARIN 500 UNITS: 100 SYRINGE at 09:34

## 2025-05-30 RX ADMIN — AMLODIPINE BESYLATE 5 MG: 5 TABLET ORAL at 09:21

## 2025-05-30 RX ADMIN — MENTHOL, ZINC OXIDE 1 APPLICATION: .44; 20.6 OINTMENT TOPICAL at 09:22

## 2025-05-30 RX ADMIN — FLUTICASONE PROPIONATE 2 SPRAY: 50 SPRAY, METERED NASAL at 09:22

## 2025-05-30 RX ADMIN — FILGRASTIM 300 MCG: 300 INJECTION, SOLUTION INTRAVENOUS; SUBCUTANEOUS at 09:21

## 2025-05-30 RX ADMIN — IPRATROPIUM BROMIDE AND ALBUTEROL SULFATE 3 ML: .5; 3 SOLUTION RESPIRATORY (INHALATION) at 06:32

## 2025-05-30 RX ADMIN — ALPRAZOLAM 0.5 MG: 0.5 TABLET ORAL at 09:21

## 2025-05-30 RX ADMIN — Medication 1000 MCG: at 09:21

## 2025-05-30 RX ADMIN — GUAIFENESIN 1200 MG: 600 TABLET, EXTENDED RELEASE ORAL at 09:21

## 2025-05-30 NOTE — CASE MANAGEMENT/SOCIAL WORK
Continued Stay Note  [facility]     Patient Name: Penelope Onofre  MRN: [id number]  Today's Date: 5/30/2025    Admit Date: 5/12/2025    Plan: [facility] after 1500 today.   Discharge Plan       Row Name 05/30/25 1013       Plan    Plan [facility] after 1500 today.    Patient/Family in Agreement with Plan yes    Plan Comments DC orders noted. CCP notified [facility] that pt has DC orders; [facility] states pt can arrive at any time today; [facility] can provide transportation after 1500 and will contact the nursing station when they have arrived. CCP notified MD, RN, pt and pt's nephew/Ulises of transport time. Pharmacy verified. Packet given to RN. Plan is [facility] after 1500 today. RLutes RN/CCP    Final Discharge Disposition Code 03 - skilled nursing facility (SNF)    Final Note [facility] after 1500 today.                   Discharge Codes    No documentation.                 Expected Discharge Date and Time       Expected Discharge Date Expected Discharge Time    May 30, 2025               Ana Durant, RN

## 2025-05-30 NOTE — CASE MANAGEMENT/SOCIAL WORK
Continued Stay Note  [facility]     Patient Name: Penelope Onofre  MRN: [id number]  Today's Date: 5/30/2025    Admit Date: 5/12/2025    Plan: Plan is [facility] via [facility] van at 1500 today. RLvictorina RN/CCP   Discharge Plan       Row Name 05/30/25 1334       Plan    Plan Plan is [facility] via iodine van at 1500 today. RLvictorina RN/CCP    Patient/Family in Agreement with Plan yes    Plan Comments RN notified MD would like CBC drawn weekly while pt is in rehab. CCP notified [facility] that MD would like CBC drawn weekly while pt is in rehab; [facility] agreeable; a copy of the order will need to be sent with pt in their packet. CCP notified pt's nurse. CCP sent CBC order to [facility]; [facility] notified. Inbound call from [facility]; [facility] is unable to provide transport for pt. CCP arranged transport with [facility] at 1500 today; confirmation # [id number] with a cost of $82.50. CCP updated RN and [facility]. Plan is [facility] via iodine van at 1500 today. RLvictorina RN/CCP                   Discharge Codes    No documentation.                 Expected Discharge Date and Time       Expected Discharge Date Expected Discharge Time    May 30, 2025               Ana Durant RN

## 2025-05-30 NOTE — PLAN OF CARE
Goal Outcome Evaluation:    A&OX4, calm and cooperative. Mobility remains limited- SOA with exertion. 3L humidified o2. Port de accessed- heparin locked- site clean and dry. Purwick in place per pt request. Report called to facility. VSS- no needs voiced currently. TIMBO.

## 2025-05-30 NOTE — DISCHARGE SUMMARY
Date of Discharge:  5/30/2025    PCP: Bonny Judge MD    Discharge Diagnosis:   Active Hospital Problems    Diagnosis  POA    **Small cell carcinoma of middle lobe of right lung [C34.2]  Unknown    Anxiety associated with cancer diagnosis [F41.1, C80.1]  Unknown    Anemia associated with chemotherapy [D64.81, T45.1X5A]  Unknown    Urine retention [R33.9]  Unknown    Severe protein-calorie malnutrition [E43]  Yes    Generalized weakness [R53.1]  Yes    Dizziness [R42]  Yes    Pneumonia [J18.9]  Yes    Pleural effusion [J90]  Yes    At high risk for pressure injury of skin [Z91.89]  Not Applicable    Decubitus ulcer of sacral region, stage 1 [L89.151]  Yes    Acute kidney failure [N17.9]  Yes    Dehydration [E86.0]  Yes    Elevated troponin [R79.89]  Yes    Elevated brain natriuretic peptide (BNP) level [R79.89]  Yes    Hypokalemia [E87.6]  Yes    Hyperglycemia [R73.9]  Yes      Resolved Hospital Problems   No resolved problems to display.     Procedure(s):  INSERTION VENOUS ACCESS DEVICE     Consults       Date and Time Order Name Status Description    5/21/2025 12:19 PM Inpatient General Surgery Consult Completed     5/19/2025  2:08 PM Hematology & Oncology Inpatient Consult Completed     5/19/2025  9:34 AM Inpatient Cardiology Consult Completed     5/14/2025  4:42 PM Inpatient Psychiatrist Consult Completed     5/13/2025 12:06 PM Inpatient Pulmonology Consult Completed     5/13/2025  1:06 AM [facility] (on-call MD unless specified) Details            Hospital Course  Patient is a 70 y.o. female who was initially admitted with generalized weakness and shortness of breath from multiple weeks.  Ultimately she was seen by pulmonology as well as oncology and cardiology including general surgery this patient was found to have metastatic small cell lung cancer with postobstructive right-sided pneumonia with pleural effusion and subsequent acute hypoxic respiratory failure and associated chest pain.  Thoracentesis performed  5/14/2025 with culture which was negative.  Cytology was also negative and it was deemed exudative per pulmonary though also concern for possible malignant etiology despite the negative cytology and patient has completed antibiotics with IV Zosyn and has remained afebrile with overall clinical improvement.  Her COPD is advancing and she has also been initiated on scheduled DuoNebs with Brovana and Pulmicort and oxygen levels have remained stable for the last week as has her cardiopulmonary status.  She was found to have a right lymph node positive for metastatic disease on a 5/15/2025 biopsy.  As a result of that general surgery was consulted and Dr. Starr placed a port on 5/22/2025 and patient has tolerated the initiation of chemotherapy per oncological recommendations.  She was started on chemotherapy planned every 3 weeks for 4-6 cycles with plans for immunotherapy to be added as an outpatient.  Her chest pain is secondary to her lung cancer and EKG do not demonstrate ischemia with flat troponins and CTA was negative for PE.  Echocardiogram reveals no wall motion abnormalities.  Other comorbidities with CKD have remained stable and her previous issues with hyperkalemia and acidosis have resolved.  Creatinine upon transfer is normal at 0.93 with reassuring electrolyte panel.  Hypertension is stable running in the low 100 range on amlodipine.  Her generalized weakness is pronounced and [facility] has coordinated discharge needs.  In the meantime continue PPI for GERD/GI prophylaxis and Lovenox also suggested to continue for prophylaxis until activity levels improve we will defer further duration to rehab MD.  At this point in time the patient is medically stable for the transfer.  All questions answered the patient the best my ability and she is thankful of the care she received while here as well as amenable to the above plan.    Temp:  [97.8 °F (36.6 °C)-98.8 °F (37.1 °C)] 98 °F (36.7 °C)  Heart Rate:  []  87  Resp:  [16-18] 18  BP: (104-127)/(62-74) 127/68  Body mass index is 17.97 kg/m².    Physical Exam  Constitutional:       Comments: Very pleasant and conversational.   HENT:      Head: Normocephalic.      Nose: Nose normal.      Mouth/Throat:      Mouth: Mucous membranes are moist.      Pharynx: Oropharynx is clear.   Eyes:      Extraocular Movements: Extraocular movements intact.      Pupils: Pupils are equal, round, and reactive to light.   Cardiovascular:      Rate and Rhythm: Normal rate and regular rhythm.   Pulmonary:      Effort: Pulmonary effort is normal. No respiratory distress.      Breath sounds: Normal breath sounds.      Comments: Right chest wall mass.  Left-sided chest wall port  Abdominal:      General: Bowel sounds are normal. There is no distension.      Palpations: Abdomen is soft.      Tenderness: There is no abdominal tenderness.   Musculoskeletal:         General: No swelling.   Skin:     General: Skin is warm and dry.   Neurological:      Mental Status: She is alert and oriented to person, place, and time. Mental status is at baseline.      Cranial Nerves: No cranial nerve deficit.      Motor: Weakness present.      Gait: Gait abnormal.       Disposition: Skilled Nursing Facility (DC - External)       Discharge Medications        New Medications        Instructions Start Date   allopurinol 300 MG tablet  Commonly known as: ZYLOPRIM   300 mg, Oral, Daily      ALPRAZolam 0.5 MG tablet  Commonly known as: Xanax   0.5 mg, Oral, 3 Times Daily PRN      arformoterol 15 MCG/2ML nebulizer solution  Commonly known as: BROVANA   15 mcg, Nebulization, 2 Times Daily - RT      budesonide 1 MG/2ML nebulizer solution  Commonly known as: PULMICORT   1 mg, Nebulization, 2 Times Daily - RT      calcium carbonate 500 MG chewable tablet  Commonly known as: TUMS   2 tablets, Oral, 2 Times Daily PRN      cyanocobalamin 1000 MCG tablet  Commonly known as: VITAMIN B-12   1,000 mcg, Oral, Daily      enoxaparin  sodium 40 MG/0.4ML solution prefilled syringe syringe  Commonly known as: LOVENOX   40 mg, Subcutaneous, Every 24 Hours      fluticasone 50 MCG/ACT nasal spray  Commonly known as: FLONASE   2 sprays, Each Nare, Daily      guaiFENesin 600 MG 12 hr tablet  Commonly known as: MUCINEX   1,200 mg, Oral, Every 12 Hours Scheduled      hydrOXYzine 25 MG tablet  Commonly known as: ATARAX   25 mg, Oral, 3 Times Daily PRN      ipratropium-albuterol 0.5-2.5 mg/3 ml nebulizer  Commonly known as: DUO-NEB   3 mL, Nebulization, 4 Times Daily - RT      Lidocaine 4 %   1 patch, Transdermal, Every 24 Hours Scheduled, Remove & Discard patch within 12 hours or as directed by MD      Menthol-Zinc Oxide 0.44-20.6 % ointment   1 Application, Topical, 2 Times Daily      ondansetron 8 MG tablet  Commonly known as: ZOFRAN   8 mg, Oral, Every 8 Hours PRN      pantoprazole 40 MG EC tablet  Commonly known as: PROTONIX   40 mg, Oral, 2 Times Daily Before Meals             Changes to Medications        Instructions Start Date   amLODIPine 5 MG tablet  Commonly known as: NORVASC  What changed:   medication strength  how much to take  when to take this   5 mg, Oral, Every 24 Hours Scheduled             Continue These Medications        Instructions Start Date   acetaminophen 500 MG tablet  Commonly known as: TYLENOL   500 mg, Every 6 Hours PRN      atorvastatin 20 MG tablet  Commonly known as: LIPITOR   20 mg, Nightly      HYDROcodone-acetaminophen 5-325 MG per tablet  Commonly known as: NORCO   1 tablet, Oral, Every 4 Hours PRN             Stop These Medications      cyclobenzaprine 10 MG tablet  Commonly known as: FLEXERIL     naproxen 500 MG tablet  Commonly known as: Naprosyn     omeprazole 40 MG capsule  Commonly known as: priLOSEC     Terbinafine-Hydryprop Chitosan 250 MG & 1% kit               Additional Instructions for the Follow-ups that You Need to Schedule       Discharge Follow-up with PCP   As directed       Currently Documented PCP:     Bonny Judge MD    PCP Phone Number:    [phone]     Follow Up Details: PCP post rehab.  All consultants per their recommendations               Contact information for follow-up providers       Bonny Judge MD .    Specialty: Internal Medicine  Why: PCP post rehab.  All consultants per their recommendations  Contact information:  [address] [facility] [address] [phone]                       Contact information for after-discharge care       Destination       [facility] .    Service: Skilled Nursing  Contact information:  [address] [facility] [facility] [address] [phone]                                No future appointments.  Pending Labs       Order Current Status    Manual Differential In process    AFB Culture - Body Fluid, Pleural Cavity Preliminary result           Agusto Dowell MD  [facility]  05/30/25 07:28 EDT    Discharge time spent greater than 30 minutes.

## 2025-05-30 NOTE — TELEPHONE ENCOUNTER
----- Message from Koki MIX sent at 5/30/2025  1:55 PM EDT -----    ----- Message -----  From: Anisha Lucas MD  Sent: 5/30/2025   1:47 PM EDT  To: Yannick Koroma MD PhD; Tsering Quesada, RN;#    Patient is being discharged from [facility] to skilled nursing facility.    Please schedule patient for follow-up with Dr. Koroma in 3 weeks + chemotherapy (Carboplatin Etoposide Tecentriq-3-day regimen).  Labs-CBC CMP LDH uric acid.    Patient will need CBC checked weekly while at the subacute nursing facility.      Tsering, can you please place an order for CBC weekly?    Thank you

## 2025-05-30 NOTE — CASE MANAGEMENT/SOCIAL WORK
Case Management Discharge Note      Final Note: [facility] after 1500 today.    Provided Post Acute Provider List?: Yes  Post Acute Provider List: Nursing Home  Provided Post Acute Provider Quality & Resource List?: Yes  Post Acute Provider Quality and Resource List: Nursing Home  Delivered To: Patient  Method of Delivery: In person    Selected Continued Care - Admitted Since 5/12/2025       Destination Coordination complete.      Service Provider Services Address Phone Fax Patient Preferred    [facility] Skilled [place] [id number] [facility] [address] [phone] --              Durable Medical Equipment    No services have been selected for the patient.                Dialysis/Infusion    No services have been selected for the patient.                Home Medical Care    No services have been selected for the patient.                Therapy    No services have been selected for the patient.                Community Resources    No services have been selected for the patient.                Community & DME    No services have been selected for the patient.                    Transportation Services  W/C Van: Other ([facility]    Final Discharge Disposition Code: 03 - skilled nursing facility (SNF)

## 2025-05-30 NOTE — PROGRESS NOTES
Subjective     CHIEF COMPLAINT:     Small cell lung cancer, extensive stage    INTERVAL HISTORY:     Patient reports feeling okay today.  She does not feel short of breath.  No chest pain.  She worked with PT.    REVIEW OF SYSTEMS:  Pertinent ROS as in the interval history. Otherwise, negative.    SCHEDULED MEDS:  allopurinol, 300 mg, Oral, Daily  amLODIPine, 5 mg, Oral, Q24H  arformoterol, 15 mcg, Nebulization, BID - RT  atorvastatin, 20 mg, Oral, Nightly  budesonide, 1 mg, Nebulization, BID - RT  enoxaparin sodium, 40 mg, Subcutaneous, Q24H  fluticasone, 2 spray, Each Nare, Daily  guaiFENesin, 1,200 mg, Oral, Q12H  ipratropium-albuterol, 3 mL, Nebulization, 4x Daily - RT  Lidocaine, 1 patch, Transdermal, Q24H  Menthol-Zinc Oxide, 1 Application, Topical, BID  pantoprazole, 40 mg, Oral, BID AC  sodium chloride, 10 mL, Intravenous, Q12H  sodium chloride, 10 mL, Intravenous, Q12H  vitamin B-12, 1,000 mcg, Oral, Daily      Objective   VITAL SIGNS:  Temp:  [97.8 °F (36.6 °C)-98.4 °F (36.9 °C)] 98 °F (36.7 °C)  Heart Rate:  [] 87  Resp:  [16-18] 18  BP: (104-127)/(62-74) 127/68     PHYSICAL EXAMINATION:        GENERAL:  The patient appears in fair general condition, not in acute distress.  SKIN: No skin rash.   EYES:  No Jaundice. No Pallor.   CHEST: Normal respiratory effort.  Port in the left upper chest.  Lipoma in the right upper chest.  Lungs clear bilaterally.  No added sounds.  CARDIAC: Normal S1-S2.  No murmurs.  ABDOMEN: Nondistended.  EXTREMITIES:  No edema.    RESULT REVIEW:   Results from last 7 days   Lab Units 05/30/25  0539 05/29/25  0710 05/28/25  0509 05/27/25  0611 05/26/25  0549 05/25/25  0455   WBC 10*3/mm3 3.15* 10.00 16.35* 18.98* 6.42 7.22   NEUTROS ABS 10*3/mm3  --  9.10* 13.90* 17.46* 5.90 6.37   LYMPHS ABS 10*3/mm3  --  0.70 0.98 1.52 0.39*  --    HEMOGLOBIN g/dL 10.8* 10.9* 11.4* 12.5 11.5* 11.6*   HEMATOCRIT % 31.2* 33.6* 33.6* 36.9 35.8 33.7*   PLATELETS 10*3/mm3 72* 93* 113* 141 151  161     Results from last 7 days   Lab Units 05/30/25  0539 05/29/25  0710 05/28/25  0509 05/27/25  0611 05/26/25  0549 05/25/25  0455 05/24/25  0754   SODIUM mmol/L 134* 135* 138 135* 136 137 137   POTASSIUM mmol/L 4.7 4.7 4.9 4.6 4.7 4.3 4.3   CHLORIDE mmol/L 103 104 107 106 106 107 106   CO2 mmol/L 23.0 23.4 23.1 22.0 20.1* 18.0* 21.0*   BUN mg/dL 21.0 21.0 22.0 22 18 17 12   CREATININE mg/dL 0.94 0.89 1.05* 1.12* 1.03* 1.06* 1.00   CALCIUM mg/dL 8.5* 8.4* 8.4* 8.6 8.6 8.4* 8.4*   MAGNESIUM mg/dL  --   --  1.9 1.9 1.9 1.7 1.7         Lab 05/25/25 0455   IRON 124   IRON SATURATION (TSAT) 82*   TIBC 152*   TRANSFERRIN 102*   FERRITIN 629.00*      Component      Latest Ref Rng 5/25/2025 5/26/2025 5/27/2025 5/28/2025   Uric Acid      2.4 - 5.7 mg/dL 3.9  3.3  3.6  3.2      Assessment & Plan   *Small cell lung cancer, extensive stage.  She has been to have recommended last bulky right hilar mass lymphadenopathy.  There is right supraclavicular adenopathy.  Biopsy revealed tumor with high Ki-67 of >95%.  CT brain with no metastasis.  Dr. Koroma discussed with the patient systemic chemotherapy with carboplatin etoposide x 3 days with addition of chemotherapy.  Patient received carboplatin and etoposide between 5/23/2025 and 5/25/2025.  She tolerated the treatment well.  She is at increased risk for neutropenia and neutropenic infection.  She was started on Neupogen 300 mcg SQ daily on 5/26/2025.    *Nausea secondary to chemotherapy.  Patient is on Zyprexa 5 mg nightly x 4 nights.  She is not having nausea at this time.    *Tumor lysis syndrome prophylaxis.  Patient's allopurinol which was started on 5/21/2025.  5/27/2025: Uric acid 3.6.    *Anemia.  5/26/2025: Hemoglobin 11.5.  Vitamin B12 low normal at 394.  Folate 12.4.  Patient was placed on vitamin B12 1000 mcg daily.    *Urinary retention.  Patient had Bae catheter placement.    *Anxiety.  Patient was placed on Xanax 0.5 mg every 8 hours as needed.    PLAN:    1.   Patient to receive dose #5 of Neupogen today before discharge.    2.  We will arrange for CBC to be done weekly while in rehab with the result faxed to our office.  3.  Will schedule patient to see Dr. Koroma in follow-up in 3 weeks.  She will be scheduled tentatively to receive chemotherapy with Carboplatin Etoposide and Tecentriq.  Labs will be obtained-CBC CMP LDH uric acid.        Anisha Lucas MD  05/30/25

## 2025-06-04 PROBLEM — R65.21 SEPTIC SHOCK: Status: ACTIVE | Noted: 2025-01-01

## 2025-06-04 PROBLEM — A41.9 SEPTIC SHOCK: Status: ACTIVE | Noted: 2025-01-01

## 2025-06-04 LAB
MYCOBACTERIUM SPEC CULT: NORMAL
NIGHT BLUE STAIN TISS: NORMAL

## 2025-06-04 NOTE — PROGRESS NOTES
Pulmonary/critical care    Patient has continued to deteriorate she is now on heated high flow O2 at 60 L 100% with a nonrebreather over her saturations between 88 and 92% she is struggling to breathe.  Her nephew is here we talked he says she has been in decline for quite some time.  We talked about slim chance of survival and if she survives probably poor quality of life certainly will be less than what she had.  He does not think we should intubate or resuscitate but we should keep her comfortable which is certainly what I agree with.  She apparently has 2 brothers they are estranged the nephew has been the one taking care of her but she never gave him any definitive paperwork.  He does not have immediate contact with this brothers either to discuss I think since he has been her primary care and whom she listed as the person to go to that this case where she is not decisional that he is the appropriate source.  He certainly has been with her and very responsive to our calls again the person she listed for emergent contact.  We are therefore going to make patient palliative care will try and keep her comfortable both of us agree she is in distress now laboring to breathe we will try and get her some medications for this.  Will get hospice consult stat to see if she qualifies for hospice scatter bed.    Spent an additional 38 minutes on patient's care critical care

## 2025-06-04 NOTE — H&P
Group: [facility]       HISTORY AND PHYSICAL    Patient Identification:  Penelope Onofre  70 y.o.  female  1955  [id number]            CC: unresponsive    History of Present Illness:  Penelope Onofre is a 70 year old female with a medical history including: anxiety, chronic back pain, GERD, chronic kidney disease, and hypertension.    She was recently diagnosed with small cell lung cancer, extensive stage during hospitalization in May. She was seen by oncology during hospitalization and started on systemic chemotherapy with carboplatin etoposide x three days. She was started on Neupogen during hospitalization for neutropenia.    Patient presented to the ED on 6/4/25 from nursing facility with concern for unresponsiveness. Patient was found unresponsive by staff, and EMS was called. Upon EMS arrival, patient remained minimally responsive, blood glucose 61 and replacement administered. Reported systolic blood pressure was 40, and she was started on IVF in route to the ED. Patient remained tachycardic (150s) and hypotensive (64/43) upon arrival to the ED. She is not a great historian, but denies any focal symptoms of shortness of breath, chest pain, abdominal pain., nausea, vomiting, fever, or chills.     ED evaluation revealed multiple abnormalities including: high sensitivity troponin 59 (reflex 65), sodium 128, potassium 3.1, creatinine 2.30, BUN 29.0, lactic acid 9.6, WBC 0.93, hemoglobin 5.1, hematocrit 15.5, platelets 7,000. UA demonstrated 3+ bacteria. Bedside fecal occult negative.    Review of Systems:  Limited by confusion.     Past Medical History:  Past Medical History:   Diagnosis Date    Anxiety     Chronic back pain     CKD (chronic kidney disease)     Claudication     GERD (gastroesophageal reflux disease)     Hypertension     Kidney insufficiency     Raynaud's disease     Scoliosis     Small cell lung cancer 2025    Metastatic       Past Surgical History:  Past Surgical History:    Procedure Laterality Date    BREAST CYST EXCISION Right     TONSILLECTOMY      US GUIDED LYMPH NODE BIOPSY  5/15/2025    VENOUS ACCESS DEVICE (PORT) INSERTION N/A 5/22/2025    Procedure: INSERTION VENOUS ACCESS DEVICE;  Surgeon: Bria Starr MD;  Location: [facility] MAIN OR;  Service: General;  Laterality: N/A;        Home Meds:  (Not in a hospital admission)      Allergies:  Allergies   Allergen Reactions    Codeine Unknown - Low Severity    Sulfa Antibiotics Unknown - Low Severity       Social History:   Social History     Socioeconomic History    Marital status:    Tobacco Use    Smoking status: Former     Current packs/day: 1.00     Average packs/day: 1 pack/day for 40.0 years (40.0 ttl pk-yrs)     Types: Cigarettes    Smokeless tobacco: Never    Tobacco comments:     quit 2 weeks ago   Vaping Use    Vaping status: Never Used   Substance and Sexual Activity    Alcohol use: No    Drug use: No    Sexual activity: Defer       Family History:  Family History   Problem Relation Age of Onset    Alzheimer's disease Mother     Cancer Father     Cancer Sister     Hemochromatosis Sister     Pancreatic cancer Sister     Malig Hyperthermia Neg Hx        Physical Exam:  BP (!) 83/52   Pulse (!) 139   Temp 100 °F (37.8 °C) (Rectal)   Resp 22   Wt 59.1 kg (130 lb 6.4 oz)   SpO2 91%   BMI 17.69 kg/m²  Body mass index is 17.69 kg/m². 91% 59.1 kg (130 lb 6.4 oz)  Constitutional: Middle aged, appears much older than stated age, female pt in bed, No acute respiratory distress, no accessory muscle use  Head: - NCAT  Eyes: No pallor, Anicteric conjunctiva, EOMI.  ENMT:  Mallampati 3, no oral thrush. Dry MM. Edentulous  NECK: Trachea midline, No thyromegaly, no palpable cervical LNpathy  Heart: Tachycardic rate, no murmur. No pedal edema   Lungs: SRAVANTHI +, No wheezes/ crackles heard    Abdomen: Soft. No tenderness, guarding or rigidity. No palpable masses  Extremities: Extremities warm and well perfused. No cyanosis/  clubbing  Neuro: Conscious, alert to self, place- disoriented to time or situation, no focal deficits    PPE recommended per [facility] infectious disease Isolation protocol for the current clinical scenario(as mentioned below) was followed.      LABS:  COVID19   Date Value Ref Range Status   06/04/2025 Not Detected Not Detected - Ref. Range Final       Lab Results   Component Value Date    CALCIUM 7.7 (L) 06/04/2025    PHOS 3.0 06/04/2025     Results from last 7 days   Lab Units 06/04/25  0255 05/30/25  0539 05/29/25  0710   MAGNESIUM mg/dL 1.4*  --   --    SODIUM mmol/L 128* 134* 135*   POTASSIUM mmol/L 3.1* 4.7 4.7   CHLORIDE mmol/L 95* 103 104   CO2 mmol/L 11.4* 23.0 23.4   BUN mg/dL 29.0* 21.0 21.0   CREATININE mg/dL 2.30* 0.94 0.89   GLUCOSE mg/dL 149* 105* 92   CALCIUM mg/dL 7.7* 8.5* 8.4*   WBC 10*3/mm3 0.93* 3.15* 10.00   HEMOGLOBIN g/dL 5.1* 10.8* 10.9*   PLATELETS 10*3/mm3 7* 72* 93*   ALT (SGPT) U/L 12  --   --    AST (SGOT) U/L 19  --   --    PROBNP pg/mL 22,624.0*  --   --      Lab Results   Component Value Date    CKTOTAL 61 05/16/2025    TROPONINT 65 (C) 06/04/2025     Results from last 7 days   Lab Units 06/04/25  0427 06/04/25  0255   HSTROP T ng/L 65* 59*         Results from last 7 days   Lab Units 06/04/25  0255   LACTATE mmol/L 9.6*         Results from last 7 days   Lab Units 06/04/25  0310   ADENOVIRUS DETECTION BY PCR  Not Detected   CORONAVIRUS 229E  Not Detected   CORONAVIRUS HKU1  Not Detected   CORONAVIRUS NL63  Not Detected   CORONAVIRUS OC43  Not Detected   HUMAN METAPNEUMOVIRUS  Not Detected   HUMAN RHINOVIRUS/ENTEROVIRUS  Not Detected   INFLUENZA B PCR  Not Detected   PARAINFLUENZA 1  Not Detected   PARAINFLUENZA VIRUS 2  Not Detected   PARAINFLUENZA VIRUS 3  Not Detected   PARAINFLUENZA VIRUS 4  Not Detected   BORDETELLA PERTUSSIS PCR  Not Detected   BORDETELLA PARAPERTUSSIS PCR  Not Detected   CHLAMYDOPHILA PNEUMONIAE PCR  Not Detected   MYCOPLAMA PNEUMO PCR  Not Detected    RSV, PCR  Not Detected     Results from last 7 days   Lab Units 06/04/25  0255   INR  1.70*         Lab Results   Component Value Date    TSH 4.800 (H) 05/13/2025     Estimated Creatinine Clearance: 20.9 mL/min (A) (by C-G formula based on SCr of 2.3 mg/dL (H)).  Results from last 7 days   Lab Units 06/04/25  0329   NITRITE UA  Negative   WBC UA /HPF 11-20*   BACTERIA UA /HPF 3+*   SQUAM EPITHEL UA /HPF 3-6*     Microbiology Results (last 10 days)       Procedure Component Value - Date/Time    Respiratory Panel PCR w/COVID-19(SARS-CoV-2) CESIA/ROSSY/MESHA/PAD/COR/ANNIE In-House, NP Swab in UTM/VTM, 2 HR TAT - Swab, Nasopharynx [[id number]]  (Normal) Collected: 06/04/25 0310    Lab Status: Final result Specimen: Swab from Nasopharynx Updated: 06/04/25 0408     ADENOVIRUS, PCR Not Detected     Coronavirus 229E Not Detected     Coronavirus HKU1 Not Detected     Coronavirus NL63 Not Detected     Coronavirus OC43 Not Detected     COVID19 Not Detected     Human Metapneumovirus Not Detected     Human Rhinovirus/Enterovirus Not Detected     Influenza A PCR Not Detected     Influenza B PCR Not Detected     Parainfluenza Virus 1 Not Detected     Parainfluenza Virus 2 Not Detected     Parainfluenza Virus 3 Not Detected     Parainfluenza Virus 4 Not Detected     RSV, PCR Not Detected     Bordetella pertussis pcr Not Detected     Bordetella parapertussis PCR Not Detected     Chlamydophila pneumoniae PCR Not Detected     Mycoplasma pneumo by PCR Not Detected    Narrative:      In the setting of a positive respiratory panel with a viral infection PLUS a negative procalcitonin without other underlying concern for bacterial infection, consider observing off antibiotics or discontinuation of antibiotics and continue supportive care. If the respiratory panel is positive for atypical bacterial infection (Bordetella pertussis, Chlamydophila pneumoniae, or Mycoplasma pneumoniae), consider antibiotic de-escalation to target atypical bacterial  infection.               Imaging: I personally visualized the images of scans/x-rays performed within last 3 days.      Assessment:  Septic shock  Acute UTI  Atrial fibrillation  Hyponatremia  Hypokalemia  Acute kidney injury superimposed on CKD  Leukopenia  Anemia  Thrombocytopenia  Small cell lung cancer with extensive metastases  Anxiety      Recommendations:  Septic shock  Acute UTI  UA demonstrated turbid urine with 3+ bacteria.  RVP negative.  Chest x-ray shows evidence of diffuse interstitial changes, likely reflective of interstitial pulmonary edema and a small right pleural effusion-no evidence of focal consolidation or infiltrate to indicate pneumonia.  Blood cultures, urine culture collected, pending.  1 L of LR was administered, given remaining 750 mL of recommended sepsis bolus.  Continue norepinephrine to maintain MAP greater than 65, vasopressin added as a second line treatment if necessary.  Vasopressors being administered through indwelling port.  Continue cefepime and vancomycin-check MRSA PCR.    Atrial fibrillation  New onset atrial fibrillation, likely related to severe infection.  Initiated on amiodarone in the emergency department-continue.  Cardiology consult.    Hyponatremia  Hypokalemia  Acute kidney injury superimposed on CKD  Sodium 128, potassium 3.1 creatinine 2.30.  Continue cautious rehydration-noted that patient's proBNP was greater than 21,000, however patient appears profoundly dehydrated on exam with poor skin turgor with dry mucous membranes.    Leukopenia  Anemia  Thrombocytopenia  Small cell lung cancer with extensive metastases  WBC 0.93, hemoglobin 5.1, hematocrit 15.5, platelets 7000.  Blood transfusions ordered-goal hemoglobin greater than 7, platelets greater than 10,000.  Suspect related to recent chemotherapy administration.  Oncology consulted.  Continue allopurinol for prevention of tumor lysis syndrome.    Anxiety  Home as needed medication: Hydroxyzine Xanax,  continue.    Regular diet  SCDs  Full code    Patient was placed in face mask upon entering room and kept mask on throughout our encounter. I wore full protective equipment throughout this patient encounter including a face mask, gown and gloves. Hand hygiene was performed before donning protective equipment and after removal when leaving the room.    Bisi Hylton, APRN  6/4/2025  05:23 EDT      Much of this encounter note is an electronic transcription/translation of spoken language to printed text using Dragon Software.

## 2025-06-04 NOTE — CONSULTS
.     REASON FOR CONSULTATION:     Provide an opinion on any further workup or treatment  Extensive stage small cell lung cancer                             REQUESTING PHYSICIAN: Demetris Brenner MD  RECORDS OBTAINED:  Records of the patient's history including those obtained from the referring provider were reviewed and summarized in detail.    HISTORY OF PRESENT ILLNESS:  The patient is a 70 y.o. year old female  who is here for follow-up with the above-mentioned history.    Patient is a 70-year-old lady with recent diagnosis of small cell lung cancer, extensive stage.  She presented with bulky right hilar lymphadenopathy, right supraclavicular lymphadenopathy.  Biopsy reviewed high-grade tumor with a Ki-67 of greater than 95%.  CT brain with no metastasis.  Patient received systemic chemotherapy with carboplatin and etoposide x 3 days between 5/23/2025 and 5/25/2025.  She tolerated treatment well.  She did receive Neupogen 300 mcg subcutaneously daily starting on 5/26/2025.  She was discharged from the hospital on 5/30/2025 with a plan to follow-up with Dr. Koroma for the second cycle of carboplatin and etoposide.  She was brought to the emergency room as she was unresponsive and labs were severely abnormal with significant cytopenias as well as severely elevated procalcitonin.   Patient was hypoxic and hypotensive.  She was also noted to have a large right pleural effusion and very bulky mediastinal and right hilar lymphadenopathy obstructing the bronchus intermedius.  She was started on broad-spectrum antibiotics.  Further deterioration of clinical status prompted  goals of care discussion with her next of kin who is her nephew who is at bedside.  Dr. Ying discussed with the nephew and he preferred to proceed with comfort measures/hospice.  According to him this with the patient's wishes.  I discussed the same with the  patient's nephew as patient is unresponsive.  He would like for the patient to be  DNR/DNI.    Past Medical History:   Diagnosis Date    Anxiety     Chronic back pain     CKD (chronic kidney disease)     Claudication     GERD (gastroesophageal reflux disease)     Hypertension     Kidney insufficiency     Raynaud's disease     Scoliosis     Small cell lung cancer 2025    Metastatic     Past Surgical History:   Procedure Laterality Date    BREAST CYST EXCISION Right     TONSILLECTOMY      US GUIDED LYMPH NODE BIOPSY  5/15/2025    VENOUS ACCESS DEVICE (PORT) INSERTION N/A 5/22/2025    Procedure: INSERTION VENOUS ACCESS DEVICE;  Surgeon: Bria Starr MD;  Location: [facility];  Service: General;  Laterality: N/A;       MEDICATIONS    Current Facility-Administered Medications:     acetaminophen (TYLENOL) tablet 650 mg, 650 mg, Oral, Q4H PRN **OR** acetaminophen (TYLENOL) 160 MG/5ML oral solution 650 mg, 650 mg, Oral, Q4H PRN **OR** acetaminophen (TYLENOL) suppository 650 mg, 650 mg, Rectal, Q4H PRN, Hardy iYng Jr., MD    diphenhydrAMINE (BENADRYL) capsule 25 mg, 25 mg, Oral, Q6H PRN **OR** diphenhydrAMINE (BENADRYL) injection 25 mg, 25 mg, Intravenous, Q6H PRN, Hardy Ying Jr., MD    diphenoxylate-atropine (LOMOTIL) 2.5-0.025 MG per tablet 1 tablet, 1 tablet, Oral, Q2H PRN, Hardy Ying Jr., MD    glycopyrrolate (ROBINUL) injection 0.2 mg, 0.2 mg, Intravenous, Q2H PRN **OR** glycopyrrolate (ROBINUL) injection 0.2 mg, 0.2 mg, Subcutaneous, Q2H PRN **OR** glycopyrrolate (ROBINUL) injection 0.4 mg, 0.4 mg, Intravenous, Q2H PRN **OR** glycopyrrolate (ROBINUL) injection 0.4 mg, 0.4 mg, Subcutaneous, Q2H PRN, Hardy Ying Jr., MD    haloperidol (HALDOL) tablet 1 mg, 1 mg, Oral, Q4H PRN **OR** haloperidol (HALDOL) 2 MG/ML solution 1 mg, 1 mg, Oral, Q4H PRN **OR** haloperidol lactate (HALDOL) injection 1 mg, 1 mg, Subcutaneous, Q4H PRN, Hardy Ying Jr., MD    haloperidol (HALDOL) tablet 2 mg, 2 mg, Oral, Q4H PRN **OR** haloperidol (HALDOL) 2 MG/ML solution 2 mg,  2 mg, Oral, Q4H PRN **OR** haloperidol lactate (HALDOL) injection 2 mg, 2 mg, Subcutaneous, Q4H PRN, Hardy Ying Jr., MD    morphine injection 2 mg, 2 mg, Intravenous, Q1H PRN **OR** morphine concentrated solution 5 mg, 5 mg, Sublingual, Q1H PRN **OR** HYDROmorphone (DILAUDID) injection 0.5 mg, 0.5 mg, Intravenous, Q1H PRN, Hardy Ying Jr., MD    morphine injection 4 mg, 4 mg, Intravenous, Q1H PRN **OR** morphine concentrated solution 10 mg, 10 mg, Sublingual, Q1H PRN **OR** HYDROmorphone (DILAUDID) injection 1 mg, 1 mg, Intravenous, Q1H PRN, Hardy Ying Jr., MD    morphine injection 6 mg, 6 mg, Intravenous, Q1H PRN **OR** morphine concentrated solution 20 mg, 20 mg, Sublingual, Q1H PRN **OR** HYDROmorphone (DILAUDID) injection 1.5 mg, 1.5 mg, Intravenous, Q1H PRN, Hardy Ying Jr., MD    LORazepam (ATIVAN) injection 0.5 mg, 0.5 mg, Intravenous, Q1H PRN **OR** LORazepam (ATIVAN) injection 0.5 mg, 0.5 mg, Subcutaneous, Q1H PRN **OR** LORazepam (ATIVAN) 2 MG/ML concentrated solution 0.5 mg, 0.5 mg, Sublingual, Q1H PRN, Hardy Ying Jr., MD    LORazepam (ATIVAN) injection 1 mg, 1 mg, Intravenous, Q1H PRN **OR** LORazepam (ATIVAN) injection 1 mg, 1 mg, Subcutaneous, Q1H PRN **OR** LORazepam (ATIVAN) 2 MG/ML concentrated solution 1 mg, 1 mg, Sublingual, Q1H PRN, Hardy Ying Jr., MD    LORazepam (ATIVAN) injection 2 mg, 2 mg, Intravenous, Q1H PRN **OR** LORazepam (ATIVAN) injection 2 mg, 2 mg, Subcutaneous, Q1H PRN **OR** LORazepam (ATIVAN) 2 MG/ML concentrated solution 2 mg, 2 mg, Sublingual, Q1H PRN, Hardy Ying Jr., MD    magic mouthwash oral suspension (mixture) (diphenhydrAMINE HCl - aluminum & magnesium hydroxide-simethicone - lidocaine viscous) solution 55 mL, 5 mL, Swish & Spit, Q4H PRN, Hardy Ying Jr., MD    ondansetron ODT (ZOFRAN-ODT) disintegrating tablet 4 mg, 4 mg, Oral, Q6H PRN **OR** ondansetron (ZOFRAN) injection 4 mg, 4 mg, Intravenous, Q6H  PRN, Hardy Ying Jr., MD    [START ON 6/5/2025] pantoprazole (PROTONIX) EC tablet 40 mg, 40 mg, Oral, Q AM **OR** [START ON 6/5/2025] pantoprazole (PROTONIX) injection 40 mg, 40 mg, Intravenous, Q AM, Hardy Ying Jr., MD    Polyvinyl Alcohol-Povidone PF (ARTIFICIAL TEARS) 1.4-0.6 % ophthalmic solution 1 drop, 1 drop, Both Eyes, Q30 Min PRN, Hardy Ying Jr., MD    prochlorperazine (COMPAZINE) injection 5 mg, 5 mg, Intravenous, Q6H PRN **OR** prochlorperazine (COMPAZINE) tablet 5 mg, 5 mg, Oral, Q6H PRN **OR** prochlorperazine (COMPAZINE) suppository 25 mg, 25 mg, Rectal, Q12H PRN, Hardy Ying Jr., MD    promethazine (PHENERGAN) tablet 12.5 mg, 12.5 mg, Oral, Q4H PRN **OR** promethazine (PHENERGAN) suppository 12.5 mg, 12.5 mg, Rectal, Q4H PRN, Hardy Ying Jr., MD    scopolamine patch 1 mg/72 hr, 1 patch, Transdermal, Q72H PRN, Hardy Ying Jr., MD    Access VAD, , , Once **AND** sodium chloride 0.9 % flush 10 mL, 10 mL, Intravenous, PRN, Demetris Brenner MD    sodium chloride 0.9 % flush 10 mL, 10 mL, Intravenous, PRN, Demetris Brenner MD    sodium chloride 0.9 % flush 20 mL, 20 mL, Intravenous, PRNElidia Austin C, MD    sodium chloride 0.9 % infusion 40 mL, 40 mL, Intravenous, PRNElidia Austin C, MD    sodium chloride nasal spray 2 spray, 2 spray, Each Nare, PRN, Hardy Ying Jr., MD    ALLERGIES:     Allergies   Allergen Reactions    Codeine Unknown - Low Severity    Sulfa Antibiotics Unknown - Low Severity       SOCIAL HISTORY:       Social History     Socioeconomic History    Marital status:    Tobacco Use    Smoking status: Former     Current packs/day: 1.00     Average packs/day: 1 pack/day for 40.0 years (40.0 ttl pk-yrs)     Types: Cigarettes    Smokeless tobacco: Never    Tobacco comments:     quit 2 weeks ago   Vaping Use    Vaping status: Never Used   Substance and Sexual Activity    Alcohol use: No    Drug use: No    Sexual activity: Defer          FAMILY HISTORY:  Family History   Problem Relation Age of Onset    Alzheimer's disease Mother     Cancer Father     Cancer Sister     Hemochromatosis Sister     Pancreatic cancer Sister     Malig Hyperthermia Neg Hx        REVIEW OF SYSTEMS:  Review of Systems  Unable to obtain           Vitals:    06/04/25 1145 06/04/25 1321 06/04/25 1331 06/04/25 1412   BP: 92/56 (!) 80/47 (!) 80/43    BP Location:       Patient Position:       Pulse: 106 106 106 Comment: uto   Resp:    26   Temp:    97.4 °F (36.3 °C)   TempSrc:    Axillary   SpO2: (!) 85% 94% (!) 88%    Weight:              No data to display               PHYSICAL EXAM:      CONSTITUTIONAL:  Vital signs reviewed.  Ill-appearing and unresponsive  EYES:  Conjunctivae and lids unremarkable.  PERRLA  EARS,NOSE,MOUTH,THROAT:  Ears and nose appear unremarkable.  Lips, teeth, gums appear unremarkable.  RESPIRATORY:   On a nonrebreather.  CARDIOVASCULAR:  Normal S1, S2.  No murmurs rubs or gallops.          RECENT LABS:        WBC   Date Value Ref Range Status   06/04/2025 1.12 (C) 3.40 - 10.80 10*3/mm3 Final   06/04/2025   Corrected     Comment:     .  Corrected result. Previous result was 0.93 10*3/mm3 on 6/4/2025 at 0344 EDT.     Hemoglobin   Date Value Ref Range Status   06/04/2025 8.2 (L) 12.0 - 15.9 g/dL Final   06/04/2025   Corrected     Comment:     .  Corrected result. Previous result was 5.1 g/dL on 6/4/2025 at 0344 EDT.     Platelets   Date Value Ref Range Status   06/04/2025 9 (C) 140 - 450 10*3/mm3 Final   06/04/2025   Corrected     Comment:     .  Corrected result. Previous result was 7 10*3/mm3 on 6/4/2025 at 0344 EDT.       Assessment & Plan   [unfilled]      Penelope Onofre       *Extensive stage small cell lung cancer  Received cycle 1 chemotherapy with, cis-platinum and etoposide from 5/23/2025 to 5/25/2025.  Patient admitted with severe cytopenias and sepsis.  She is unresponsive and terminally ill.  Proceed with comfort  measures/hospice.    *Septic shock  Was on vasopressors per ICU team.  Possible pneumonia versus UTI as the source of infection.  On vancomycin and cefepime.  Patient's family elected comfort measures.  Proceed with hospice consult    *Altered mental status  Secondary to sepsis.    *Severe electrolyte abnormalities  Secondary to sepsis.    Recommendations  Patient's family elected for comfort measures.  Agree with the same.  Agree with DNR/DNI and hospice.    We will sign off at this time.  Please call with additional questions or concerns

## 2025-06-04 NOTE — PROGRESS NOTES
Clinical Pharmacy Services: Medication History    Penelope Onofre is a 70 y.o. female presenting to [facility] for   Chief Complaint   Patient presents with    Hypotension       She  has a past medical history of Anxiety, Chronic back pain, CKD (chronic kidney disease), Claudication, GERD (gastroesophageal reflux disease), Hypertension, Kidney insufficiency, Raynaud's disease, Scoliosis, and Small cell lung cancer (2025).    Allergies as of 06/04/2025 - Reviewed 06/04/2025   Allergen Reaction Noted    Codeine Unknown - Low Severity 07/27/2016    Sulfa antibiotics Unknown - Low Severity 07/27/2016       Medication information was obtained from: Nursing Home   Pharmacy and Phone Number:     Prior to Admission Medications       Prescriptions Last Dose Informant Patient Reported? Taking?    acetaminophen (TYLENOL) 500 MG tablet 6/3/2025 Nursing Home Yes Yes    Take 1 tablet by mouth Every 6 (Six) Hours As Needed for Mild Pain.    allopurinol (ZYLOPRIM) 300 MG tablet 6/3/2025 assisted No Yes    Take 1 tablet by mouth Daily.    ALPRAZolam (Xanax) 0.5 MG tablet  Nursing Home No Yes    Take 1 tablet by mouth 3 (Three) Times a Day As Needed for Anxiety.    amLODIPine (NORVASC) 5 MG tablet Past Week Nursing Home No Yes    Take 1 tablet by mouth Daily.    arformoterol (BROVANA) 15 MCG/2ML nebulizer solution 6/3/2025 Nursing Home No Yes    Take 2 mL by nebulization 2 (Two) Times a Day.    atorvastatin (LIPITOR) 20 MG tablet 6/3/2025 Nursing Home Yes Yes    Take 1 tablet by mouth Every Night.    budesonide (PULMICORT) 1 MG/2ML nebulizer solution 6/3/2025 Nursing Home No Yes    Take 2 mL by nebulization 2 (Two) Times a Day.    calcium carbonate (TUMS) 500 MG chewable tablet  Nursing Home No Yes    Chew 2 tablets 2 (Two) Times a Day As Needed for Heartburn.    enoxaparin sodium (LOVENOX) 40 MG/0.4ML solution prefilled syringe syringe 6/3/2025 Nursing Home No Yes    Inject 0.4 mL under the skin into the  appropriate area as directed Daily for 10 days. Indications: Prevention of Unwanted Clot in Veins    fluticasone (FLONASE) 50 MCG/ACT nasal spray 6/3/2025 [facility] No Yes    2 sprays by Each Nare route Daily.    guaiFENesin (MUCINEX) 600 MG 12 hr tablet 6/3/2025 [facility] No Yes    Take 2 tablets by mouth Every 12 (Twelve) Hours.    HYDROcodone-acetaminophen (NORCO) 5-325 MG per tablet  Nursing Home No Yes    Take 1 tablet by mouth Every 4 (Four) Hours As Needed (pain).    hydrOXYzine (ATARAX) 25 MG tablet Past Week Nursing Home No Yes    Take 1 tablet by mouth 3 (Three) Times a Day As Needed for Itching or Anxiety.    ipratropium-albuterol (DUO-NEB) 0.5-2.5 mg/3 ml nebulizer 6/3/2025 [facility] No Yes    Take 3 mL by nebulization 4 (Four) Times a Day.    Menthol-Zinc Oxide 0.44-20.6 % ointment 6/3/2025 [facility] No Yes    Apply 1 Application topically to the appropriate area as directed 2 (Two) Times a Day.    ondansetron (ZOFRAN) 8 MG tablet  [facility] Home No Yes    Take 1 tablet by mouth Every 8 (Eight) Hours As Needed for Nausea or Vomiting.    pantoprazole (PROTONIX) 40 MG EC tablet 6/3/2025 [facility] No Yes    Take 1 tablet by mouth 2 (Two) Times a Day Before Meals.    vitamin B-12 (VITAMIN B-12) 1000 MCG tablet 6/3/2025 [facility] No Yes    Take 1 tablet by mouth Daily.              Medication notes:     This medication list is complete to the best of my knowledge as of 6/4/2025    Please call if questions.    Enoc Wei  Medication History Technician  [phone]    6/4/2025 09:14 EDT

## 2025-06-04 NOTE — Clinical Note
Level of Care: Critical Care [6]   Diagnosis: Septic shock [[id number]]   Admitting Physician: JESSE MCKEON [[id number]]   Attending Physician: JESSE MCKEON [[id number]]   Certification: I Certify That Inpatient Hospital Services Are Medically Necessary For Greater Than 2 Midnights

## 2025-06-04 NOTE — ED NOTES
Nursing report ED to floor  Penelope Onofre  70 y.o.  female    HPI :  HPI  Stated Reason for Visit: pt was found unreponsive by staff  History Obtained From: EMS    Chief Complaint  Chief Complaint   Patient presents with    Hypotension       Admitting doctor:   Hardy Ying Jr., MD    Admitting diagnosis:   The primary encounter diagnosis was Septic shock. Diagnoses of Hypotension, unspecified hypotension type, Atrial fibrillation with RVR, Anemia, unspecified type, Thrombocytopenia, Hypomagnesemia, SHADIA (acute kidney injury), Lactic acidosis, Acute pulmonary edema, Acute UTI, and Leukopenia, unspecified type were also pertinent to this visit.    Code status:   Current Code Status       Date Active Code Status Order ID Comments User Context       6/4/2025 1223 No CPR (Do Not Attempt to Resuscitate) [id number]  Hardy Ying Jr., MD ED        Question Answer    Code Status (Patient has no pulse and is not breathing) No CPR (Do Not Attempt to Resuscitate)    Medical Interventions (Patient has pulse or is breathing) Comfort Measures    Level Of Support Discussed With Next of Kin (If No Surrogate)                    Allergies:   Codeine and Sulfa antibiotics    Isolation:   No active isolations    Intake and Output    Intake/Output Summary (Last 24 hours) at 6/4/2025 1257  Last data filed at 6/4/2025 1053  Gross per 24 hour   Intake 2810.33 ml   Output --   Net 2810.33 ml       Weight:       06/04/25  0219   Weight: 59.1 kg (130 lb 6.4 oz)       Most recent vitals:   Vitals:    06/04/25 1101 06/04/25 1106 06/04/25 1121 06/04/25 1145   BP:  90/53 98/61 92/56   BP Location:       Patient Position:       Pulse: 107 108 107 106   Resp: (!) 32      Temp:       TempSrc:       SpO2: (!) 87% (!) 88% (!) 89% (!) 85%   Weight:           Active LDAs/IV Access:   Lines, Drains & Airways       Active LDAs       Name Placement date Placement time Site Days    Peripheral IV 06/04/25 0220 18 G Right Antecubital 06/04/25   0220  Antecubital  less than 1    Peripheral IV 06/04/25 0221 18 G Anterior;Left Forearm 06/04/25  0221  Forearm  less than 1    Peripheral IV Anterior;Proximal;Right Forearm --  --  Forearm  --    Single Lumen Implantable Port 05/22/25 Left Chest 05/22/25  1032  Chest  13                    Labs (abnormal labs have a star):   Labs Reviewed   URINALYSIS W/ CULTURE IF INDICATED - Abnormal; Notable for the following components:       Result Value    Color, UA Dark Yellow (*)     Appearance, UA Turbid (*)     Blood, UA Large (3+) (*)     Protein, UA >=300 mg/dL (3+) (*)     Leuk Esterase, UA Moderate (2+) (*)     All other components within normal limits    Narrative:     In absence of clinical symptoms, the presence of pyuria, bacteria, and/or nitrites on the urinalysis result does not correlate with infection.   LACTIC ACID, REFLEX - Abnormal; Notable for the following components:    Lactate 4.8 (*)     All other components within normal limits   HIGH SENSITIVITIY TROPONIN T 1HR - Abnormal; Notable for the following components:    HS Troponin T 65 (*)     All other components within normal limits    Narrative:     High Sensitive Troponin T Reference Range:  <14.0 ng/L- Negative Female for AMI  <22.0 ng/L- Negative Male for AMI  >=14 - Abnormal Female indicating possible myocardial injury.  >=22 - Abnormal Male indicating possible myocardial injury.   Clinicians would have to utilize clinical acumen, EKG, Troponin, and serial changes to determine if it is an Acute Myocardial Infarction or myocardial injury due to an underlying chronic condition.        URINALYSIS, MICROSCOPIC ONLY - Abnormal; Notable for the following components:    RBC, UA 11-20 (*)     WBC, UA 11-20 (*)     Bacteria, UA 3+ (*)     Squamous Epithelial Cells, UA 3-6 (*)     All other components within normal limits   CBC (NO DIFF) - Abnormal; Notable for the following components:    WBC 1.12 (*)     RBC 2.35 (*)     Hemoglobin 8.2 (*)     Hematocrit  "23.4 (*)     MCV 99.6 (*)     MCH 34.9 (*)     MPV 12.4 (*)     Platelets 9 (*)     All other components within normal limits   PROCALCITONIN - Abnormal; Notable for the following components:    Procalcitonin 93.20 (*)     All other components within normal limits    Narrative:     As a Marker for Sepsis (Non-Neonates):    1. <0.5 ng/mL represents a low risk of severe sepsis and/or septic shock.  2. >2 ng/mL represents a high risk of severe sepsis and/or septic shock.    As a Marker for Lower Respiratory Tract Infections that require antibiotic therapy:    PCT on Admission    Antibiotic Therapy       6-12 Hrs later    >0.5                Strongly Recommended  >0.25 - <0.5        Recommended   0.1 - 0.25          Discouraged              Remeasure/reassess PCT  <0.1                Strongly Discouraged     Remeasure/reassess PCT    As 28 day mortality risk marker: \"Change in Procalcitonin Result\" (>80% or <=80%) if Day 0 (or Day 1) and Day 4 values are available. Refer to http://www.Fnbox[employer]-pct-calculator.com    Change in PCT <=80%  A decrease of PCT levels below or equal to 80% defines a positive change in PCT test result representing a higher risk for 28-day all-cause mortality of patients diagnosed with severe sepsis for septic shock.    Change in PCT >80%  A decrease of PCT levels of more than 80% defines a negative change in PCT result representing a lower risk for 28-day all-cause mortality of patients diagnosed with severe sepsis or septic shock.      COMPREHENSIVE METABOLIC PANEL - Abnormal; Notable for the following components:    Glucose 123 (*)     BUN 32.0 (*)     Creatinine 2.27 (*)     Sodium 127 (*)     Potassium 2.8 (*)     Chloride 94 (*)     CO2 12.7 (*)     Calcium 8.0 (*)     Total Protein 5.1 (*)     Albumin 2.5 (*)     Alkaline Phosphatase 34 (*)     Anion Gap 20.3 (*)     eGFR 22.6 (*)     All other components within normal limits    Narrative:     GFR Categories in Chronic Kidney Disease (CKD)   "            GFR Category          GFR (mL/min/1.73)    Interpretation  G1                    90 or greater        Normal or high (1)  G2                    60-89                Mild decrease (1)  G3a                   45-59                Mild to moderate decrease  G3b                   30-44                Moderate to severe decrease  G4                    15-29                Severe decrease  G5                    14 or less           Kidney failure    (1)In the absence of evidence of kidney disease, neither GFR category G1 or G2 fulfill the criteria for CKD.    eGFR calculation 2021 CKD-EPI creatinine equation, which does not include race as a factor   MAGNESIUM - Abnormal; Notable for the following components:    Magnesium 1.4 (*)     All other components within normal limits   BNP (IN-HOUSE) - Abnormal; Notable for the following components:    proBNP 25,356.0 (*)     All other components within normal limits    Narrative:     This assay is used as an aid in the diagnosis of individuals suspected of having heart failure. It can be used as an aid in the diagnosis of acute decompensated heart failure (ADHF) in patients presenting with signs and symptoms of ADHF to the emergency department (ED). In addition, NT-proBNP of <300 pg/mL indicates ADHF is not likely.    Age Range Result Interpretation  NT-proBNP Concentration (pg/mL:      <50             Positive            >450                   Gray                 300-450                    Negative             <300    50-75           Positive            >900                  Gray                300-900                  Negative            <300      >75             Positive            >1800                  Gray                300-1800                  Negative            <300   PROTIME-INR - Abnormal; Notable for the following components:    Protime 17.8 (*)     INR 1.47 (*)     All other components within normal limits   APTT - Abnormal; Notable for the following  components:    PTT 38.0 (*)     All other components within normal limits   LACTIC ACID, REFLEX - Abnormal; Notable for the following components:    Lactate 3.1 (*)     All other components within normal limits   BLOOD GAS, ARTERIAL - Abnormal; Notable for the following components:    pH, Arterial 7.290 (*)     pO2, Arterial 55.6 (*)     HCO3, Arterial 17.8 (*)     Base Excess, Arterial -8.1 (*)     O2 Saturation, Arterial 85.2 (*)     All other components within normal limits   RESPIRATORY PANEL PCR W/ COVID-19 (SARS-COV-2), NP SWAB IN UTM/VTP, 2 HR TAT - Normal    Narrative:     In the setting of a positive respiratory panel with a viral infection PLUS a negative procalcitonin without other underlying concern for bacterial infection, consider observing off antibiotics or discontinuation of antibiotics and continue supportive care. If the respiratory panel is positive for atypical bacterial infection (Bordetella pertussis, Chlamydophila pneumoniae, or Mycoplasma pneumoniae), consider antibiotic de-escalation to target atypical bacterial infection.   MRSA SCREEN, PCR - Normal    Narrative:     The negative predictive value of this diagnostic test is high and should only be used to consider de-escalating anti-MRSA therapy. A positive result may indicate colonization with MRSA and must be correlated clinically.   PHOSPHORUS - Normal   BLOOD CULTURE   BLOOD CULTURE   URINE CULTURE   COMPREHENSIVE METABOLIC PANEL    Narrative:     GFR Categories in Chronic Kidney Disease (CKD)              GFR Category          GFR (mL/min/1.73)    Interpretation  G1                    90 or greater        Normal or high (1)  G2                    60-89                Mild decrease (1)  G3a                   45-59                Mild to moderate decrease  G3b                   30-44                Moderate to severe decrease  G4                    15-29                Severe decrease  G5                    14 or less           Kidney  failure    (1)In the absence of evidence of kidney disease, neither GFR category G1 or G2 fulfill the criteria for CKD.    eGFR calculation 2021 CKD-EPI creatinine equation, which does not include race as a factor  The previously reported component GFR is no longer being reported. Previous result was 22.2 mL/min/1.73 (Reference Range: >60.0 mL/min/1.73) on 6/4/2025 at 0339 EDT.   PROTIME-INR   APTT   MAGNESIUM   PHOSPHORUS   BNP (IN-HOUSE)    Narrative:     This assay is used as an aid in the diagnosis of individuals suspected of having heart failure. It can be used as an aid in the diagnosis of acute decompensated heart failure (ADHF) in patients presenting with signs and symptoms of ADHF to the emergency department (ED). In addition, NT-proBNP of <300 pg/mL indicates ADHF is not likely.    Age Range Result Interpretation  NT-proBNP Concentration (pg/mL:      <50             Positive            >450                   Gray                 300-450                    Negative             <300    50-75           Positive            >900                  Gray                300-900                  Negative            <300      >75             Positive            >1800                  Gray                300-1800                  Negative            <300   TROPONIN    Narrative:     High Sensitive Troponin T Reference Range:  <14.0 ng/L- Negative Female for AMI  <22.0 ng/L- Negative Male for AMI  >=14 - Abnormal Female indicating possible myocardial injury.  >=22 - Abnormal Male indicating possible myocardial injury.   Clinicians would have to utilize clinical acumen, EKG, Troponin, and serial changes to determine if it is an Acute Myocardial Infarction or myocardial injury due to an underlying chronic condition.        LACTIC ACID, PLASMA   CBC WITH AUTO DIFFERENTIAL   MANUAL DIFFERENTIAL    Narrative:     The previously reported component Monocytes % is no longer being reported. Previous result was 10.0 % (Reference  Range: 5.0-12.0 %) on 6/4/2025 at 0439 EDT.  The previously reported component Absolute Monocytes is no longer being reported. Previous   result was 0.09 10*3/mm3 (Reference Range: 0.10-0.90 10*3/mm3) on 6/4/2025 at 0439 EDT.   TYPE AND SCREEN   PREPARE RBC   PREPARE PLATELET PHERESIS   CBC AND DIFFERENTIAL    Narrative:     The following orders were created for panel order CBC & Differential.  Procedure                               Abnormality         Status                     ---------                               -----------         ------                     CBC Auto Differential[[id number]]                            Edited Result - FINAL        Please view results for these tests on the individual orders.       EKG:   ECG 12 Lead Altered Mental Status   Preliminary Result   HEART RZIH=844  bpm   RR Vnfkvdli=153  ms   NM Interval=  ms   P Horizontal Axis=  deg   P Front Axis=  deg   QRSD Interval=69  ms   QT Zgxqzsss=989  ms   TOaN=668  ms   QRS Axis=98  deg   T Wave Axis=  deg   - ABNORMAL ECG -   Atrial fibrillation with rapid V-rate   Anteroseptal infarct, age indeterminate   When compared with ECG of 18-May-2025 18:07:58,   Significant change in rhythm: previously sinus   Date and Time of Study:2025-06-04 02:24:45          Meds given in ED:   Medications   sodium chloride 0.9 % flush 10 mL (has no administration in time range)   sodium chloride 0.9 % flush 10 mL (has no administration in time range)   sodium chloride 0.9 % flush 20 mL (has no administration in time range)   sodium chloride 0.9 % infusion 40 mL (has no administration in time range)   LORazepam (ATIVAN) injection 0.5 mg (has no administration in time range)     Or   LORazepam (ATIVAN) injection 0.5 mg (has no administration in time range)     Or   LORazepam (ATIVAN) 2 MG/ML concentrated solution 0.5 mg (has no administration in time range)   LORazepam (ATIVAN) injection 1 mg (has no administration in time range)     Or   LORazepam (ATIVAN)  injection 1 mg (has no administration in time range)     Or   LORazepam (ATIVAN) 2 MG/ML concentrated solution 1 mg (has no administration in time range)   LORazepam (ATIVAN) injection 2 mg (has no administration in time range)     Or   LORazepam (ATIVAN) injection 2 mg (has no administration in time range)     Or   LORazepam (ATIVAN) 2 MG/ML concentrated solution 2 mg (has no administration in time range)   morphine injection 2 mg (has no administration in time range)     Or   morphine concentrated solution 5 mg (has no administration in time range)     Or   HYDROmorphone (DILAUDID) injection 0.5 mg (has no administration in time range)   morphine injection 4 mg (has no administration in time range)     Or   morphine concentrated solution 10 mg (has no administration in time range)     Or   HYDROmorphone (DILAUDID) injection 1 mg (has no administration in time range)   morphine injection 6 mg (has no administration in time range)     Or   morphine concentrated solution 20 mg (has no administration in time range)     Or   HYDROmorphone (DILAUDID) injection 1.5 mg (has no administration in time range)   diphenoxylate-atropine (LOMOTIL) 2.5-0.025 MG per tablet 1 tablet (has no administration in time range)   Polyvinyl Alcohol-Povidone PF (ARTIFICIAL TEARS) 1.4-0.6 % ophthalmic solution 1 drop (has no administration in time range)   acetaminophen (TYLENOL) tablet 650 mg (has no administration in time range)     Or   acetaminophen (TYLENOL) 160 MG/5ML oral solution 650 mg (has no administration in time range)     Or   acetaminophen (TYLENOL) suppository 650 mg (has no administration in time range)   magic mouthwash oral suspension (mixture) (diphenhydrAMINE HCl - aluminum & magnesium hydroxide-simethicone - lidocaine viscous) solution 55 mL (has no administration in time range)   diphenhydrAMINE (BENADRYL) capsule 25 mg (has no administration in time range)     Or   diphenhydrAMINE (BENADRYL) injection 25 mg (has no  administration in time range)   ondansetron ODT (ZOFRAN-ODT) disintegrating tablet 4 mg (has no administration in time range)     Or   ondansetron (ZOFRAN) injection 4 mg (has no administration in time range)   prochlorperazine (COMPAZINE) injection 5 mg (has no administration in time range)     Or   prochlorperazine (COMPAZINE) tablet 5 mg (has no administration in time range)     Or   prochlorperazine (COMPAZINE) suppository 25 mg (has no administration in time range)   promethazine (PHENERGAN) tablet 12.5 mg (has no administration in time range)     Or   promethazine (PHENERGAN) suppository 12.5 mg (has no administration in time range)   pantoprazole (PROTONIX) EC tablet 40 mg (has no administration in time range)     Or   pantoprazole (PROTONIX) injection 40 mg (has no administration in time range)   scopolamine patch 1 mg/72 hr (has no administration in time range)   glycopyrrolate (ROBINUL) injection 0.2 mg (has no administration in time range)     Or   glycopyrrolate (ROBINUL) injection 0.2 mg (has no administration in time range)     Or   glycopyrrolate (ROBINUL) injection 0.4 mg (has no administration in time range)     Or   glycopyrrolate (ROBINUL) injection 0.4 mg (has no administration in time range)   haloperidol (HALDOL) tablet 1 mg (has no administration in time range)     Or   haloperidol (HALDOL) 2 MG/ML solution 1 mg (has no administration in time range)     Or   haloperidol lactate (HALDOL) injection 1 mg (has no administration in time range)   haloperidol (HALDOL) tablet 2 mg (has no administration in time range)     Or   haloperidol (HALDOL) 2 MG/ML solution 2 mg (has no administration in time range)     Or   haloperidol lactate (HALDOL) injection 2 mg (has no administration in time range)   sodium chloride nasal spray 2 spray (has no administration in time range)   lactated ringers bolus 1,000 mL (0 mL Intravenous Stopped 6/4/25 3622)   acetaminophen (TYLENOL) suppository 650 mg (650 mg Rectal  Given 6/4/25 0253)   amiodarone 150 mg in 100 mL D5W (loading dose) (0 mg Intravenous Stopped 6/4/25 0315)   cefepime 2000 mg IVPB in 100 mL NS (MBP) (0 mg Intravenous Stopped 6/4/25 0550)   Vancomycin HCl 1,250 mg in sodium chloride 0.9 % 250 mL VTB (0 mg Intravenous Stopped 6/4/25 0824)       Imaging results:  XR Chest 1 View  Result Date: 6/4/2025  Electronically signed by Kurt Rogel MD on 06-04-25 at 0405      Ambulatory status:       Social issues:   Social History     Socioeconomic History    Marital status:    Tobacco Use    Smoking status: Former     Current packs/day: 1.00     Average packs/day: 1 pack/day for 40.0 years (40.0 ttl pk-yrs)     Types: Cigarettes    Smokeless tobacco: Never    Tobacco comments:     quit 2 weeks ago   Vaping Use    Vaping status: Never Used   Substance and Sexual Activity    Alcohol use: No    Drug use: No    Sexual activity: Defer       Peripheral Neurovascular  Peripheral Neurovascular (Adult)  Peripheral Neurovascular WDL: WDL    Neuro Cognitive  Neuro Cognitive (Adult)  Cognitive/Neuro/Behavioral WDL: WDL, orientation  Orientation: oriented x 4  Additional Documentation: [place] Coma Scale (Group)  [place] Coma Scale  Best Eye Response: 4-->(E4) spontaneous  Best Motor Response: 6-->(M6) obeys commands  Best Verbal Response: 5-->(V5) oriented  [place] Coma Scale Score: 15    Learning  Learning Assessment  Learning Readiness and Ability: no barriers identified  Education Provided  Person Taught: patient  Teaching Method: verbal instruction  Teaching Focus: symptom/problem overview  Education Outcome Evaluation: acceptance expressed, able to teach back, verbalizes understanding    Respiratory  Respiratory  Airway WDL: WDL  Respiratory WDL  Respiratory WDL: WDL  Breath Sounds  All Lung Fields Breath Sounds: All Fields  All Lung Fields Breath Sounds: coarse, crackles    Abdominal Pain       Pain Assessments  Pain (Adult)  (0-10) Pain Rating: Rest: 0  (0-10) Pain  Rating: Activity: 0  Response to Pain Interventions: interventions effective per patient    NIH Stroke Scale       aTmra Rey RN  06/04/25 12:57 EDT

## 2025-06-04 NOTE — PROGRESS NOTES
"HAWA received communication from staff RN shortly after patient's arrival to unit that patient's nephew, Sanjiv, had \"legal questions.\" HAWA spoke with Sanjiv, who noted that while he has been caring for patient for the past few years, patient does have two brothers who are apparently not involved in patient's care. Patient does not have an advance directive. Notified Suman that per [place] statute, patient's brothers would be next of kin. Requested contact information from Sanjiv, who provided phone number for patient's brother, Suman Onofre [phone], which was then added to chart. HAWA collaborated with palliative physician Dr. Bass, who attempted to contact brother with no answer. Voicemail left.   "

## 2025-06-04 NOTE — CONSULTS
UPDATE: Notified by RN that patient  [id number].  Quiana at bedside engaging in postdeath rituals and grieving appropriately.  She is in the process of notifying additional extensive family.  She expressed gratitude for the care received here at [facility].            [facility]   Inpatient Palliative Care Consultation  Patient Name: Penelope Onofre   Patient : 1955   Date: 2025   Consulting Provider: Stepan  Reason for Consultation: End-of-life management  Inpatient Palliative Care MD Consult  Consult performed by: Wu Bass MD  Consult ordered by: Hardy Ying Jr., MD      Chief Complaint: Terminal decline    Information obtained from: Chart review, family, bedside RN    Summary of Palliative Illness and Palliative Assessment: 70-year-old female recently diagnosed with small cell lung cancer who presents with hypotension and altered mental status.    In the ED patient was found to be hypotensive, septic, and in multiorgan failure with an ABG showing a pH of 7.29, PO2 55.6; lactate 4.8; platelets 9; white blood cell 1.1; hemoglobin 8.2.  Chest x-ray was obtained that I independently reviewed showing right greater than left pleural opacities consistent with interstitial disease.  Given her advanced disease state, multiorgan failure, and poor prognosis comfort focused care was recommended as the only viable course of action.    Patient's primary caregiver has been her nephew Sanjiv along with his wife Quiana who have been present during hospital stay. Patient does not have a spouse or children and parents have predeceased her.  Her next of kin would be her 2 brothers from whom she is largely estranged. Brother Suman Onofre ([phone]) called at 1450 with no answer.  Voicemail left with callback number.  Patient's other brother is named Carlos Onofre but nephew and niece do not have contact information for him so he is unable to be reached.    Past Medical and  Surgical History:    Past Medical History:   Diagnosis Date    Anxiety     Chronic back pain     CKD (chronic kidney disease)     Claudication     GERD (gastroesophageal reflux disease)     Hypertension     Kidney insufficiency     Raynaud's disease     Scoliosis     Small cell lung cancer 2025    Metastatic      Past Surgical History:   Procedure Laterality Date    BREAST CYST EXCISION Right     TONSILLECTOMY      US GUIDED LYMPH NODE BIOPSY  5/15/2025    VENOUS ACCESS DEVICE (PORT) INSERTION N/A 5/22/2025    Procedure: INSERTION VENOUS ACCESS DEVICE;  Surgeon: Bria Starr MD;  Location: [facility] OR;  Service: General;  Laterality: N/A;          Palliative Care Psychosocial and Spiritual Screening    SocHx:  at [facility] for 20+ years retiring just 6 years ago      Physical Assessment:   BP (!) 80/43   Pulse 106   Temp 97.4 °F (36.3 °C) (Axillary)   Resp 26   Wt 59.1 kg (130 lb 6.4 oz)   SpO2 (!) 88%   BMI 17.69 kg/m²    Palliative Performance Scale: 10%  Physical Exam  Constitutional:       Comments: Ill-appearing and actively dying, propped up in bed   HENT:      Head:      Comments: Severe temporal wasting; prominent zygomatic arches     Mouth/Throat:      Mouth: Mucous membranes are dry.      Pharynx: Oropharynx is clear.      Comments: Edentulous  Eyes:      Comments: Half open; negative eyelash reflex   Cardiovascular:      Comments: Bradycardic; absent radial pulses bilaterally  Pulmonary:      Comments: Agonal respirations with mandibular movement; periods of apnea  Abdominal:      Comments: Absent bowel sounds   Skin:     Comments: Cold and mottled distal extremities   Neurological:      Comments: Unresponsive; no myoclonus   Psychiatric:      Comments: No agitation or restlessness        Medications:    Current Facility-Administered Medications:     acetaminophen (TYLENOL) tablet 650 mg, 650 mg, Oral, Q4H PRN **OR** acetaminophen (TYLENOL) 160 MG/5ML oral solution 650 mg,  650 mg, Oral, Q4H PRN **OR** acetaminophen (TYLENOL) suppository 650 mg, 650 mg, Rectal, Q4H PRN, Hardy Ying Jr., MD    diphenhydrAMINE (BENADRYL) capsule 25 mg, 25 mg, Oral, Q6H PRN **OR** diphenhydrAMINE (BENADRYL) injection 25 mg, 25 mg, Intravenous, Q6H PRN, Hardy Ying Jr., MD    diphenoxylate-atropine (LOMOTIL) 2.5-0.025 MG per tablet 1 tablet, 1 tablet, Oral, Q2H PRN, Hardy Ying Jr., MD    glycopyrrolate (ROBINUL) injection 0.2 mg, 0.2 mg, Intravenous, Q2H PRN **OR** glycopyrrolate (ROBINUL) injection 0.2 mg, 0.2 mg, Subcutaneous, Q2H PRN **OR** glycopyrrolate (ROBINUL) injection 0.4 mg, 0.4 mg, Intravenous, Q2H PRN **OR** glycopyrrolate (ROBINUL) injection 0.4 mg, 0.4 mg, Subcutaneous, Q2H PRN, Hardy Ying Jr., MD    haloperidol (HALDOL) tablet 1 mg, 1 mg, Oral, Q4H PRN **OR** haloperidol (HALDOL) 2 MG/ML solution 1 mg, 1 mg, Oral, Q4H PRN **OR** haloperidol lactate (HALDOL) injection 1 mg, 1 mg, Subcutaneous, Q4H PRN, Hardy Ying Jr., MD    haloperidol (HALDOL) tablet 2 mg, 2 mg, Oral, Q4H PRN **OR** haloperidol (HALDOL) 2 MG/ML solution 2 mg, 2 mg, Oral, Q4H PRN **OR** haloperidol lactate (HALDOL) injection 2 mg, 2 mg, Subcutaneous, Q4H PRN, Hardy Ying Jr., MD    morphine injection 2 mg, 2 mg, Intravenous, Q1H PRN **OR** morphine concentrated solution 5 mg, 5 mg, Sublingual, Q1H PRN **OR** HYDROmorphone (DILAUDID) injection 0.5 mg, 0.5 mg, Intravenous, Q1H PRN, Hardy Ying Jr., MD    morphine injection 4 mg, 4 mg, Intravenous, Q1H PRN **OR** morphine concentrated solution 10 mg, 10 mg, Sublingual, Q1H PRN **OR** HYDROmorphone (DILAUDID) injection 1 mg, 1 mg, Intravenous, Q1H PRN, Hardy Ying Jr., MD    morphine injection 6 mg, 6 mg, Intravenous, Q1H PRN **OR** morphine concentrated solution 20 mg, 20 mg, Sublingual, Q1H PRN **OR** HYDROmorphone (DILAUDID) injection 1.5 mg, 1.5 mg, Intravenous, Q1H PRN, Hardy Yign Jr., MD     LORazepam (ATIVAN) injection 0.5 mg, 0.5 mg, Intravenous, Q1H PRN **OR** LORazepam (ATIVAN) injection 0.5 mg, 0.5 mg, Subcutaneous, Q1H PRN **OR** LORazepam (ATIVAN) 2 MG/ML concentrated solution 0.5 mg, 0.5 mg, Sublingual, Q1H PRN, Hardy Ying Jr., MD    LORazepam (ATIVAN) injection 1 mg, 1 mg, Intravenous, Q1H PRN **OR** LORazepam (ATIVAN) injection 1 mg, 1 mg, Subcutaneous, Q1H PRN **OR** LORazepam (ATIVAN) 2 MG/ML concentrated solution 1 mg, 1 mg, Sublingual, Q1H PRN, Hardy Ying Jr., MD    LORazepam (ATIVAN) injection 2 mg, 2 mg, Intravenous, Q1H PRN **OR** LORazepam (ATIVAN) injection 2 mg, 2 mg, Subcutaneous, Q1H PRN **OR** LORazepam (ATIVAN) 2 MG/ML concentrated solution 2 mg, 2 mg, Sublingual, Q1H PRN, Hardy Ying Jr., MD    magic mouthwash oral suspension (mixture) (diphenhydrAMINE HCl - aluminum & magnesium hydroxide-simethicone - lidocaine viscous) solution 55 mL, 5 mL, Swish & Spit, Q4H PRN, Hardy Ying Jr., MD    ondansetron ODT (ZOFRAN-ODT) disintegrating tablet 4 mg, 4 mg, Oral, Q6H PRN **OR** ondansetron (ZOFRAN) injection 4 mg, 4 mg, Intravenous, Q6H PRN, Hardy Ying Jr., MD    [START ON 6/5/2025] pantoprazole (PROTONIX) EC tablet 40 mg, 40 mg, Oral, Q AM **OR** [START ON 6/5/2025] pantoprazole (PROTONIX) injection 40 mg, 40 mg, Intravenous, Q AM, Hardy Ying Jr., MD    Polyvinyl Alcohol-Povidone PF (ARTIFICIAL TEARS) 1.4-0.6 % ophthalmic solution 1 drop, 1 drop, Both Eyes, Q30 Min PRN, Hardy Ying Jr., MD    prochlorperazine (COMPAZINE) injection 5 mg, 5 mg, Intravenous, Q6H PRN **OR** prochlorperazine (COMPAZINE) tablet 5 mg, 5 mg, Oral, Q6H PRN **OR** prochlorperazine (COMPAZINE) suppository 25 mg, 25 mg, Rectal, Q12H PRN, Hardy Ying Jr., MD    promethazine (PHENERGAN) tablet 12.5 mg, 12.5 mg, Oral, Q4H PRN **OR** promethazine (PHENERGAN) suppository 12.5 mg, 12.5 mg, Rectal, Q4H PRN, Hardy Ying Jr., MD    scopolamine  patch 1 mg/72 hr, 1 patch, Transdermal, Q72H PRPANTERA, Hardy Ying Jr., MD    Access VAD, , , Once **AND** sodium chloride 0.9 % flush 10 mL, 10 mL, Intravenous, Elidia MOBLEY Austin C, MD    sodium chloride 0.9 % flush 10 mL, 10 mL, Intravenous, Elidia MOBLEY Austin C, MD    sodium chloride 0.9 % flush 20 mL, 20 mL, Intravenous, Elidia MOBLEY Austin C, MD    sodium chloride 0.9 % infusion 40 mL, 40 mL, Intravenous, Elidia MOBLEY Austin C, MD    sodium chloride nasal spray 2 spray, 2 spray, Each Nare, PRStepna MOTT Harold Dale Jr., MD     Allergies:   Allergies   Allergen Reactions    Codeine Unknown - Low Severity    Sulfa Antibiotics Unknown - Low Severity          Data (labs/images reviewed):   WBC   Date Value Ref Range Status   06/04/2025 1.12 (C) 3.40 - 10.80 10*3/mm3 Final     RBC   Date Value Ref Range Status   06/04/2025 2.35 (L) 3.77 - 5.28 10*6/mm3 Final     Hemoglobin   Date Value Ref Range Status   06/04/2025 8.2 (L) 12.0 - 15.9 g/dL Final     Hematocrit   Date Value Ref Range Status   06/04/2025 23.4 (L) 34.0 - 46.6 % Final     MCV   Date Value Ref Range Status   06/04/2025 99.6 (H) 79.0 - 97.0 fL Final     MCH   Date Value Ref Range Status   06/04/2025 34.9 (H) 26.6 - 33.0 pg Final     MCHC   Date Value Ref Range Status   06/04/2025 35.0 31.5 - 35.7 g/dL Final     RDW   Date Value Ref Range Status   06/04/2025 13.1 12.3 - 15.4 % Final     RDW-SD   Date Value Ref Range Status   06/04/2025 46.7 37.0 - 54.0 fl Final     MPV   Date Value Ref Range Status   06/04/2025 12.4 (H) 6.0 - 12.0 fL Final     Platelets   Date Value Ref Range Status   06/04/2025 9 (C) 140 - 450 10*3/mm3 Final     Neutrophils Absolute   Date Value Ref Range Status   06/04/2025   Corrected     Comment:     Corrected result. Previous result was 0.52 10*3/mm3 on 6/4/2025 at 0439 EDT.        Lab Results   Component Value Date    GLUCOSE 123 (H) 06/04/2025    BUN 32.0 (H) 06/04/2025    CREATININE 2.27 (H) 06/04/2025     (L) 06/04/2025     K 2.8 (L) 06/04/2025    CL 94 (L) 06/04/2025    CALCIUM 8.0 (L) 06/04/2025    PROTEINTOT 5.1 (L) 06/04/2025    ALBUMIN 2.5 (L) 06/04/2025    ALT 13 06/04/2025    AST 20 06/04/2025    ALKPHOS 34 (L) 06/04/2025    BILITOT 0.5 06/04/2025    GLOB 2.6 06/04/2025    AGRATIO 1.0 06/04/2025    BCR 14.1 06/04/2025    ANIONGAP 20.3 (H) 06/04/2025    EGFR 22.6 (L) 06/04/2025      Palliative Care Assessment and Recommendations: Penelope Onofre is a 70 y.o. female with a primary palliative care diagnosis of terminal decline from sepsis and lung cancer. Palliative care was consulted for end-of-life care management.     Prognosis and Palliative Performance Scale:  10%  Disease State: Actively dying  Symptom Management:   Pain: Continue opioids as needed titrate to effect  Shortness of Breath: Related to malignancy and decline; opioids as primary intervention  Constipation: Bisacodyl AK as needed available but aggressive bowel regimen unlikely to aid in comfort at this stage  Goals of Care Treatment Preferences   Palliative Care Decision Making Capacity: Not decisional; actively dying  Healthcare Surrogate: Next of kin per [place] statute would be 2 brothers Suman and Carlos, however she has been extreme from them for some time now.  Attempts to reach Suman have not been successful and no contact information for Carlos is available.  Primary caregivers have been nephew Sanjiv and his wife Quiana.  Sanjiv would be next in line as healthcare surrogate per [place] statute as next nearest and contactable living relative.  What is Most important to patient/family at this time: Focused care  Code Status DNR    Thank you for consulting palliative care. If you need to reach the provider on call for the palliative care team please call [phone]      Wu Bass MD  6/4/2025 14:51 EDT

## 2025-06-04 NOTE — PROGRESS NOTES
LOS: 0 days   Patient Care Team:  Phoenix Tobar MD as PCP - General (Internal Medicine)  Karely Hyman, RN as Nurse Navigator  Yannick Koroma MD PhD as Consulting Physician (Hematology and Oncology)    Subjective     Resting the stretcher in the emergency department she looks terrible she is saying a few words but we cannot understand what she is saying.  She is working a little bit to breathe although her oxygen saturations are pretty good.    Review of Systems:   Unable to obtain       Objective     Vital Signs  Vital Sign Min/Max for last 24 hours  Temp  Min: 97.6 °F (36.4 °C)  Max: 100 °F (37.8 °C)   BP  Min: 61/48  Max: 133/65   Pulse  Min: 79  Max: 160   Resp  Min: 20  Max: 24   SpO2  Min: 90 %  Max: 96 %   Flow (L/min) (Oxygen Therapy)  Min: 3  Max: 3   Weight  Min: 59.1 kg (130 lb 6.4 oz)  Max: 59.1 kg (130 lb 6.4 oz)        Ventilator/Non-Invasive Ventilation Settings (From admission, onward)      None                         Body mass index is 17.69 kg/m².  I/O last 3 completed shifts:  In: 2100 [I.V.:1000; IV Piggyback:1100]  Out: -   I/O this shift:  In: 273.3 [Blood:23.3; IV Piggyback:250]  Out: -         Physical Exam:  General Appearance: Thin elderly cachectic white female resting on stretcher in the emergency department she is on 6 L nasal cannula O2 oxygen saturations are 94 to 96%.  She looks acutely and chronically ill.  She is on Levophed at 0.2 mics per kilogram per minute and amiodarone at 0.5 mg/h.  She has gurgling breath sounds cross the room  Eyes: Conjunctiva are clear and anicteric pupils are equal about 2-1/2 mm  ENT: Mucous membranes are a little dry no erythema no exudates  Neck: No adenopathy or thyromegaly no jugular venous tension, trachea midline  Lungs: She has some much upper airway gurgling breath sounds it is hard to know how much is lower  Cardiac: Tachycardic regular rhythm no murmur on the monitor looks to be sinus  Abdomen: Flat no palpable  hepatosplenomegaly or masses does not appear to be tender  : Unremarkable  Musculoskeletal: Does not have much muscle mass no subcutaneous fat she has a left anterior chest [id number] that is accessed  Skin: Dry, no petechiae, no jaundice  Neuro: She is answering some questions but her answers are unintelligible for myself or the nurse.  Nurse says that sometimes she says things that are appropriate and they can understand.  She is moving all extremities  Extremities/P Vascular: No clubbing, cyanosis, she has some mottling in her knees and legs  MSE: Unable to assess       Labs:  Results from last 7 days   Lab Units 06/04/25  0427 05/30/25  0539 05/29/25  0710   GLUCOSE mg/dL 123* 105* 92   SODIUM mmol/L 127* 134* 135*   POTASSIUM mmol/L 2.8* 4.7 4.7   MAGNESIUM mg/dL 1.4*  --   --    CO2 mmol/L 12.7* 23.0 23.4   CHLORIDE mmol/L 94* 103 104   ANION GAP mmol/L 20.3* 8.0 7.6   CREATININE mg/dL 2.27* 0.94 0.89   BUN mg/dL 32.0* 21.0 21.0   BUN / CREAT RATIO  14.1 22.3 23.6   CALCIUM mg/dL 8.0* 8.5* 8.4*   ALK PHOS U/L 34*  --   --    TOTAL PROTEIN g/dL 5.1*  --   --    ALT (SGPT) U/L 13  --   --    AST (SGOT) U/L 20  --   --    BILIRUBIN mg/dL 0.5  --   --    ALBUMIN g/dL 2.5*  --   --    GLOBULIN gm/dL 2.6  --   --      Estimated Creatinine Clearance: 21.2 mL/min (A) (by C-G formula based on SCr of 2.27 mg/dL (H)).      Results from last 7 days   Lab Units 06/04/25  0513 05/30/25  0539 05/29/25  0710   WBC 10*3/mm3 1.12* 3.15* 10.00   RBC 10*6/mm3 2.35* 3.04* 3.12*   HEMOGLOBIN g/dL 8.2* 10.8* 10.9*   HEMATOCRIT % 23.4* 31.2* 33.6*   MCV fL 99.6* 102.6* 107.7*   MCH pg 34.9* 35.5* 34.9*   MCHC g/dL 35.0 34.6 32.4   RDW % 13.1 12.9 13.2   RDW-SD fl 46.7 47.9 52.2   MPV fL 12.4* 10.5 10.5   PLATELETS 10*3/mm3 9* 72* 93*   NEUTROS ABS 10*3/mm3  --  2.43 9.10*   EOS ABS 10*3/mm3  --  0.00 0.10   BASOS ABS 10*3/mm3  --  0.00 0.00   NRBC /100 WBC  --  0.0  --          Results from last 7 days   Lab Units 06/04/25  0422    HSTROP T ng/L 65*     Results from last 7 days   Lab Units 06/04/25  0427   PROBNP pg/mL 25,356.0*         Results from last 7 days   Lab Units 06/04/25  0637 06/04/25  0427   LACTATE mmol/L 4.8*  --    PROCALCITONIN ng/mL  --  93.20*         Microbiology Results (last 10 days)       Procedure Component Value - Date/Time    Respiratory Panel PCR w/COVID-19(SARS-CoV-2) CESIA/ROSSY/MESHA/PAD/COR/ANNIE In-House, NP Swab in UTM/VTM, 2 HR TAT - Swab, Nasopharynx [[id number]]  (Normal) Collected: 06/04/25 0310    Lab Status: Final result Specimen: Swab from Nasopharynx Updated: 06/04/25 0408     ADENOVIRUS, PCR Not Detected     Coronavirus 229E Not Detected     Coronavirus HKU1 Not Detected     Coronavirus NL63 Not Detected     Coronavirus OC43 Not Detected     COVID19 Not Detected     Human Metapneumovirus Not Detected     Human Rhinovirus/Enterovirus Not Detected     Influenza A PCR Not Detected     Influenza B PCR Not Detected     Parainfluenza Virus 1 Not Detected     Parainfluenza Virus 2 Not Detected     Parainfluenza Virus 3 Not Detected     Parainfluenza Virus 4 Not Detected     RSV, PCR Not Detected     Bordetella pertussis pcr Not Detected     Bordetella parapertussis PCR Not Detected     Chlamydophila pneumoniae PCR Not Detected     Mycoplasma pneumo by PCR Not Detected    Narrative:      In the setting of a positive respiratory panel with a viral infection PLUS a negative procalcitonin without other underlying concern for bacterial infection, consider observing off antibiotics or discontinuation of antibiotics and continue supportive care. If the respiratory panel is positive for atypical bacterial infection (Bordetella pertussis, Chlamydophila pneumoniae, or Mycoplasma pneumoniae), consider antibiotic de-escalation to target atypical bacterial infection.                allopurinol, 300 mg, Oral, Daily  arformoterol, 15 mcg, Nebulization, BID - RT  budesonide, 1 mg, Nebulization, BID - RT  cefepime, 2,000 mg,  Intravenous, Q24H  filgrastim (NEUPOGEN) injection, 300 mcg, Subcutaneous, Daily  ipratropium-albuterol, 3 mL, Nebulization, 4x Daily - RT  lactated ringers, 750 mL, Intravenous, Once  mupirocin, 1 Application, Each Nare, BID  pantoprazole, 40 mg, Oral, BID AC  potassium chloride, 10 mEq, Intravenous, Q1H  senna-docusate sodium, 2 tablet, Oral, BID  sodium chloride, 10 mL, Intravenous, Q12H  sodium chloride, 10 mL, Intravenous, Q12H  Vancomycin Pharmacy Intermittent/Pulse Dosing, , Not Applicable, Daily      amiodarone, 1 mg/min, Last Rate: 1 mg/min (06/04/25 0315)   Followed by  amiodarone, 0.5 mg/min  norepinephrine, 0.02-0.3 mcg/kg/min, Last Rate: 0.2 mcg/kg/min (06/04/25 0749)  Pharmacy to dose vancomycin,   sodium chloride, 100 mL/hr  vasopressin, 0.03 Units/min        Diagnostics:  XR Chest 1 View  Result Date: 6/4/2025  Patient: AMRIT SEGURA  Time Out: 04:05 Exam(s): XR CXR 1 VIEW EXAM:   XR Chest, 1 View CLINICAL HISTORY:    Reason for exam: sepsis. TECHNIQUE:   Frontal view of the chest. COMPARISON: None IMPRESSION:     1.  Diffuse interstitial changes in the lungs effect in the right greater than left lung which is favored to relate to interstitial pulmonary edema. 2.  Suspected small right pleural effusion. 3.  Faint airspace opacities in the right lung which may be infectious in nature.  Neoplasm also possible.  Consider cross-sectional imaging if there is further concern. 4.  Left chest port terminates within the mid SVC.    Electronically signed by Kurt Rogel MD on 06-04-25 at 0405    XR Chest Post CVA Port  Result Date: 5/22/2025  XR CHEST POST CVA PORT-  INDICATIONS: Chest port placement  TECHNIQUE: Frontal view of the chest  COMPARISON: 5/18/2025  FINDINGS:  Left chest port extends to the superior vena cava. Heart size is borderline. Pulmonary vasculature is unremarkable. Persistent opacities in the right lung appears slightly decreased. Persistent small right pleural effusion. No  pneumothorax. Otherwise stable.       Chest port placement. No pneumothorax.    This report was finalized on 5/22/2025 11:44 AM by Dr. Cuauhtemoc Aponte M.D on Workstation: [id number]      FL C Arm During Surgery  Result Date: 5/22/2025  This procedure was auto-finalized with no dictation required.    CT Head With & Without Contrast  Result Date: 5/22/2025  HEAD CT WITHOUT AND WITH CONTRAST  REASON:  small cell lung cancer, for staging; R53.1-Weakness; J18.9-Pneumonia, unspecified organism  COMPARISON STUDIES:  None Available.  TECHNIQUE:  Axial images were acquired from the skull base to vertex without and with with IV contrast, including multiplanar reformats, per standard departmental protocol.    Radiation dose reduction techniques were utilized, including automated exposure control, and exposure modulation based on body size.  FINDINGS:  There is no CT evidence of acute intracranial hemorrhage, mass, or infarct. There is volume loss, but there is no evidence of hydrocephalus or extra-axial fluid collection.  Brain parenchymal density is within normal limits. Postcontrast images show no evidence of intracranial mass or other abnormal enhancement.  Skull base, calvarium, and extracranial soft tissues show no acute abnormality.       Volume loss, otherwise negative.        This report was finalized on 5/22/2025 12:39 AM by Dr. Ty Mock M.D on Workstation: [id number]      XR Chest 1 View  Result Date: 5/19/2025  XR CHEST 1 VW-  Clinical: Chest pain  COMPARISON CT examination 5/18/2025 and chest radiograph 5/13/2025  FINDINGS: Patient is rotated on the current examination. The cardiomediastinal silhouette is stable. The left lung is clear. Right-sided pleural effusion is similar to or larger compared to the previous examination. There is vague infiltrate/atelectasis right lung. There is prominence of the right hilar and right paratracheal soft tissues consistent with mass and/or adenopathy. The remainder is  unremarkable.  This report was finalized on 5/19/2025 6:33 AM by Dr. Carlos Stapleton M.D on Workstation: [id number]      CT Angiogram Chest  Result Date: 5/18/2025  CTA CHEST WITH IV CONTRAST  HISTORY: Hypoxia, chest pain, SOB; R53.1-Weakness; J18.9-Pneumonia, unspecified organism  COMPARISON: 5/14/2025  TECHNIQUE: CT angiography was performed of the chest with axial images as well as coronal and sagittal reformatted MIP images provided following administration of IV contrast. 3-D surface rendered reformats were obtained of the pulmonary arteries and aorta. Radiation dose reduction techniques were utilized, including automated exposure control, and exposure modulation based on body size.  FINDINGS:  Redemonstrated moderate to large right pleural effusion with compressive atelectasis of nearly the entirety of the right lower lobe. There is opacity in the dependent right upper lobe as well, also likely atelectasis. Left lung demonstrates emphysematous changes, without acute parenchymal abnormality.  Redemonstrated large right hilar and mediastinal mass encasing the main pulmonary artery and right upper and lower lobe pulmonary arteries, grossly unchanged in appearance.  Thoracic aorta is normal in caliber.  There are coronary atherosclerotic vascular calcifications.  Images of the upper abdomen show no acute abnormality.  There is no acute bony abnormality.  Bolus timing is excellent, and there is no evidence of pulmonary embolism.       Pulmonary arteries are well-opacified, and there is no evidence of pulmonary embolism.  Redemonstrated large right hilar mediastinal mass, with moderate to large right pleural effusion and posterior basilar compressive atelectasis in the right lung, no new pulmonary parenchymal abnormality when compared to 5/14/2025.    This report was finalized on 5/18/2025 9:16 PM by Dr. Ty Mock M.D on Workstation: [id number]      US Guided Lymph Node Biopsy  Result Date:  5/15/2025  ULTRASOUND-GUIDED RIGHT SUPRACLAVICULAR LYMPH NODE BIOPSY  INDICATION: Right supraclavicular lymphadenopathy concerning for malignancy, biopsy requested  COMPARISON: CT of the chest 5/14/2025  The risks, benefits and alternatives of the procedure were discussed with the patient, and informed consent was obtained. In the procedure room a timeout was performed confirming correct patient and procedure.  TECHNIQUE: Ultrasound of the right supraclavicular region was performed. The largest lymph node was identified measuring 2.2 x 1.7 x 1.3 cm and corresponding with the enlarged lymph node on the comparison study. The overlying skin was prepped and draped in usual sterile fashion with 2% chlorhexidine. 1% lidocaine was administered for local anesthesia. Next under ultrasound guidance 2 18-gauge core biopsies of the lymph node were obtained and samples sent for tissue pathology and flow cytometry. Patient tolerated procedure well without immediate complications      Technically successful ultrasound-guided right supraclavicular lymph node biopsy  This report was finalized on 5/15/2025 4:28 PM by Dr. Wu Ortega M.D on Workstation: [id number]      CT Chest With Contrast Diagnostic  Result Date: 5/15/2025  CT CHEST WITH IV CONTRAST  HISTORY: 70-year-old female with lymphadenopathy and right effusion. Status post right ultrasound-guided thoracentesis earlier today removing 1 L of fluid.  TECHNIQUE: Radiation dose reduction techniques were utilized, including automated exposure control and exposure modulation based on body size. 3 mm images were obtained through the chest after the administration of IV contrast. Compared with yesterday's noncontrast chest CT.  FINDINGS: 1. Residual moderate size residual right pleural effusion which layers to the apex. Significant decrease in the right lower lobe atelectasis. There are dependent airspace opacities at the right upper lobe suspicious for pneumonia, more  conspicuous due to the decrease in the right effusion, but unchanged. Unchanged pulmonary nodules. Moderately advanced emphysema  2. Bulky confluent mediastinal and right hilar lymphadenopathy measures in total approximately 8.6 x 8.0 cm. There is also separate bulky anterior mediastinal lymphadenopathy and bulky right supraclavicular lymphadenopathy which is likely metastatic. The bulky lymphadenopathy is contiguous with a bulky right middle lobe mass measuring approximately 4.8 x 3.2 cm, image 172. Tissue diagnosis is recommended with bronchoscopy.  3. There is occlusion of the right mainstem bronchus and right interlobar bronchus. Right middle lobe bronchus is narrowed and some of the right lower lobe bronchi are opacified. The proximal right upper lobe bronchi are narrowed, but are otherwise patent.  4. There is a very small pericardial effusion which measures up to 1 cm in diameter along the anterior cardiac margin. No left pleural effusion.   This report was finalized on 5/15/2025 12:23 PM by Dr. Ledy Johnson M.D on Workstation: [id number]       Thoracentesis  Result Date: 5/14/2025  IMAGE GUIDED THORACENTESIS  HISTORY:  right pleural effusion, need to rule out malignant cause  TECHNIQUE: Informed consent was obtained and documented in the patient's chart. The patient was placed in upright position. After planning ultrasound, the patient's right posterolateral thorax was prepped and draped in the usual aseptic manner. 1% lidocaine was infiltrated at the designated puncture site. A 5-Z[place]ian thoracentesis catheter was then inserted into the pleural effusion using ultrasound guidance and a total of 1 L fluid was drained with specimen sent to laboratory.  Upon completion of the procedure, the catheter was withdrawn and hemostasis achieved by manual pressure. Sterile dressing was applied. There were no immediate complications. All elements of maximal sterile technique were followed.       Successful ultrasound  guided right thoracentesis.  This report was finalized on 5/14/2025 11:09 AM by Dr. Phillip Starkey M.D on Workstation: [id number]      Adult Transthoracic Echo Complete W/ Cont if Necessary Per Protocol  Result Date: 5/13/2025    Left ventricular systolic function is normal.   Left ventricular diastolic function is consistent with (grade I) impaired relaxation.   Normal valvular structure and function     CT Chest Without Contrast Diagnostic  Result Date: 5/13/2025  CT CHEST WITHOUT CONTRAST  HISTORY: Pneumonia and pleural effusion  TECHNIQUE: Radiation dose reduction techniques were utilized, including automated exposure control and exposure modulation based on body size. CT scan of the chest without the administration of IV contrast was performed. Coronal and sagittal reformatted images obtained.  COMPARISON: Chest x-ray earlier today  FINDINGS: There is a moderate size right pleural effusion. There is no left pleural effusion. There is bulky mediastinal lymphadenopathy present. Index right lower paratracheal lymph node measures about 2.6 x 2.8 cm. Index subcarinal lymph node measures about 3 cm in greatest short axis dimension. Evaluation of the hilar structures is limited without IV contrast. There is bulky right hilar adenopathy present, difficult to measure. There is also right-sided supraclavicular lymphadenopathy present with an index right supraclavicular lymph node measuring about 1.3 cm in greatest short axis dimension. There is a soft tissue filling defect within the distal right mainstem bronchus extending into the bronchus intermedius concerning for an endobronchial mass. Alternatively it may be extrinsic compression from lymphadenopathy. Suggest correlation with bronchoscopy. There is narrowing at the origin of the right upper lobe bronchus. Severe narrowing of the bronchus intermedius and narrowing at the origin of the right middle lobe bronchus. Some low-attenuation material seen within multiple right  lower lobe bronchi favored to reflect mucous plugging although could be extension of endobronchial masses. Near complete atelectasis of the right lower lobe. There is mild partial atelectasis of the right upper lobe. Some patchy density in the dependent portion of the right upper lobe may be infectious/inflammatory. 6 mm nodule base of right upper lobe near the fissure. There is a 7 mm nodule base of right middle lobe. Coronary artery calcifications are present. Limited imaging of the upper abdomen is unremarkable. No acute bony abnormality      1. There is bulky mediastinal and right hilar lymphadenopathy. Index measurements above. 2. There is soft tissue filling defect in the distal right mainstem bronchus extending into the bronchus intermedius that is concerning for an endobronchial mass although could potentially reflect extrinsic compression from the adjacent lymphadenopathy. Overall constellation of findings are concerning for malignancy. Would suggest evaluation with bronchoscopy 3. Moderate right pleural effusion. Associated near complete atelectasis of the right lower lobe and partial right upper lobe atelectasis. Patchy density in the dependent portion of the right upper lobe may reflect small areas of pneumonia. 4. Small lung nodules elsewhere in the right lung which may reflect small metastatic lesions given the above findings.    Radiation dose reduction techniques were utilized, including automated exposure control and exposure modulation based on body size.   This report was finalized on 5/13/2025 3:02 PM by Dr. Wu Ortega M.D on Workstation: [id number]      CT Head Without Contrast  Result Date: 5/13/2025  CT HEAD WO CONTRAST-  DATE OF EXAM: 5/13/2025 1:59 PM  INDICATION: Dizziness.  COMPARISON: CT 1/25/2019 and 4/28/2018.  TECHNIQUE: Multiple contiguous axial images were acquired through the head without the intravenous administration of contrast. Reformatted coronal and sagittal sequences  were also reviewed. Radiation dose reduction techniques were utilized, including automated exposure control and exposure modulation based on body size.  FINDINGS: No acute intracranial hemorrhage or mass/mass effect. The ventricles and sulci are stable. The basilar cisterns are patent. Normal gray-white matter differentiation is grossly maintained. Intracranial vascular calcifications, consistent with atherosclerotic disease. Limited imaging of the orbits, paranasal sinuses, and mastoid air cells is unremarkable. Mild hyperostosis frontalis interna. No acute osseous abnormality is identified.      No acute intracranial hemorrhage or mass/mass effect.  This report was finalized on 5/13/2025 2:48 PM by Kavin Horton MD on Workstation: [id number]      XR Chest 1 View  Result Date: 5/13/2025  SINGLE VIEW OF THE CHEST  HISTORY: Generalized weakness  COMPARISON: January 25, 2019  FINDINGS: There is cardiomegaly. There is no vascular congestion. Left lung is clear. The patient appears to have a large right pleural effusion. There is dense right basilar consolidation. There may be some further infiltrate within the right upper lobe.      Dense right basilar consolidation and large right pleural effusion, with possible infiltrate within the right upper lobe as well.  This report was finalized on 5/13/2025 12:38 AM by Dr. Elsie Pedraza M.D on Workstation: [id number]      Results for orders placed during the hospital encounter of 05/12/25    Adult Transthoracic Echo Complete W/ Cont if Necessary Per Protocol    Interpretation Summary    Left ventricular systolic function is normal.    Left ventricular diastolic function is consistent with (grade I) impaired relaxation.    Normal valvular structure and function          Active Hospital Problems    Diagnosis  POA    **Septic shock [A41.9, R65.21]  Yes      Resolved Hospital Problems   No resolved problems to display.         Assessment & Plan     Septic shock continue  vasopressors wean as tolerated I think with her marginal respiratory status renal dysfunction elevated proBNP were not going to be able to push too much with fluids..  With her lung disease and shock I am going to go ahead and give her some stress dose steroids  Pneumonia and possible UTI as sources for her sepsis she is on vancomycin and cefepime cultures and MRSA swab pending continue  Paroxysmal A-fib with rapid rate she is back in sinus rhythm now on amiodarone cardiology consulted.  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  I think she would be a horrible dialysis candidate if she gets to that level  Pancytopenia severe neutropenia and thrombocytopenia oncology consulted getting Neupogen getting transfuse platelets patient's hemoglobin A2 and got a unit of blood we will follow.  Acute hypoxic respiratory failure supplemental O2  Lactic acidosis secondary to sepsis trend  Elevated troponin does not look like acute ischemia this is probably a demand type II non-ST elevation MI with sepsis A-fib and rapid rate etc., cardiology consulting  Encephalopathy metabolic encephalopathy  COPD underlying continue bronchodilators  Hyponatremia  Hypokalemia replacing  Hypomagnesemia replace      I called and spoke with her nephew Sanjiv that he is next of kin that is involved with her care she does not have any children or spouse.  He checks on her regularly.  He is going to come in again he saw her last night and see her this morning I ask him to think about how aggressive we be, if she deteriorates does he want intubation and CPR I think with her constellation of problems if she gets to that level her chances of meaningful recovery are very  poor  Plan for disposition:    Hardy Ying Jr, MD  06/04/25  08:43 EDT    Time: Critical care time 55 minutes

## 2025-06-04 NOTE — PROGRESS NOTES
[facility] Clinical Pharmacy Services: Vancomycin Pharmacokinetic Initial Consult Note    Penelope Onofre is a 70 y.o. female who is on day 1 of pharmacy to dose vancomycin.    Indication: Empiric  Consulting Provider: Bisi Hylton  Planned Duration of Therapy: pending  Loading Dose Ordered or Given: 1250 mg scheduled for  6/4 at 0405  MRSA PCR performed: pending; Result:   Culture/Source: pending  Target: Dose by Levels  Pertinent Vanc Dosing History:   Other Antimicrobials: cefepime    Vitals/Labs  Ht:  ; Wt: 59.1 kg (130 lb 6.4 oz)  Temp Readings from Last 1 Encounters:   06/04/25 100 °F (37.8 °C) (Rectal)    Estimated Creatinine Clearance: 20.9 mL/min (A) (by C-G formula based on SCr of 2.3 mg/dL (H)).       Results from last 7 days   Lab Units 06/04/25  0255 05/30/25  0539 05/29/25  0710   CREATININE mg/dL 2.30* 0.94 0.89   WBC 10*3/mm3 0.93* 3.15* 10.00     Assessment/Plan:    SHADIA, loading dose ordered. Will obtain vanc random level with AM labs on 6/5 and re-dose as appropriate.     Pharmacy will follow patient's kidney function and will adjust doses and obtain levels as necessary. Thank you for involving pharmacy in this patient's care. Please contact pharmacy with any questions or concerns.                           Norman Perera [facility]  Clinical Pharmacist

## 2025-06-04 NOTE — CONSULTS
Date of Hospital Visit: 2025  Date of consult:   Encounter Provider: Golden Ribera MD  Place of Service: [facility]  Patient Name: Penelope Onofre  :1955  Referral Provider: Demetris Brenner MD    Chief complaint: Hypotension    Reason for consult: A-fib    History of Present Illness Penelope Onofre is a 70-year-old patient with a history of COPD, GERD, chronic kidney disease, lung cancer, vomiting and hypertension.  Patient was recently diagnosed with small cell lung cancer in May while being hospitalized.  She was seen by oncology and started on systemic chemotherapy with carboplatin etoposide x three days. She was started on Neupogen during hospitalization for neutropenia.  During hospitalization ,we were consulted for left-sided chest pain and EKG and troponin were unchanged from previous and a CTA was ruled out for PE.  It was felt her chest pain was noncardiac.  Echo showed normal LV systolic function.  A CTA of her chest showed minimal coronary artery calcification mostly located in the mid LAD and mid circumflex.  No further cardiac testing was needed.  Patient presented to the ER on 2025 from nursing facility with concern for unresponsiveness.  Patient was found unresponsive by staff and EMS was called.  When EMS arrived, patient remained minimally responsive and her glucose was 61.  She was given replacement.  Her systolic blood pressure was 40 she was started on IV fluids  in route to ER.  In ER patient remained tachycardic and was hypotensive with blood pressure of 64/43.  She denied any shortness of breath, chest pain, abdominal pain, nausea, fever or chills.  In ER BUN 29, creatinine 2.3, sodium 128, potassium 3.1, lactic acid 9.6, white count 0.93, hemoglobin 5.1, platelets 7000, INR 1.70, mag 1.4, troponin T 59/65, proBNP 22,624 ,UA showed 3+ bacteria and occult stool was negative.  Respiratory panel negative.  Chest x-ray showed Diffuse  interstitial changes in the lungs effect in the right greater than left lung which is favored to relate to interstitial pulmonary edema. 2. Suspected small right pleural effusion. 3. Faint airspace opacities in the right lung which may be infectious in nature. Neoplasm also possible.  EKG showed A-fib with RVR rate of 154.  Patient admitted for septic shock.      I have been asked to see for new A-fib.       ECHO 5/13/25      Left ventricular systolic function is normal.    Left ventricular diastolic function is consistent with (grade I) impaired relaxation.    Normal valvular structure and function      Past Medical History:   Diagnosis Date    Anxiety     Chronic back pain     CKD (chronic kidney disease)     Claudication     GERD (gastroesophageal reflux disease)     Hypertension     Kidney insufficiency     Raynaud's disease     Scoliosis     Small cell lung cancer 2025    Metastatic       Past Surgical History:   Procedure Laterality Date    BREAST CYST EXCISION Right     TONSILLECTOMY      US GUIDED LYMPH NODE BIOPSY  5/15/2025    VENOUS ACCESS DEVICE (PORT) INSERTION N/A 5/22/2025    Procedure: INSERTION VENOUS ACCESS DEVICE;  Surgeon: Bria Starr MD;  Location: [facility]  Service: General;  Laterality: N/A;       Medications Prior to Admission   Medication Sig Dispense Refill Last Dose/Taking    acetaminophen (TYLENOL) 500 MG tablet Take 1 tablet by mouth Every 6 (Six) Hours As Needed for Mild Pain.   6/3/2025    allopurinol (ZYLOPRIM) 300 MG tablet Take 1 tablet by mouth Daily.   6/3/2025    ALPRAZolam (Xanax) 0.5 MG tablet Take 1 tablet by mouth 3 (Three) Times a Day As Needed for Anxiety. 12 tablet 0 Taking As Needed    amLODIPine (NORVASC) 5 MG tablet Take 1 tablet by mouth Daily.   Past Week    arformoterol (BROVANA) 15 MCG/2ML nebulizer solution Take 2 mL by nebulization 2 (Two) Times a Day.   6/3/2025    atorvastatin (LIPITOR) 20 MG tablet Take 1 tablet by mouth Every Night.   6/3/2025     budesonide (PULMICORT) 1 MG/2ML nebulizer solution Take 2 mL by nebulization 2 (Two) Times a Day.   6/3/2025    calcium carbonate (TUMS) 500 MG chewable tablet Chew 2 tablets 2 (Two) Times a Day As Needed for Heartburn.   Taking As Needed    enoxaparin sodium (LOVENOX) 40 MG/0.4ML solution prefilled syringe syringe Inject 0.4 mL under the skin into the appropriate area as directed Daily for 10 days. Indications: Prevention of Unwanted Clot in Veins   6/3/2025    fluticasone (FLONASE) 50 MCG/ACT nasal spray 2 sprays by Each Nare route Daily.   6/3/2025    guaiFENesin (MUCINEX) 600 MG 12 hr tablet Take 2 tablets by mouth Every 12 (Twelve) Hours.   6/3/2025    HYDROcodone-acetaminophen (NORCO) 5-325 MG per tablet Take 1 tablet by mouth Every 4 (Four) Hours As Needed (pain). 12 tablet 0 Taking As Needed    hydrOXYzine (ATARAX) 25 MG tablet Take 1 tablet by mouth 3 (Three) Times a Day As Needed for Itching or Anxiety.   Past Week    ipratropium-albuterol (DUO-NEB) 0.5-2.5 mg/3 ml nebulizer Take 3 mL by nebulization 4 (Four) Times a Day.   6/3/2025    Menthol-Zinc Oxide 0.44-20.6 % ointment Apply 1 Application topically to the appropriate area as directed 2 (Two) Times a Day.   6/3/2025    ondansetron (ZOFRAN) 8 MG tablet Take 1 tablet by mouth Every 8 (Eight) Hours As Needed for Nausea or Vomiting.   Taking As Needed    pantoprazole (PROTONIX) 40 MG EC tablet Take 1 tablet by mouth 2 (Two) Times a Day Before Meals.   6/3/2025    vitamin B-12 (VITAMIN B-12) 1000 MCG tablet Take 1 tablet by mouth Daily.   6/3/2025       Current Meds  Scheduled Meds:[START ON 6/5/2025] pantoprazole, 40 mg, Oral, Q AM   Or  [START ON 6/5/2025] pantoprazole, 40 mg, Intravenous, Q AM      Continuous Infusions:     PRN Meds:.  acetaminophen **OR** acetaminophen **OR** acetaminophen    diphenhydrAMINE **OR** diphenhydrAMINE    diphenoxylate-atropine    glycopyrrolate **OR** glycopyrrolate **OR** glycopyrrolate **OR** glycopyrrolate     haloperidol **OR** haloperidol **OR** haloperidol lactate    haloperidol **OR** haloperidol **OR** haloperidol lactate    Morphine **OR** morphine **OR** HYDROmorphone    Morphine **OR** morphine **OR** HYDROmorphone    Morphine **OR** morphine **OR** HYDROmorphone    LORazepam **OR** LORazepam **OR** LORazepam    LORazepam **OR** LORazepam **OR** LORazepam    LORazepam **OR** LORazepam **OR** LORazepam    magic mouthwash oral suspension (mixture) (diphenhydrAMINE HCl - aluminum & magnesium hydroxide-simethicone - lidocaine viscous) solution 55 mL    ondansetron ODT **OR** ondansetron    Polyvinyl Alcohol-Povidone PF    prochlorperazine **OR** prochlorperazine **OR** prochlorperazine    promethazine **OR** promethazine    Scopolamine    Access VAD **AND** sodium chloride    sodium chloride    sodium chloride    sodium chloride    sodium chloride    Allergies as of 06/04/2025 - Reviewed 06/04/2025   Allergen Reaction Noted    Codeine Unknown - Low Severity 07/27/2016    Sulfa antibiotics Unknown - Low Severity 07/27/2016       Social History     Socioeconomic History    Marital status:    Tobacco Use    Smoking status: Former     Current packs/day: 1.00     Average packs/day: 1 pack/day for 40.0 years (40.0 ttl pk-yrs)     Types: Cigarettes    Smokeless tobacco: Never    Tobacco comments:     quit 2 weeks ago   Vaping Use    Vaping status: Never Used   Substance and Sexual Activity    Alcohol use: No    Drug use: No    Sexual activity: Defer       Family History   Problem Relation Age of Onset    Alzheimer's disease Mother     Cancer Father     Cancer Sister     Hemochromatosis Sister     Pancreatic cancer Sister     Malig Hyperthermia Neg Hx        REVIEW OF SYSTEMS:   All systems reviewed and pertinent positives include in HPI otherwise negative review of systems.       Objective:   Temp:  [97.4 °F (36.3 °C)-100 °F (37.8 °C)] 97.4 °F (36.3 °C)  Heart Rate:  [] 106  Resp:  [20-32] 26  BP:  ()/(43-91) 80/43  Body mass index is 17.69 kg/m².  Flowsheet Rows      Flowsheet Row First Filed Value   Admission Height --   Admission Weight 59.1 kg (130 lb 6.4 oz) Documented at 06/04/2025 0219          Vitals:    06/04/25 1412   BP:    Pulse:    Resp: 26   Temp: 97.4 °F (36.3 °C)   SpO2:        General Appearance:  In severe respiratory distress, chronically sick looking   Head:    Normocephalic, without obvious abnormality, atraumatic   Eyes:            Lids and lashes normal, conjunctivae and sclerae normal, no   icterus, no pallor, corneas clear, PERRLA   Ears:    Ears appear intact with no abnormalities noted   Throat:   No oral lesions, no thrush, oral mucosa moist   Neck:   No adenopathy, supple, trachea midline, no thyromegaly, no   carotid bruit, no JVD   Back:     No kyphosis present, no scoliosis present, no skin lesions, erythema or scars, no tenderness to percussion or palpation, range of motion normal   Lungs:   Tachypneic with bilateral rhonchi and crackles    Heart:  Tachycardic   Chest Wall:    No abnormalities observed   Abdomen:     Normal bowel sounds, no masses, no organomegaly, soft nontender, nondistended, no guarding, no rebound  tenderness   Extremities:   Moves all extremities well, no edema, no cyanosis, no redness   Pulses:   Pulses palpable and equal bilaterally. Normal radial, carotid, femoral, dorsalis pedis and posterior tibial pulses bilaterally. Normal abdominal aorta   :  Neurology:   Psychiatric:     Lethargic and disoriented             Review of Data:      Results from last 7 days   Lab Units 06/04/25  0427   SODIUM mmol/L 127*   POTASSIUM mmol/L 2.8*   CHLORIDE mmol/L 94*   CO2 mmol/L 12.7*   BUN mg/dL 32.0*   CREATININE mg/dL 2.27*   CALCIUM mg/dL 8.0*   BILIRUBIN mg/dL 0.5   ALK PHOS U/L 34*   ALT (SGPT) U/L 13   AST (SGOT) U/L 20   GLUCOSE mg/dL 123*     Results from last 7 days   Lab Units 06/04/25  0427   HSTROP T ng/L 65*     @LABRCNTbnp@  Results from last 7  days   Lab Units 06/04/25  0513 05/30/25  0539 05/29/25  0710   WBC 10*3/mm3 1.12* 3.15* 10.00   HEMOGLOBIN g/dL 8.2* 10.8* 10.9*   HEMATOCRIT % 23.4* 31.2* 33.6*   PLATELETS 10*3/mm3 9* 72* 93*     Results from last 7 days   Lab Units 06/04/25  0955   INR  1.47*   APTT seconds 38.0*     Results from last 7 days   Lab Units 06/04/25  0427   MAGNESIUM mg/dL 1.4*     @LABRCNTIP(chol,trig,hdl,ldl)                        I personally viewed and interpreted the patient's EKG/Telemetry data  )   Septic shock        Assessment and Plan:  Paroxysmal atrial fibrillation-back in sinus during exam  Severe sepsis with hypotension/pneumonia  Extensive small cell lung cancer status post recent chemotherapy  Acute exacerbation of COPD  Acute on chronic renal failure  Metabolic encephalopathy    And significant respiratory distress.  Lethargic.  Guarded prognosis.  Her immediate family is a nephew who was at bedside.  He does not have POA and she does not have advanced directives but I have discussed prognosis and possibility of palliative care.  I will defer that to primary care.  She does not need any further cardiac specific testing or treatment at this point.  Cardiology will sign off.    Golden Ribera MD  06/04/25  14:34 EDT.      Time spent in reviewing chart, discussion and examination:

## 2025-06-04 NOTE — PROGRESS NOTES
Case Management Discharge Note        Final Note: The patient  on 25 @ 14:58. BTee Shah RN, CCP.       Selected Continued Care - Admitted Since 2025       Destination    No services have been selected for the patient.                Durable Medical Equipment    No services have been selected for the patient.                Dialysis/Infusion    No services have been selected for the patient.                Home Medical Care    No services have been selected for the patient.                Therapy    No services have been selected for the patient.                Community Resources    No services have been selected for the patient.                Community & DME    No services have been selected for the patient.                    Selected Continued Care - Prior Encounters Includes continued care and service providers with selected services from prior encounters from 3/6/2025 to 2025      Discharged on 2025 Admission date: 2025 - Discharge disposition: Skilled Nursing Facility (DC - External)      Destination       Service Provider Services Address Phone Fax Patient Preferred    [facility] [address] [place] [phone] --                               Final Discharge Disposition Code: 20 -

## 2025-06-04 NOTE — ED PROVIDER NOTES
EMERGENCY DEPARTMENT ENCOUNTER  Room Number:  05/05  PCP: Phoenix Tobar MD  Independent Historians: Patient and EMS  Date of encounter:  6/4/2025  Patient Care Team:  Phoenix Tobar MD as PCP - General (Internal Medicine)  Karely Hyman RN as Nurse Navigator  Yannick Koroma MD PhD as Consulting Physician (Hematology and Oncology)     HPI:  Chief Complaint: had concerns including Hypotension.     A complete HPI/ROS/PMH/PSH/SH/FH are unobtainable due to: Altered Mental Status, Poor historian, and Acutely ill and not able to provide history    Chronic or social conditions impacting patient care (Social Determinants of Health): None    Context: Penelope Onofre is a 70 y.o. female with a medical history of COPD, lung cancer, vomiting, hypertension who presents to the ED c/o acute unresponsiveness.  Patient is a resident at [facility].  EMS was called when patient was found to be unresponsive by staff.  Patient herself cannot report when she started feeling unwell and cannot report any specific symptoms.  She does deny headache, nausea, vomiting, fever, dysuria or abdominal pain.  EMS states that she had a blood sugar of 61 on their arrival was initially unresponsive.  She was given D50.  She was also hypotensive with a reported systolic blood pressure of 40.  She received the majority of a liter of LR en route.  Her mentation did improve and she was able to respond to her name and follow basic commands by time she arrived to the ER.  Further history unavailable.    Review of prior external notes (non-ED) -and- Review of prior external test results outside of this encounter: Discharge summary from 5/30/2025 reviewed.  Patient was admitted for shortness of breath and generalized weakness.  She was found to have a right sided chest mass and was diagnosed with lung cancer.  She received a thoracentesis for a pleural effusion.  She also had a postobstructive pneumonia and was treated with IV Zosyn.  Her COPD  was advancing and she was given multiple rounds of breathing treatments.    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 05/20/2025    Severe protein-calorie malnutrition 05/21/2025    Anxiety associated with cancer diagnosis 05/25/2025    Anemia associated with chemotherapy 05/25/2025    Urine retention 05/25/2025    Pneumonia due to Streptococcus 05/30/2025     Resolved Ambulatory Problems     Diagnosis Date Noted    No Resolved Ambulatory Problems     Past Medical History:   Diagnosis Date    Anxiety     Chronic back pain     CKD (chronic kidney disease)     Claudication     GERD (gastroesophageal reflux disease)     Hypertension     Kidney insufficiency     Raynaud's disease     Scoliosis     Small cell lung cancer 2025       PAST SURGICAL HISTORY  Past Surgical History:   Procedure Laterality Date    BREAST CYST EXCISION Right     TONSILLECTOMY      US GUIDED LYMPH NODE BIOPSY  5/15/2025    VENOUS ACCESS DEVICE (PORT) INSERTION N/A 5/22/2025    Procedure: INSERTION VENOUS ACCESS DEVICE;  Surgeon: Bria Starr MD;  Location: [facility];  Service: General;  Laterality: N/A;       FAMILY HISTORY  Family History   Problem Relation Age of Onset    Alzheimer's disease Mother     Cancer Father     Cancer Sister     Hemochromatosis Sister     Pancreatic cancer Sister     Malig Hyperthermia Neg Hx        SOCIAL HISTORY  Social History     Socioeconomic History    Marital  status:    Tobacco Use    Smoking status: Former     Current packs/day: 1.00     Average packs/day: 1 pack/day for 40.0 years (40.0 ttl pk-yrs)     Types: Cigarettes    Smokeless tobacco: Never    Tobacco comments:     quit 2 weeks ago   Vaping Use    Vaping status: Never Used   Substance and Sexual Activity    Alcohol use: No    Drug use: No    Sexual activity: Defer       ALLERGIES  Codeine and Sulfa antibiotics    REVIEW OF SYSTEMS  Review of Systems  Included in HPI  All systems reviewed and negative except for those discussed in HPI.    PHYSICAL EXAM    I have reviewed the triage vital signs and nursing notes.    ED Triage Vitals [06/04/25 0217]   Temp Heart Rate Resp BP SpO2   -- (!) 159 22 (!) 64/43 94 %      Temp src Heart Rate Source Patient Position BP Location FiO2 (%)   -- Monitor Lying Right arm --       Physical Exam  Constitutional:       General: She is not in acute distress.     Appearance: Normal appearance. She is ill-appearing. She is not toxic-appearing.   HENT:      Head: Normocephalic and atraumatic.      Nose: Nose normal.      Mouth/Throat:      Mouth: Mucous membranes are dry.      Pharynx: Oropharynx is clear.   Eyes:      Extraocular Movements: Extraocular movements intact.      Conjunctiva/sclera: Conjunctivae normal.      Pupils: Pupils are equal, round, and reactive to light.   Cardiovascular:      Rate and Rhythm: Tachycardia present. Rhythm irregular.      Pulses: Normal pulses.      Heart sounds: Normal heart sounds. No murmur heard.     No friction rub. No gallop.   Pulmonary:      Effort: Pulmonary effort is normal. No respiratory distress.      Breath sounds: Normal breath sounds. No stridor. No wheezing, rhonchi or rales.   Abdominal:      General: Abdomen is flat. There is no distension.      Palpations: Abdomen is soft.      Tenderness: There is abdominal tenderness (suprapubic). There is no guarding or rebound.   Musculoskeletal:      Cervical back: Normal range of  motion and neck supple.   Skin:     General: Skin is warm and dry.   Neurological:      Mental Status: She is alert.      Comments: Will answer basic questions, follows simple commands   Psychiatric:         Mood and Affect: Mood normal.         Behavior: Behavior normal.         Thought Content: Thought content normal.         Judgment: Judgment normal.         LAB RESULTS  Recent Results (from the past 24 hours)   ECG 12 Lead Altered Mental Status    Collection Time: 06/04/25  2:24 AM   Result Value Ref Range    QT Interval 293 ms    QTC Interval 477 ms   Comprehensive Metabolic Panel    Collection Time: 06/04/25  2:55 AM    Specimen: Blood   Result Value Ref Range    Glucose 149 (H) 65 - 99 mg/dL    BUN 29.0 (H) 8.0 - 23.0 mg/dL    Creatinine 2.30 (H) 0.57 - 1.00 mg/dL    Sodium 128 (L) 136 - 145 mmol/L    Potassium 3.1 (L) 3.5 - 5.2 mmol/L    Chloride 95 (L) 98 - 107 mmol/L    CO2 11.4 (L) 22.0 - 29.0 mmol/L    Calcium 7.7 (L) 8.6 - 10.5 mg/dL    Total Protein 4.4 (L) 6.0 - 8.5 g/dL    Albumin 1.9 (L) 3.5 - 5.2 g/dL    ALT (SGPT) 12 1 - 33 U/L    AST (SGOT) 19 1 - 32 U/L    Alkaline Phosphatase 28 (L) 39 - 117 U/L    Total Bilirubin 0.5 0.0 - 1.2 mg/dL    Globulin 2.5 gm/dL    A/G Ratio 0.8 g/dL    BUN/Creatinine Ratio 12.6 7.0 - 25.0    Anion Gap 21.6 (H) 5.0 - 15.0 mmol/L    eGFR 22.2 (L) >60.0 mL/min/1.73   Protime-INR    Collection Time: 06/04/25  2:55 AM    Specimen: Blood   Result Value Ref Range    Protime 20.1 (H) 11.7 - 14.2 Seconds    INR 1.70 (H) 0.90 - 1.10   aPTT    Collection Time: 06/04/25  2:55 AM    Specimen: Blood   Result Value Ref Range    PTT 37.3 (H) 22.7 - 35.4 seconds   Magnesium    Collection Time: 06/04/25  2:55 AM    Specimen: Blood   Result Value Ref Range    Magnesium 1.4 (L) 1.6 - 2.4 mg/dL   Phosphorus    Collection Time: 06/04/25  2:55 AM    Specimen: Blood   Result Value Ref Range    Phosphorus 3.0 2.5 - 4.5 mg/dL   BNP    Collection Time: 06/04/25  2:55 AM    Specimen: Blood    Result Value Ref Range    proBNP 22,624.0 (H) 0.0 - 900.0 pg/mL   High Sensitivity Troponin T    Collection Time: 06/04/25  2:55 AM    Specimen: Blood   Result Value Ref Range    HS Troponin T 59 (C) <14 ng/L   Lactic Acid, Plasma    Collection Time: 06/04/25  2:55 AM    Specimen: Blood   Result Value Ref Range    Lactate 9.6 (C) 0.5 - 2.0 mmol/L   CBC Auto Differential    Collection Time: 06/04/25  2:55 AM    Specimen: Blood   Result Value Ref Range    WBC 0.93 (C) 3.40 - 10.80 10*3/mm3    RBC 1.51 (L) 3.77 - 5.28 10*6/mm3    Hemoglobin 5.1 (C) 12.0 - 15.9 g/dL    Hematocrit 15.5 (C) 34.0 - 46.6 %    .6 (H) 79.0 - 97.0 fL    MCH 33.8 (H) 26.6 - 33.0 pg    MCHC 32.9 31.5 - 35.7 g/dL    RDW 12.7 12.3 - 15.4 %    RDW-SD 46.6 37.0 - 54.0 fl    MPV 12.3 (H) 6.0 - 12.0 fL    Platelets 7 (C) 140 - 450 10*3/mm3   Manual Differential    Collection Time: 06/04/25  2:55 AM    Specimen: Blood   Result Value Ref Range    Neutrophil % 56.0 42.7 - 76.0 %    Lymphocyte % 33.0 19.6 - 45.3 %    Monocyte % 10.0 5.0 - 12.0 %    Myelocyte % 1.0 (H) 0.0 - 0.0 %    Neutrophils Absolute 0.52 (L) 1.70 - 7.00 10*3/mm3    Lymphocytes Absolute 0.31 (L) 0.70 - 3.10 10*3/mm3    Monocytes Absolute 0.09 (L) 0.10 - 0.90 10*3/mm3    Hypochromia Mod/2+ None Seen    WBC Morphology Normal Normal    Platelet Morphology Normal Normal   Respiratory Panel PCR w/COVID-19(SARS-CoV-2) CESIA/ROSSY/MESHA/PAD/COR/ANNIE In-House, NP Swab in UTM/VTM, 2 HR TAT - Swab, Nasopharynx    Collection Time: 06/04/25  3:10 AM    Specimen: Nasopharynx; Swab   Result Value Ref Range    ADENOVIRUS, PCR Not Detected Not Detected    Coronavirus 229E Not Detected Not Detected    Coronavirus HKU1 Not Detected Not Detected    Coronavirus NL63 Not Detected Not Detected    Coronavirus OC43 Not Detected Not Detected    COVID19 Not Detected Not Detected - Ref. Range    Human Metapneumovirus Not Detected Not Detected    Human Rhinovirus/Enterovirus Not Detected Not Detected     Influenza A PCR Not Detected Not Detected    Influenza B PCR Not Detected Not Detected    Parainfluenza Virus 1 Not Detected Not Detected    Parainfluenza Virus 2 Not Detected Not Detected    Parainfluenza Virus 3 Not Detected Not Detected    Parainfluenza Virus 4 Not Detected Not Detected    RSV, PCR Not Detected Not Detected    Bordetella pertussis pcr Not Detected Not Detected    Bordetella parapertussis PCR Not Detected Not Detected    Chlamydophila pneumoniae PCR Not Detected Not Detected    Mycoplasma pneumo by PCR Not Detected Not Detected   Urinalysis With Culture If Indicated - Straight Cath    Collection Time: 06/04/25  3:29 AM    Specimen: Straight Cath; Urine   Result Value Ref Range    Color, UA Dark Yellow (A) Yellow, Straw    Appearance, UA Turbid (A) Clear    pH, UA 7.0 5.0 - 8.0    Specific Gravity, UA 1.012 1.005 - 1.030    Glucose, UA Negative Negative    Ketones, UA Negative Negative    Bilirubin, UA Negative Negative    Blood, UA Large (3+) (A) Negative    Protein, UA >=300 mg/dL (3+) (A) Negative    Leuk Esterase, UA Moderate (2+) (A) Negative    Nitrite, UA Negative Negative    Urobilinogen, UA 1.0 E.U./dL 0.2 - 1.0 E.U./dL   Urinalysis, Microscopic Only - Straight Cath    Collection Time: 06/04/25  3:29 AM    Specimen: Straight Cath; Urine   Result Value Ref Range    RBC, UA 11-20 (A) None Seen, 0-2 /HPF    WBC, UA 11-20 (A) None Seen, 0-2 /HPF    Bacteria, UA 3+ (A) None Seen /HPF    Squamous Epithelial Cells, UA 3-6 (A) None Seen, 0-2 /HPF    Hyaline Casts, UA None Seen None Seen /LPF    Methodology Manual Light Microscopy    High Sensitivity Troponin T 1Hr    Collection Time: 06/04/25  4:27 AM    Specimen: Blood   Result Value Ref Range    HS Troponin T 65 (C) <14 ng/L    Troponin T Numeric Delta 6 ng/L    Troponin T % Delta 10 Abnormal if >/= 20%   Type & Screen    Collection Time: 06/04/25  4:27 AM    Specimen: Blood   Result Value Ref Range    ABO Type O     RH type Positive      Antibody Screen Negative     T&S Expiration Date 6/7/2025 11:59:59 PM    Prepare RBC, 3 Units    Collection Time: 06/04/25  4:53 AM   Result Value Ref Range    Product Code P6252G15     Unit Number [id number]     UNIT  ABO O     UNIT  RH POS     Crossmatch Interpretation Compatible     Dispense Status XM     Blood Expiration Date 954618527099     Blood Type Barcode 5100     Product Code F8471L70     Unit Number [id number]     UNIT  ABO O     UNIT  RH POS     Crossmatch Interpretation Compatible     Dispense Status XM     Blood Expiration Date 157508713924     Blood Type Barcode 5100     Product Code P1878L47     Unit Number [id number]     UNIT  ABO O     UNIT  RH POS     Crossmatch Interpretation Compatible     Dispense Status XM     Blood Expiration Date [id number]     Blood Type Barcode 5100    Prepare Platelet Pheresis, 2 Units    Collection Time: 06/04/25  4:54 AM   Result Value Ref Range    Product Code B2183P71     Unit Number [id number]     UNIT  ABO O     UNIT  RH POS     Dispense Status XM     Blood Expiration Date [id number]     Blood Type Barcode 5100     Product Code V6714M30     Unit Number [id number]     UNIT  ABO A     UNIT  RH POS     Dispense Status XM     Blood Expiration Date [id number]     Blood Type Barcode 6200    CBC (No Diff)    Collection Time: 06/04/25  5:13 AM    Specimen: Blood   Result Value Ref Range    WBC 1.12 (C) 3.40 - 10.80 10*3/mm3    RBC 2.35 (L) 3.77 - 5.28 10*6/mm3    Hemoglobin 8.2 (L) 12.0 - 15.9 g/dL    Hematocrit 23.4 (L) 34.0 - 46.6 %    MCV 99.6 (H) 79.0 - 97.0 fL    MCH 34.9 (H) 26.6 - 33.0 pg    MCHC 35.0 31.5 - 35.7 g/dL    RDW 13.1 12.3 - 15.4 %    RDW-SD 46.7 37.0 - 54.0 fl    MPV 12.4 (H) 6.0 - 12.0 fL    Platelets 9 (C) 140 - 450 10*3/mm3       RADIOLOGY  XR Chest 1 View  Result Date: 6/4/2025  Patient: AMRIT SEGURA  Time Out: 04:05 Exam(s): XR CXR 1 VIEW EXAM:   XR Chest, 1 View CLINICAL HISTORY:    Reason for exam: sepsis.  TECHNIQUE:   Frontal view of the chest. COMPARISON: None IMPRESSION:     1.  Diffuse interstitial changes in the lungs effect in the right greater than left lung which is favored to relate to interstitial pulmonary edema. 2.  Suspected small right pleural effusion. 3.  Faint airspace opacities in the right lung which may be infectious in nature.  Neoplasm also possible.  Consider cross-sectional imaging if there is further concern. 4.  Left chest port terminates within the mid SVC.    Electronically signed by Kurt Rogel MD on 06-04-25 at 0405      MEDICATIONS GIVEN IN ER  Medications   norepinephrine (LEVOPHED) 8 mg in 250 mL NS infusion (premix) (0.24 mcg/kg/min × 59.1 kg Intravenous Rate/Dose Change 6/4/25 0518)   amiodarone 150 mg in 100 mL D5W (loading dose) (0 mg Intravenous Stopped 6/4/25 0315)     Followed by   amiodarone 360 mg in 200 mL D5W infusion (1 mg/min Intravenous New Bag 6/4/25 0315)     Followed by   amiodarone 360 mg in 200 mL D5W infusion (has no administration in time range)   Potassium Replacement - Follow Nurse / BPA Driven Protocol (has no administration in time range)   Magnesium Standard Dose Replacement - Follow Nurse / BPA Driven Protocol (has no administration in time range)   Phosphorus Replacement - Follow Nurse / BPA Driven Protocol (has no administration in time range)   Calcium Replacement - Follow Nurse / BPA Driven Protocol (has no administration in time range)   Vancomycin HCl 1,250 mg in sodium chloride 0.9 % 250 mL VTB (has no administration in time range)   sodium chloride 0.9 % flush 10 mL (has no administration in time range)   sodium chloride 0.9 % flush 10 mL (has no administration in time range)   sodium chloride 0.9 % flush 10 mL (has no administration in time range)   sodium chloride 0.9 % flush 20 mL (has no administration in time range)   sodium chloride 0.9 % infusion 40 mL (has no administration in time range)   heparin injection 500 Units (has no  administration in time range)   nitroglycerin (NITROSTAT) SL tablet 0.4 mg (has no administration in time range)   sodium chloride 0.9 % flush 10 mL (has no administration in time range)   sodium chloride 0.9 % flush 10 mL (has no administration in time range)   sodium chloride 0.9 % infusion 40 mL (has no administration in time range)   mupirocin (BACTROBAN) 2 % nasal ointment 1 Application (has no administration in time range)   sennosides-docusate (PERICOLACE) 8.6-50 MG per tablet 2 tablet (has no administration in time range)     And   polyethylene glycol (MIRALAX) packet 17 g (has no administration in time range)     And   bisacodyl (DULCOLAX) EC tablet 5 mg (has no administration in time range)     And   bisacodyl (DULCOLAX) suppository 10 mg (has no administration in time range)   ondansetron (ZOFRAN) injection 4 mg (has no administration in time range)   cefepime 2000 mg IVPB in 100 mL NS (MBP) (has no administration in time range)   Pharmacy to dose vancomycin (has no administration in time range)   vasopressin (PITRESSIN) 20 units in 100 mL NS infusion (has no administration in time range)   lactated ringers bolus 1,000 mL (0 mL Intravenous Stopped 6/4/25 0303)   acetaminophen (TYLENOL) suppository 650 mg (650 mg Rectal Given 6/4/25 0253)   cefepime 2000 mg IVPB in 100 mL NS (MBP) (2,000 mg Intravenous New Bag 6/4/25 0411)       ORDERS PLACED DURING THIS VISIT:  Orders Placed This Encounter   Procedures    Blood Culture - Blood,    Blood Culture - Blood,    Respiratory Panel PCR w/COVID-19(SARS-CoV-2) CESIA/ROSSY/MESHA/PAD/COR/ANNIE In-House, NP Swab in UTM/VTM, 2 HR TAT - Swab, Nasopharynx    Urine Culture - Urine,    XR Chest 1 View    Comprehensive Metabolic Panel    Protime-INR    aPTT    Urinalysis With Culture If Indicated - Urine, Catheter    Magnesium    Phosphorus    BNP    High Sensitivity Troponin T    Lactic Acid, Plasma    CBC Auto Differential    STAT Lactic Acid, Reflex    High Sensitivity Troponin  T 1Hr    Urinalysis, Microscopic Only - Urine, Clean Catch    Manual Differential    CBC (No Diff)    Basic Metabolic Panel    Magnesium    Procalcitonin    Vancomycin, Random    Diet: Regular/House; Fluid Consistency: Thin (IDDSI 0)    Verify Informed Consent for Blood Product Administration    Connectors / Hubs Must Be Scrubbed 15 Seconds Using 70% Alcohol Before Access - Allow to Dry Before Accessing Line    Change Dressing to IV Site As Needed When Damp, Loose or Soiled    Change Needleless Connectors    Change Adams Needle & Transparent Dressing Every 7 Days    Change Adams Needle As Needed    Daily CHG Bath While Central Line in Place    Vital Signs Every Hour and Per Hospital Policy Based on Patient Condition    Telemetry - Place Orders & Notify Provider of Results When Patient Experiences Acute Chest Pain, Dysrhythmia or Respiratory Distress    Continuous Pulse Oximetry    Height & Weight    Daily Weights    Intake & Output    Oral Care - Patient Not on NPPV & Not Intubated    Target Arousal Level RASS 0 to -2    Use Mobility Guidelines for Advancement of Activity    Daily CHG Bath While in Critical Care    Place Sequential Compression Device    Maintain Sequential Compression Device    If Patient has BG Less Than 80 & is Symptomatic But Not on IV Insulin Protocol - Use Adult Hypoglycemia Treatment Orders    Maintain IV Access    ICU / CCU - Place Order Consult Intensivist For Critical Care Management (If Patient Not Admitted to Cardiology for Primary Cardiology Condition)    ICU / CCU - Notify All Physicians When Patient is Transferred    Code Status and Medical Interventions: CPR (Attempt to Resuscitate); Full Support    Pulmonology (on-call MD unless specified)    Hematology & Oncology Inpatient Consult    Patient Isolation Neutropenic    Oxygen Therapy- Nasal Cannula; Titrate 1-6 LPM Per SpO2; 90 - 95%    POC Glucose Q6H    ECG 12 Lead Altered Mental Status    Type & Screen    Prepare RBC, 3 Units     Prepare Platelet Pheresis, 2 Units    Access VAD    Insert Peripheral IV    Inpatient Admission    CBC & Differential    CBC & Differential       OUTPATIENT MEDICATION MANAGEMENT:  Current Facility-Administered Medications Ordered in Epic   Medication Dose Route Frequency Provider Last Rate Last Admin    amiodarone 360 mg in 200 mL D5W infusion  1 mg/min Intravenous Continuous Demetris Brenner MD 33.3 mL/hr at 06/04/25 0315 1 mg/min at 06/04/25 0315    Followed by    amiodarone 360 mg in 200 mL D5W infusion  0.5 mg/min Intravenous Continuous Demetris Brenner MD        sennosides-docusate (PERICOLACE) 8.6-50 MG per tablet 2 tablet  2 tablet Oral BID Bisi Hylton APRN        And    polyethylene glycol (MIRALAX) packet 17 g  17 g Oral Daily PRN Bisi Hylton APRN        And    bisacodyl (DULCOLAX) EC tablet 5 mg  5 mg Oral Daily PRN Bisi Hylton APRN        And    bisacodyl (DULCOLAX) suppository 10 mg  10 mg Rectal Daily PRN Bisi Hylton APRN        Calcium Replacement - Follow Nurse / BPA Driven Protocol   Not Applicable PRN Demetris Brenner MD        cefepime 2000 mg IVPB in 100 mL NS (MBP)  2,000 mg Intravenous Q24H Bisi Hylton APRN        heparin injection 500 Units  5 mL Intravenous PRN Demetris Brenner MD        Magnesium Standard Dose Replacement - Follow Nurse / BPA Driven Protocol   Not Applicable PRN Demetris Brenner MD        mupirocin (BACTROBAN) 2 % nasal ointment 1 Application  1 Application Each Nare BID Bisi Hylton APRN        nitroglycerin (NITROSTAT) SL tablet 0.4 mg  0.4 mg Sublingual Q5 Min PRN Bisi Hylton APRN        norepinephrine (LEVOPHED) 8 mg in 250 mL NS infusion (premix)  0.02-0.3 mcg/kg/min Intravenous Titrated Demetris Brenner MD 26.6 mL/hr at 06/04/25 0518 0.24 mcg/kg/min at 06/04/25 0518    ondansetron (ZOFRAN) injection 4 mg  4 mg Intravenous Q6H PRN Bisi Hylton APRN        Pharmacy to dose vancomycin   Not Applicable Continuous PRN Bisi Hylton  APRPANTERA        Phosphorus Replacement - Follow Nurse / BPA Driven Protocol   Not Applicable PRN Demetris Brenner MD        Potassium Replacement - Follow Nurse / BPA Driven Protocol   Not Applicable PRN Demetris Brenner MD        sodium chloride 0.9 % flush 10 mL  10 mL Intravenous PRN Demetris Brenner MD        sodium chloride 0.9 % flush 10 mL  10 mL Intravenous Q12H Demetris Brenner MD        sodium chloride 0.9 % flush 10 mL  10 mL Intravenous PRN Demetris Brenner MD        sodium chloride 0.9 % flush 10 mL  10 mL Intravenous Q12H Bisi Hylton APRN        sodium chloride 0.9 % flush 10 mL  10 mL Intravenous PRN Bisi Hylton APRN        sodium chloride 0.9 % flush 20 mL  20 mL Intravenous PRN Demetris Brenner MD        sodium chloride 0.9 % infusion 40 mL  40 mL Intravenous PRN Demetris Brenner MD        sodium chloride 0.9 % infusion 40 mL  40 mL Intravenous PRN Bisi Hylton APRN        Vancomycin HCl 1,250 mg in sodium chloride 0.9 % 250 mL VTB  20 mg/kg Intravenous Once Demetris Brenner MD        vasopressin (PITRESSIN) 20 units in 100 mL NS infusion  0.03 Units/min Intravenous Continuous PRN Bisi Hylton, CARYN         Current Outpatient Medications Ordered in Epic   Medication Sig Dispense Refill    acetaminophen (TYLENOL) 500 MG tablet Take 1 tablet by mouth Every 6 (Six) Hours As Needed for Mild Pain.      allopurinol (ZYLOPRIM) 300 MG tablet Take 1 tablet by mouth Daily.      ALPRAZolam (Xanax) 0.5 MG tablet Take 1 tablet by mouth 3 (Three) Times a Day As Needed for Anxiety. 12 tablet 0    amLODIPine (NORVASC) 5 MG tablet Take 1 tablet by mouth Daily.      arformoterol (BROVANA) 15 MCG/2ML nebulizer solution Take 2 mL by nebulization 2 (Two) Times a Day.      atorvastatin (LIPITOR) 20 MG tablet Take 1 tablet by mouth Every Night.      budesonide (PULMICORT) 1 MG/2ML nebulizer solution Take 2 mL by nebulization 2 (Two) Times a Day.      calcium carbonate (TUMS) 500 MG chewable tablet Chew 2  tablets 2 (Two) Times a Day As Needed for Heartburn.      enoxaparin sodium (LOVENOX) 40 MG/0.4ML solution prefilled syringe syringe Inject 0.4 mL under the skin into the appropriate area as directed Daily for 10 days. Indications: Prevention of Unwanted Clot in Veins      fluticasone (FLONASE) 50 MCG/ACT nasal spray 2 sprays by Each Nare route Daily.      guaiFENesin (MUCINEX) 600 MG 12 hr tablet Take 2 tablets by mouth Every 12 (Twelve) Hours.      HYDROcodone-acetaminophen (NORCO) 5-325 MG per tablet Take 1 tablet by mouth Every 4 (Four) Hours As Needed (pain). 12 tablet 0    hydrOXYzine (ATARAX) 25 MG tablet Take 1 tablet by mouth 3 (Three) Times a Day As Needed for Itching or Anxiety.      ipratropium-albuterol (DUO-NEB) 0.5-2.5 mg/3 ml nebulizer Take 3 mL by nebulization 4 (Four) Times a Day.      Lidocaine 4 % Place 1 patch on the skin as directed by provider Daily. Remove & Discard patch within 12 hours or as directed by MD      Menthol-Zinc Oxide 0.44-20.6 % ointment Apply 1 Application topically to the appropriate area as directed 2 (Two) Times a Day.      ondansetron (ZOFRAN) 8 MG tablet Take 1 tablet by mouth Every 8 (Eight) Hours As Needed for Nausea or Vomiting.      pantoprazole (PROTONIX) 40 MG EC tablet Take 1 tablet by mouth 2 (Two) Times a Day Before Meals.      vitamin B-12 (VITAMIN B-12) 1000 MCG tablet Take 1 tablet by mouth Daily.         PROCEDURES  Procedures    PROGRESS, DATA ANALYSIS, CONSULTS, AND MEDICAL DECISION MAKING  All labs have been independently interpreted by me.  All radiology studies have been reviewed by me. All EKG's have been independently viewed and interpreted by me.  Discussion below represents my analysis of pertinent findings related to patient's condition, differential diagnosis, treatment plan and final disposition.    Differential diagnosis includes but is not limited to sepsis, UTI, pneumonia, renal failure, hyperkalemia, A-fib.    Clinical Scores:                    ED Course as of 06/04/25 0531   Wed Jun 04, 2025 0221 Echo from 5/13/2025 reviewed.  Normal ejection fraction.  Grade 1 diastolic dysfunction. [AB]   0230 EKG interpreted by myself  Time: 0224  Rate: 154  Rhythm: a fib with rvr  No ST elevation or depression  Normal morphology [AB]   0309 XR Chest 1 View  My independent interpretation of the imaging study is right sided infiltrate versus edema [AB]   0312 Patient is in A-fib with RVR with associated hypotension.  I suspect that her hypotension is distributive in origin.  Cannot give her any Lopressor or diltiazem as this could make her further hypotensive.  She is receiving fluids and is on Levophed.  Considered cardioversion, but her tachycardia is likely compensatory given her sepsis.  Will load with amiodarone and continue to monitor.  Will defer cardioversion at this time. [AB]   0335 Patient rechecked.  Heart rate is improving to the 130s to 150s.  Blood pressure also improving and Levophed is being turned down.  Patient appears more awake and alert.  She had no complaints at this time.  I updated her on findings thus far. [AB]   0335 Lactate(!!): 9.6  Patient already has received 30 cc/kg bolus (when you account for the 1 L of LR given by EMS).  Patient is currently on Levophed.  Patient given Zosyn. [AB]   0340 proBNP(!): 22,624.0 [AB]   0340 Magnesium(!): 1.4 [AB]   0343 Creatinine(!): 2.30 [AB]   0343 Calcium(!): 7.7 [AB]   0349 Patient received first round of chemotherapy between May 23 and May 25 while in the hospital.  She was also being treated with a PPI for GERD. [AB]   0408 Patient of her anemia and thrombocytopenia.  She is agreeable to receiving blood products. [AB]   0408 Rectal exam chaperoned by Natalie, ED Tech  No fissure or external hemorrhoids, normal rectal tone, no melena or bright red blood per rectum, bedside Hemoccult negative   [AB]   0423 Phone call with Myra with Pulmonary.  Discussed the patient, relevant history, exam,  diagnostics, ED findings/progress, and concerns. They agree to admit the patient to ICU under Dr. Davison. Care assumed by the admitting physician at this time. [AB]   0424 MDM: Patient presents severely ill from multiple sources.  Patient is notably leukopenic, thrombocytopenic and anemic.  Given blood products.  Suspect these are secondary to recent chemotherapy.  Bedside Hemoccult is negative.  Covered broadly with cefepime and vancomycin.  Patient also new onset A-fib with RVR, started on amiodarone with improvement in heart rate.  Deferring cardioversion at this time given patient is likely in shock from sepsis.  Elevated lactic acid, given full 30 cc bolus.  Patient on Levophed.  Patient has pulmonary edema and SHADIA.  Will defer diuresis in setting of hypotension.  Patient with multiple electrolyte derangements, IV replacement ordered.  SHADIA, given IV fluids.  Admit to ICU. [AB]   0425 CRITICAL CARE PERFORMED BY ED PHYSICIAN    Critical care provider statement:    Critical care time (minutes): 50.   Critical care time was exclusive of:  Separately billable procedures and treating other patients   Critical care was necessary to treat or prevent imminent or life-threatening deterioration of the following conditions:  Multi-Organ Failure   Critical care was time spent personally by me on the following activities:  Development of treatment plan with patient or surrogate, discussions with consultants, evaluation of patient's response to treatment, examination of patient, obtaining history from patient or surrogate, ordering and performing treatments and interventions, ordering and review of laboratory studies, ordering and review of radiographic studies, pulse oximetry, re-evaluation of patient's condition and review of old charts. Critical Care indicators:      See progress notes for further documentation.   [AB]   0530 Hemoglobin(!): 8.2 [AB]   0530 Platelets(!!): 9 [AB]   0530 Lab requested repeat CBC which was  drawn.  New hemoglobin is 8.2 but platelets are still low at 9.  Will hold PRBCs but will administer platelet transfusion. [AB]      ED Course User Index  [AB] Demetris Brenner MD             AS OF 05:31 EDT VITALS:    BP - 98/54  HR - 105  TEMP - 100 °F (37.8 °C) (Rectal)  O2 SATS - 96%    COMPLEXITY OF CARE  The patient requires admission.      DIAGNOSIS  Final diagnoses:   Septic shock   Hypotension, unspecified hypotension type   Atrial fibrillation with RVR   Anemia, unspecified type   Thrombocytopenia   Hypomagnesemia   SHADIA (acute kidney injury)   Lactic acidosis   Acute pulmonary edema   Acute UTI   Leukopenia, unspecified type         DISPOSITION  ED Disposition       ED Disposition   Decision to Admit    Condition   --    Comment   Level of Care: Critical Care [6]   Diagnosis: Septic shock [2168244]   Admitting Physician: JESSE MCKEON [[id number]]   Attending Physician: JESSE MCKEON [[id number]]   Certification: I Certify That Inpatient Hospital Services Are Medically Necessary For Greater Than 2 Midnights                  Please note that portions of this document were completed with a voice recognition program.    Note Disclaimer: At [facility], we believe that sharing information builds trust and better relationships. You are receiving this note because you recently visited [facility]. It is possible you will see health information before a provider has talked with you about it. This kind of information can be easy to misunderstand. To help you fully understand what it means for your health, we urge you to discuss this note with your provider.              Demetris Brenner MD  06/04/25 0524

## 2025-06-04 NOTE — PROGRESS NOTES
Discharge Planning Assessment  [facility]     Patient Name: Penelope Onofre  MRN: [id number]  Today's Date: 2025    Admit Date: 2025    Plan: palliative   Discharge Needs Assessment    No documentation.                  Discharge Plan       Row Name 25 1609       Plan    Plan palliative    Plan Comments The patient transferred to [facility] from ER on 25 @ 14:58 for palliative care. [facility] to evaluate. TESS Shah RN, CCP    Final Discharge Disposition Code 20 -     Final Note The patient  on 25 @ 14:58. TESS Shah RN, CCP.                  Selected Continued Care - Prior Encounters Includes continued care and service providers with selected services from prior encounters from 3/6/2025 to 2025      Discharged on 2025 Admission date: 2025 - Discharge disposition: Skilled Nursing Facility (DC - External)      Destination       Service Provider Services Address Phone Fax Patient Preferred    [facility] [address] [address] [phone] --                             Demographic Summary    No documentation.                  Functional Status    No documentation.                  Psychosocial    No documentation.                  Abuse/Neglect    No documentation.                  Legal    No documentation.                  Substance Abuse    No documentation.                  Patient Forms    No documentation.                     Brittnee Shah RN

## 2025-06-04 NOTE — DISCHARGE SUMMARY
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  Right pleural effusion malignant  Acute kidney injury probably ATN  Severe pancytopenia  Acute hypoxic respiratory failure  Lactic acidosis  Non-ST elevation MI type II demand ischemia  Metabolic encephalopathy  Hyponatremia  Hypokalemia  Hypomagnesemia  COPD    Course patient admitted for septic shock she has known extensive stage small cell carcinoma of the lung with a right pleural effusion that supplies is suspected to be malignant she developed some paroxysmal A-fib with rapid rates she had evidence of pneumonia and a UTI she was severely pancytopenic respiratory failure she had lactic acidosis elevated troponin was no evidence of a ACS probably demand ischemia.  Patient was cachectic appearing encephalopathic.  She continued to deteriorate despite aggressive therapy to the point where she was going to require intubation mechanical ventilation her nephew Sanjiv who helps care for her was here and she has him is his emergency contact.  We talked her quality of life has been declining clearly this is going to take a major blow to that skin to be hard for her to recover and get back to even treatment for her cancer.  He did not think she would want to go on a ventilator or go through CPR we talked at this point she looks quite miserable she has air hunger and he requested that we moved to a palliative care approach which I 100% supported and agree with.  The patient was therefore transferred to palliative care and she subsequently

## 2025-06-05 ENCOUNTER — DOCUMENTATION (OUTPATIENT)
Dept: OTHER | Facility: HOSPITAL | Age: 70
End: 2025-06-05
Payer: MEDICARE

## 2025-06-05 LAB
BH BB BLOOD EXPIRATION DATE: NORMAL
BH BB BLOOD EXPIRATION DATE: NORMAL
BH BB BLOOD TYPE BARCODE: 5100
BH BB BLOOD TYPE BARCODE: 6200
BH BB DISPENSE STATUS: NORMAL
BH BB DISPENSE STATUS: NORMAL
BH BB PRODUCT CODE: NORMAL
BH BB PRODUCT CODE: NORMAL
BH BB UNIT NUMBER: NORMAL
BH BB UNIT NUMBER: NORMAL
UNIT  ABO: NORMAL
UNIT  ABO: NORMAL
UNIT  RH: NORMAL
UNIT  RH: NORMAL

## 2025-06-05 NOTE — PAYOR COMM NOTE
"Amrit Onofre (Dcsd. Female)     ATTN: NURSE REVIEWER  RE: INITIAL INPT AUTH CLINICALS   REF: [id number]  PLS REPLY TO MICHAEL CARRERA [phone] OR FAX# [phone]      Date of Birth   1955    Social Security Number       Address   [address] [facility] [address]    Home Phone   [phone]    MRN   [id number]       Synagogue   None    Marital Status                               Admission Date   2025    Admission Type   Emergency    Admitting Provider   Hardy Ying Jr., MD    Attending Provider       Department, Room/Bed   [facility] [address] [place], P494/1       Discharge Date   2025    Discharge Disposition       Discharge Destination                                 Attending Provider: (none)   Allergies: Codeine, Sulfa Antibiotics    Isolation: None   Infection: None   Code Status: Prior    Ht: 182.9 cm (72\")   Wt: 59.1 kg (130 lb 6.4 oz)    Admission Cmt: None   Principal Problem: Septic shock [A41.9,R65.21]                   Active Insurance as of 2025       Primary Coverage       Payor Plan Insurance Group Employer/Plan Group    ANTHEM MEDICARE REPLACEMENT ANTHEM MEDICARE ADVANTAGE HMO [id number]       Payor Plan Address Payor Plan Phone Number Payor Plan Fax Number Effective Dates    [address] [phone]  2025 - None Entered    [facility] [id number]         Subscriber Name Subscriber Birth Date Member ID       AMRIT ONOFRE 1955 [id number]                     Emergency Contacts        (Rel.) Home Phone Work Phone Mobile Phone    DimpleSuman campbell (Brother) -- -- [phone]    DimpleCarlos campbell (Brother) -- -- --    Sanjiv Diallo (Other) [phone] -- [phone]                 History & Physical        Bisi Hylton APRN at 25 0510       Attestation signed by Tiffany Davison MD at 25 0650      I have reviewed this documentation and agree.  I personally obtained history, reviewed " the data and and examined the patient. I provided more than half of the total time dedicated to the treatment of this patient.   The following is a summary:    Assessment:  Septic shock  UTI  Right pulmonary infiltrates: Right pleural effusion and RLL atelectasis on CT chest 5/18/2025  Small cell lung cancer of the right lung, extensive stage..  On carboplatin/etoposide, last received 5/25  A-fib with RVR  COPD, no current exacerbation  SHADIA on CKD stage III  Pancytopenia  Electrolytes disturbance: Hyponatremia and hypokalemia  Severe protein calorie malnutrition    Plan:  Antibiotics with cefepime and vancomycin.  Check MRSA screen.  Follow-up cultures.  Sepsis fluid bolus administered.  Maintenance IV fluid with normal saline.  Levophed IV drip and titrate to keep MAP >65.  Bronchodilators DuoNeb 4 times a day Pulmicort and Brovana.  Amiodarone drip for A-fib.  Initiate Neupogen.  Check coags as platelets are significantly low.  Transfuse 1 unit of platelets.    Prognosis is guarded at least.    Electronically signed by Tiffany Davison MD, 06/04/25, 6:45 AM EDT.       CC time 48 minutes                      Group: [facility]       HISTORY AND PHYSICAL    Patient Identification:  Penelope Onofre  70 y.o.  female  1955  [id number]            CC: unresponsive    History of Present Illness:  Penelope Onofre is a 70 year old female with a medical history including: anxiety, chronic back pain, GERD, chronic kidney disease, and hypertension.    She was recently diagnosed with small cell lung cancer, extensive stage during hospitalization in May. She was seen by oncology during hospitalization and started on systemic chemotherapy with carboplatin etoposide x three days. She was started on Neupogen during hospitalization for neutropenia.    Patient presented to the ED on 6/4/25 from nursing facility with concern for unresponsiveness. Patient was found unresponsive by staff, and EMS was called. Upon EMS  arrival, patient remained minimally responsive, blood glucose 61 and replacement administered. Reported systolic blood pressure was 40, and she was started on IVF in route to the ED. Patient remained tachycardic (150s) and hypotensive (64/43) upon arrival to the ED. She is not a great historian, but denies any focal symptoms of shortness of breath, chest pain, abdominal pain., nausea, vomiting, fever, or chills.     ED evaluation revealed multiple abnormalities including: high sensitivity troponin 59 (reflex 65), sodium 128, potassium 3.1, creatinine 2.30, BUN 29.0, lactic acid 9.6, WBC 0.93, hemoglobin 5.1, hematocrit 15.5, platelets 7,000. UA demonstrated 3+ bacteria. Bedside fecal occult negative.    Review of Systems:  Limited by confusion.     Past Medical History:  Past Medical History:   Diagnosis Date    Anxiety     Chronic back pain     CKD (chronic kidney disease)     Claudication     GERD (gastroesophageal reflux disease)     Hypertension     Kidney insufficiency     Raynaud's disease     Scoliosis     Small cell lung cancer 2025    Metastatic       Past Surgical History:  Past Surgical History:   Procedure Laterality Date    BREAST CYST EXCISION Right     TONSILLECTOMY      US GUIDED LYMPH NODE BIOPSY  5/15/2025    VENOUS ACCESS DEVICE (PORT) INSERTION N/A 5/22/2025    Procedure: INSERTION VENOUS ACCESS DEVICE;  Surgeon: Bria Starr MD;  Location: [facility];  Service: General;  Laterality: N/A;        Home Meds:  (Not in a hospital admission)      Allergies:  Allergies   Allergen Reactions    Codeine Unknown - Low Severity    Sulfa Antibiotics Unknown - Low Severity       Social History:   Social History     Socioeconomic History    Marital status:    Tobacco Use    Smoking status: Former     Current packs/day: 1.00     Average packs/day: 1 pack/day for 40.0 years (40.0 ttl pk-yrs)     Types: Cigarettes    Smokeless tobacco: Never    Tobacco comments:     quit 2 weeks ago   Vaping Use     Vaping status: Never Used   Substance and Sexual Activity    Alcohol use: No    Drug use: No    Sexual activity: Defer       Family History:  Family History   Problem Relation Age of Onset    Alzheimer's disease Mother     Cancer Father     Cancer Sister     Hemochromatosis Sister     Pancreatic cancer Sister     Malig Hyperthermia Neg Hx        Physical Exam:  BP (!) 83/52   Pulse (!) 139   Temp 100 °F (37.8 °C) (Rectal)   Resp 22   Wt 59.1 kg (130 lb 6.4 oz)   SpO2 91%   BMI 17.69 kg/m²  Body mass index is 17.69 kg/m². 91% 59.1 kg (130 lb 6.4 oz)  Constitutional: Middle aged, appears much older than stated age, female pt in bed, No acute respiratory distress, no accessory muscle use  Head: - NCAT  Eyes: No pallor, Anicteric conjunctiva, EOMI.  ENMT:  Mallampati 3, no oral thrush. Dry MM. Edentulous  NECK: Trachea midline, No thyromegaly, no palpable cervical LNpathy  Heart: Tachycardic rate, no murmur. No pedal edema   Lungs: SRAVANTHI +, No wheezes/ crackles heard    Abdomen: Soft. No tenderness, guarding or rigidity. No palpable masses  Extremities: Extremities warm and well perfused. No cyanosis/ clubbing  Neuro: Conscious, alert to self, place- disoriented to time or situation, no focal deficits    PPE recommended per [facility] infectious disease Isolation protocol for the current clinical scenario(as mentioned below) was followed.      LABS:  COVID19   Date Value Ref Range Status   06/04/2025 Not Detected Not Detected - Ref. Range Final       Lab Results   Component Value Date    CALCIUM 7.7 (L) 06/04/2025    PHOS 3.0 06/04/2025     Results from last 7 days   Lab Units 06/04/25  0255 05/30/25  0539 05/29/25  0710   MAGNESIUM mg/dL 1.4*  --   --    SODIUM mmol/L 128* 134* 135*   POTASSIUM mmol/L 3.1* 4.7 4.7   CHLORIDE mmol/L 95* 103 104   CO2 mmol/L 11.4* 23.0 23.4   BUN mg/dL 29.0* 21.0 21.0   CREATININE mg/dL 2.30* 0.94 0.89   GLUCOSE mg/dL 149* 105* 92   CALCIUM mg/dL 7.7* 8.5* 8.4*   WBC  10*3/mm3 0.93* 3.15* 10.00   HEMOGLOBIN g/dL 5.1* 10.8* 10.9*   PLATELETS 10*3/mm3 7* 72* 93*   ALT (SGPT) U/L 12  --   --    AST (SGOT) U/L 19  --   --    PROBNP pg/mL 22,624.0*  --   --      Lab Results   Component Value Date    CKTOTAL 61 05/16/2025    TROPONINT 65 (C) 06/04/2025     Results from last 7 days   Lab Units 06/04/25  0427 06/04/25  0255   HSTROP T ng/L 65* 59*         Results from last 7 days   Lab Units 06/04/25  0255   LACTATE mmol/L 9.6*         Results from last 7 days   Lab Units 06/04/25  0310   ADENOVIRUS DETECTION BY PCR  Not Detected   CORONAVIRUS 229E  Not Detected   CORONAVIRUS HKU1  Not Detected   CORONAVIRUS NL63  Not Detected   CORONAVIRUS OC43  Not Detected   HUMAN METAPNEUMOVIRUS  Not Detected   HUMAN RHINOVIRUS/ENTEROVIRUS  Not Detected   INFLUENZA B PCR  Not Detected   PARAINFLUENZA 1  Not Detected   PARAINFLUENZA VIRUS 2  Not Detected   PARAINFLUENZA VIRUS 3  Not Detected   PARAINFLUENZA VIRUS 4  Not Detected   BORDETELLA PERTUSSIS PCR  Not Detected   BORDETELLA PARAPERTUSSIS PCR  Not Detected   CHLAMYDOPHILA PNEUMONIAE PCR  Not Detected   MYCOPLAMA PNEUMO PCR  Not Detected   RSV, PCR  Not Detected     Results from last 7 days   Lab Units 06/04/25 0255   INR  1.70*         Lab Results   Component Value Date    TSH 4.800 (H) 05/13/2025     Estimated Creatinine Clearance: 20.9 mL/min (A) (by C-G formula based on SCr of 2.3 mg/dL (H)).  Results from last 7 days   Lab Units 06/04/25  0329   NITRITE UA  Negative   WBC UA /HPF 11-20*   BACTERIA UA /HPF 3+*   SQUAM EPITHEL UA /HPF 3-6*     Microbiology Results (last 10 days)       Procedure Component Value - Date/Time    Respiratory Panel PCR w/COVID-19(SARS-CoV-2) CESIA/ROSSY/MESHA/PAD/COR/ANNIE In-House, NP Swab in UTM/VTM, 2 HR TAT - Swab, Nasopharynx [[id number]]  (Normal) Collected: 06/04/25 0310    Lab Status: Final result Specimen: Swab from Nasopharynx Updated: 06/04/25 0408     ADENOVIRUS, PCR Not Detected     Coronavirus 229E Not  Detected     Coronavirus HKU1 Not Detected     Coronavirus NL63 Not Detected     Coronavirus OC43 Not Detected     COVID19 Not Detected     Human Metapneumovirus Not Detected     Human Rhinovirus/Enterovirus Not Detected     Influenza A PCR Not Detected     Influenza B PCR Not Detected     Parainfluenza Virus 1 Not Detected     Parainfluenza Virus 2 Not Detected     Parainfluenza Virus 3 Not Detected     Parainfluenza Virus 4 Not Detected     RSV, PCR Not Detected     Bordetella pertussis pcr Not Detected     Bordetella parapertussis PCR Not Detected     Chlamydophila pneumoniae PCR Not Detected     Mycoplasma pneumo by PCR Not Detected    Narrative:      In the setting of a positive respiratory panel with a viral infection PLUS a negative procalcitonin without other underlying concern for bacterial infection, consider observing off antibiotics or discontinuation of antibiotics and continue supportive care. If the respiratory panel is positive for atypical bacterial infection (Bordetella pertussis, Chlamydophila pneumoniae, or Mycoplasma pneumoniae), consider antibiotic de-escalation to target atypical bacterial infection.               Imaging: I personally visualized the images of scans/x-rays performed within last 3 days.      Assessment:  Septic shock  Acute UTI  Atrial fibrillation  Hyponatremia  Hypokalemia  Acute kidney injury superimposed on CKD  Leukopenia  Anemia  Thrombocytopenia  Small cell lung cancer with extensive metastases  Anxiety      Recommendations:  Septic shock  Acute UTI  UA demonstrated turbid urine with 3+ bacteria.  RVP negative.  Chest x-ray shows evidence of diffuse interstitial changes, likely reflective of interstitial pulmonary edema and a small right pleural effusion-no evidence of focal consolidation or infiltrate to indicate pneumonia.  Blood cultures, urine culture collected, pending.  1 L of LR was administered, given remaining 750 mL of recommended sepsis bolus.  Continue  norepinephrine to maintain MAP greater than 65, vasopressin added as a second line treatment if necessary.  Vasopressors being administered through indwelling port.  Continue cefepime and vancomycin-check MRSA PCR.    Atrial fibrillation  New onset atrial fibrillation, likely related to severe infection.  Initiated on amiodarone in the emergency department-continue.  Cardiology consult.    Hyponatremia  Hypokalemia  Acute kidney injury superimposed on CKD  Sodium 128, potassium 3.1 creatinine 2.30.  Continue cautious rehydration-noted that patient's proBNP was greater than 21,000, however patient appears profoundly dehydrated on exam with poor skin turgor with dry mucous membranes.    Leukopenia  Anemia  Thrombocytopenia  Small cell lung cancer with extensive metastases  WBC 0.93, hemoglobin 5.1, hematocrit 15.5, platelets 7000.  Blood transfusions ordered-goal hemoglobin greater than 7, platelets greater than 10,000.  Suspect related to recent chemotherapy administration.  Oncology consulted.  Continue allopurinol for prevention of tumor lysis syndrome.    Anxiety  Home as needed medication: Hydroxyzine Xanax, continue.    Regular diet  SCDs  Full code    Patient was placed in face mask upon entering room and kept mask on throughout our encounter. I wore full protective equipment throughout this patient encounter including a face mask, gown and gloves. Hand hygiene was performed before donning protective equipment and after removal when leaving the room.    Bisi Hylton, APRN  6/4/2025  05:23 EDT      Much of this encounter note is an electronic transcription/translation of spoken language to printed text using Dragon Software.     Electronically signed by Tiffany Davison MD at 06/04/25 0650       Facility-Administered Medications as of 6/4/2025   Medication Dose Route Frequency Provider Last Rate Last Admin    [COMPLETED] acetaminophen (TYLENOL) suppository 650 mg  650 mg Rectal Once Demetris Brenner MD   650 mg  at 06/04/25 0253    [COMPLETED] amiodarone 150 mg in 100 mL D5W (loading dose)  150 mg Intravenous Once Demetris Brenner MD   Stopped at 06/04/25 0315    [COMPLETED] cefepime 2000 mg IVPB in 100 mL NS (MBP)  2,000 mg Intravenous Once Demetris Brenner MD   Stopped at 06/04/25 0550    [COMPLETED] lactated ringers bolus 1,000 mL  1,000 mL Intravenous Once Demetris Brenner MD   Stopped at 06/04/25 0303    [COMPLETED] Vancomycin HCl 1,250 mg in sodium chloride 0.9 % 250 mL VTB  20 mg/kg Intravenous Once Demetris Brenner MD   Stopped at 06/04/25 0824     Lab Results (last 24 hours)       Procedure Component Value Units Date/Time    MRSA Screen, PCR (Inpatient) - Swab, Nares [[id number]]  (Normal) Collected: 06/04/25 0953    Specimen: Swab from Nares Updated: 06/04/25 1129     MRSA PCR No MRSA Detected    Narrative:      The negative predictive value of this diagnostic test is high and should only be used to consider de-escalating anti-MRSA therapy. A positive result may indicate colonization with MRSA and must be correlated clinically.    Blood Gas, Arterial - [[id number]]  (Abnormal) Collected: 06/04/25 1050    Specimen: Arterial Blood Updated: 06/04/25 1055     Site Right Brachial     Eric's Test N/A     pH, Arterial 7.290 pH units      pCO2, Arterial 36.9 mm Hg      pO2, Arterial 55.6 mm Hg      HCO3, Arterial 17.8 mmol/L      Base Excess, Arterial -8.1 mmol/L      Comment: Serial Number: [id number]:  [id number]        O2 Saturation, Arterial 85.2 %      A-a DO2 0.1 mmHg      Barometric Pressure for Blood Gas 753.0000 mmHg      Modality NRB     FIO2 100 %      Flow Rate 15.0000 lpm      Rate 30 Breaths/minute      Notified Who Stepan     Read Back Yes     Notified Time --     Hemodilution No     Device Comment sat 86%     PO2/FIO2 56    STAT Lactic Acid, Reflex [[id number]]  (Abnormal) Collected: 06/04/25 0955    Specimen: Blood from Arm, Left Updated: 06/04/25 1053     Lactate 3.1 mmol/L     Protime-INR [[id number]]   "(Abnormal) Collected: 06/04/25 0955    Specimen: Blood from Arm, Left Updated: 06/04/25 1020     Protime 17.8 Seconds      INR 1.47    aPTT [[id number]]  (Abnormal) Collected: 06/04/25 0955    Specimen: Blood from Arm, Left Updated: 06/04/25 1020     PTT 38.0 seconds     Procalcitonin [[id number]]  (Abnormal) Collected: 06/04/25 0427    Specimen: Blood Updated: 06/04/25 0731     Procalcitonin 93.20 ng/mL     Narrative:      As a Marker for Sepsis (Non-Neonates):    1. <0.5 ng/mL represents a low risk of severe sepsis and/or septic shock.  2. >2 ng/mL represents a high risk of severe sepsis and/or septic shock.    As a Marker for Lower Respiratory Tract Infections that require antibiotic therapy:    PCT on Admission    Antibiotic Therapy       6-12 Hrs later    >0.5                Strongly Recommended  >0.25 - <0.5        Recommended   0.1 - 0.25          Discouraged              Remeasure/reassess PCT  <0.1                Strongly Discouraged     Remeasure/reassess PCT    As 28 day mortality risk marker: \"Change in Procalcitonin Result\" (>80% or <=80%) if Day 0 (or Day 1) and Day 4 values are available. Refer to http://www.[facility]-pct-calculator.com    Change in PCT <=80%  A decrease of PCT levels below or equal to 80% defines a positive change in PCT test result representing a higher risk for 28-day all-cause mortality of patients diagnosed with severe sepsis for septic shock.    Change in PCT >80%  A decrease of PCT levels of more than 80% defines a negative change in PCT result representing a lower risk for 28-day all-cause mortality of patients diagnosed with severe sepsis or septic shock.       STAT Lactic Acid, Reflex [[id number]]  (Abnormal) Collected: 06/04/25 0637    Specimen: Blood Updated: 06/04/25 0720     Lactate 4.8 mmol/L     Magnesium [[id number]]  (Abnormal) Collected: 06/04/25 0427    Specimen: Blood Updated: 06/04/25 0605     Magnesium 1.4 mg/dL     Comprehensive Metabolic Panel [[id number]]  (Abnormal) " Collected: 06/04/25 0427    Specimen: Blood Updated: 06/04/25 0605     Glucose 123 mg/dL      BUN 32.0 mg/dL      Creatinine 2.27 mg/dL      Sodium 127 mmol/L      Potassium 2.8 mmol/L      Chloride 94 mmol/L      CO2 12.7 mmol/L      Calcium 8.0 mg/dL      Total Protein 5.1 g/dL      Albumin 2.5 g/dL      ALT (SGPT) 13 U/L      AST (SGOT) 20 U/L      Alkaline Phosphatase 34 U/L      Total Bilirubin 0.5 mg/dL      Globulin 2.6 gm/dL      A/G Ratio 1.0 g/dL      BUN/Creatinine Ratio 14.1     Anion Gap 20.3 mmol/L      eGFR 22.6 mL/min/1.73     Narrative:      GFR Categories in Chronic Kidney Disease (CKD)              GFR Category          GFR (mL/min/1.73)    Interpretation  G1                    90 or greater        Normal or high (1)  G2                    60-89                Mild decrease (1)  G3a                   45-59                Mild to moderate decrease  G3b                   30-44                Moderate to severe decrease  G4                    15-29                Severe decrease  G5                    14 or less           Kidney failure    (1)In the absence of evidence of kidney disease, neither GFR category G1 or G2 fulfill the criteria for CKD.    eGFR calculation 2021 CKD-EPI creatinine equation, which does not include race as a factor    Protime-INR [[id number]] Collected: 06/04/25 0255    Specimen: Blood Updated: 06/04/25 0556     Protime --     Comment: Questionable results   Corrected result. Previous result was 20.1 Seconds on 6/4/2025 at 0339 EDT.        INR --     Comment: Questionable results   Corrected result. Previous result was 1.70 on 6/4/2025 at 0339 EDT.       aPTT [[id number]] Collected: 06/04/25 0255    Specimen: Blood Updated: 06/04/25 0556     PTT --     Comment: Questionable results   Corrected result. Previous result was 37.3 seconds on 6/4/2025 at 0339 EDT.       Phosphorus [[id number]]  (Normal) Collected: 06/04/25 0427    Specimen: Blood Updated: 06/04/25 0553     Phosphorus  3.3 mg/dL     BNP [[id number]]  (Abnormal) Collected: 06/04/25 0427    Specimen: Blood Updated: 06/04/25 0553     proBNP 25,356.0 pg/mL     Narrative:      This assay is used as an aid in the diagnosis of individuals suspected of having heart failure. It can be used as an aid in the diagnosis of acute decompensated heart failure (ADHF) in patients presenting with signs and symptoms of ADHF to the emergency department (ED). In addition, NT-proBNP of <300 pg/mL indicates ADHF is not likely.    Age Range Result Interpretation  NT-proBNP Concentration (pg/mL:      <50             Positive            >450                   Gray                 300-450                    Negative             <300    50-75           Positive            >900                  Gray                300-900                  Negative            <300      >75             Positive            >1800                  Gray                300-1800                  Negative            <300    Phosphorus [[id number]] Collected: 06/04/25 0255    Specimen: Blood Updated: 06/04/25 0535     Phosphorus --     Comment: Specimen Contaminated    Corrected result. Previous result was 3.0 mg/dL on 6/4/2025 at 0338 EDT.       BNP [[id number]] Collected: 06/04/25 0255    Specimen: Blood Updated: 06/04/25 0535     proBNP --     Comment: Specimen Contaminated  Corrected result. Previous result was 22,624.0 pg/mL on 6/4/2025 at 0338 EDT.       Narrative:      This assay is used as an aid in the diagnosis of individuals suspected of having heart failure. It can be used as an aid in the diagnosis of acute decompensated heart failure (ADHF) in patients presenting with signs and symptoms of ADHF to the emergency department (ED). In addition, NT-proBNP of <300 pg/mL indicates ADHF is not likely.    Age Range Result Interpretation  NT-proBNP Concentration (pg/mL:      <50             Positive            >450                   Gray                 300-450                     Negative             <300    50-75           Positive            >900                  Gray                300-900                  Negative            <300      >75             Positive            >1800                  Gray                300-1800                  Negative            <300    High Sensitivity Troponin T [[id number]] Collected: 06/04/25 0255    Specimen: Blood Updated: 06/04/25 0535     HS Troponin T --     Comment: Specimen Contaminated  Corrected result. Previous result was 59 ng/L on 6/4/2025 at 0340 EDT.       Narrative:      High Sensitive Troponin T Reference Range:  <14.0 ng/L- Negative Female for AMI  <22.0 ng/L- Negative Male for AMI  >=14 - Abnormal Female indicating possible myocardial injury.  >=22 - Abnormal Male indicating possible myocardial injury.   Clinicians would have to utilize clinical acumen, EKG, Troponin, and serial changes to determine if it is an Acute Myocardial Infarction or myocardial injury due to an underlying chronic condition.         Comprehensive Metabolic Panel [[id number]] Collected: 06/04/25 0255    Specimen: Blood Updated: 06/04/25 0535     Glucose --     Comment: Specimen Contaminated  Corrected result. Previous result was 149 mg/dL on 6/4/2025 at 0339 EDT.        BUN --     Comment: Specimen Contaminated  Corrected result. Previous result was 29.0 mg/dL on 6/4/2025 at 0339 EDT.        Creatinine --     Comment: Specimen Contaminated  Corrected result. Previous result was 2.30 mg/dL on 6/4/2025 at 0339 EDT.        Sodium --     Comment: Specimen Contaminated  Corrected result. Previous result was 128 mmol/L on 6/4/2025 at 0339 EDT.        Potassium --     Comment: Specimen Contaminated  Corrected result. Previous result was 3.1 mmol/L on 6/4/2025 at 0339 EDT.        Chloride --     Comment: Specimen Contaminated  Corrected result. Previous result was 95 mmol/L on 6/4/2025 at 0339 EDT.        CO2 --     Comment: Specimen Contaminated  Corrected result. Previous  result was 11.4 mmol/L on 6/4/2025 at 0339 EDT.        Calcium --     Comment: Specimen Contaminated  Corrected result. Previous result was 7.7 mg/dL on 6/4/2025 at 0339 EDT.        Total Protein --     Comment: Specimen Contaminated  Corrected result. Previous result was 4.4 g/dL on 6/4/2025 at 0339 EDT.        Albumin --     Comment: Specimen Contaminated  Corrected result. Previous result was 1.9 g/dL on 6/4/2025 at 0339 EDT.        ALT (SGPT) --     Comment: Specimen Contaminated  Corrected result. Previous result was 12 U/L on 6/4/2025 at 0339 EDT.        AST (SGOT) --     Comment: Specimen Contaminated  Corrected result. Previous result was 19 U/L on 6/4/2025 at 0339 EDT.        Alkaline Phosphatase --     Comment: Specimen Contaminated  Corrected result. Previous result was 28 U/L on 6/4/2025 at 0339 EDT.        Total Bilirubin --     Comment: Specimen Contaminated  Corrected result. Previous result was 0.5 mg/dL on 6/4/2025 at 0339 EDT.        Globulin --     Comment: Specimen Contaminated  Corrected result. Previous result was 2.5 gm/dL on 6/4/2025 at 0339 EDT.        A/G Ratio --     Comment: Specimen Contaminated  Corrected result. Previous result was 0.8 g/dL on 6/4/2025 at 0339 EDT.        BUN/Creatinine Ratio --     Comment: Specimen Contaminated  Corrected result. Previous result was 12.6 on 6/4/2025 at 0339 EDT.        Anion Gap --     Comment: Specimen Contaminated  Corrected result. Previous result was 21.6 mmol/L on 6/4/2025 at 0339 EDT.       Narrative:      GFR Categories in Chronic Kidney Disease (CKD)              GFR Category          GFR (mL/min/1.73)    Interpretation  G1                    90 or greater        Normal or high (1)  G2                    60-89                Mild decrease (1)  G3a                   45-59                Mild to moderate decrease  G3b                   30-44                Moderate to severe decrease  G4                    15-29                Severe  decrease  G5                    14 or less           Kidney failure    (1)In the absence of evidence of kidney disease, neither GFR category G1 or G2 fulfill the criteria for CKD.    eGFR calculation 2021 CKD-EPI creatinine equation, which does not include race as a factor  The previously reported component GFR is no longer being reported. Previous result was 22.2 mL/min/1.73 (Reference Range: >60.0 mL/min/1.73) on 6/4/2025 at 0339 EDT.    Magnesium [[id number]] Collected: 06/04/25 0255    Specimen: Blood Updated: 06/04/25 0535     Magnesium --     Comment: Specimen Contaminated  Corrected result. Previous result was 1.4 mg/dL on 6/4/2025 at 0339 EDT.       Manual Differential [[id number]] Collected: 06/04/25 0255    Specimen: Blood Updated: 06/04/25 0533     Neutrophil % --     Comment: Corrected result. Previous result was 56.0 % on 6/4/2025 at 0439 EDT.        Lymphocyte % --     Comment: Corrected result. Previous result was 33.0 % on 6/4/2025 at 0439 EDT.        Myelocyte % --     Comment: Corrected result. Previous result was 1.0 % on 6/4/2025 at 0439 EDT.        Neutrophils Absolute --     Comment: Corrected result. Previous result was 0.52 10*3/mm3 on 6/4/2025 at 0439 EDT.        Lymphocytes Absolute --     Comment: Corrected result. Previous result was 0.31 10*3/mm3 on 6/4/2025 at 0439 EDT.        Hypochromia Mod/2+     WBC Morphology Normal     Platelet Morphology Normal    Narrative:      The previously reported component Monocytes % is no longer being reported. Previous result was 10.0 % (Reference Range: 5.0-12.0 %) on 6/4/2025 at 0439 EDT.  The previously reported component Absolute Monocytes is no longer being reported. Previous result was 0.09 10*3/mm3 (Reference Range: 0.10-0.90 10*3/mm3) on 6/4/2025 at 0439 EDT.    CBC & Differential [[id number]] Collected: 06/04/25 0255    Specimen: Blood Updated: 06/04/25 0532    Narrative:      The following orders were created for panel order CBC &  Differential.  Procedure                               Abnormality         Status                     ---------                               -----------         ------                     CBC Auto Differential[[id number]]                            Edited Result - FINAL        Please view results for these tests on the individual orders.    CBC Auto Differential [[id number]] Collected: 06/04/25 0255    Specimen: Blood Updated: 06/04/25 0532     WBC --     Comment: .  Corrected result. Previous result was 0.93 10*3/mm3 on 6/4/2025 at 0344 EDT.        RBC --     Comment: .  Corrected result. Previous result was 1.51 10*6/mm3 on 6/4/2025 at 0344 EDT.        Hemoglobin --     Comment: .  Corrected result. Previous result was 5.1 g/dL on 6/4/2025 at 0344 EDT.        Hematocrit --     Comment: .  Corrected result. Previous result was 15.5 % on 6/4/2025 at 0344 EDT.        MCV --     Comment: .  Corrected result. Previous result was 102.6 fL on 6/4/2025 at 0344 EDT.        MCH --     Comment: .  Corrected result. Previous result was 33.8 pg on 6/4/2025 at 0344 EDT.        MCHC --     Comment: .  Corrected result. Previous result was 32.9 g/dL on 6/4/2025 at 0344 EDT.        RDW --     Comment: .  Corrected result. Previous result was 12.7 % on 6/4/2025 at 0344 EDT.        RDW-SD --     Comment: .  Corrected result. Previous result was 46.6 fl on 6/4/2025 at 0344 EDT.        MPV --     Comment: .  Corrected result. Previous result was 12.3 fL on 6/4/2025 at 0344 EDT.        Platelets --     Comment: .  Corrected result. Previous result was 7 10*3/mm3 on 6/4/2025 at 0344 EDT.       Lactic Acid, Plasma [[id number]] Collected: 06/04/25 0255    Specimen: Blood Updated: 06/04/25 0531     Lactate --     Comment: Specimen Contaminated   Corrected result. Previous result was 9.6 mmol/L on 6/4/2025 at 0334 EDT.       CBC (No Diff) [[id number]]  (Abnormal) Collected: 06/04/25 0513    Specimen: Blood Updated: 06/04/25 0528     WBC 1.12  10*3/mm3      RBC 2.35 10*6/mm3      Hemoglobin 8.2 g/dL      Hematocrit 23.4 %      MCV 99.6 fL      MCH 34.9 pg      MCHC 35.0 g/dL      RDW 13.1 %      RDW-SD 46.7 fl      MPV 12.4 fL      Platelets 9 10*3/mm3     High Sensitivity Troponin T 1Hr [[id number]]  (Abnormal) Collected: 06/04/25 0427    Specimen: Blood Updated: 06/04/25 0509     HS Troponin T 65 ng/L      Troponin T Numeric Delta 6 ng/L      Troponin T % Delta 10    Narrative:      High Sensitive Troponin T Reference Range:  <14.0 ng/L- Negative Female for AMI  <22.0 ng/L- Negative Male for AMI  >=14 - Abnormal Female indicating possible myocardial injury.  >=22 - Abnormal Male indicating possible myocardial injury.   Clinicians would have to utilize clinical acumen, EKG, Troponin, and serial changes to determine if it is an Acute Myocardial Infarction or myocardial injury due to an underlying chronic condition.         Respiratory Panel PCR w/COVID-19(SARS-CoV-2) CESIA/ROSSY/MESHA/PAD/COR/ANNIE In-House, NP Swab in [facility], 2 HR TAT - Swab, Nasopharynx [[id number]]  (Normal) Collected: 06/04/25 0310    Specimen: Swab from Nasopharynx Updated: 06/04/25 0408     ADENOVIRUS, PCR Not Detected     Coronavirus 229E Not Detected     Coronavirus HKU1 Not Detected     Coronavirus NL63 Not Detected     Coronavirus OC43 Not Detected     COVID19 Not Detected     Human Metapneumovirus Not Detected     Human Rhinovirus/Enterovirus Not Detected     Influenza A PCR Not Detected     Influenza B PCR Not Detected     Parainfluenza Virus 1 Not Detected     Parainfluenza Virus 2 Not Detected     Parainfluenza Virus 3 Not Detected     Parainfluenza Virus 4 Not Detected     RSV, PCR Not Detected     Bordetella pertussis pcr Not Detected     Bordetella parapertussis PCR Not Detected     Chlamydophila pneumoniae PCR Not Detected     Mycoplasma pneumo by PCR Not Detected    Narrative:      In the setting of a positive respiratory panel with a viral infection PLUS a negative  procalcitonin without other underlying concern for bacterial infection, consider observing off antibiotics or discontinuation of antibiotics and continue supportive care. If the respiratory panel is positive for atypical bacterial infection (Bordetella pertussis, Chlamydophila pneumoniae, or Mycoplasma pneumoniae), consider antibiotic de-escalation to target atypical bacterial infection.    Urinalysis, Microscopic Only - Straight Cath [[id number]]  (Abnormal) Collected: 06/04/25 0329    Specimen: Urine from Straight Cath Updated: 06/04/25 0406     RBC, UA 11-20 /HPF      WBC, UA 11-20 /HPF      Bacteria, UA 3+ /HPF      Squamous Epithelial Cells, UA 3-6 /HPF      Hyaline Casts, UA None Seen /LPF      Methodology Manual Light Microscopy    Urinalysis With Culture If Indicated - Straight Cath [[id number]]  (Abnormal) Collected: 06/04/25 0329    Specimen: Urine from Straight Cath Updated: 06/04/25 0347     Color, UA Dark Yellow     Appearance, UA Turbid     pH, UA 7.0     Specific Gravity, UA 1.012     Glucose, UA Negative     Ketones, UA Negative     Bilirubin, UA Negative     Blood, UA Large (3+)     Protein, UA >=300 mg/dL (3+)     Leuk Esterase, UA Moderate (2+)     Nitrite, UA Negative     Urobilinogen, UA 1.0 E.U./dL    Narrative:      In absence of clinical symptoms, the presence of pyuria, bacteria, and/or nitrites on the urinalysis result does not correlate with infection.          Imaging Results (Last 24 Hours)       Procedure Component Value Units Date/Time    XR Chest 1 View [[id number]] Collected: 06/04/25 0406     Updated: 06/04/25 0406    Narrative:        Patient: AMRIT SEGURA  Time Out: 04:05  Exam(s): XR CXR 1 VIEW     EXAM:    XR Chest, 1 View    CLINICAL HISTORY:     Reason for exam: sepsis.    TECHNIQUE:    Frontal view of the chest.    COMPARISON:  None    IMPRESSION:       1.  Diffuse interstitial changes in the lungs effect in the right greater   than left lung which is favored to relate to  "interstitial pulmonary edema.    2.  Suspected small right pleural effusion.  3.  Faint airspace opacities in the right lung which may be infectious in   nature.  Neoplasm also possible.  Consider cross-sectional imaging if   there is further concern.  4.  Left chest port terminates within the mid SVC.    Impression:          Electronically signed by Kurt Rogel MD on 06-04-25 at 0405          ECG/EMG Results (last 24 hours)       Procedure Component Value Units Date/Time    ECG 12 Lead Altered Mental Status [[id number]] Collected: 06/04/25 0224     Updated: 06/04/25 1628     QT Interval 293 ms      QTC Interval 477 ms     Narrative:      HEART QDGQ=647  bpm  RR Ygwewiic=434  ms  GA Interval=  ms  P Horizontal Axis=  deg  P Front Axis=  deg  QRSD Interval=69  ms  QT Agmgiets=810  ms  JScR=564  ms  QRS Axis=98  deg  T Wave Axis=  deg  - ABNORMAL ECG -  Atrial fibrillation with rapid V-rate  Anteroseptal  infarct, age indeterminate  When compared with ECG of 18-May-2025 18:07:58,  Significant change in rhythm: previously sinus  Electronically Signed By: Alondra Busch ([facility]) 2025-06-04 16:28:01  Date and Time of Study:2025-06-04 02:24:45          Orders (last 24 hrs)        Start     Ordered    06/05/25 0600  Daily CHG Bath While Central Line in Place  Daily,   Status:  Canceled       06/04/25 0428    06/05/25 0600  Basic Metabolic Panel  Daily,   Status:  Canceled       06/04/25 0509    06/05/25 0600  CBC & Differential  Daily,   Status:  Canceled       06/04/25 0509    06/05/25 0600  Magnesium  Daily,   Status:  Canceled       06/04/25 0509    06/05/25 0600  Vancomycin, Random  Morning Draw,   Status:  Canceled         06/04/25 0523    06/05/25 0600  pantoprazole (PROTONIX) EC tablet 40 mg  Every Early Morning,   Status:  Discontinued        Placed in \"Or\" Linked Group    06/04/25 1223    06/05/25 0600  pantoprazole (PROTONIX) injection 40 mg  Every Early Morning,   Status:  Discontinued        Placed in \"Or\" " "Linked Group    25 1223    25 1830  Initiation Sequence for Vasoactive Infusions  Every Shift Nursing,   Status:  Canceled      Comments: Initiation Sequence for Vasoactive Infusions    25 1005    25 1830  Weaning Sequence for Vasoactive Infusions  Every Shift Nursing,   Status:  Canceled      Comments: Weaning Sequence for Vasoactive Infusions    25 1005    25 1759  Blood Culture ID, PCR - Blood, Arm, Left  Once,   Status:  Canceled         25 1758    25 1648  Discharge patient  Once         25 1647    25 1648  Release To [facility]  Until Discontinued,   Status:  Canceled         25 1647    25 1647  Bowel Regimen Not Indicated  Once         25 1647    25 1647  Inpatient Admission  Once         25 1647    25 1219  sodium chloride nasal spray 2 spray  As Needed,   Status:  Discontinued         25 1223    25 1219  haloperidol (HALDOL) tablet 2 mg  Every 4 Hours PRN,   Status:  Discontinued        Placed in \"Or\" Linked Group    25 1223    25 1219  haloperidol (HALDOL) 2 MG/ML solution 2 mg  Every 4 Hours PRN,   Status:  Discontinued        Placed in \"Or\" Linked Group    25 1223    25 1219  haloperidol lactate (HALDOL) injection 2 mg  Every 4 Hours PRN,   Status:  Discontinued        Placed in \"Or\" Linked Group    25 1223    25 1219  haloperidol (HALDOL) tablet 1 mg  Every 4 Hours PRN,   Status:  Discontinued        Placed in \"Or\" Linked Group    25 1223    25 1219  haloperidol (HALDOL) 2 MG/ML solution 1 mg  Every 4 Hours PRN,   Status:  Discontinued        Placed in \"Or\" Linked Group    25 1223    25 1219  haloperidol lactate (HALDOL) injection 1 mg  Every 4 Hours PRN,   Status:  Discontinued        Placed in \"Or\" Linked Group    25 1223    25 1219  glycopyrrolate (ROBINUL) injection 0.2 mg  Every 2 Hours PRN,   Status:  Discontinued      " "  Placed in \"Or\" Linked Group    06/04/25 1223    06/04/25 1219  glycopyrrolate (ROBINUL) injection 0.2 mg  Every 2 Hours PRN,   Status:  Discontinued        Placed in \"Or\" Linked Group    06/04/25 1223    06/04/25 1219  glycopyrrolate (ROBINUL) injection 0.4 mg  Every 2 Hours PRN,   Status:  Discontinued        Placed in \"Or\" Linked Group    06/04/25 1223    06/04/25 1219  glycopyrrolate (ROBINUL) injection 0.4 mg  Every 2 Hours PRN,   Status:  Discontinued        Placed in \"Or\" Linked Group    06/04/25 1223    06/04/25 1219  scopolamine patch 1 mg/72 hr  Every 72 Hours PRN,   Status:  Discontinued         06/04/25 1223    06/04/25 1219  promethazine (PHENERGAN) tablet 12.5 mg  Every 4 Hours PRN,   Status:  Discontinued        Placed in \"Or\" Linked Group    06/04/25 1223    06/04/25 1219  promethazine (PHENERGAN) suppository 12.5 mg  Every 4 Hours PRN,   Status:  Discontinued        Placed in \"Or\" Linked Group    06/04/25 1223    06/04/25 1219  prochlorperazine (COMPAZINE) injection 5 mg  Every 6 Hours PRN,   Status:  Discontinued        Placed in \"Or\" Linked Group    06/04/25 1223    06/04/25 1219  prochlorperazine (COMPAZINE) tablet 5 mg  Every 6 Hours PRN,   Status:  Discontinued        Placed in \"Or\" Linked Group    06/04/25 1223    06/04/25 1219  prochlorperazine (COMPAZINE) suppository 25 mg  Every 12 Hours PRN,   Status:  Discontinued        Placed in \"Or\" Linked Group    06/04/25 1223    06/04/25 1219  ondansetron ODT (ZOFRAN-ODT) disintegrating tablet 4 mg  Every 6 Hours PRN,   Status:  Discontinued        Placed in \"Or\" Linked Group    06/04/25 1223    06/04/25 1219  ondansetron (ZOFRAN) injection 4 mg  Every 6 Hours PRN,   Status:  Discontinued        Placed in \"Or\" Linked Group    06/04/25 1223    06/04/25 1218  diphenhydrAMINE (BENADRYL) capsule 25 mg  Every 6 Hours PRN,   Status:  Discontinued        Placed in \"Or\" Linked Group    06/04/25 1223    06/04/25 1218  diphenhydrAMINE (BENADRYL) injection 25 " "mg  Every 6 Hours PRN,   Status:  Discontinued        Placed in \"Or\" Linked Group    06/04/25 1223    06/04/25 1218  magic mouthwash oral suspension (mixture) (diphenhydrAMINE HCl - aluminum & magnesium hydroxide-simethicone - lidocaine viscous) solution 55 mL  Every 4 Hours PRN,   Status:  Discontinued         06/04/25 1223    06/04/25 1217  morphine injection 6 mg  Every 1 Hour PRN,   Status:  Discontinued        Placed in \"Or\" Linked Group    06/04/25 1223    06/04/25 1217  morphine concentrated solution 20 mg  Every 1 Hour PRN,   Status:  Discontinued        Placed in \"Or\" Linked Group    06/04/25 1223    06/04/25 1217  HYDROmorphone (DILAUDID) injection 1.5 mg  Every 1 Hour PRN,   Status:  Discontinued        Placed in \"Or\" Linked Group    06/04/25 1223    06/04/25 1217  diphenoxylate-atropine (LOMOTIL) 2.5-0.025 MG per tablet 1 tablet  Every 2 Hours PRN,   Status:  Discontinued         06/04/25 1223    06/04/25 1217  Polyvinyl Alcohol-Povidone PF (ARTIFICIAL TEARS) 1.4-0.6 % ophthalmic solution 1 drop  Every 30 Minutes PRN,   Status:  Discontinued         06/04/25 1223    06/04/25 1217  acetaminophen (TYLENOL) tablet 650 mg  Every 4 Hours PRN,   Status:  Discontinued        Placed in \"Or\" Linked Group    06/04/25 1223    06/04/25 1217  acetaminophen (TYLENOL) 160 MG/5ML oral solution 650 mg  Every 4 Hours PRN,   Status:  Discontinued        Placed in \"Or\" Linked Group    06/04/25 1223    06/04/25 1217  acetaminophen (TYLENOL) suppository 650 mg  Every 4 Hours PRN,   Status:  Discontinued        Placed in \"Or\" Linked Group    06/04/25 1223    06/04/25 1217  Up With Assistance  As Needed,   Status:  Canceled       06/04/25 1223    06/04/25 1217  Oxygen Therapy- Nasal Cannula; Titrate 1-6 LPM Per SpO2; 90 - 95%  Continuous PRN,   Status:  Canceled      Comments: Add humidification to oxygen as needed for patient comfort.    06/04/25 1223    06/04/25 1217  LORazepam (ATIVAN) injection 0.5 mg  Every 1 Hour PRN,   " "Status:  Discontinued        Placed in \"Or\" Linked Group    06/04/25 1223    06/04/25 1217  LORazepam (ATIVAN) injection 0.5 mg  Every 1 Hour PRN,   Status:  Discontinued        Placed in \"Or\" Linked Group    06/04/25 1223    06/04/25 1217  LORazepam (ATIVAN) 2 MG/ML concentrated solution 0.5 mg  Every 1 Hour PRN,   Status:  Discontinued        Placed in \"Or\" Linked Group    06/04/25 1223    06/04/25 1217  LORazepam (ATIVAN) injection 1 mg  Every 1 Hour PRN,   Status:  Discontinued        Placed in \"Or\" Linked Group    06/04/25 1223    06/04/25 1217  LORazepam (ATIVAN) injection 1 mg  Every 1 Hour PRN,   Status:  Discontinued        Placed in \"Or\" Linked Group    06/04/25 1223    06/04/25 1217  LORazepam (ATIVAN) 2 MG/ML concentrated solution 1 mg  Every 1 Hour PRN,   Status:  Discontinued        Placed in \"Or\" Linked Group    06/04/25 1223    06/04/25 1217  LORazepam (ATIVAN) injection 2 mg  Every 1 Hour PRN,   Status:  Discontinued        Placed in \"Or\" Linked Group    06/04/25 1223    06/04/25 1217  LORazepam (ATIVAN) injection 2 mg  Every 1 Hour PRN,   Status:  Discontinued        Placed in \"Or\" Linked Group    06/04/25 1223    06/04/25 1217  LORazepam (ATIVAN) 2 MG/ML concentrated solution 2 mg  Every 1 Hour PRN,   Status:  Discontinued        Placed in \"Or\" Linked Group    06/04/25 1223    06/04/25 1217  morphine injection 2 mg  Every 1 Hour PRN,   Status:  Discontinued        Placed in \"Or\" Linked Group    06/04/25 1223    06/04/25 1217  morphine concentrated solution 5 mg  Every 1 Hour PRN,   Status:  Discontinued        Placed in \"Or\" Linked Group    06/04/25 1223    06/04/25 1217  HYDROmorphone (DILAUDID) injection 0.5 mg  Every 1 Hour PRN,   Status:  Discontinued        Placed in \"Or\" Linked Group    06/04/25 1223    06/04/25 1217  morphine injection 4 mg  Every 1 Hour PRN,   Status:  Discontinued        Placed in \"Or\" Linked Group    06/04/25 1223    06/04/25 1217  morphine concentrated solution 10 mg  " "Every 1 Hour PRN,   Status:  Discontinued        Placed in \"Or\" Linked Group    06/04/25 1223    06/04/25 1217  HYDROmorphone (DILAUDID) injection 1 mg  Every 1 Hour PRN,   Status:  Discontinued        Placed in \"Or\" Linked Group    06/04/25 1223    06/04/25 1200  POC Glucose Q6H  Every 6 Hours,   Status:  Canceled      Comments: Complete no more than 45 minutes prior to patient eating      06/04/25 1006    06/04/25 0600  Vital Signs Every Hour and Per Hospital Policy Based on Patient Condition  Every Hour,   Status:  Canceled       06/04/25 0509    06/04/25 0600  Intake & Output  Every Hour,   Status:  Canceled       06/04/25 0509    06/04/25 0600  POC Glucose Q6H  Every 6 Hours,   Status:  Canceled      Comments: Complete no more than 45 minutes prior to patient eating      06/04/25 0509    06/04/25 0508  Oral Care - Patient Not on NPPV & Not Intubated  Every Shift,   Status:  Canceled       06/04/25 0509    06/04/25 0427  sodium chloride 0.9 % flush 10 mL  As Needed,   Status:  Discontinued         06/04/25 0428    06/04/25 0427  sodium chloride 0.9 % flush 20 mL  As Needed,   Status:  Discontinued         06/04/25 0428    06/04/25 0427  sodium chloride 0.9 % infusion 40 mL  As Needed,   Status:  Discontinued         06/04/25 0428    06/04/25 0427  Change Adams Needle As Needed  As Needed,   Status:  Canceled       06/04/25 0428    06/04/25 0427  Change Dressing to IV Site As Needed When Damp, Loose or Soiled  As Needed,   Status:  Canceled       06/04/25 0428    06/04/25 0427  Change Needleless Connectors  As Needed,   Status:  Canceled      Comments: Change Needleless Connectors When:  - Administration Set Changed  - Dressing Changed  - Removed For Any Reason  - Residual Blood or Debris Within Connector  - Prior to Drawing Blood Cultures  - Contamination of Connector  - After Administration of Blood or Blood Components    06/04/25 0428    06/04/25 0427  sodium chloride 0.9 % flush 10 mL  As Needed,   Status:  " "Discontinued        Placed in \"And\" Linked Group    06/04/25 0428                  Operative/Procedure Notes (last 24 hours)  Notes from 06/04/25 1426 through 06/05/25 1426   No notes of this type exist for this encounter.       Physician Progress Notes (last 24 hours)  Notes from 06/04/25 1426 through 06/05/25 1426   No notes of this type exist for this encounter.          Consult Notes (last 24 hours)        Ghazala Coleman at 06/04/25 1618          Visit with family after patient's death.     Electronically signed by Ghazala Coleman at 06/04/25 1618       Madeleine Houser MD at 06/04/25 1609        Consult Orders    1. Hematology & Oncology Inpatient Consult [[id number]] ordered by Bisi Hylton APRN at 06/04/25 0505                   Subjective  .     REASON FOR CONSULTATION:     Provide an opinion on any further workup or treatment  Extensive stage small cell lung cancer                             REQUESTING PHYSICIAN: Demetris Brenner MD  RECORDS OBTAINED:  Records of the patient's history including those obtained from the referring provider were reviewed and summarized in detail.    HISTORY OF PRESENT ILLNESS:  The patient is a 70 y.o. year old female  who is here for follow-up with the above-mentioned history.    Patient is a 70-year-old lady with recent diagnosis of small cell lung cancer, extensive stage.  She presented with bulky right hilar lymphadenopathy, right supraclavicular lymphadenopathy.  Biopsy reviewed high-grade tumor with a Ki-67 of greater than 95%.  CT brain with no metastasis.  Patient received systemic chemotherapy with carboplatin and etoposide x 3 days between 5/23/2025 and 5/25/2025.  She tolerated treatment well.  She did receive Neupogen 300 mcg subcutaneously daily starting on 5/26/2025.  She was discharged from the hospital on 5/30/2025 with a plan to follow-up with Dr. Koroma for the second cycle of carboplatin and etoposide.  She was brought to the emergency room as she was " unresponsive and labs were severely abnormal with significant cytopenias as well as severely elevated procalcitonin.   Patient was hypoxic and hypotensive.  She was also noted to have a large right pleural effusion and very bulky mediastinal and right hilar lymphadenopathy obstructing the bronchus intermedius.  She was started on broad-spectrum antibiotics.  Further deterioration of clinical status prompted  goals of care discussion with her next of kin who is her nephew who is at bedside.  Dr. Ying discussed with the nephew and he preferred to proceed with comfort measures/hospice.  According to him this with the patient's wishes.  I discussed the same with the  patient's nephew as patient is unresponsive.  He would like for the patient to be DNR/DNI.    Past Medical History:   Diagnosis Date    Anxiety     Chronic back pain     CKD (chronic kidney disease)     Claudication     GERD (gastroesophageal reflux disease)     Hypertension     Kidney insufficiency     Raynaud's disease     Scoliosis     Small cell lung cancer 2025    Metastatic     Past Surgical History:   Procedure Laterality Date    BREAST CYST EXCISION Right     TONSILLECTOMY      US GUIDED LYMPH NODE BIOPSY  5/15/2025    VENOUS ACCESS DEVICE (PORT) INSERTION N/A 5/22/2025    Procedure: INSERTION VENOUS ACCESS DEVICE;  Surgeon: Bria Starr MD;  Location: [facility];  Service: General;  Laterality: N/A;       MEDICATIONS    Current Facility-Administered Medications:     acetaminophen (TYLENOL) tablet 650 mg, 650 mg, Oral, Q4H PRN **OR** acetaminophen (TYLENOL) 160 MG/5ML oral solution 650 mg, 650 mg, Oral, Q4H PRN **OR** acetaminophen (TYLENOL) suppository 650 mg, 650 mg, Rectal, Q4H PRN, Hardy Ying Jr., MD    diphenhydrAMINE (BENADRYL) capsule 25 mg, 25 mg, Oral, Q6H PRN **OR** diphenhydrAMINE (BENADRYL) injection 25 mg, 25 mg, Intravenous, Q6H PRN, Hardy Ying Jr., MD    diphenoxylate-atropine (LOMOTIL) 2.5-0.025 MG per  tablet 1 tablet, 1 tablet, Oral, Q2H PRN, Hardy Ying Jr., MD    glycopyrrolate (ROBINUL) injection 0.2 mg, 0.2 mg, Intravenous, Q2H PRN **OR** glycopyrrolate (ROBINUL) injection 0.2 mg, 0.2 mg, Subcutaneous, Q2H PRN **OR** glycopyrrolate (ROBINUL) injection 0.4 mg, 0.4 mg, Intravenous, Q2H PRN **OR** glycopyrrolate (ROBINUL) injection 0.4 mg, 0.4 mg, Subcutaneous, Q2H PRN, Hardy Ying Jr., MD    haloperidol (HALDOL) tablet 1 mg, 1 mg, Oral, Q4H PRN **OR** haloperidol (HALDOL) 2 MG/ML solution 1 mg, 1 mg, Oral, Q4H PRN **OR** haloperidol lactate (HALDOL) injection 1 mg, 1 mg, Subcutaneous, Q4H PRN, Hardy Ying Jr., MD    haloperidol (HALDOL) tablet 2 mg, 2 mg, Oral, Q4H PRN **OR** haloperidol (HALDOL) 2 MG/ML solution 2 mg, 2 mg, Oral, Q4H PRN **OR** haloperidol lactate (HALDOL) injection 2 mg, 2 mg, Subcutaneous, Q4H PRN, Hardy Ying Jr., MD    morphine injection 2 mg, 2 mg, Intravenous, Q1H PRN **OR** morphine concentrated solution 5 mg, 5 mg, Sublingual, Q1H PRN **OR** HYDROmorphone (DILAUDID) injection 0.5 mg, 0.5 mg, Intravenous, Q1H PRN, Hardy Ying Jr., MD    morphine injection 4 mg, 4 mg, Intravenous, Q1H PRN **OR** morphine concentrated solution 10 mg, 10 mg, Sublingual, Q1H PRN **OR** HYDROmorphone (DILAUDID) injection 1 mg, 1 mg, Intravenous, Q1H PRN, Hardy Ying Jr., MD    morphine injection 6 mg, 6 mg, Intravenous, Q1H PRN **OR** morphine concentrated solution 20 mg, 20 mg, Sublingual, Q1H PRN **OR** HYDROmorphone (DILAUDID) injection 1.5 mg, 1.5 mg, Intravenous, Q1H PRN, Hardy Ying Jr., MD    LORazepam (ATIVAN) injection 0.5 mg, 0.5 mg, Intravenous, Q1H PRN **OR** LORazepam (ATIVAN) injection 0.5 mg, 0.5 mg, Subcutaneous, Q1H PRN **OR** LORazepam (ATIVAN) 2 MG/ML concentrated solution 0.5 mg, 0.5 mg, Sublingual, Q1H PRN, Hardy Ying Jr., MD    LORazepam (ATIVAN) injection 1 mg, 1 mg, Intravenous, Q1H PRN **OR** LORazepam (ATIVAN)  injection 1 mg, 1 mg, Subcutaneous, Q1H PRN **OR** LORazepam (ATIVAN) 2 MG/ML concentrated solution 1 mg, 1 mg, Sublingual, Q1H PRN, Hardy Ying Jr., MD    LORazepam (ATIVAN) injection 2 mg, 2 mg, Intravenous, Q1H PRN **OR** LORazepam (ATIVAN) injection 2 mg, 2 mg, Subcutaneous, Q1H PRN **OR** LORazepam (ATIVAN) 2 MG/ML concentrated solution 2 mg, 2 mg, Sublingual, Q1H PRN, Hardy Ying Jr., MD    magic mouthwash oral suspension (mixture) (diphenhydrAMINE HCl - aluminum & magnesium hydroxide-simethicone - lidocaine viscous) solution 55 mL, 5 mL, Swish & Spit, Q4H PRN, Hardy Ying Jr., MD    ondansetron ODT (ZOFRAN-ODT) disintegrating tablet 4 mg, 4 mg, Oral, Q6H PRN **OR** ondansetron (ZOFRAN) injection 4 mg, 4 mg, Intravenous, Q6H PRN, Hardy Ying Jr., MD    [START ON 6/5/2025] pantoprazole (PROTONIX) EC tablet 40 mg, 40 mg, Oral, Q AM **OR** [START ON 6/5/2025] pantoprazole (PROTONIX) injection 40 mg, 40 mg, Intravenous, Q AM, Hardy Ying Jr., MD    Polyvinyl Alcohol-Povidone PF (ARTIFICIAL TEARS) 1.4-0.6 % ophthalmic solution 1 drop, 1 drop, Both Eyes, Q30 Min PRN, Hardy Ying Jr., MD    prochlorperazine (COMPAZINE) injection 5 mg, 5 mg, Intravenous, Q6H PRN **OR** prochlorperazine (COMPAZINE) tablet 5 mg, 5 mg, Oral, Q6H PRN **OR** prochlorperazine (COMPAZINE) suppository 25 mg, 25 mg, Rectal, Q12H PRN, Hardy Ying Jr., MD    promethazine (PHENERGAN) tablet 12.5 mg, 12.5 mg, Oral, Q4H PRN **OR** promethazine (PHENERGAN) suppository 12.5 mg, 12.5 mg, Rectal, Q4H PRN, Hardy Ying Jr., MD    scopolamine patch 1 mg/72 hr, 1 patch, Transdermal, Q72H PRN, Hardy Ying Jr., MD    Access VAD, , , Once **AND** sodium chloride 0.9 % flush 10 mL, 10 mL, Intravenous, PRN, Demetris Brenner MD    sodium chloride 0.9 % flush 10 mL, 10 mL, Intravenous, PRN, Demetris Brenner MD    sodium chloride 0.9 % flush 20 mL, 20 mL, Intravenous, PRN, Baker, Demetris C,  MD    sodium chloride 0.9 % infusion 40 mL, 40 mL, Intravenous, PRN, Demetris Brenner MD    sodium chloride nasal spray 2 spray, 2 spray, Each Nare, LEANDRA, Hardy Ying Jr., MD    ALLERGIES:     Allergies   Allergen Reactions    Codeine Unknown - Low Severity    Sulfa Antibiotics Unknown - Low Severity       SOCIAL HISTORY:       Social History     Socioeconomic History    Marital status:    Tobacco Use    Smoking status: Former     Current packs/day: 1.00     Average packs/day: 1 pack/day for 40.0 years (40.0 ttl pk-yrs)     Types: Cigarettes    Smokeless tobacco: Never    Tobacco comments:     quit 2 weeks ago   Vaping Use    Vaping status: Never Used   Substance and Sexual Activity    Alcohol use: No    Drug use: No    Sexual activity: Defer         FAMILY HISTORY:  Family History   Problem Relation Age of Onset    Alzheimer's disease Mother     Cancer Father     Cancer Sister     Hemochromatosis Sister     Pancreatic cancer Sister     Malig Hyperthermia Neg Hx        REVIEW OF SYSTEMS:  Review of Systems  Unable to obtain      Objective     Vitals:    06/04/25 1145 06/04/25 1321 06/04/25 1331 06/04/25 1412   BP: 92/56 (!) 80/47 (!) 80/43    BP Location:       Patient Position:       Pulse: 106 106 106 Comment: uto   Resp:    26   Temp:    97.4 °F (36.3 °C)   TempSrc:    Axillary   SpO2: (!) 85% 94% (!) 88%    Weight:              No data to display               PHYSICAL EXAM:      CONSTITUTIONAL:  Vital signs reviewed.  Ill-appearing and unresponsive  EYES:  Conjunctivae and lids unremarkable.  PERRLA  EARS,NOSE,MOUTH,THROAT:  Ears and nose appear unremarkable.  Lips, teeth, gums appear unremarkable.  RESPIRATORY:   On a nonrebreather.  CARDIOVASCULAR:  Normal S1, S2.  No murmurs rubs or gallops.          RECENT LABS:        WBC   Date Value Ref Range Status   06/04/2025 1.12 (C) 3.40 - 10.80 10*3/mm3 Final   06/04/2025   Corrected     Comment:     .  Corrected result. Previous result was 0.93  10*3/mm3 on 2025 at 0344 EDT.     Hemoglobin   Date Value Ref Range Status   2025 8.2 (L) 12.0 - 15.9 g/dL Final   2025   Corrected     Comment:     .  Corrected result. Previous result was 5.1 g/dL on 2025 at 0344 EDT.     Platelets   Date Value Ref Range Status   2025 9 (C) 140 - 450 10*3/mm3 Final   2025   Corrected     Comment:     .  Corrected result. Previous result was 7 10*3/mm3 on 2025 at 0344 EDT.       Assessment & Plan   [unfilled]      Penelope Onofre       *Extensive stage small cell lung cancer  Received cycle 1 chemotherapy with, cis-platinum and etoposide from 2025 to 2025.  Patient admitted with severe cytopenias and sepsis.  She is unresponsive and terminally ill.  Proceed with comfort measures/hospice.    *Septic shock  Was on vasopressors per ICU team.  Possible pneumonia versus UTI as the source of infection.  On vancomycin and cefepime.  Patient's family elected comfort measures.  Proceed with hospice consult    *Altered mental status  Secondary to sepsis.    *Severe electrolyte abnormalities  Secondary to sepsis.    Recommendations  Patient's family elected for comfort measures.  Agree with the same.  Agree with DNR/DNI and hospice.    We will sign off at this time.  Please call with additional questions or concerns                 Electronically signed by Madeleine Houser MD at 25 1617       Wu Bass MD at 25 1451        Consult Orders    1. Inpatient Palliative Care MD Consult [[id number]] ordered by Hardy Ying Jr., MD                   UPDATE: Notified by RN that patient  145.  Quiana at bedside engaging in postdeath rituals and grieving appropriately.  She is in the process of notifying additional extensive family.  She expressed gratitude for the care received here at [facility].            [facility]   Inpatient Palliative Care Consultation  Patient Name: Penelope Onofre   Patient  : 1955   Date: 2025   Consulting Provider: Stepan  Reason for Consultation: End-of-life management  Inpatient Palliative Care MD Consult  Consult performed by: Wu Bass MD  Consult ordered by: Hardy Ying Jr., MD      Chief Complaint: Terminal decline    Information obtained from: Chart review, family, bedside RN    Summary of Palliative Illness and Palliative Assessment: 70-year-old female recently diagnosed with small cell lung cancer who presents with hypotension and altered mental status.    In the ED patient was found to be hypotensive, septic, and in multiorgan failure with an ABG showing a pH of 7.29, PO2 55.6; lactate 4.8; platelets 9; white blood cell 1.1; hemoglobin 8.2.  Chest x-ray was obtained that I independently reviewed showing right greater than left pleural opacities consistent with interstitial disease.  Given her advanced disease state, multiorgan failure, and poor prognosis comfort focused care was recommended as the only viable course of action.    Patient's primary caregiver has been her nephew Sanjiv along with his wife Quiana who have been present during hospital stay. Patient does not have a spouse or children and parents have predeceased her.  Her next of kin would be her 2 brothers from whom she is largely estranged. Brother Suman Onofre ([phone]) called at [phone] with no answer.  Voicemail left with callback number.  Patient's other brother is named Carlos Onofre but nephew and niece do not have contact information for him so he is unable to be reached.    Past Medical and Surgical History:    Past Medical History:   Diagnosis Date    Anxiety     Chronic back pain     CKD (chronic kidney disease)     Claudication     GERD (gastroesophageal reflux disease)     Hypertension     Kidney insufficiency     Raynaud's disease     Scoliosis     Small cell lung cancer     Metastatic      Past Surgical History:   Procedure Laterality Date    BREAST CYST  EXCISION Right     TONSILLECTOMY      US GUIDED LYMPH NODE BIOPSY  5/15/2025    VENOUS ACCESS DEVICE (PORT) INSERTION N/A 5/22/2025    Procedure: INSERTION VENOUS ACCESS DEVICE;  Surgeon: Bria Starr MD;  Location: [facility] OR;  Service: General;  Laterality: N/A;          Palliative Care Psychosocial and Spiritual Screening    SocHx:  at [employer] [employer] for 20+ years retiring just 6 years ago      Physical Assessment:   BP (!) 80/43   Pulse 106   Temp 97.4 °F (36.3 °C) (Axillary)   Resp 26   Wt 59.1 kg (130 lb 6.4 oz)   SpO2 (!) 88%   BMI 17.69 kg/m²    Palliative Performance Scale: 10%  Physical Exam  Constitutional:       Comments: Ill-appearing and actively dying, propped up in bed   HENT:      Head:      Comments: Severe temporal wasting; prominent zygomatic arches     Mouth/Throat:      Mouth: Mucous membranes are dry.      Pharynx: Oropharynx is clear.      Comments: Edentulous  Eyes:      Comments: Half open; negative eyelash reflex   Cardiovascular:      Comments: Bradycardic; absent radial pulses bilaterally  Pulmonary:      Comments: Agonal respirations with mandibular movement; periods of apnea  Abdominal:      Comments: Absent bowel sounds   Skin:     Comments: Cold and mottled distal extremities   Neurological:      Comments: Unresponsive; no myoclonus   Psychiatric:      Comments: No agitation or restlessness        Medications:    Current Facility-Administered Medications:     acetaminophen (TYLENOL) tablet 650 mg, 650 mg, Oral, Q4H PRN **OR** acetaminophen (TYLENOL) 160 MG/5ML oral solution 650 mg, 650 mg, Oral, Q4H PRN **OR** acetaminophen (TYLENOL) suppository 650 mg, 650 mg, Rectal, Q4H PRN, Hardy Ying Jr., MD    diphenhydrAMINE (BENADRYL) capsule 25 mg, 25 mg, Oral, Q6H PRN **OR** diphenhydrAMINE (BENADRYL) injection 25 mg, 25 mg, Intravenous, Q6H PRN, Hardy Ying Jr., MD    diphenoxylate-atropine (LOMOTIL) 2.5-0.025 MG per tablet 1 tablet, 1 tablet,  Oral, Q2H PRN, Hardy Ying Jr., MD    glycopyrrolate (ROBINUL) injection 0.2 mg, 0.2 mg, Intravenous, Q2H PRN **OR** glycopyrrolate (ROBINUL) injection 0.2 mg, 0.2 mg, Subcutaneous, Q2H PRN **OR** glycopyrrolate (ROBINUL) injection 0.4 mg, 0.4 mg, Intravenous, Q2H PRN **OR** glycopyrrolate (ROBINUL) injection 0.4 mg, 0.4 mg, Subcutaneous, Q2H PRN, Hardy Ying Jr., MD    haloperidol (HALDOL) tablet 1 mg, 1 mg, Oral, Q4H PRN **OR** haloperidol (HALDOL) 2 MG/ML solution 1 mg, 1 mg, Oral, Q4H PRN **OR** haloperidol lactate (HALDOL) injection 1 mg, 1 mg, Subcutaneous, Q4H PRN, Hardy Ying Jr., MD    haloperidol (HALDOL) tablet 2 mg, 2 mg, Oral, Q4H PRN **OR** haloperidol (HALDOL) 2 MG/ML solution 2 mg, 2 mg, Oral, Q4H PRN **OR** haloperidol lactate (HALDOL) injection 2 mg, 2 mg, Subcutaneous, Q4H PRN, Hardy Ying Jr., MD    morphine injection 2 mg, 2 mg, Intravenous, Q1H PRN **OR** morphine concentrated solution 5 mg, 5 mg, Sublingual, Q1H PRN **OR** HYDROmorphone (DILAUDID) injection 0.5 mg, 0.5 mg, Intravenous, Q1H PRN, Hardy Ying Jr., MD    morphine injection 4 mg, 4 mg, Intravenous, Q1H PRN **OR** morphine concentrated solution 10 mg, 10 mg, Sublingual, Q1H PRN **OR** HYDROmorphone (DILAUDID) injection 1 mg, 1 mg, Intravenous, Q1H PRN, Hardy Ying Jr., MD    morphine injection 6 mg, 6 mg, Intravenous, Q1H PRN **OR** morphine concentrated solution 20 mg, 20 mg, Sublingual, Q1H PRN **OR** HYDROmorphone (DILAUDID) injection 1.5 mg, 1.5 mg, Intravenous, Q1H PRN, Hardy Ying Jr., MD    LORazepam (ATIVAN) injection 0.5 mg, 0.5 mg, Intravenous, Q1H PRN **OR** LORazepam (ATIVAN) injection 0.5 mg, 0.5 mg, Subcutaneous, Q1H PRN **OR** LORazepam (ATIVAN) 2 MG/ML concentrated solution 0.5 mg, 0.5 mg, Sublingual, Q1H PRN, Hardy Yign Jr., MD    LORazepam (ATIVAN) injection 1 mg, 1 mg, Intravenous, Q1H PRN **OR** LORazepam (ATIVAN) injection 1 mg, 1 mg, Subcutaneous,  Q1H PRN **OR** LORazepam (ATIVAN) 2 MG/ML concentrated solution 1 mg, 1 mg, Sublingual, Q1H PRN, Hardy Ying Jr., MD    LORazepam (ATIVAN) injection 2 mg, 2 mg, Intravenous, Q1H PRN **OR** LORazepam (ATIVAN) injection 2 mg, 2 mg, Subcutaneous, Q1H PRN **OR** LORazepam (ATIVAN) 2 MG/ML concentrated solution 2 mg, 2 mg, Sublingual, Q1H PRN, Hardy Ying Jr., MD    magic mouthwash oral suspension (mixture) (diphenhydrAMINE HCl - aluminum & magnesium hydroxide-simethicone - lidocaine viscous) solution 55 mL, 5 mL, Swish & Spit, Q4H PRN, Hardy Ying Jr., MD    ondansetron ODT (ZOFRAN-ODT) disintegrating tablet 4 mg, 4 mg, Oral, Q6H PRN **OR** ondansetron (ZOFRAN) injection 4 mg, 4 mg, Intravenous, Q6H PRN, Hardy Ying Jr., MD    [START ON 6/5/2025] pantoprazole (PROTONIX) EC tablet 40 mg, 40 mg, Oral, Q AM **OR** [START ON 6/5/2025] pantoprazole (PROTONIX) injection 40 mg, 40 mg, Intravenous, Q AM, Hardy Ying Jr., MD    Polyvinyl Alcohol-Povidone PF (ARTIFICIAL TEARS) 1.4-0.6 % ophthalmic solution 1 drop, 1 drop, Both Eyes, Q30 Min PRN, Hardy Ying Jr., MD    prochlorperazine (COMPAZINE) injection 5 mg, 5 mg, Intravenous, Q6H PRN **OR** prochlorperazine (COMPAZINE) tablet 5 mg, 5 mg, Oral, Q6H PRN **OR** prochlorperazine (COMPAZINE) suppository 25 mg, 25 mg, Rectal, Q12H PRN, Hardy Ying Jr., MD    promethazine (PHENERGAN) tablet 12.5 mg, 12.5 mg, Oral, Q4H PRN **OR** promethazine (PHENERGAN) suppository 12.5 mg, 12.5 mg, Rectal, Q4H PRN, Hardy Ying Jr., MD    scopolamine patch 1 mg/72 hr, 1 patch, Transdermal, Q72H PRN, Hardy Ying Jr., MD    Access VAD, , , Once **AND** sodium chloride 0.9 % flush 10 mL, 10 mL, Intravenous, PRN, Demetris Brenner MD    sodium chloride 0.9 % flush 10 mL, 10 mL, Intravenous, PRN, Demetris Brenner MD    sodium chloride 0.9 % flush 20 mL, 20 mL, Intravenous, PRN, Demetris Brenner MD    sodium chloride 0.9 % infusion 40  mL, 40 mL, Intravenous, PRN, Demetris Brenner MD    sodium chloride nasal spray 2 spray, 2 spray, Each Nare, LEANDRA, Hardy Ying Jr., MD     Allergies:   Allergies   Allergen Reactions    Codeine Unknown - Low Severity    Sulfa Antibiotics Unknown - Low Severity          Data (labs/images reviewed):   WBC   Date Value Ref Range Status   06/04/2025 1.12 (C) 3.40 - 10.80 10*3/mm3 Final     RBC   Date Value Ref Range Status   06/04/2025 2.35 (L) 3.77 - 5.28 10*6/mm3 Final     Hemoglobin   Date Value Ref Range Status   06/04/2025 8.2 (L) 12.0 - 15.9 g/dL Final     Hematocrit   Date Value Ref Range Status   06/04/2025 23.4 (L) 34.0 - 46.6 % Final     MCV   Date Value Ref Range Status   06/04/2025 99.6 (H) 79.0 - 97.0 fL Final     MCH   Date Value Ref Range Status   06/04/2025 34.9 (H) 26.6 - 33.0 pg Final     MCHC   Date Value Ref Range Status   06/04/2025 35.0 31.5 - 35.7 g/dL Final     RDW   Date Value Ref Range Status   06/04/2025 13.1 12.3 - 15.4 % Final     RDW-SD   Date Value Ref Range Status   06/04/2025 46.7 37.0 - 54.0 fl Final     MPV   Date Value Ref Range Status   06/04/2025 12.4 (H) 6.0 - 12.0 fL Final     Platelets   Date Value Ref Range Status   06/04/2025 9 (C) 140 - 450 10*3/mm3 Final     Neutrophils Absolute   Date Value Ref Range Status   06/04/2025   Corrected     Comment:     Corrected result. Previous result was 0.52 10*3/mm3 on 6/4/2025 at 0439 EDT.        Lab Results   Component Value Date    GLUCOSE 123 (H) 06/04/2025    BUN 32.0 (H) 06/04/2025    CREATININE 2.27 (H) 06/04/2025     (L) 06/04/2025    K 2.8 (L) 06/04/2025    CL 94 (L) 06/04/2025    CALCIUM 8.0 (L) 06/04/2025    PROTEINTOT 5.1 (L) 06/04/2025    ALBUMIN 2.5 (L) 06/04/2025    ALT 13 06/04/2025    AST 20 06/04/2025    ALKPHOS 34 (L) 06/04/2025    BILITOT 0.5 06/04/2025    GLOB 2.6 06/04/2025    AGRATIO 1.0 06/04/2025    BCR 14.1 06/04/2025    ANIONGAP 20.3 (H) 06/04/2025    EGFR 22.6 (L) 06/04/2025      Palliative Care  Assessment and Recommendations: Penelope Onofre is a 70 y.o. female with a primary palliative care diagnosis of terminal decline from sepsis and lung cancer. Palliative care was consulted for end-of-life care management.     Prognosis and Palliative Performance Scale:  10%  Disease State: Actively dying  Symptom Management:   Pain: Continue opioids as needed titrate to effect  Shortness of Breath: Related to malignancy and decline; opioids as primary intervention  Constipation: Bisacodyl IL as needed available but aggressive bowel regimen unlikely to aid in comfort at this stage  Goals of Care Treatment Preferences   Palliative Care Decision Making Capacity: Not decisional; actively dying  Healthcare Surrogate: Next of kin per [place] statute would be 2 brothers Suman and Carlos, however she has been extreme from them for some time now.  Attempts to reach Suman have not been successful and no contact information for Carlos is available.  Primary caregivers have been nephew Sanjiv and his wife Quiana.  Sanjiv would be next in line as healthcare surrogate [place] as next nearest and contactable living relative.  What is Most important to patient/family at this time: Focused care  Code Status DNR    Thank you for consulting palliative care. If you need to reach the provider on call for the palliative care team please call [phone]      Wu Bass MD  6/4/2025 14:51 EDT              Electronically signed by Wu Bass MD at 06/04/25 1528          Discharge Summary        Hardy Ying Jr., MD at 06/04/25 1648          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  Right pleural effusion malignant  Acute kidney injury probably ATN  Severe pancytopenia  Acute hypoxic respiratory failure  Lactic acidosis  Non-ST elevation MI type II demand ischemia  Metabolic  encephalopathy  Hyponatremia  Hypokalemia  Hypomagnesemia  COPD    Course patient admitted for septic shock she has known extensive stage small cell carcinoma of the lung with a right pleural effusion that supplies is suspected to be malignant she developed some paroxysmal A-fib with rapid rates she had evidence of pneumonia and a UTI she was severely pancytopenic respiratory failure she had lactic acidosis elevated troponin was no evidence of a ACS probably demand ischemia.  Patient was cachectic appearing encephalopathic.  She continued to deteriorate despite aggressive therapy to the point where she was going to require intubation mechanical ventilation her nephew Sanjiv who helps care for her was here and she has him is his emergency contact.  We talked her quality of life has been declining clearly this is going to take a major blow to that skin to be hard for her to recover and get back to even treatment for her cancer.  He did not think she would want to go on a ventilator or go through CPR we talked at this point she looks quite miserable she has air hunger and he requested that we moved to a palliative care approach which I 100% supported and agree with.  The patient was therefore transferred to palliative care and she subsequently     Electronically signed by Hardy Ying Jr., MD at 25 7106

## 2025-06-06 LAB
BACTERIA SPEC AEROBE CULT: ABNORMAL
BACTERIA SPEC AEROBE CULT: ABNORMAL
GRAM STN SPEC: ABNORMAL
ISOLATED FROM: ABNORMAL

## 2025-06-08 LAB
BACTERIA SPEC AEROBE CULT: ABNORMAL
BH BB BLOOD EXPIRATION DATE: NORMAL
BH BB BLOOD TYPE BARCODE: 5100
BH BB DISPENSE STATUS: NORMAL
BH BB PRODUCT CODE: NORMAL
BH BB UNIT NUMBER: NORMAL
CROSSMATCH INTERPRETATION: NORMAL
GRAM STN SPEC: ABNORMAL
GRAM STN SPEC: ABNORMAL
ISOLATED FROM: ABNORMAL
UNIT  ABO: NORMAL
UNIT  RH: NORMAL

## 2025-06-11 LAB
MYCOBACTERIUM SPEC CULT: NORMAL
NIGHT BLUE STAIN TISS: NORMAL

## 2025-06-18 LAB
MYCOBACTERIUM SPEC CULT: NORMAL
NIGHT BLUE STAIN TISS: NORMAL

## 2025-06-25 LAB
MYCOBACTERIUM SPEC CULT: NORMAL
NIGHT BLUE STAIN TISS: NORMAL

## (undated) DEVICE — SYR LL TP 10ML STRL

## (undated) DEVICE — SUT PROLN 2/0 SH 36IN 8523H

## (undated) DEVICE — NDL HYPO PRECISIONGLIDE REG 25G 1 1/2

## (undated) DEVICE — CVR PROB GEN PURP W ISOSILK 6X48

## (undated) DEVICE — ANTIBACTERIAL UNDYED BRAIDED (POLYGLACTIN 910), SYNTHETIC ABSORBABLE SUTURE: Brand: COATED VICRYL

## (undated) DEVICE — SOL NACL 0.9PCT 100ML SGL

## (undated) DEVICE — Device

## (undated) DEVICE — INTENDED FOR TISSUE SEPARATION, AND OTHER PROCEDURES THAT REQUIRE A SHARP SURGICAL BLADE TO PUNCTURE OR CUT.: Brand: BARD-PARKER ® CARBON RIB-BACK BLADES

## (undated) DEVICE — EXOFIN PRECISION PEN HIGH VISCOSITY TOPICAL SKIN ADHESIVE: Brand: EXOFIN PRECISION PEN, 1G

## (undated) DEVICE — GLV SURG BIOGEL LTX PF 6 1/2

## (undated) DEVICE — PATIENT RETURN ELECTRODE, SINGLE-USE, CONTACT QUALITY MONITORING, ADULT, WITH 9FT CORD, FOR PATIENTS WEIGING OVER 33LBS. (15KG): Brand: MEGADYNE

## (undated) DEVICE — COVER,C-ARM,41X74: Brand: MEDLINE

## (undated) DEVICE — LOU MINOR PROCEDURE: Brand: MEDLINE INDUSTRIES, INC.